# Patient Record
Sex: MALE | Race: WHITE | NOT HISPANIC OR LATINO | Employment: OTHER | URBAN - METROPOLITAN AREA
[De-identification: names, ages, dates, MRNs, and addresses within clinical notes are randomized per-mention and may not be internally consistent; named-entity substitution may affect disease eponyms.]

---

## 2017-01-14 RX ORDER — TAMSULOSIN HYDROCHLORIDE 0.4 MG/1
CAPSULE ORAL
Qty: 90 CAPSULE | Refills: 1 | Status: SHIPPED | OUTPATIENT
Start: 2017-01-14 | End: 2017-06-28 | Stop reason: SDUPTHER

## 2017-01-23 RX ORDER — HYDROCODONE BITARTRATE AND ACETAMINOPHEN 5; 325 MG/1; MG/1
1 TABLET ORAL EVERY 6 HOURS PRN
Qty: 120 TABLET | Refills: 0 | Status: SHIPPED | OUTPATIENT
Start: 2017-01-23 | End: 2017-02-22 | Stop reason: SDUPTHER

## 2017-01-23 NOTE — TELEPHONE ENCOUNTER
----- Message from Deepak Wasserman sent at 1/23/2017  9:32 AM CST -----  Contact: Patient  Patient needs a refill on Vikatin called into   [facility] [address] [place] [address] AT [facility] & [address] [address]  Phone: [phone] Fax: [phone]  Please call patient at [phone] to confirm that prescription has been called in. Thanks!

## 2017-01-23 NOTE — TELEPHONE ENCOUNTER
LV 9/27/16   He has appointment scheduled feb. 15th.   Pt is aware that he needs to come in every 3 months but that the soonest he could get in.

## 2017-02-01 ENCOUNTER — PATIENT OUTREACH (OUTPATIENT)
Dept: ADMINISTRATIVE | Facility: HOSPITAL | Age: 82
End: 2017-02-01

## 2017-02-08 ENCOUNTER — LAB VISIT (OUTPATIENT)
Dept: LAB | Facility: HOSPITAL | Age: 82
End: 2017-02-08
Attending: FAMILY MEDICINE
Payer: MEDICARE

## 2017-02-08 DIAGNOSIS — I10 ESSENTIAL HYPERTENSION: Chronic | ICD-10-CM

## 2017-02-08 DIAGNOSIS — E78.5 DYSLIPIDEMIA: Chronic | ICD-10-CM

## 2017-02-08 DIAGNOSIS — E11.8 TYPE 2 DIABETES MELLITUS WITH COMPLICATION, WITHOUT LONG-TERM CURRENT USE OF INSULIN: ICD-10-CM

## 2017-02-08 LAB
ALBUMIN SERPL BCP-MCNC: 3.9 G/DL
ALP SERPL-CCNC: 60 U/L
ALT SERPL W/O P-5'-P-CCNC: 17 U/L
ANION GAP SERPL CALC-SCNC: 11 MMOL/L
AST SERPL-CCNC: 19 U/L
BILIRUB SERPL-MCNC: 0.8 MG/DL
BUN SERPL-MCNC: 14 MG/DL
CALCIUM SERPL-MCNC: 9.3 MG/DL
CHLORIDE SERPL-SCNC: 105 MMOL/L
CHOLEST/HDLC SERPL: 2.4 {RATIO}
CO2 SERPL-SCNC: 24 MMOL/L
CREAT SERPL-MCNC: 1.1 MG/DL
EST. GFR  (AFRICAN AMERICAN): >60 ML/MIN/1.73 M^2
EST. GFR  (NON AFRICAN AMERICAN): >60 ML/MIN/1.73 M^2
GLUCOSE SERPL-MCNC: 102 MG/DL
HDL/CHOLESTEROL RATIO: 41.9 %
HDLC SERPL-MCNC: 129 MG/DL
HDLC SERPL-MCNC: 54 MG/DL
LDLC SERPL CALC-MCNC: 64.2 MG/DL
NONHDLC SERPL-MCNC: 75 MG/DL
POTASSIUM SERPL-SCNC: 4.2 MMOL/L
PROT SERPL-MCNC: 7 G/DL
SODIUM SERPL-SCNC: 140 MMOL/L
TRIGL SERPL-MCNC: 54 MG/DL
TSH SERPL DL<=0.005 MIU/L-ACNC: 1.88 UIU/ML

## 2017-02-08 PROCEDURE — 80061 LIPID PANEL: CPT

## 2017-02-08 PROCEDURE — 83036 HEMOGLOBIN GLYCOSYLATED A1C: CPT

## 2017-02-08 PROCEDURE — 84443 ASSAY THYROID STIM HORMONE: CPT

## 2017-02-08 PROCEDURE — 36415 COLL VENOUS BLD VENIPUNCTURE: CPT | Mod: PO

## 2017-02-08 PROCEDURE — 80053 COMPREHEN METABOLIC PANEL: CPT

## 2017-02-09 LAB
ESTIMATED AVG GLUCOSE: 123 MG/DL
HBA1C MFR BLD HPLC: 5.9 %

## 2017-02-14 RX ORDER — AMIODARONE HYDROCHLORIDE 200 MG/1
TABLET ORAL
Qty: 90 TABLET | Refills: 3 | Status: SHIPPED | OUTPATIENT
Start: 2017-02-14 | End: 2018-03-26 | Stop reason: SDUPTHER

## 2017-02-15 ENCOUNTER — OFFICE VISIT (OUTPATIENT)
Dept: FAMILY MEDICINE | Facility: CLINIC | Age: 82
End: 2017-02-15
Payer: MEDICARE

## 2017-02-15 VITALS
SYSTOLIC BLOOD PRESSURE: 130 MMHG | DIASTOLIC BLOOD PRESSURE: 70 MMHG | BODY MASS INDEX: 30.54 KG/M2 | HEART RATE: 48 BPM | HEIGHT: 68 IN | WEIGHT: 201.5 LBS | RESPIRATION RATE: 16 BRPM

## 2017-02-15 DIAGNOSIS — M51.9 LUMBAR DISC DISEASE: ICD-10-CM

## 2017-02-15 DIAGNOSIS — E11.8 TYPE 2 DIABETES MELLITUS WITH COMPLICATION, WITHOUT LONG-TERM CURRENT USE OF INSULIN: Primary | ICD-10-CM

## 2017-02-15 DIAGNOSIS — H04.203 WATERY EYES: ICD-10-CM

## 2017-02-15 DIAGNOSIS — E78.5 DYSLIPIDEMIA: ICD-10-CM

## 2017-02-15 DIAGNOSIS — E11.43 TYPE II DIABETES MELLITUS WITH PERIPHERAL AUTONOMIC NEUROPATHY: ICD-10-CM

## 2017-02-15 DIAGNOSIS — I10 ESSENTIAL HYPERTENSION: ICD-10-CM

## 2017-02-15 DIAGNOSIS — I25.10 ATHEROSCLEROSIS OF NATIVE CORONARY ARTERY OF NATIVE HEART WITHOUT ANGINA PECTORIS: ICD-10-CM

## 2017-02-15 PROCEDURE — 3075F SYST BP GE 130 - 139MM HG: CPT | Mod: S$GLB,,, | Performed by: FAMILY MEDICINE

## 2017-02-15 PROCEDURE — 1157F ADVNC CARE PLAN IN RCRD: CPT | Mod: S$GLB,,, | Performed by: FAMILY MEDICINE

## 2017-02-15 PROCEDURE — 99499 UNLISTED E&M SERVICE: CPT | Mod: S$GLB,,, | Performed by: FAMILY MEDICINE

## 2017-02-15 PROCEDURE — 99214 OFFICE O/P EST MOD 30 MIN: CPT | Mod: S$GLB,,, | Performed by: FAMILY MEDICINE

## 2017-02-15 PROCEDURE — 1126F AMNT PAIN NOTED NONE PRSNT: CPT | Mod: S$GLB,,, | Performed by: FAMILY MEDICINE

## 2017-02-15 PROCEDURE — 1159F MED LIST DOCD IN RCRD: CPT | Mod: S$GLB,,, | Performed by: FAMILY MEDICINE

## 2017-02-15 PROCEDURE — 1160F RVW MEDS BY RX/DR IN RCRD: CPT | Mod: S$GLB,,, | Performed by: FAMILY MEDICINE

## 2017-02-15 PROCEDURE — 3078F DIAST BP <80 MM HG: CPT | Mod: S$GLB,,, | Performed by: FAMILY MEDICINE

## 2017-02-15 PROCEDURE — 99999 PR PBB SHADOW E&M-EST. PATIENT-LVL III: CPT | Mod: PBBFAC,,, | Performed by: FAMILY MEDICINE

## 2017-02-15 RX ORDER — CICLOPIROX OLAMINE 7.7 MG/G
CREAM TOPICAL
Refills: 3 | COMMUNITY
Start: 2017-02-09 | End: 2017-05-24 | Stop reason: ALTCHOICE

## 2017-02-15 RX ORDER — QUETIAPINE FUMARATE 50 MG/1
TABLET, FILM COATED ORAL
COMMUNITY
Start: 2017-02-14 | End: 2017-03-29

## 2017-02-15 RX ORDER — TRIAMCINOLONE ACETONIDE 1 MG/G
CREAM TOPICAL
Refills: 1 | COMMUNITY
Start: 2017-02-09 | End: 2017-05-24 | Stop reason: ALTCHOICE

## 2017-02-15 NOTE — MR AVS SNAPSHOT
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         Essential hypertension         Dyslipidemia         Lumbar disc disease         Atherosclerosis of native coronary artery of native heart without angina pectoris         Watery eyes                To Do List           Future Appointments        Provider Department Dept Phone    2017 10:15 AM [facility] US1 [facility] [phone]    3/6/2017 2:30 PM DEAN Muir OD [facility] [phone]    3/29/2017 10:20 AM Don Amaro MD [facility] Cardiology [phone]    5/15/2017 10:40 AM Brodie Trotter MD [facility] [phone]      Goals (5 Years of Data)     None      [facility] On [facility]     [facility] Nurse Care [facility] -  Assistance  Registered nurses in the [facility] provide clinical advisement, health education, appointment booking, and other advisory services.  Call for this free service at [phone].             Medications           Message regarding Medications     Verify the changes and/or additions to your medication regime listed below are the same as discussed with your clinician today.  If any of these changes or additions are incorrect, please notify your healthcare provider.             Verify that the below list of medications is an accurate representation of the medications you are currently taking.  If none reported, the list may be blank. If incorrect,  "please contact your healthcare provider. Carry this list with you in case of emergency.           Current Medications     amiodarone (PACERONE) 200 MG Tab TAKE ONE TABLET BY MOUTH TWICE DAILY FOR 2 WEEKS, THEN ONE TABLET BY MOUTH DAILY THEREAFTER    atorvastatin (LIPITOR) 10 MG tablet Take 1 tablet (10 mg total) by mouth once daily.    cetirizine (ZYRTEC) 10 MG tablet Take 10 mg by mouth 2 (two) times daily.     ciclopirox (LOPROX) 0.77 % Crea APPLY TO FEET BID FOR 4 WEEKS    hydrocodone-acetaminophen 5-325mg (NORCO) 5-325 mg per tablet Take 1 tablet by mouth every 6 (six) hours as needed.    metformin (GLUCOPHAGE-XR) 500 MG 24 hr tablet Take 2 tablets (1,000 mg total) by mouth daily with dinner or evening meal.    nitroGLYCERIN (NITROQUICK) 0.4 MG SL tablet Place 0.4 mg under the tongue every 5 (five) minutes as needed.     omeprazole (PRILOSEC) 40 MG capsule Take 40 mg by mouth once daily.    ONE TOUCH ULTRASOFT LANCETS lancets TEST TWICE DAILY    oxybutynin (DITROPAN-XL) 10 MG 24 hr tablet Take 1 tablet (10 mg total) by mouth once daily.    quetiapine (SEROQUEL) 100 MG Tab TAKE 1 TABLET BY MOUTH EVERY NIGHT AT BEDTIME    quetiapine (SEROQUEL) 50 MG tablet     tamsulosin (FLOMAX) 0.4 mg Cp24 TAKE 1 CAPSULE BY MOUTH EVERY NIGHT AT BEDTIME    triamcinolone acetonide 0.1% (KENALOG) 0.1 % cream APPLY TO AFFECTED AREA BID PRN FOR ITCHING    azelastine (ASTELIN) 137 mcg (0.1 %) nasal spray 1 spray (137 mcg total) by Nasal route 2 (two) times daily.    fluticasone (FLONASE) 50 mcg/actuation nasal spray 1 spray by Each Nare route 2 (two) times daily.           Clinical Reference Information           Your Vitals Were     BP Pulse Resp Height Weight BMI    144/76 48 16 5' 8" (1.727 m) 91.4 kg (201 lb 8 oz) 30.64 kg/m2      Blood Pressure          Most Recent Value    BP  (!)  144/76      Allergies as of 2/15/2017     No Known Drug Allergies      Immunizations Administered on Date of Encounter - 2/15/2017     None    "   Orders Placed During Today's Visit      Normal Orders This Visit    Ambulatory referral to Optometry     Future Labs/Procedures Expected by Expires    US Carotid Bilateral  2/15/2017 2/15/2018      Language Assistance Services     ATTENTION: Language assistance services are available, free of charge. Please call [phone].      ATENCIÓN: Si habla rl, tiene a torres disposición servicios gratuitos de asistencia lingüística. Llame al [phone].     CHÚ Ý: N?u b?n nói Ti?ng Vi?t, có các d?ch v? h? tr? ngôn ng? mi?n phí dành cho b?n. G?i s? [phone].         [facility] complies with applicable Federal civil rights laws and does not discriminate on the basis of race, color, national origin, age, disability, or sex.

## 2017-02-15 NOTE — PROGRESS NOTES
Subjective:     THIS DOCUMENT WAS MADE IN PART WITH [employer] DICTATION SOFTWARE.  OCCASIONALLY THIS SOFTWARE WILL MISINTERPRET WORDS OR PHRASES.     Patient ID: Lázaro Wang is a 82 y.o. male.    Chief Complaint: Diabetes (follow up with labs;  foot exam and eye exam )    HPI   Type 2 diabetes, chronic condition.  Stable and satisfactory.  No hypoglycemia.  No recent eye exam, this will be scheduled.  He reports no acute vision changes but has noticed watery eyes recently.  He denies painful neuropathy but has had mild decreased sensation on exam in the past    Hypertension, chronic stable and well-controlled.  He does report occasionally it sounds like he hears his pulse in his head but has no headaches and has not had elevated blood pressure when checked.    Dyslipidemia, also stable.    Lumbar degenerative disc disease, chronic pain syndrome, chronic narcotic usage to control pain this has been stable and he has been compliant with follow-up.  Has been exercising 3-4 x week, he does feel better but has not had a reduction in his pain.    Recent cold symptoms with some sinus congestion post nasal drainage.  Overall doing better but has some popping and crackling in his ears when chewing.  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    BPV (benign positional vertigo) 02/17/2016    Nuclear sclerosis 02/17/2016    Posterior vitreous detachment of both eyes 02/17/2016    Blepharitis of both eyes  "02/17/2016    Hyperopia with astigmatism and presbyopia 02/17/2016     Resolved Ambulatory Problems     Diagnosis Date Noted    Degenerative arthritis of knee 03/15/2012    Bradycardia 05/23/2013    Hypoxemia 07/16/2014     Past Medical History   Diagnosis Date    Anxiety     Arthritis     Cataract     Coronary artery disease     Diabetes mellitus     Elevated cholesterol     GERD (gastroesophageal reflux disease)     Heart disease     Hypertension     Myocardial infarction     Prostate disorder     Trouble in sleeping            Review of Systems   HENT: Positive for congestion and postnasal drip.    Respiratory: Negative for shortness of breath and wheezing.    Cardiovascular: Negative for chest pain.   Neurological: Negative for tremors and weakness.       Objective:       Vitals:    02/15/17 1140   BP: (!) 144/76   Pulse: (!) 48   Resp: 16   Weight: 91.4 kg (201 lb 8 oz)   Height: 5' 8" (1.727 m)       Physical Exam   Constitutional: He is oriented to person, place, and time. He appears well-developed and well-nourished. No distress.   HENT:   Head: Normocephalic and atraumatic.   Right Ear: External ear normal.   Left Ear: External ear normal.   Mouth/Throat: Oropharynx is clear and moist. No oropharyngeal exudate.   Mild nasal congestion and erythema.  Both tympanic membranes are normal in both canals were clear.   Eyes: Conjunctivae are normal. No scleral icterus.   Cardiovascular: Normal rate, regular rhythm and normal heart sounds.    Pulses:       Dorsalis pedis pulses are 1+ on the right side, and 1+ on the left side.        Posterior tibial pulses are 1+ on the right side, and 1+ on the left side.   Pulmonary/Chest: Effort normal and breath sounds normal. No respiratory distress. He has no wheezes.   Feet:   Right Foot:   Protective Sensation: 10 sites tested. 9 sites sensed.   Skin Integrity: Negative for ulcer, skin breakdown or dry skin.   Left Foot:   Protective Sensation: 10 sites " tested. 9 sites sensed.   Skin Integrity: Negative for ulcer, skin breakdown or dry skin.   Neurological: He is alert and oriented to person, place, and time. Coordination normal.   Skin: He is not diaphoretic.   Psychiatric: He has a normal mood and affect. His behavior is normal.       Assessment:       1. Type 2 diabetes mellitus with complication, without long-term current use of insulin    2. 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 Essential hypertension    4. Dyslipidemia    5. Lumbar disc disease    6. Atherosclerosis of native coronary artery of native heart without angina pectoris     7. Watery eyes        Plan:       Lázaro was seen today for diabetes.    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  Follow back in 3 months

## 2017-02-16 ENCOUNTER — HOSPITAL ENCOUNTER (OUTPATIENT)
Dept: RADIOLOGY | Facility: HOSPITAL | Age: 82
Discharge: HOME | End: 2017-02-16
Attending: FAMILY MEDICINE
Payer: MEDICARE

## 2017-02-16 DIAGNOSIS — I25.10 ATHEROSCLEROSIS OF NATIVE CORONARY ARTERY OF NATIVE HEART WITHOUT ANGINA PECTORIS: ICD-10-CM

## 2017-02-16 PROCEDURE — 93880 EXTRACRANIAL BILAT STUDY: CPT | Mod: TC,PO

## 2017-02-16 PROCEDURE — 93880 EXTRACRANIAL BILAT STUDY: CPT | Mod: 26,,, | Performed by: RADIOLOGY

## 2017-02-22 RX ORDER — HYDROCODONE BITARTRATE AND ACETAMINOPHEN 5; 325 MG/1; MG/1
1 TABLET ORAL EVERY 6 HOURS PRN
Qty: 120 TABLET | Refills: 0 | Status: SHIPPED | OUTPATIENT
Start: 2017-02-22 | End: 2017-03-22 | Stop reason: SDUPTHER

## 2017-02-22 NOTE — TELEPHONE ENCOUNTER
----- Message from Nargis Schmitt sent at 2/22/2017  9:27 AM CST -----  Contact: self  Patient is requesting a refill on hydrocodone    Please send to    [facility] [id number] [place] [address] [place] [address] AT SEC [facility] & [address] [address]  Phone: [phone] Fax: [phone]

## 2017-03-06 ENCOUNTER — OFFICE VISIT (OUTPATIENT)
Dept: OPTOMETRY | Facility: CLINIC | Age: 82
End: 2017-03-06
Payer: MEDICARE

## 2017-03-06 DIAGNOSIS — Z13.5 GLAUCOMA SCREENING: ICD-10-CM

## 2017-03-06 DIAGNOSIS — H01.00B BLEPHARITIS OF UPPER AND LOWER EYELIDS OF BOTH EYES, UNSPECIFIED TYPE: ICD-10-CM

## 2017-03-06 DIAGNOSIS — E11.9 DIABETES MELLITUS TYPE 2 WITHOUT RETINOPATHY: Primary | ICD-10-CM

## 2017-03-06 DIAGNOSIS — H43.813 POSTERIOR VITREOUS DETACHMENT, BILATERAL: ICD-10-CM

## 2017-03-06 DIAGNOSIS — H01.00A BLEPHARITIS OF UPPER AND LOWER EYELIDS OF BOTH EYES, UNSPECIFIED TYPE: ICD-10-CM

## 2017-03-06 DIAGNOSIS — H25.13 NUCLEAR SCLEROSIS, BILATERAL: ICD-10-CM

## 2017-03-06 PROCEDURE — 92014 COMPRE OPH EXAM EST PT 1/>: CPT | Mod: S$GLB,,, | Performed by: OPTOMETRIST

## 2017-03-06 PROCEDURE — 99999 PR PBB SHADOW E&M-EST. PATIENT-LVL II: CPT | Mod: PBBFAC,,, | Performed by: OPTOMETRIST

## 2017-03-06 NOTE — MR AVS SNAPSHOT
[facility]  [address]  [facility] [address]  Phone: [phone]  Fax: [phone]                  Lázaro CHENG Felipe   3/6/2017 2:30 PM   Office Visit    Description:  Male : 10/8/1934   Provider:  DEAN Muir OD   Department:  [facility]           Reason for Visit     Annual Exam     Diabetic Eye Exam     Dry Eye     Spots and/or Floaters     Blurred Vision           Diagnoses this Visit        Comments    Diabetes mellitus type 2 without retinopathy    -  Primary     Nuclear sclerosis, bilateral         Posterior vitreous detachment, bilateral         Glaucoma screening                To Do List           Future Appointments        Provider Department Dept Phone    3/29/2017 10:20 AM Don Amaro MD [facility] Cardiology [phone]    5/15/2017 10:40 AM Brodie Trotter MD [facility] Family Medicine [phone]      Goals (5 Years of Data)     None      Follow-Up and Disposition     Return in about 1 year (around 3/6/2018) for Annual Diabetic Eye Exam.      [facility]     [facility] Nurse Care Line - 24/7 Assistance  Registered nurses in the [facility] provide clinical advisement, health education, appointment booking, and other advisory services.  Call for this free service at [phone].             Medications           Message regarding Medications     Verify the changes and/or additions to your medication regime listed below are the same as discussed with your clinician today.  If any of these changes or additions are incorrect, please notify your healthcare provider.             Verify that the below list of medications is an accurate representation of the medications you are currently taking.  If none reported, the list may be blank. If incorrect, please contact your healthcare provider. Carry this list with you in case of emergency.           Current Medications     amiodarone (PACERONE) 200 MG Tab TAKE ONE  TABLET BY MOUTH TWICE DAILY FOR 2 WEEKS, THEN ONE TABLET BY MOUTH DAILY THEREAFTER    atorvastatin (LIPITOR) 10 MG tablet Take 1 tablet (10 mg total) by mouth once daily.    azelastine (ASTELIN) 137 mcg (0.1 %) nasal spray 1 spray (137 mcg total) by Nasal route 2 (two) times daily.    cetirizine (ZYRTEC) 10 MG tablet Take 10 mg by mouth 2 (two) times daily.     ciclopirox (LOPROX) 0.77 % Crea APPLY TO FEET BID FOR 4 WEEKS    fluticasone (FLONASE) 50 mcg/actuation nasal spray 1 spray by Each Nare route 2 (two) times daily.    hydrocodone-acetaminophen 5-325mg (NORCO) 5-325 mg per tablet Take 1 tablet by mouth every 6 (six) hours as needed.    metformin (GLUCOPHAGE-XR) 500 MG 24 hr tablet Take 2 tablets (1,000 mg total) by mouth daily with dinner or evening meal.    nitroGLYCERIN (NITROQUICK) 0.4 MG SL tablet Place 0.4 mg under the tongue every 5 (five) minutes as needed.     omeprazole (PRILOSEC) 40 MG capsule Take 40 mg by mouth once daily.    ONE TOUCH ULTRASOFT LANCETS lancets TEST TWICE DAILY    oxybutynin (DITROPAN-XL) 10 MG 24 hr tablet Take 1 tablet (10 mg total) by mouth once daily.    quetiapine (SEROQUEL) 100 MG Tab TAKE 1 TABLET BY MOUTH EVERY NIGHT AT BEDTIME    quetiapine (SEROQUEL) 50 MG tablet     tamsulosin (FLOMAX) 0.4 mg Cp24 TAKE 1 CAPSULE BY MOUTH EVERY NIGHT AT BEDTIME    triamcinolone acetonide 0.1% (KENALOG) 0.1 % cream APPLY TO AFFECTED AREA BID PRN FOR ITCHING           Clinical Reference Information           Allergies as of 3/6/2017     No Known Drug Allergies      Immunizations Administered on Date of Encounter - 3/6/2017     None      Instructions    DIABETES AND THE EYE / DIABETIC RETINOPATHY    Diabetic retinopathy is a condition occurring in persons with diabetes, which causes progressive damage to the retina, the light sensitive lining at the back of the eye. It is a serious sight-threatening complication of diabetes.    Diabetic retinopathy is the result of damage to the tiny blood  vessels that nourish the retina. They leak blood and other fluids that cause swelling of retinal tissue and clouding of vision. The condition usually affects both eyes. The longer a person has diabetes, the more likely they will develop diabetic retinopathy. If left untreated, diabetic retinopathy can cause blindness.  There are two basic types of diabetic retinopathy:    Background or nonproliferative diabetic retinopathy (NPDR)  Nonproliferative diabetic retinopathy (NPDR) is the earliest stage of diabetic retinopathy. With this condition, damaged blood vessels in the retina begin to leak extra fluid and small amounts of blood into the eye. Sometimes, deposits of cholesterol or other fats from the blood may leak into the retina. Many people with diabetes have mild NPDR, which usually does not affect their vision. However, if their vision is affected, it is the result of macular edema and macular ischemia.    If vision is affected due to macular changes, a consult with a Retina Specialist may be advised.  This is an ophthalmologist that treats retina conditions, including diabetic retinopathy.     Proliferative diabetic retinopathy (PDR)  Proliferative diabetic retinopathy (PDR) mainly occurs when many of the blood vessels in the retina close, preventing enough blood flow. In an attempt to supply blood to the area where the original vessels closed, the retina responds by growing new blood vessels. This is called neovascularization. However, these new blood vessels are abnormal and do not supply the retina with proper blood flow. The new vessels are also often accompanied by scar tissue that may cause the retina to wrinkle or detach. PDR may cause more severe vision loss than NPDR because it can affect both central and peripheral vision.     A patient diagnosed with proliferative diabetic eye disease will be referred to a retinal specialist for consultation.    Often there are no visual symptoms in the early stages  of diabetic retinopathy. That is why our eye care professionals recommend that everyone with diabetes have a comprehensive dilated eye examination once a year. Early detection and treatment can limit the potential for significant vision loss from diabetic retinopathy.        FLASHES / FLOATERS / POSTERIOR VITREOUS DETACHMENT    Call the clinic if you have any further changes in symptoms.  Including:  Increased numbers of floaters or flashing lights, dimness or darkness that moves through or stays constant in your vision, or any pain in the eye (s).            Early Cataracts--not visually significant for surgery consultation.    What Are Cataracts?  A clear lens in the eye focuses light. This lets the eye see images sharply. With age, the lens slowly becomes cloudy. The cloudy lens is a cataract. A cataract scatters light and makes it hard for the eye to focus. Cataracts often form in both eyes. But one lens may cloud faster than the other.      The Aging of Your Lens    Your lens may cloud so slowly that you don`t notice any vision changes at first. But as the cataract gets worse, the eye has a harder time focusing. In early stages, glasses may help you see better. As the lens gets cloudier, your doctor may recommend surgery to restore your vision.         Language Assistance Services     ATTENTION: Language assistance services are available, free of charge. Please call [phone].      ATENCIÓN: Si dontrell shine, tiene a torres disposición servicios gratuitos de asistencia lingüística. Llame al [phone].     VITO Ý: N?u b?n nói Ti?ng Vi?t, có các d?ch v? h? tr? ngôn ng? mi?n phí dành cho b?n. G?i s? [phone].         [facility] complies with applicable Federal civil rights laws and does not discriminate on the basis of race, color, national origin, age, disability, or sex.

## 2017-03-06 NOTE — PROGRESS NOTES
HPI     Annual Exam    Additional comments: DLE 3-15 (shelley)     ocular health exam            Diabetic Eye Exam    Additional comments: BSL controlled           Dry Eye    Additional comments: OU tearing, +Murine gtts prn           Spots and/or Floaters    Additional comments: OU -- no light flashes           Blurred Vision    Additional comments: at distance only --- near [facility] good           Comments   Hemoglobin A1C       Date                     Value               Ref Range             Status                02/08/2017               5.9                 4.5 - 6.2 %           Final              Comment:    According to ADA guidelines, hemoglobin A1C <7.0% represents  optimal   control in non-pregnant diabetic patients.  Different  metrics may apply   to specific populations.   Standards of Medical Care in Diabetes -   2016.  For the purpose of screening for the presence of diabetes:  <5.7%       Consistent with the absence of diabetes  5.7-6.4%  Consistent with   increasing risk for diabetes   (prediabetes)  >or=6.5%  Consistent with   diabetes  Currently no consensus exists for use of hemoglobin A1C  for   diagnosis of diabetes for children.         05/24/2016               5.8                 4.5 - 6.2 %           Final                 10/16/2015               5.8                 4.5 - 6.2 %           Final            ----------         Last edited by Zoila Ortega on 3/6/2017  3:08 PM. (History)            Assessment /Plan     For exam results, see Encounter Report.    Diabetes mellitus type 2 without retinopathy    Nuclear sclerosis, bilateral    Posterior vitreous detachment, bilateral    Glaucoma screening      1. No ret/ no csme, gave info, control glucose, annual DFE  2. Vis sig, not ready for consult  Cautions driving, avoid night   Gave info  3. RD precautions given  4. Not suspect    Gave copy of specs rx, , consider changes at least for distance vision    Discussed and educated patient on current  findings /plan.  RTC 1 year, prn if any changes / issues

## 2017-03-06 NOTE — LETTER
March 6, 2017      Brodie Trotter MD  [address] [facility] [address] [place] [address]           [facility]  [address] [facility] [address] [place] [address]  Phone: [phone]  Fax: [phone]          Patient: Lázaro Wang   MR Number: [id number]   YOB: 1934   Date of Visit: 3/6/2017       Dear Dr. Brodie Trotter:    Thank you for referring Lázaro Wang to me for evaluation. Attached you will find relevant portions of my assessment and plan of care.    If you have questions, please do not hesitate to call me. I look forward to following Lázaro Wang along with you.    Sincerely,    DEAN Muir, OD    Enclosure  CC:  No Recipients    If you would like to receive this communication electronically, please contact [email or web address] or [phone] to request more information on [employer] access.    For providers and/or their staff who would like to refer a patient to [facility], please contact us through our one-stop-shop provider referral line, [facility] , at [phone].    If you feel you have received this communication in error or would no longer like to receive these types of communications, please e-mail [email or web address]

## 2017-03-22 RX ORDER — HYDROCODONE BITARTRATE AND ACETAMINOPHEN 5; 325 MG/1; MG/1
1 TABLET ORAL EVERY 6 HOURS PRN
Qty: 120 TABLET | Refills: 0 | Status: SHIPPED | OUTPATIENT
Start: 2017-03-22 | End: 2017-04-21 | Stop reason: SDUPTHER

## 2017-03-22 NOTE — TELEPHONE ENCOUNTER
----- Message from Sam Monteiro sent at 3/22/2017  2:55 PM CDT -----  Contact: Patient  Patient states that he is ready for a refill for the hydrocodone-acetaminophen 5-325mg (NORCO) 5-325 mg per tablets.  Any questions, you can call [phone].  Thank you      [facility] [address] [place] [address] AT SEC of [facility] & [address] [address]  Phone: [phone] Fax: [phone]

## 2017-03-29 ENCOUNTER — OFFICE VISIT (OUTPATIENT)
Dept: CARDIOLOGY | Facility: CLINIC | Age: 82
End: 2017-03-29
Payer: MEDICARE

## 2017-03-29 VITALS
DIASTOLIC BLOOD PRESSURE: 70 MMHG | HEART RATE: 53 BPM | SYSTOLIC BLOOD PRESSURE: 145 MMHG | HEIGHT: 68 IN | BODY MASS INDEX: 30.61 KG/M2 | WEIGHT: 201.94 LBS

## 2017-03-29 DIAGNOSIS — I10 ESSENTIAL HYPERTENSION: Chronic | ICD-10-CM

## 2017-03-29 DIAGNOSIS — I25.10 CORONARY ARTERY DISEASE INVOLVING NATIVE CORONARY ARTERY OF NATIVE HEART WITHOUT ANGINA PECTORIS: Chronic | ICD-10-CM

## 2017-03-29 DIAGNOSIS — E78.5 DYSLIPIDEMIA: Primary | Chronic | ICD-10-CM

## 2017-03-29 PROCEDURE — 99999 PR PBB SHADOW E&M-EST. PATIENT-LVL III: CPT | Mod: PBBFAC,,, | Performed by: INTERNAL MEDICINE

## 2017-03-29 PROCEDURE — 1160F RVW MEDS BY RX/DR IN RCRD: CPT | Mod: S$GLB,,, | Performed by: INTERNAL MEDICINE

## 2017-03-29 PROCEDURE — 1159F MED LIST DOCD IN RCRD: CPT | Mod: S$GLB,,, | Performed by: INTERNAL MEDICINE

## 2017-03-29 PROCEDURE — 1157F ADVNC CARE PLAN IN RCRD: CPT | Mod: S$GLB,,, | Performed by: INTERNAL MEDICINE

## 2017-03-29 PROCEDURE — 99499 UNLISTED E&M SERVICE: CPT | Mod: S$GLB,,, | Performed by: INTERNAL MEDICINE

## 2017-03-29 PROCEDURE — 1126F AMNT PAIN NOTED NONE PRSNT: CPT | Mod: S$GLB,,, | Performed by: INTERNAL MEDICINE

## 2017-03-29 PROCEDURE — 3078F DIAST BP <80 MM HG: CPT | Mod: S$GLB,,, | Performed by: INTERNAL MEDICINE

## 2017-03-29 PROCEDURE — 3077F SYST BP >= 140 MM HG: CPT | Mod: S$GLB,,, | Performed by: INTERNAL MEDICINE

## 2017-03-29 PROCEDURE — 99214 OFFICE O/P EST MOD 30 MIN: CPT | Mod: S$GLB,,, | Performed by: INTERNAL MEDICINE

## 2017-03-29 NOTE — PROGRESS NOTES
Subjective:    Patient ID:  Lázaro Wang is a 82 y.o. male who presents for follow-up of Coronary Artery Disease (4 month f/u ) and Hypertension      Coronary Artery Disease   Presents for follow-up visit. Symptoms include shortness of breath. Pertinent negatives include no chest pain, chest pressure, chest tightness, dizziness, leg swelling, muscle weakness, palpitations or weight gain. The symptoms have been stable. Compliance with diet is good. Compliance with exercise is poor. Compliance with medications is good.   Hypertension   Associated symptoms include shortness of breath. Pertinent negatives include no chest pain or palpitations.       Review of Systems   Constitution: Negative for weight gain.   Cardiovascular: Negative for chest pain, leg swelling and palpitations.   Respiratory: Positive for shortness of breath. Negative for chest tightness.    Musculoskeletal: Negative for muscle weakness.   Neurological: Negative for dizziness.   All other systems reviewed and are negative.       Objective:    Physical Exam   Constitutional: He is oriented to person, place, and time. He appears well-developed and well-nourished.   HENT:   Head: Normocephalic and atraumatic.   Eyes: Conjunctivae and EOM are normal. Pupils are equal, round, and reactive to light. Right eye exhibits no discharge. Left eye exhibits no discharge. No scleral icterus.   Neck: Normal range of motion. Neck supple. No JVD present. No tracheal deviation present. No thyromegaly present.   Cardiovascular: Normal rate.  Exam reveals no gallop and no friction rub.    Murmur heard.   Harsh midsystolic murmur is present with a grade of 2/6  at the upper right sternal border radiating to the neck  Pulses:       Carotid pulses are 2+ on the right side, and 2+ on the left side.       Dorsalis pedis pulses are 2+ on the right side, and 2+ on the left side.        Posterior tibial pulses are 2+ on the right side, and 2+ on the left side.    Pulmonary/Chest: Effort normal and breath sounds normal. No stridor. No respiratory distress. He has no wheezes. He has no rales.   Abdominal: Soft. Bowel sounds are normal. He exhibits no distension. There is no tenderness. There is no rebound and no guarding.   Musculoskeletal: Normal range of motion. He exhibits no edema, tenderness or deformity.   Lymphadenopathy:     He has no cervical adenopathy.   Neurological: He is alert and oriented to person, place, and time.   Skin: Skin is warm and dry. No rash noted. No erythema. No pallor.   Psychiatric: He has a normal mood and affect. His behavior is normal. Judgment and thought content normal.         Assessment:       1. Dyslipidemia    2. Coronary artery disease involving native coronary artery of native heart without angina pectoris    3. Essential hypertension         Plan:       Dyslipidemia    Coronary artery disease involving native coronary artery of native heart without angina pectoris    Essential hypertension    Other orders  -     mirabegron 50 mg Tb24; Take 1 tablet (50 mg total) by mouth once daily.  Dispense: 30 tablet; Refill: 11    Decrease amiodarone to 3 days a week.  Decrease atorvastatin to 3 days a week.

## 2017-04-11 RX ORDER — ATORVASTATIN CALCIUM 10 MG/1
TABLET, FILM COATED ORAL
Qty: 90 TABLET | Refills: 3 | Status: SHIPPED | OUTPATIENT
Start: 2017-04-11 | End: 2017-06-28 | Stop reason: SDUPTHER

## 2017-04-21 NOTE — TELEPHONE ENCOUNTER
----- Message from Lesly Hardy sent at 4/21/2017  1:29 PM CDT -----  hydrocodone-acetaminophen 5-325mg (NORCO) 5-325 mg per     [facility]    Call back

## 2017-04-23 RX ORDER — HYDROCODONE BITARTRATE AND ACETAMINOPHEN 5; 325 MG/1; MG/1
1 TABLET ORAL EVERY 6 HOURS PRN
Qty: 120 TABLET | Refills: 0 | Status: SHIPPED | OUTPATIENT
Start: 2017-04-23 | End: 2017-05-22 | Stop reason: SDUPTHER

## 2017-05-15 ENCOUNTER — OFFICE VISIT (OUTPATIENT)
Dept: FAMILY MEDICINE | Facility: CLINIC | Age: 82
End: 2017-05-15
Payer: MEDICARE

## 2017-05-15 VITALS
BODY MASS INDEX: 31.28 KG/M2 | WEIGHT: 206.38 LBS | DIASTOLIC BLOOD PRESSURE: 68 MMHG | HEART RATE: 51 BPM | RESPIRATION RATE: 16 BRPM | SYSTOLIC BLOOD PRESSURE: 144 MMHG | HEIGHT: 68 IN

## 2017-05-15 DIAGNOSIS — R19.8 CHANGE IN BOWEL FUNCTION: ICD-10-CM

## 2017-05-15 DIAGNOSIS — F11.90 CHRONIC NARCOTIC USE: ICD-10-CM

## 2017-05-15 DIAGNOSIS — Z12.11 COLON CANCER SCREENING: ICD-10-CM

## 2017-05-15 DIAGNOSIS — E11.43 TYPE II DIABETES MELLITUS WITH PERIPHERAL AUTONOMIC NEUROPATHY: ICD-10-CM

## 2017-05-15 DIAGNOSIS — I10 ESSENTIAL HYPERTENSION: ICD-10-CM

## 2017-05-15 DIAGNOSIS — R10.9 ABDOMINAL PAIN, UNSPECIFIED LOCATION: ICD-10-CM

## 2017-05-15 DIAGNOSIS — M51.9 LUMBAR DISC DISEASE: Primary | ICD-10-CM

## 2017-05-15 PROCEDURE — 99499 UNLISTED E&M SERVICE: CPT | Mod: S$GLB,,, | Performed by: FAMILY MEDICINE

## 2017-05-15 PROCEDURE — 3078F DIAST BP <80 MM HG: CPT | Mod: S$GLB,,, | Performed by: FAMILY MEDICINE

## 2017-05-15 PROCEDURE — 1160F RVW MEDS BY RX/DR IN RCRD: CPT | Mod: S$GLB,,, | Performed by: FAMILY MEDICINE

## 2017-05-15 PROCEDURE — 1126F AMNT PAIN NOTED NONE PRSNT: CPT | Mod: S$GLB,,, | Performed by: FAMILY MEDICINE

## 2017-05-15 PROCEDURE — 99999 PR PBB SHADOW E&M-EST. PATIENT-LVL III: CPT | Mod: PBBFAC,,, | Performed by: FAMILY MEDICINE

## 2017-05-15 PROCEDURE — 1159F MED LIST DOCD IN RCRD: CPT | Mod: S$GLB,,, | Performed by: FAMILY MEDICINE

## 2017-05-15 PROCEDURE — 3077F SYST BP >= 140 MM HG: CPT | Mod: S$GLB,,, | Performed by: FAMILY MEDICINE

## 2017-05-15 PROCEDURE — 99214 OFFICE O/P EST MOD 30 MIN: CPT | Mod: S$GLB,,, | Performed by: FAMILY MEDICINE

## 2017-05-15 NOTE — PROGRESS NOTES
Subjective:     THIS DOCUMENT WAS MADE IN PART WITH [employer] SOFTWARE.  OCCASIONALLY THIS SOFTWARE WILL MISINTERPRET WORDS OR PHRASES.     Patient ID: Lázaro Wang is a 82 y.o. male.    Chief Complaint: Diabetes (follow up )    HPI     Here today primarily for chronic pain management  Lumbar deg disc disease, has been stable,  But states he has chronic pain, states he is compliant with hydrocodone in this greatly improves his life without any adverse effects or any 'euphoric effect'    'stomach aches', after eating, feels bloated, generalized fullness and discomfort  Stopped prilosec because read negaitve  But no return of reflux symtoms    T2 diabetes has been stable, no changes, no hypoglycemia or other issues. Health maintenance topics up-to-date    Fatigue, not sure if snoring, does not sleep in the same room as his wife. Fatigue is a chronic problem has worsened recently. He does not complain of chest pain or shortness of breath or exercise intolerance. 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    BPV (benign positional vertigo) 02/17/2016    Nuclear sclerosis 02/17/2016    Posterior vitreous detachment of both eyes 02/17/2016    Blepharitis of both eyes 02/17/2016    Hyperopia with astigmatism and presbyopia 02/17/2016     Resolved Ambulatory Problems     Diagnosis Date Noted    Degenerative arthritis of knee 03/15/2012    Bradycardia 05/23/2013    Hypoxemia 07/16/2014     Past Medical  "History:   Diagnosis Date    Anxiety     Arthritis     Cataract     Coronary artery disease     Diabetes mellitus     Elevated cholesterol     GERD (gastroesophageal reflux disease)     Heart disease     Hypertension     Myocardial infarction     Prostate disorder     Trouble in sleeping          Review of Systems   Constitutional: Negative for chills, fever and unexpected weight change.   Respiratory: Negative for shortness of breath and wheezing.    Cardiovascular: Negative for chest pain.   Musculoskeletal: Positive for arthralgias and back pain.   Psychiatric/Behavioral: Positive for sleep disturbance.       Objective:       Vitals:    05/15/17 1040   BP: (!) 144/68   Pulse: (!) 51   Resp: 16   Weight: 93.6 kg (206 lb 5.6 oz)   Height: 5' 8" (1.727 m)       Physical Exam   Constitutional: He is oriented to person, place, and time. He appears well-developed and well-nourished. No distress.   HENT:   Head: Normocephalic and atraumatic.   Eyes: Conjunctivae are normal. No scleral icterus.   Cardiovascular: Regular rhythm.  Bradycardia present.    Pulmonary/Chest: Effort normal. No respiratory distress.   Neurological: He is alert and oriented to person, place, and time. Coordination normal.   Skin: He is not diaphoretic.   Psychiatric: He has a normal mood and affect. His behavior is normal.       Assessment:       1. Lumbar disc disease    2. Chronic narcotic use    3. Essential hypertension    4. 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 Abdominal pain, unspecified location    6. Change in bowel function    7. Colon cancer screening        Plan:       Lázaro was seen today for diabetes.    Diagnoses and all orders for this visit:    Lumbar disc disease  Chronic narcotic use   Refill his hydrocodone as needed. He reports that there may be some logistical issues as he will be spending a few months out of town. He will attempt to just simply call once a month so I can send it " electronically but let me know there any other issues.    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 He is on a narcotic but does take the Miralax daily. Last colonoscopy was about nine years ago he was advised to repeat 6 to 7 years after that. If symptoms do not improve or change he should consult with gastroenterology    Colon cancer screening  -     Case request GI: COLONOSCOPY

## 2017-05-15 NOTE — MR AVS SNAPSHOT
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     Registered nurses in the [facility] provide: appointment scheduling, clinical advisement, health education, and other advisory services.  Call: [phone] (toll free)               Medications           Message regarding Medications     Verify the changes and/or additions to your medication regime listed below are the same as discussed with your clinician today.  If any of these changes or additions are incorrect, please notify your healthcare provider.        STOP taking these medications     omeprazole (PRILOSEC) 40 MG capsule Take 40 mg by mouth once daily.    oxybutynin (DITROPAN-XL) 10 MG 24 hr tablet Take 1 tablet (10 mg total) by mouth once daily.           Verify that the below list of medications is an accurate representation of the medications you are currently taking.  If none reported, the list may be blank. If incorrect,  "please contact your healthcare provider. Carry this list with you in case of emergency.           Current Medications     amiodarone (PACERONE) 200 MG Tab TAKE ONE TABLET BY MOUTH TWICE DAILY FOR 2 WEEKS, THEN ONE TABLET BY MOUTH DAILY THEREAFTER    atorvastatin (LIPITOR) 10 MG tablet TAKE 1 TABLET BY MOUTH EVERY DAY    cetirizine (ZYRTEC) 10 MG tablet Take 10 mg by mouth 2 (two) times daily.     hydrocodone-acetaminophen 5-325mg (NORCO) 5-325 mg per tablet Take 1 tablet by mouth every 6 (six) hours as needed.    metformin (GLUCOPHAGE-XR) 500 MG 24 hr tablet Take 2 tablets (1,000 mg total) by mouth daily with dinner or evening meal.    mirabegron 50 mg Tb24 Take 1 tablet (50 mg total) by mouth once daily.    nitroGLYCERIN (NITROQUICK) 0.4 MG SL tablet Place 0.4 mg under the tongue every 5 (five) minutes as needed.     ONE TOUCH ULTRASOFT LANCETS lancets TEST TWICE DAILY    quetiapine (SEROQUEL) 100 MG Tab TAKE 1 TABLET BY MOUTH EVERY NIGHT AT BEDTIME    tamsulosin (FLOMAX) 0.4 mg Cp24 TAKE 1 CAPSULE BY MOUTH EVERY NIGHT AT BEDTIME    azelastine (ASTELIN) 137 mcg (0.1 %) nasal spray 1 spray (137 mcg total) by Nasal route 2 (two) times daily.    ciclopirox (LOPROX) 0.77 % Crea APPLY TO FEET BID FOR 4 WEEKS    fluticasone (FLONASE) 50 mcg/actuation nasal spray 1 spray by Each Nare route 2 (two) times daily.    triamcinolone acetonide 0.1% (KENALOG) 0.1 % cream APPLY TO AFFECTED AREA BID PRN FOR ITCHING           Clinical Reference Information           Your Vitals Were     BP Pulse Resp Height Weight BMI    144/68 51 16 5' 8" (1.727 m) 93.6 kg (206 lb 5.6 oz) 31.38 kg/m2      Blood Pressure          Most Recent Value    BP  (!)  144/68      Allergies as of 5/15/2017     No Known Drug Allergies      Immunizations Administered on Date of Encounter - 5/15/2017     None      Orders Placed During Today's Visit      Normal Orders This Visit    Case request GI: COLONOSCOPY     Future Labs/Procedures Expected by Expires    US " Abdomen Complete  5/15/2017 5/16/2018      Language Assistance Services     ATTENTION: Language assistance services are available, free of charge. Please call [phone].      ATENCIÓN: Si hablucio shine, tiene a torres disposición servicios gratuitos de asistencia lingüística. Llame al [phone].     CHÚ Ý: N?u b?n nói Ti?ng Vi?t, có các d?ch v? h? tr? ngôn ng? mi?n phí dành cho b?n. G?i s? [phone].         [facility] complies with applicable Federal civil rights laws and does not discriminate on the basis of race, color, national origin, age, disability, or sex.

## 2017-05-22 RX ORDER — HYDROCODONE BITARTRATE AND ACETAMINOPHEN 5; 325 MG/1; MG/1
1 TABLET ORAL EVERY 6 HOURS PRN
Qty: 120 TABLET | Refills: 0 | Status: SHIPPED | OUTPATIENT
Start: 2017-05-22 | End: 2017-06-01 | Stop reason: SDUPTHER

## 2017-05-22 NOTE — TELEPHONE ENCOUNTER
----- Message from Lesly Hardy sent at 5/22/2017  1:00 PM CDT -----  Contact: ihsan   hydrocodone-acetaminophen 5-325mg (NORCO) 5-325 mg per tablet  .  [facility] [id number] [place] [address] AT [facility] & [address] [address]  Phone: [phone] Fax: [phone]     Call back

## 2017-05-25 ENCOUNTER — ANESTHESIA EVENT (OUTPATIENT)
Dept: ENDOSCOPY | Facility: HOSPITAL | Age: 82
End: 2017-05-25
Payer: MEDICARE

## 2017-05-25 ENCOUNTER — HOSPITAL ENCOUNTER (OUTPATIENT)
Facility: HOSPITAL | Age: 82
Discharge: HOME | End: 2017-05-25
Attending: INTERNAL MEDICINE | Admitting: INTERNAL MEDICINE
Payer: MEDICARE

## 2017-05-25 ENCOUNTER — SURGERY (OUTPATIENT)
Age: 82
End: 2017-05-25

## 2017-05-25 ENCOUNTER — ANESTHESIA (OUTPATIENT)
Dept: ENDOSCOPY | Facility: HOSPITAL | Age: 82
End: 2017-05-25
Payer: MEDICARE

## 2017-05-25 VITALS — RESPIRATION RATE: 23 BRPM

## 2017-05-25 DIAGNOSIS — R19.4 CHANGE IN BOWEL HABITS: ICD-10-CM

## 2017-05-25 LAB — GLUCOSE SERPL-MCNC: 108 MG/DL (ref 70–110)

## 2017-05-25 PROCEDURE — G0105 COLORECTAL SCRN; HI RISK IND: HCPCS | Mod: PO | Performed by: INTERNAL MEDICINE

## 2017-05-25 PROCEDURE — 82962 GLUCOSE BLOOD TEST: CPT | Mod: PO | Performed by: INTERNAL MEDICINE

## 2017-05-25 PROCEDURE — 37000009 HC ANESTHESIA EA ADD 15 MINS: Mod: PO | Performed by: INTERNAL MEDICINE

## 2017-05-25 PROCEDURE — D9220A PRA ANESTHESIA: Mod: CRNA,,, | Performed by: NURSE ANESTHETIST, CERTIFIED REGISTERED

## 2017-05-25 PROCEDURE — G0121 COLON CA SCRN NOT HI RSK IND: HCPCS | Mod: ,,, | Performed by: INTERNAL MEDICINE

## 2017-05-25 PROCEDURE — 37000008 HC ANESTHESIA 1ST 15 MINUTES: Mod: PO | Performed by: INTERNAL MEDICINE

## 2017-05-25 PROCEDURE — G0121 COLON CA SCRN NOT HI RSK IND: HCPCS | Mod: PO | Performed by: INTERNAL MEDICINE

## 2017-05-25 PROCEDURE — 25000003 PHARM REV CODE 250: Mod: PO | Performed by: NURSE ANESTHETIST, CERTIFIED REGISTERED

## 2017-05-25 PROCEDURE — 25000003 PHARM REV CODE 250: Mod: PO | Performed by: INTERNAL MEDICINE

## 2017-05-25 PROCEDURE — 63600175 PHARM REV CODE 636 W HCPCS: Mod: PO | Performed by: NURSE ANESTHETIST, CERTIFIED REGISTERED

## 2017-05-25 PROCEDURE — D9220A PRA ANESTHESIA: Mod: ANES,,, | Performed by: ANESTHESIOLOGY

## 2017-05-25 RX ORDER — SODIUM CHLORIDE 0.9 % (FLUSH) 0.9 %
3 SYRINGE (ML) INJECTION
Status: DISCONTINUED | OUTPATIENT
Start: 2017-05-25 | End: 2017-05-25 | Stop reason: HOSPADM

## 2017-05-25 RX ORDER — LIDOCAINE HCL/PF 100 MG/5ML
SYRINGE (ML) INTRAVENOUS
Status: DISCONTINUED | OUTPATIENT
Start: 2017-05-25 | End: 2017-05-25

## 2017-05-25 RX ORDER — PROPOFOL 10 MG/ML
VIAL (ML) INTRAVENOUS
Status: DISCONTINUED | OUTPATIENT
Start: 2017-05-25 | End: 2017-05-25

## 2017-05-25 RX ORDER — SODIUM CHLORIDE, SODIUM LACTATE, POTASSIUM CHLORIDE, CALCIUM CHLORIDE 600; 310; 30; 20 MG/100ML; MG/100ML; MG/100ML; MG/100ML
INJECTION, SOLUTION INTRAVENOUS CONTINUOUS
Status: DISCONTINUED | OUTPATIENT
Start: 2017-05-26 | End: 2017-05-25 | Stop reason: HOSPADM

## 2017-05-25 RX ADMIN — PROPOFOL 30 MG: 10 INJECTION, EMULSION INTRAVENOUS at 10:05

## 2017-05-25 RX ADMIN — PROPOFOL 50 MG: 10 INJECTION, EMULSION INTRAVENOUS at 10:05

## 2017-05-25 RX ADMIN — PROPOFOL 30 MG: 10 INJECTION, EMULSION INTRAVENOUS at 11:05

## 2017-05-25 RX ADMIN — SODIUM CHLORIDE, SODIUM LACTATE, POTASSIUM CHLORIDE, AND CALCIUM CHLORIDE: .6; .31; .03; .02 INJECTION, SOLUTION INTRAVENOUS at 09:05

## 2017-05-25 RX ADMIN — LIDOCAINE HYDROCHLORIDE 50 MG: 20 INJECTION, SOLUTION INTRAVENOUS at 10:05

## 2017-05-25 NOTE — DISCHARGE INSTRUCTIONS
Procedural Sedation (Adult)  You have been given medicine by vein to make you sleep during your surgery. This may have included both a pain medicine and sleeping medicine. Most of the effects have worn off. But you may still have some drowsiness for the next 6 to 8 hours.  Home care  Follow these guidelines when you get home:  · For the next 8 hours, you should be watched by a responsible adult. This person should make sure your condition is not getting worse.  · Don't take any medicine by mouth for pain or for sleep during the next 4 hours. These might react with the medicines you were given in the hospital. This could cause a much stronger response than usual.  · Don't drink any alcohol for the next 24 hours.  · Don't drive, operate dangerous machinery, or make important business or personal decisions during the next 24 hours.  Follow-up care  Follow up with your healthcare provider if you are not alert and back to your usual level of activity within 12 hours.  When to seek medical advice  Call your healthcare provider right away if any of these occur:  · Drowsiness gets worse  · Weakness or dizziness gets worse  · Repeated vomiting  · You cannot be awakened   Date Last Reviewed: 10/18/2016  © 4467-2145 [employer]. [address] [place] [address], [place] [address]. All rights reserved. This information is not intended as a substitute for professional medical care. Always follow your healthcare professional's instructions.

## 2017-05-25 NOTE — DISCHARGE SUMMARY
Discharge Note  Short Stay      SUMMARY     Admit Date: 5/25/2017    Attending Physician: Nito Larsen MD     Discharge Physician: Nito Larsen MD    Discharge Date: 5/25/2017 11:11 AM    Final Diagnosis: Colon cancer screening [Z12.11]  Change in bowel function [R19.4]  Abdominal pain, unspecified location [R10.9]    Disposition: HOME OR SELF CARE    Patient Instructions:   Current Discharge Medication List      CONTINUE these medications which have NOT CHANGED    Details   amiodarone (PACERONE) 200 MG Tab TAKE ONE TABLET BY MOUTH TWICE DAILY FOR 2 WEEKS, THEN ONE TABLET BY MOUTH DAILY THEREAFTER  Qty: 90 tablet, Refills: 3      atorvastatin (LIPITOR) 10 MG tablet TAKE 1 TABLET BY MOUTH EVERY DAY  Qty: 90 tablet, Refills: 3      cetirizine (ZYRTEC) 10 MG tablet Take 10 mg by mouth 2 (two) times daily.       hydrocodone-acetaminophen 5-325mg (NORCO) 5-325 mg per tablet Take 1 tablet by mouth every 6 (six) hours as needed.  Qty: 120 tablet, Refills: 0      metformin (GLUCOPHAGE-XR) 500 MG 24 hr tablet Take 2 tablets (1,000 mg total) by mouth daily with dinner or evening meal.  Qty: 180 tablet, Refills: 6      mirabegron 50 mg Tb24 Take 1 tablet (50 mg total) by mouth once daily.  Qty: 30 tablet, Refills: 11      ONE TOUCH ULTRASOFT LANCETS lancets TEST TWICE DAILY  Qty: 180 each, Refills: PRN    Comments: **Patient requests 90 day supply**      quetiapine (SEROQUEL) 100 MG Tab TAKE 1 TABLET BY MOUTH EVERY NIGHT AT BEDTIME  Qty: 90 tablet, Refills: 1      tamsulosin (FLOMAX) 0.4 mg Cp24 TAKE 1 CAPSULE BY MOUTH EVERY NIGHT AT BEDTIME  Qty: 90 capsule, Refills: 1      nitroGLYCERIN (NITROQUICK) 0.4 MG SL tablet Place 0.4 mg under the tongue every 5 (five) minutes as needed.              Discharge Procedure Orders (must include Diet, Follow-up, Activity)    Follow Up:  Follow up with PCP as previously scheduled  Resume routine diet.  Activity as tolerated.    No driving day of procedure.

## 2017-05-25 NOTE — H&P
History & Physical - Short Stay  Gastroenterology      SUBJECTIVE:     Procedure: Colonoscopy    Chief Complaint/Indication for Procedure: Screening    PTA Medications   Medication Sig    amiodarone (PACERONE) 200 MG Tab TAKE ONE TABLET BY MOUTH TWICE DAILY FOR 2 WEEKS, THEN ONE TABLET BY MOUTH DAILY THEREAFTER    atorvastatin (LIPITOR) 10 MG tablet TAKE 1 TABLET BY MOUTH EVERY DAY    cetirizine (ZYRTEC) 10 MG tablet Take 10 mg by mouth 2 (two) times daily.     hydrocodone-acetaminophen 5-325mg (NORCO) 5-325 mg per tablet Take 1 tablet by mouth every 6 (six) hours as needed.    metformin (GLUCOPHAGE-XR) 500 MG 24 hr tablet Take 2 tablets (1,000 mg total) by mouth daily with dinner or evening meal.    mirabegron 50 mg Tb24 Take 1 tablet (50 mg total) by mouth once daily.    ONE TOUCH ULTRASOFT LANCETS lancets TEST TWICE DAILY    quetiapine (SEROQUEL) 100 MG Tab TAKE 1 TABLET BY MOUTH EVERY NIGHT AT BEDTIME    tamsulosin (FLOMAX) 0.4 mg Cp24 TAKE 1 CAPSULE BY MOUTH EVERY NIGHT AT BEDTIME    nitroGLYCERIN (NITROQUICK) 0.4 MG SL tablet Place 0.4 mg under the tongue every 5 (five) minutes as needed.        Review of patient's allergies indicates:   Allergen Reactions    No known drug allergies         Past Medical History:   Diagnosis Date    Anxiety     Arthritis     Cataract     OU    Coronary artery disease     stents x3    Diabetes mellitus     LBSL 70 today---stable    Elevated cholesterol     GERD (gastroesophageal reflux disease)     Heart disease     Hypertension     Myocardial infarction     Prostate disorder     Trouble in sleeping      Past Surgical History:   Procedure Laterality Date    CARDIAC SURGERY      3 stents    CIRCUMCISION      COLONOSCOPY  11/14/2008  Ray    Diverticulosis.  Small Internal hemorrhoids.    COLONOSCOPY W/ POLYPECTOMY  12/2002    Adenomatous polyp    COLONOSCOPY W/ POLYPECTOMY  10/12/2005  Ray    One 2-3 mm polyp in the rectum. ADENOMATOUS POLYP.   Diverticulosis.  Internal hemorrhoids.      ESOPHAGOGASTRODUODENOSCOPY  10/12/2005  Ray    Reflux esophagitis.  Small Hiatal Hernia.  Esophageal spasm?  Antral erythema.  CLOtest negative.    TOTAL KNEE ARTHROPLASTY      bilateral     Family History   Problem Relation Age of Onset    Glaucoma Mother     Cancer Father      esophageal, did not die of     Social History   Substance Use Topics    Smoking status: Never Smoker    Smokeless tobacco: Never Used    Alcohol use No         OBJECTIVE:     Vital Signs (Most Recent)  Temp: 97.3 °F (36.3 °C) (05/25/17 0948)  Pulse: (!) 53 (05/25/17 0948)  Resp: 16 (05/25/17 0948)  BP: (!) 152/77 (05/25/17 0948)  SpO2: 97 % (05/25/17 0948)    Physical Exam                                                        GENERAL:  Comfortable, in no acute distress.                                 HEENT EXAM:  Nonicteric.  No adenopathy.  Oropharynx is clear.               NECK:  Supple.                                                               LUNGS:  Clear.                                                               CARDIAC:  Regular rate and rhythm.  S1, S2.  No murmur.                      ABDOMEN:  Soft, positive bowel sounds, nontender.  No hepatosplenomegaly or masses.  No rebound or guarding.                                             EXTREMITIES:  No edema.     MENTAL STATUS:  Normal, alert and oriented.      ASSESSMENT/PLAN:     Assessment: Colorectal cancer screening    Plan: Colonoscopy    Anesthesia Plan: General    ASA Grade: ASA 2 - Patient with mild systemic disease with no functional limitations    MALLAMPATI SCORE:  I (soft palate, uvula, fauces, and tonsillar pillars visible)

## 2017-05-25 NOTE — ANESTHESIA PREPROCEDURE EVALUATION
05/25/2017  Lázaro Wang is a 82 y.o., male.    Anesthesia Evaluation      I have reviewed the Medications.     Review of Systems  Anesthesia Hx:  No problems with previous Anesthesia   Social:  Non-Smoker, No Alcohol Use    Cardiovascular:   Hypertension CAD  CABG/stent (stents 2008)  hyperlipidemia    Pulmonary:  Pulmonary Normal    Renal/:  Renal/ Normal     Hepatic/GI:  Hepatic/GI Normal    Musculoskeletal:   Daily narcotics Spine Disorders: lumbar Chronic Pain and Degenerative disease    Neurological:   Peripheral Neuropathy    Endocrine:   Diabetes    Psych:   anxiety          Physical Exam  General:  Obesity    Airway/Jaw/Neck:  Airway Findings: Mouth Opening: Normal General Airway Assessment: Adult  Mallampati: III  Jaw/Neck Findings:  Neck ROM: Extension Decreased, Mild       Chest/Lungs:  Chest/Lungs Findings: Clear to auscultation, Normal Respiratory Rate     Heart/Vascular:  Heart Findings: Rate: Normal  Rhythm: Regular Rhythm  Sounds: Normal  Heart murmur: negative Vascular Findings: Normal (No carotid bruits.)       Mental Status:  Mental Status Findings:  Cooperative, Alert and Oriented         Anesthesia Plan  Type of Anesthesia, risks & benefits discussed:  Anesthesia Type:  general  Patient's Preference:   Intra-op Monitoring Plan:   Intra-op Monitoring Plan Comments:   Post Op Pain Control Plan:   Post Op Pain Control Plan Comments:   Induction:   IV  Beta Blocker:  Patient is not currently on a Beta-Blocker (No further documentation required).       Informed Consent: Patient understands risks and agrees with Anesthesia plan.  Questions answered. Anesthesia consent signed with patient.  ASA Score: 3     Day of Surgery Review of History & Physical:        Anesthesia Plan Notes: Propofol general.        Ready For Surgery From Anesthesia Perspective.

## 2017-05-25 NOTE — ANESTHESIA POSTPROCEDURE EVALUATION
"Anesthesia Post Evaluation    Patient: Lázaro Wang    Procedure(s) Performed: Procedure(s) (LRB):  COLONOSCOPY (N/A)    Final Anesthesia Type: general  Patient location during evaluation: PACU  Patient participation: Yes- Able to Participate  Level of consciousness: awake and alert  Post-procedure vital signs: reviewed and stable  Pain management: adequate  Airway patency: patent  PONV status at discharge: No PONV  Anesthetic complications: no      Cardiovascular status: hemodynamically stable and blood pressure returned to baseline  Respiratory status: unassisted, spontaneous ventilation and room air  Hydration status: euvolemic  Follow-up not needed.        Visit Vitals  BP (!) 179/90 (BP Location: Left arm, Patient Position: Sitting, BP Method: Automatic)   Pulse (!) 55   Temp 36.5 °C (97.7 °F) (Skin)   Resp 16   Ht 5' 8" (1.727 m)   Wt 93.4 kg (206 lb)   SpO2 100%   BMI 31.32 kg/m²       Pain/Juana Score: Pain Assessment Performed: Yes (5/25/2017 11:45 AM)  Presence of Pain: denies (5/25/2017 11:45 AM)  Juana Score: 10 (5/25/2017 11:45 AM)      "

## 2017-05-25 NOTE — TRANSFER OF CARE
"Anesthesia Transfer of Care Note    Patient: Lázaro Wang    Procedure(s) Performed: Procedure(s) (LRB):  COLONOSCOPY (N/A)    Patient location: PACU    Anesthesia Type: general    Transport from OR: Transported from OR on room air with adequate spontaneous ventilation    Post pain: adequate analgesia    Post assessment: no apparent anesthetic complications and tolerated procedure well    Post vital signs: stable    Level of consciousness: awake and sedated    Nausea/Vomiting: no nausea/vomiting    Complications: none          Last vitals:   Visit Vitals  BP (!) 152/77 (BP Location: Right arm, Patient Position: Lying, BP Method: Automatic)   Pulse (!) 53   Temp 36.3 °C (97.3 °F) (Temporal)   Resp 16   Ht 5' 8" (1.727 m)   Wt 93.4 kg (206 lb)   SpO2 97%   BMI 31.32 kg/m²     "

## 2017-05-26 ENCOUNTER — HOSPITAL ENCOUNTER (OUTPATIENT)
Dept: RADIOLOGY | Facility: HOSPITAL | Age: 82
Discharge: HOME | End: 2017-05-26
Attending: FAMILY MEDICINE
Payer: MEDICARE

## 2017-05-26 VITALS
WEIGHT: 206 LBS | SYSTOLIC BLOOD PRESSURE: 179 MMHG | RESPIRATION RATE: 16 BRPM | HEART RATE: 55 BPM | DIASTOLIC BLOOD PRESSURE: 90 MMHG | BODY MASS INDEX: 31.22 KG/M2 | OXYGEN SATURATION: 100 % | HEIGHT: 68 IN | TEMPERATURE: 98 F

## 2017-05-26 DIAGNOSIS — R10.9 ABDOMINAL PAIN, UNSPECIFIED LOCATION: ICD-10-CM

## 2017-05-26 PROCEDURE — 76700 US EXAM ABDOM COMPLETE: CPT | Mod: 26,,, | Performed by: RADIOLOGY

## 2017-05-26 PROCEDURE — 76700 US EXAM ABDOM COMPLETE: CPT | Mod: TC,PO

## 2017-06-01 RX ORDER — HYDROCODONE BITARTRATE AND ACETAMINOPHEN 5; 325 MG/1; MG/1
1 TABLET ORAL EVERY 6 HOURS PRN
Qty: 120 TABLET | Refills: 0 | Status: SHIPPED | OUTPATIENT
Start: 2017-07-31 | End: 2017-09-22 | Stop reason: SDUPTHER

## 2017-06-01 RX ORDER — HYDROCODONE BITARTRATE AND ACETAMINOPHEN 5; 325 MG/1; MG/1
1 TABLET ORAL EVERY 6 HOURS PRN
Qty: 120 TABLET | Refills: 0 | Status: SHIPPED | OUTPATIENT
Start: 2017-07-01 | End: 2017-06-01 | Stop reason: SDUPTHER

## 2017-06-01 RX ORDER — HYDROCODONE BITARTRATE AND ACETAMINOPHEN 5; 325 MG/1; MG/1
1 TABLET ORAL EVERY 6 HOURS PRN
Qty: 120 TABLET | Refills: 0 | Status: SHIPPED | OUTPATIENT
Start: 2017-06-01 | End: 2017-06-01 | Stop reason: SDUPTHER

## 2017-06-01 NOTE — TELEPHONE ENCOUNTER
----- Message from Adrianna Will sent at 6/1/2017 10:14 AM CDT -----  Contact: self  Patient called regarding a medication refill for Norco. Will be leaving for 4 months. Last year was given 3 hardcopy prescriptions for while away. Stating spoke to Dr. Trotter recently and he was ok with it. Please contact [phone] (home)

## 2017-06-12 ENCOUNTER — TELEPHONE (OUTPATIENT)
Dept: FAMILY MEDICINE | Facility: CLINIC | Age: 82
End: 2017-06-12

## 2017-06-12 NOTE — TELEPHONE ENCOUNTER
Pharmacy in [place] calling to verify that patient had active prescription for Norco that he was trying to fill. Prescription verified.

## 2017-06-19 ENCOUNTER — TELEPHONE (OUTPATIENT)
Dept: FAMILY MEDICINE | Facility: CLINIC | Age: 82
End: 2017-06-19

## 2017-06-19 RX ORDER — LANCETS
EACH MISCELLANEOUS
Qty: 180 EACH | Status: SHIPPED | OUTPATIENT
Start: 2017-06-19 | End: 2017-12-29 | Stop reason: SDUPTHER

## 2017-06-19 NOTE — TELEPHONE ENCOUNTER
----- Message from Jeni Khanna sent at 6/19/2017 12:29 PM CDT -----  Patient is requesting a refill on his test strips and lancets.  Patient uses a One Touch Ultra meter.       Pharmacy is [facility] in [place]     Please call patient back at [phone] on his cell. They are in [place] for the summer.     Thank you.

## 2017-06-20 ENCOUNTER — TELEPHONE (OUTPATIENT)
Dept: FAMILY MEDICINE | Facility: CLINIC | Age: 82
End: 2017-06-20

## 2017-06-20 NOTE — TELEPHONE ENCOUNTER
June 4th he had a abd ultrasound done last of may.  i read him the results, he verbalizes understanding.  he doenst know what patient portal is or what a My Chart is.   Since he has been in [place], he continues with his abdominal discomfort,  He said it is usually after he eats. It ranges from a 4-7 on pain scale he takes IB Guard and it seems to help.   He will be in [place] until September. He understands if pain get worrisome or worse he needs to go to urgent care clinic in [facility].

## 2017-06-20 NOTE — TELEPHONE ENCOUNTER
----- Message from Yvon CHENG Jaun sent at 6/20/2017  2:53 PM CDT -----  Contact: same  Patient called in and stated he is in [place] for the summer and Dr. Trotter was to send over a refill for his Rx for his diabetic strips.  (meter is: One Touch Ultra).    Patient stated that a Rx was sent properly and to the correct pharmacy but [employer] and the Pharmacy needs additional information.    Patient call back number is [phone]

## 2017-06-20 NOTE — TELEPHONE ENCOUNTER
i spoke with pt he will check with [employer] to see what type of diabetic machine they cover with his insurance.

## 2017-06-23 ENCOUNTER — TELEPHONE (OUTPATIENT)
Dept: FAMILY MEDICINE | Facility: CLINIC | Age: 82
End: 2017-06-23

## 2017-06-23 NOTE — TELEPHONE ENCOUNTER
----- Message from Tin Friedman sent at 6/23/2017  2:15 PM CDT -----  Contact: Patient  Patient stated that he changed pharmacist,change to [facility] mail order. Requesting a Rx for blood reading machine. Please call back at [phone] to discuss. Thanks,

## 2017-06-23 NOTE — TELEPHONE ENCOUNTER
Spoke with pt he stated we will be receiving info from [employer] about diabetic supplies. Please advise.

## 2017-06-28 RX ORDER — TAMSULOSIN HYDROCHLORIDE 0.4 MG/1
1 CAPSULE ORAL NIGHTLY
Qty: 90 CAPSULE | Refills: 1 | Status: SHIPPED | OUTPATIENT
Start: 2017-06-28 | End: 2019-09-06

## 2017-06-28 RX ORDER — ATORVASTATIN CALCIUM 10 MG/1
10 TABLET, FILM COATED ORAL DAILY
Qty: 90 TABLET | Refills: 3 | Status: SHIPPED | OUTPATIENT
Start: 2017-06-28 | End: 2019-08-23

## 2017-06-28 RX ORDER — QUETIAPINE FUMARATE 100 MG/1
100 TABLET, FILM COATED ORAL NIGHTLY
Qty: 90 TABLET | Refills: 1 | Status: SHIPPED | OUTPATIENT
Start: 2017-06-28 | End: 2017-12-28 | Stop reason: SDUPTHER

## 2017-06-28 RX ORDER — LANCETS 33 GAUGE
1 EACH MISCELLANEOUS 2 TIMES DAILY
Qty: 200 EACH | Refills: 4 | Status: SHIPPED | OUTPATIENT
Start: 2017-06-28 | End: 2020-07-07 | Stop reason: SDUPTHER

## 2017-06-28 RX ORDER — METFORMIN HYDROCHLORIDE 500 MG/1
1000 TABLET, EXTENDED RELEASE ORAL
Qty: 180 TABLET | Refills: 3 | Status: SHIPPED | OUTPATIENT
Start: 2017-06-28 | End: 2017-08-22 | Stop reason: SDUPTHER

## 2017-06-28 NOTE — TELEPHONE ENCOUNTER
I refill the medication sent to me as well as the atorvastatin.     As you stated, the amiodarone MUST come from cardiology, no exceptions on this.     The Flomax and the Mirabegron  Should come from urology. I went ahead and filled the Flomax but the other one should come from his urologist.

## 2017-06-28 NOTE — TELEPHONE ENCOUNTER
I called [facility] to let them know that Dr Bauer fills pt amiodarone, atorvastatin, and mirabergron, they will send message to dr cavanaugh for these meds.

## 2017-06-28 NOTE — TELEPHONE ENCOUNTER
----- Message from Tressa Landaverde sent at 6/28/2017  3:24 PM CDT -----  Contact: Thais with [facility] at [phone]   Thais with [facility] at [phone] , calling to get new Rx for this patient to start with the mail order company. Asking for 90 day supplies on all of the following Rx   amiodarone (PACERONE) 200 MG Tab    atorvastatin (LIPITOR) 10 MG tablet    metformin (GLUCOPHAGE-XR) 500 MG 24 hr tablet    mirabegron 50 mg Tb24    quetiapine (SEROQUEL) 100 MG Tab (possible 50mg-not sure)    tamsulosin (FLOMAX) 0.4 mg Cp24     [employer] trumetrix air meter  [employer] trueplus 33G lancets    Please fax to [phone]

## 2017-07-24 ENCOUNTER — TELEPHONE (OUTPATIENT)
Dept: FAMILY MEDICINE | Facility: CLINIC | Age: 82
End: 2017-07-24

## 2017-07-24 NOTE — TELEPHONE ENCOUNTER
----- Message from Brianne Chua sent at 7/24/2017 10:27 AM CDT -----  [facility] pharmacy at [phone] called regarding medication: Norco for above patient. They are requesting Rx prior authorization. Thanks!

## 2017-07-28 RX ORDER — QUETIAPINE FUMARATE 50 MG/1
TABLET, FILM COATED ORAL
Qty: 135 TABLET | Refills: 0 | Status: SHIPPED | OUTPATIENT
Start: 2017-07-28 | End: 2017-09-27

## 2017-08-22 RX ORDER — METFORMIN HYDROCHLORIDE 500 MG/1
TABLET, EXTENDED RELEASE ORAL
Qty: 180 TABLET | Refills: 0 | Status: SHIPPED | OUTPATIENT
Start: 2017-08-22 | End: 2017-10-31

## 2017-08-25 DIAGNOSIS — E11.9 TYPE 2 DIABETES MELLITUS WITHOUT COMPLICATION: ICD-10-CM

## 2017-09-13 ENCOUNTER — PATIENT OUTREACH (OUTPATIENT)
Dept: ADMINISTRATIVE | Facility: HOSPITAL | Age: 82
End: 2017-09-13

## 2017-09-13 NOTE — LETTER
September 13, 2017    Lázaro Wang  [address]  [place] [address]             [facility]  [address]  [facility] [address]  Phone: [phone] Dear Mr. Wang:    [facility] is committed to your overall health.  To help you get the most out of each of your visits, we will review your information to make sure you are up to date on all of your recommended tests and/or procedures.      Dr. Brodie Trotter has found that you may be due for some labs for your diabetes and a flu immunization.     Medicare does not cover all immunizations to be given in the clinic.  Check your benefits to ensure that you do not need to receive your immunizations at the pharmacy.    If you have had any of the above done at another facility, please bring the records or information with you so that your record at [facility] will be complete.  If you would like to schedule any of these, please contact me.    If you are currently taking medication, please bring it with you to your appointment for review.    Also, if you have any type of Advanced Directives, please bring them with you to your office visit so we may scan them into your chart.    If you have any questions or concerns, please don't hesitate to call.    Thank you for letting us care for you,  Britt Schmitt LPN Clinical Care Coordinator  [facility] [place]  [phone]

## 2017-09-22 RX ORDER — HYDROCODONE BITARTRATE AND ACETAMINOPHEN 5; 325 MG/1; MG/1
1 TABLET ORAL EVERY 6 HOURS PRN
Qty: 120 TABLET | Refills: 0 | Status: SHIPPED | OUTPATIENT
Start: 2017-09-22 | End: 2017-10-23 | Stop reason: SDUPTHER

## 2017-09-22 NOTE — TELEPHONE ENCOUNTER
LOV:5/15/17 and NOV 9/27/2017.  [place] refill [facility].  Requesting 1 week of medication for use until appt next week.        ----- Message from Sam Monteiro sent at 9/22/2017  9:42 AM CDT -----  Contact: Patient  Patient states that he needs a refill for his Vicodin and to please send it to his local pharmacy.  Any questions, you can call the patient back at [phone] or his cell [phone].   Thank you      [facility] [address] [place] [address] AT SEC [facility] & [address] [address]  Phone: [phone] Fax: [phone]

## 2017-09-27 ENCOUNTER — LAB VISIT (OUTPATIENT)
Dept: LAB | Facility: HOSPITAL | Age: 82
End: 2017-09-27
Attending: FAMILY MEDICINE
Payer: MEDICARE

## 2017-09-27 ENCOUNTER — OFFICE VISIT (OUTPATIENT)
Dept: FAMILY MEDICINE | Facility: CLINIC | Age: 82
End: 2017-09-27
Payer: MEDICARE

## 2017-09-27 VITALS
WEIGHT: 196.44 LBS | HEIGHT: 68 IN | SYSTOLIC BLOOD PRESSURE: 132 MMHG | OXYGEN SATURATION: 97 % | HEART RATE: 59 BPM | BODY MASS INDEX: 29.77 KG/M2 | TEMPERATURE: 98 F | DIASTOLIC BLOOD PRESSURE: 62 MMHG

## 2017-09-27 DIAGNOSIS — E11.9 TYPE 2 DIABETES MELLITUS WITHOUT COMPLICATION: ICD-10-CM

## 2017-09-27 DIAGNOSIS — E11.8 TYPE 2 DIABETES MELLITUS WITH COMPLICATION, WITHOUT LONG-TERM CURRENT USE OF INSULIN: ICD-10-CM

## 2017-09-27 DIAGNOSIS — R10.9 ABDOMINAL PAIN, UNSPECIFIED LOCATION: Primary | ICD-10-CM

## 2017-09-27 DIAGNOSIS — I10 ESSENTIAL HYPERTENSION: ICD-10-CM

## 2017-09-27 DIAGNOSIS — R10.9 ABDOMINAL PAIN, UNSPECIFIED LOCATION: ICD-10-CM

## 2017-09-27 DIAGNOSIS — E78.5 DYSLIPIDEMIA: ICD-10-CM

## 2017-09-27 LAB
ALBUMIN SERPL BCP-MCNC: 3.6 G/DL
ALP SERPL-CCNC: 71 U/L
ALT SERPL W/O P-5'-P-CCNC: 12 U/L
ANION GAP SERPL CALC-SCNC: 7 MMOL/L
AST SERPL-CCNC: 16 U/L
BILIRUB SERPL-MCNC: 0.6 MG/DL
BUN SERPL-MCNC: 14 MG/DL
CALCIUM SERPL-MCNC: 9 MG/DL
CHLORIDE SERPL-SCNC: 103 MMOL/L
CO2 SERPL-SCNC: 29 MMOL/L
CREAT SERPL-MCNC: 1.2 MG/DL
EST. GFR  (AFRICAN AMERICAN): >60 ML/MIN/1.73 M^2
EST. GFR  (NON AFRICAN AMERICAN): 56 ML/MIN/1.73 M^2
ESTIMATED AVG GLUCOSE: 114 MG/DL
GLUCOSE SERPL-MCNC: 139 MG/DL
HBA1C MFR BLD HPLC: 5.6 %
POTASSIUM SERPL-SCNC: 4.2 MMOL/L
PROT SERPL-MCNC: 6.7 G/DL
SODIUM SERPL-SCNC: 139 MMOL/L

## 2017-09-27 PROCEDURE — 1159F MED LIST DOCD IN RCRD: CPT | Mod: S$GLB,,, | Performed by: FAMILY MEDICINE

## 2017-09-27 PROCEDURE — 36415 COLL VENOUS BLD VENIPUNCTURE: CPT | Mod: PO

## 2017-09-27 PROCEDURE — 80053 COMPREHEN METABOLIC PANEL: CPT

## 2017-09-27 PROCEDURE — 83036 HEMOGLOBIN GLYCOSYLATED A1C: CPT

## 2017-09-27 PROCEDURE — 90662 IIV NO PRSV INCREASED AG IM: CPT | Mod: S$GLB,,, | Performed by: FAMILY MEDICINE

## 2017-09-27 PROCEDURE — 99214 OFFICE O/P EST MOD 30 MIN: CPT | Mod: S$GLB,,, | Performed by: FAMILY MEDICINE

## 2017-09-27 PROCEDURE — 1126F AMNT PAIN NOTED NONE PRSNT: CPT | Mod: S$GLB,,, | Performed by: FAMILY MEDICINE

## 2017-09-27 PROCEDURE — 99499 UNLISTED E&M SERVICE: CPT | Mod: S$GLB,,, | Performed by: FAMILY MEDICINE

## 2017-09-27 PROCEDURE — 3078F DIAST BP <80 MM HG: CPT | Mod: S$GLB,,, | Performed by: FAMILY MEDICINE

## 2017-09-27 PROCEDURE — 99999 PR PBB SHADOW E&M-EST. PATIENT-LVL III: CPT | Mod: PBBFAC,,, | Performed by: FAMILY MEDICINE

## 2017-09-27 PROCEDURE — 3075F SYST BP GE 130 - 139MM HG: CPT | Mod: S$GLB,,, | Performed by: FAMILY MEDICINE

## 2017-09-27 PROCEDURE — 3008F BODY MASS INDEX DOCD: CPT | Mod: S$GLB,,, | Performed by: FAMILY MEDICINE

## 2017-09-27 PROCEDURE — G0008 ADMIN INFLUENZA VIRUS VAC: HCPCS | Mod: S$GLB,,, | Performed by: FAMILY MEDICINE

## 2017-09-27 RX ORDER — BLOOD-GLUCOSE METER
EACH MISCELLANEOUS
COMMUNITY
Start: 2017-06-29 | End: 2021-02-19 | Stop reason: SDUPTHER

## 2017-09-27 NOTE — PROGRESS NOTES
Subjective:     THIS DOCUMENT WAS MADE IN PART WITH [employer] DICTATION SOFTWARE.  OCCASIONALLY THIS SOFTWARE WILL MISINTERPRET WORDS OR PHRASES.     Patient ID: Lázaro Wang is a 82 y.o. male.    Chief Complaint: Follow-up (pt c/o indigestion for last 6 months )    HPI   Type 2 diabetes, this chronic condition has been stable and satisfactory.  Hypertension is stable and well-controlled.  Lipids have been stable on current medication.  Gastrointestinal symptoms continue come a unchanged from last report describes fullness bloating and generalized discomfort after eating.  No specific foods or triggers.  He does have some constipation but feels that is well managed with the MiraLAX.  No melena or hematochezia.  He did have a colonoscopy earlier this year without any significant worrisome findings.  An ultrasound did not reveal any explanation for his symptoms.  Chronic back pain and muscular skeletal pain.  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    BPV (benign positional vertigo) 02/17/2016    Nuclear sclerosis 02/17/2016    Posterior vitreous detachment of both eyes 02/17/2016    Blepharitis of both eyes 02/17/2016    Hyperopia with astigmatism and presbyopia 02/17/2016    Change in bowel habits 05/25/2017     Resolved Ambulatory Problems     Diagnosis Date Noted    Degenerative arthritis of knee 03/15/2012    Bradycardia 05/23/2013    Hypoxemia  07/16/2014     Past Medical History:   Diagnosis Date    Anxiety     Arthritis     Cataract     Coronary artery disease     Diabetes mellitus     Elevated cholesterol     GERD (gastroesophageal reflux disease)     Heart disease     Hypertension     Myocardial infarction     Prostate disorder     Trouble in sleeping          Review of Systems   Constitutional: Negative for chills, fever and unexpected weight change.   Respiratory: Negative for shortness of breath and wheezing.    Cardiovascular: Negative for chest pain.   Gastrointestinal:        As above     Musculoskeletal: Positive for arthralgias and back pain.   Psychiatric/Behavioral: Positive for sleep disturbance.       Objective:      Physical Exam   Constitutional: He is oriented to person, place, and time. He appears well-developed and well-nourished. No distress.   HENT:   Head: Normocephalic and atraumatic.   Eyes: Conjunctivae are normal. No scleral icterus.   Cardiovascular: Regular rhythm.  Bradycardia present.    Pulmonary/Chest: Effort normal and breath sounds normal. No respiratory distress. He has no wheezes.   Abdominal: Soft. Bowel sounds are normal. He exhibits no distension. There is no tenderness.   Neurological: He is alert and oriented to person, place, and time. Coordination normal.   Skin: He is not diaphoretic.   Psychiatric: He has a normal mood and affect. His behavior is normal.       Assessment:       1. Abdominal pain, unspecified location    2. Type 2 diabetes mellitus with complication, without long-term current use of insulin    3. Essential hypertension    4. Dyslipidemia        Plan:             Lázaro was seen today for follow-up.    Diagnoses and all orders for this visit:    Abdominal pain, unspecified location  -     Ambulatory referral to Gastroenterology  -     Comprehensive metabolic panel; Future  Hold metformin for 1-2 weeks.  If symptoms improve let me know, otherwise consult with gastroenterology.    Type  2 diabetes mellitus with complication, without long-term current use of insulin  -     Comprehensive metabolic panel; Future    Essential hypertension  Stable    Dyslipidemia  Stable    Other orders  -     Cancel: mirabegron 50 mg Tb24; Take 1 tablet (50 mg total) by mouth once daily.  -     Influenza - High Dose (65+) (PF) (IM)

## 2017-10-23 RX ORDER — HYDROCODONE BITARTRATE AND ACETAMINOPHEN 5; 325 MG/1; MG/1
1 TABLET ORAL EVERY 6 HOURS PRN
Qty: 120 TABLET | Refills: 0 | Status: SHIPPED | OUTPATIENT
Start: 2017-10-23 | End: 2017-11-22 | Stop reason: SDUPTHER

## 2017-10-23 NOTE — TELEPHONE ENCOUNTER
----- Message from Yvon CHENG Jaun sent at 10/23/2017  9:31 AM CDT -----  Contact: same  Patient called in and is requesting a refill on his Rx for his Riidr 5/325      [id number] [facility] [place] [address] AT SEC [facility] [address]  [address] [place] [address]  Phone: [phone] Fax: [phone]    Patient call back number is [phone]

## 2017-10-30 ENCOUNTER — TELEPHONE (OUTPATIENT)
Dept: FAMILY MEDICINE | Facility: CLINIC | Age: 82
End: 2017-10-30

## 2017-10-30 NOTE — TELEPHONE ENCOUNTER
Attemped to contact pt to confirm HRA appt on both home and cell numbers. No answer. LVM to call office if appt needs to be resched.

## 2017-10-31 ENCOUNTER — TELEPHONE (OUTPATIENT)
Dept: FAMILY MEDICINE | Facility: CLINIC | Age: 82
End: 2017-10-31

## 2017-10-31 ENCOUNTER — OFFICE VISIT (OUTPATIENT)
Dept: FAMILY MEDICINE | Facility: CLINIC | Age: 82
End: 2017-10-31
Payer: MEDICARE

## 2017-10-31 VITALS
BODY MASS INDEX: 30.87 KG/M2 | WEIGHT: 203.69 LBS | SYSTOLIC BLOOD PRESSURE: 139 MMHG | HEART RATE: 58 BPM | DIASTOLIC BLOOD PRESSURE: 70 MMHG | HEIGHT: 68 IN

## 2017-10-31 DIAGNOSIS — H52.03 HYPEROPIA OF BOTH EYES WITH ASTIGMATISM AND PRESBYOPIA: ICD-10-CM

## 2017-10-31 DIAGNOSIS — I25.10 CORONARY ARTERY DISEASE INVOLVING NATIVE CORONARY ARTERY OF NATIVE HEART WITHOUT ANGINA PECTORIS: Chronic | ICD-10-CM

## 2017-10-31 DIAGNOSIS — K21.9 GASTROESOPHAGEAL REFLUX DISEASE, ESOPHAGITIS PRESENCE NOT SPECIFIED: ICD-10-CM

## 2017-10-31 DIAGNOSIS — I77.819 ECTASIS AORTA: ICD-10-CM

## 2017-10-31 DIAGNOSIS — H43.813 POSTERIOR VITREOUS DETACHMENT OF BOTH EYES: ICD-10-CM

## 2017-10-31 DIAGNOSIS — N32.81 OVERACTIVE BLADDER: ICD-10-CM

## 2017-10-31 DIAGNOSIS — H01.00B BLEPHARITIS OF UPPER AND LOWER EYELIDS OF BOTH EYES, UNSPECIFIED TYPE: ICD-10-CM

## 2017-10-31 DIAGNOSIS — N18.30 TYPE 2 DIABETES MELLITUS WITH STAGE 3 CHRONIC KIDNEY DISEASE, WITHOUT LONG-TERM CURRENT USE OF INSULIN: ICD-10-CM

## 2017-10-31 DIAGNOSIS — R45.4 IRRITABILITY: ICD-10-CM

## 2017-10-31 DIAGNOSIS — M51.9 LUMBAR DISC DISEASE: ICD-10-CM

## 2017-10-31 DIAGNOSIS — H01.00A BLEPHARITIS OF UPPER AND LOWER EYELIDS OF BOTH EYES, UNSPECIFIED TYPE: ICD-10-CM

## 2017-10-31 DIAGNOSIS — Z00.00 ENCOUNTER FOR PREVENTIVE HEALTH EXAMINATION: Primary | ICD-10-CM

## 2017-10-31 DIAGNOSIS — Z98.61 HISTORY OF PTCA: Chronic | ICD-10-CM

## 2017-10-31 DIAGNOSIS — F41.9 ANXIETY: ICD-10-CM

## 2017-10-31 DIAGNOSIS — H52.4 HYPEROPIA OF BOTH EYES WITH ASTIGMATISM AND PRESBYOPIA: ICD-10-CM

## 2017-10-31 DIAGNOSIS — E11.43 TYPE II DIABETES MELLITUS WITH PERIPHERAL AUTONOMIC NEUROPATHY: ICD-10-CM

## 2017-10-31 DIAGNOSIS — E11.8 TYPE 2 DIABETES MELLITUS WITH COMPLICATION, WITHOUT LONG-TERM CURRENT USE OF INSULIN: ICD-10-CM

## 2017-10-31 DIAGNOSIS — I77.9 CAROTID DISEASE, BILATERAL: ICD-10-CM

## 2017-10-31 DIAGNOSIS — E11.22 TYPE 2 DIABETES MELLITUS WITH STAGE 3 CHRONIC KIDNEY DISEASE, WITHOUT LONG-TERM CURRENT USE OF INSULIN: ICD-10-CM

## 2017-10-31 DIAGNOSIS — E11.8 TYPE 2 DIABETES MELLITUS WITH COMPLICATION, WITHOUT LONG-TERM CURRENT USE OF INSULIN: Primary | ICD-10-CM

## 2017-10-31 DIAGNOSIS — H25.13 NUCLEAR SCLEROSIS OF BOTH EYES: ICD-10-CM

## 2017-10-31 DIAGNOSIS — I10 ESSENTIAL HYPERTENSION: Chronic | ICD-10-CM

## 2017-10-31 DIAGNOSIS — H52.203 HYPEROPIA OF BOTH EYES WITH ASTIGMATISM AND PRESBYOPIA: ICD-10-CM

## 2017-10-31 DIAGNOSIS — E78.5 DYSLIPIDEMIA: Chronic | ICD-10-CM

## 2017-10-31 PROBLEM — R19.4 CHANGE IN BOWEL HABITS: Status: RESOLVED | Noted: 2017-05-25 | Resolved: 2017-10-31

## 2017-10-31 PROCEDURE — G0439 PPPS, SUBSEQ VISIT: HCPCS | Mod: S$GLB,,, | Performed by: NURSE PRACTITIONER

## 2017-10-31 PROCEDURE — 99499 UNLISTED E&M SERVICE: CPT | Mod: S$GLB,,, | Performed by: NURSE PRACTITIONER

## 2017-10-31 PROCEDURE — 99999 PR PBB SHADOW E&M-EST. PATIENT-LVL IV: CPT | Mod: PBBFAC,,, | Performed by: NURSE PRACTITIONER

## 2017-10-31 RX ORDER — ESCITALOPRAM OXALATE 10 MG/1
10 TABLET ORAL DAILY
Qty: 30 TABLET | Refills: 11 | Status: SHIPPED | OUTPATIENT
Start: 2017-10-31 | End: 2018-08-09

## 2017-10-31 NOTE — PROGRESS NOTES
"Lázaro Wang presented for a  Medicare AWV and comprehensive Health Risk Assessment today. The following components were reviewed and updated:    · Medical history  · Family History  · Social history  · Allergies and Current Medications  · Health Risk Assessment  · Health Maintenance  · Care Team     Review of Systems   Constitutional: Negative for chills, fever, malaise/fatigue and weight loss.   Respiratory: Negative for cough and shortness of breath.    Cardiovascular: Negative for chest pain and leg swelling.   Gastrointestinal: Negative for abdominal pain, blood in stool, constipation, diarrhea, nausea and vomiting.   Skin: Negative for rash.   Neurological: Negative for dizziness and headaches.   Psychiatric/Behavioral: Negative for depression. The patient is nervous/anxious.      ** See Completed Assessments for Annual Wellness Visit within the encounter summary.**     The following assessments were completed:  · Living Situation  · CAGE  · Depression Screening  · Timed Get Up and Go  · Whisper Test  · Cognitive Function Screening      · Nutrition Screening  · ADL Screening  · PAQ Screening    Vitals:    10/31/17 1310   BP: 139/70   BP Location: Left arm   Patient Position: Sitting   BP Method: Medium (Automatic)   Pulse: (!) 58   Weight: 92.4 kg (203 lb 11.3 oz)   Height: 5' 8" (1.727 m)     Body mass index is 30.97 kg/m².  Physical Exam   Constitutional: He is oriented to person, place, and time. He appears well-nourished.   Cardiovascular: Normal rate, regular rhythm, normal heart sounds and intact distal pulses.    Pulmonary/Chest: Effort normal and breath sounds normal. He has no wheezes. He has no rales.   Neurological: He is alert and oriented to person, place, and time.   Vitals reviewed.        Diagnoses and health risks identified today and associated recommendations/orders:    1. Encounter for preventive health examination  Reviewed and discussed health maintenance.     2. Type 2 diabetes mellitus " with stage 3 chronic kidney disease, without long-term current use of insulin  Stable- metformin recently D/c'd due to abdominal c/o. Follow up routinely with PCP.   Labs reviewed 9/2017  Labs reviewed 9/2017  Avoid NSAIDs    3. Ectasis aorta  Stable- continue current treatment and follow up routinely with PCP  Ultrasound 5/2017    4. Carotid disease, bilateral  Stable- continue current treatment and follow up routinely with PCP  Ultrasound 2/2017    5. Dyslipidemia  Stable- continue current treatment and follow up routinely with PCP and cardiology (Dr. Aldrich)    6. Coronary artery disease involving native coronary artery of native heart without angina pectoris  Stable- continue current treatment and follow up routinely with PCP and cardiology (Dr. Aldrich)    7. History of PTCA  Stable- continue current treatment and follow up routinely with PCP and cardiology (Dr. Aldrich)    8. Essential hypertension  Stable- continue current treatment and follow up routinely with PCP and cardiology (Dr. Aldrich)    9. Type 2 diabetes mellitus with complication, without long-term current use of insulin  Stable- metformin recently D/c'd due to abdominal c/o. Follow up routinely with PCP.   Labs reviewed 9/2017    10. 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  Stable- metformin recently D/c'd due to abdominal c/o. Follow up routinely with PCP.   Labs reviewed 9/2017    11. Gastroesophageal reflux disease, esophagitis presence not specified  Stable- no new complaints. Continue current treatment and follow up routinely with PCP    12. Overactive bladder  Stable- no new complaints. Continue current treatment and follow up routinely with PCP    13. Lumbar disc disease  Stable- no new complaints. Continue current treatment and follow up routinely with PCP    14. Hyperopia of both eyes with astigmatism and presbyopia  Continue optometry follow up  (Dr. rosa)    15. Blepharitis of upper and lower  eyelids of both eyes, unspecified type  Continue optometry follow up  (Dr. rosa)    16. Posterior vitreous detachment of both eyes  Continue optometry follow up  (Dr. rosa)    17. Nuclear sclerosis of both eyes  Continue optometry follow up  (Dr. rosa)    18. Irritability  - escitalopram oxalate (LEXAPRO) 10 MG tablet; Take 1 tablet (10 mg total) by mouth once daily.  Dispense: 30 tablet; Refill: 11    19. Anxiety  - escitalopram oxalate (LEXAPRO) 10 MG tablet; Take 1 tablet (10 mg total) by mouth once daily.  Dispense: 30 tablet; Refill: 11      Provided Lázaro with a 5-10 year written screening schedule and personal prevention plan. Recommendations were developed using the USPSTF age appropriate recommendations. Education, counseling, and referrals were provided as needed. After Visit Summary printed and given to patient which includes a list of additional screenings\tests needed.    No Follow-up on file.    Latoya Palomo NP

## 2017-10-31 NOTE — PATIENT INSTRUCTIONS
Counseling and Referral of Other Preventative  (Italic type indicates deductible and co-insurance are waived)    Patient Name: Lázaro Wang  Today's Date: 10/31/2017      SERVICE LIMITATIONS RECOMMENDATION    Vaccines    · Pneumococcal (once after 65)    · Influenza (annually)    · Hepatitis B (if medium/high risk)    · Prevnar 13      Hepatitis B medium/high risk factors:       - End-stage renal disease       - Hemophiliacs who received Factor VII or         IX concentrates       - Clients of institutions for the mentally             retarded       - Persons who live in the same house as          a HepB carrier       - Homosexual men       - Illicit injectable drug abusers     Pneumococcal: Done, no repeat necessary     Influenza: Done, repeat in one year     Hepatitis B: N/A     Prevnar 13: Done, no repeat necessary    Prostate cancer screening (annually to age 75)     Prostate specific antigen (PSA) Shared decision making with Provider. Sometimes a co-pay may be required if the patient decides to have this test. The USPSTF no longer recommends prostate cancer screening routinely in medicine: not to follow    Colorectal cancer screening (to age 75)    · Fecal occult blood test (annual)  · Flexible sigmoidoscopy (5y)  · Screening colonoscopy (10y)  · Barium enema   Last done 5/2017, recommend to repeat as needed  .    Diabetes self-management training (no USPSTF recommendations)  Requires referral by treating physician for patient with diabetes or renal disease. 10 hours of initial DSMT sessions of no less than 30 minutes each in a continuous 12-month period. 2 hours of follow-up DSMT in subsequent years.  N/A    Glaucoma screening (no USPSTF recommendation)  Diabetes mellitus, family history   , age 50 or over    American, age 65 or over  Recommend follow up with eye care professional regularly   Dr. Muir 3/2017      Medical nutrition therapy for diabetes or renal disease (no  recommended schedule)  Requires referral by treating physician for patient with diabetes or renal disease or kidney transplant within the past 3 years.  Can be provided in same year as diabetes self-management training (DSMT), and CMS recommends medical nutrition therapy take place after DSMT. Up to 3 hours for initial year and 2 hours in subsequent years.  N/A    Cardiovascular screening blood tests (every 5 years)  · Fasting lipid panel  Order as a panel if possible  Done this year, repeat every year   2/2017    Diabetes screening tests (at least every 3 years, Medicare covers annually or at 6-month intervals for prediabetic patients)  · Fasting blood sugar (FBS) or glucose tolerance test (GTT)  Patient must be diagnosed with one of the following:       - Hypertension       - Dyslipidemia       - Obesity (BMI 30kg/m2)       - Previous elevated impaired FBS or GTT       ... or any two of the following:       - Overweight (BMI 25 but <30)       - Family history of diabetes       - Age 65 or older       - History of gestational diabetes or birth of baby weighing more than 9 pounds  Done this year, repeat every 6 months    9/2017    Abdominal aortic aneurysm screening (once)  · Sonogram   Limited to patients who meet one of the following criteria:       - Men who are 65-75 years old and have smoked more than 100 cigarette in their lifetime       - Anyone with a family history of abdominal aortic aneurysm       - Anyone recommended for screening by the USPSTF  N/A    HIV screening (annually for increased risk patients)  · HIV-1 and HIV-2 by EIA, or MINGO, rapid antibody test or oral mucosa transudate  Patients must be at increased risk for HIV infection per USPSTF guidelines or pregnant. Tests covered annually for patient at increased risk or as requested by the patient. Pregnant patients may receive up to 3 tests during pregnancy.  Risks discussed, screening is not recommended    Smoking cessation counseling (up to 8  sessions per year)  Patients must be asymptomatic of tobacco-related conditions to receive as a preventative service.  Non-smoker    Subsequent annual wellness visit  At least 12 months since last AWV  Return in one year     The following information is provided to all patients.  This information is to help you find resources for any of the problems found today that may be affecting your health:                Living healthy guide: www.[facility].[place].[facility]      Understanding Diabetes: www.diabetes.org      Eating healthy: www.cdc.gov/healthyweight      CDC home safety checklist: www.cdc.gov/steadi/patient.html      Agency on Aging: www.goea.[place].[facility]      Alcoholics anonymous (AA): www.aa.org      Physical Activity: www.shadi.nih.gov/dp0ishb      Tobacco use: www.quitwithusla.org

## 2017-10-31 NOTE — TELEPHONE ENCOUNTER
Seen patient for HRA today....    He is doing better regarding abdominal pains off the metformin, but feels his blood sugars are high now.   Question:   1. Do you want him on something to replace meformin?  2. Do you want to recheck A1c? If so, when?    Also, pt c/o irritability, short fuse and anxiety. I started him on lexapro 10mg. He has a follow up with you Dec 28th.    Would you like to see him sooner or does that appointment work?    Thanks   raiza

## 2017-11-02 NOTE — TELEPHONE ENCOUNTER
Please call patient and let him know  recommends staying off Metformin for now and repeating A1c before next appt. Please schedule labs a few day prior to his next appt with PCP. Thank you

## 2017-11-02 NOTE — TELEPHONE ENCOUNTER
Mr. Wang states Dr. Trotter had already stopped his metformin for a while now. Pt scheduled labs for 12/22/17. Latoya Palomo NP notified.

## 2017-11-22 RX ORDER — HYDROCODONE BITARTRATE AND ACETAMINOPHEN 5; 325 MG/1; MG/1
1 TABLET ORAL EVERY 6 HOURS PRN
Qty: 120 TABLET | Refills: 0 | Status: SHIPPED | OUTPATIENT
Start: 2017-11-22 | End: 2017-12-22 | Stop reason: SDUPTHER

## 2017-11-22 NOTE — TELEPHONE ENCOUNTER
----- Message from Calista Flores sent at 11/22/2017  9:18 AM CST -----  Refill on Rx Vicodin.  Please send into [facility].  Any questions call [phone].

## 2017-12-13 DIAGNOSIS — E11.00 TYPE 2 DIABETES MELLITUS WITH HYPEROSMOLARITY WITHOUT COMA, UNSPECIFIED LONG TERM INSULIN USE STATUS: Primary | ICD-10-CM

## 2017-12-22 ENCOUNTER — LAB VISIT (OUTPATIENT)
Dept: LAB | Facility: HOSPITAL | Age: 82
End: 2017-12-22
Attending: NURSE PRACTITIONER
Payer: MEDICARE

## 2017-12-22 DIAGNOSIS — E11.43 TYPE II DIABETES MELLITUS WITH PERIPHERAL AUTONOMIC NEUROPATHY: ICD-10-CM

## 2017-12-22 DIAGNOSIS — E11.8 TYPE 2 DIABETES MELLITUS WITH COMPLICATION, WITHOUT LONG-TERM CURRENT USE OF INSULIN: ICD-10-CM

## 2017-12-22 LAB
ALBUMIN SERPL BCP-MCNC: 3.7 G/DL
ALP SERPL-CCNC: 70 U/L
ALT SERPL W/O P-5'-P-CCNC: 14 U/L
ANION GAP SERPL CALC-SCNC: 6 MMOL/L
AST SERPL-CCNC: 17 U/L
BILIRUB SERPL-MCNC: 0.6 MG/DL
BUN SERPL-MCNC: 20 MG/DL
CALCIUM SERPL-MCNC: 9.3 MG/DL
CHLORIDE SERPL-SCNC: 104 MMOL/L
CO2 SERPL-SCNC: 28 MMOL/L
CREAT SERPL-MCNC: 1.2 MG/DL
EST. GFR  (AFRICAN AMERICAN): >60 ML/MIN/1.73 M^2
EST. GFR  (NON AFRICAN AMERICAN): 55.6 ML/MIN/1.73 M^2
ESTIMATED AVG GLUCOSE: 126 MG/DL
GLUCOSE SERPL-MCNC: 133 MG/DL
HBA1C MFR BLD HPLC: 6 %
POTASSIUM SERPL-SCNC: 4.1 MMOL/L
PROT SERPL-MCNC: 7 G/DL
SODIUM SERPL-SCNC: 138 MMOL/L

## 2017-12-22 PROCEDURE — 80053 COMPREHEN METABOLIC PANEL: CPT

## 2017-12-22 PROCEDURE — 83036 HEMOGLOBIN GLYCOSYLATED A1C: CPT

## 2017-12-22 PROCEDURE — 36415 COLL VENOUS BLD VENIPUNCTURE: CPT | Mod: PN

## 2017-12-22 RX ORDER — HYDROCODONE BITARTRATE AND ACETAMINOPHEN 5; 325 MG/1; MG/1
1 TABLET ORAL EVERY 6 HOURS PRN
Qty: 120 TABLET | Refills: 0 | Status: SHIPPED | OUTPATIENT
Start: 2017-12-22 | End: 2018-01-22 | Stop reason: SDUPTHER

## 2017-12-22 NOTE — TELEPHONE ENCOUNTER
----- Message from Olena Casiano sent at 12/22/2017 12:47 PM CST -----  Contact: pt  Pt is calling returning the nurse call to reschedule appointment..[phone] (home)

## 2017-12-22 NOTE — TELEPHONE ENCOUNTER
----- Message from Olena Casiano sent at 12/22/2017 12:49 PM CST -----  Contact: pt  Pt calling states needs a refill on hydrocodone-acetaminophen 5-325mg (NORCO) 5-325 mg per tablet.Pt is completely out..[phone] (home)         .  [facility] [address] [place] [address] AT SEC of [facility] & [address] [address]  Phone: [phone] Fax: [phone]

## 2017-12-28 NOTE — TELEPHONE ENCOUNTER
----- Message from Mckenzie Martinez sent at 12/28/2017 11:48 AM CST -----  Contact: self [phone]  He is requesting a 90 day refills of quetiapine 100 mg be sent to:   [facility] [address] [place] [address] AT SEC of [facility] & [address] [address]  Phone: [phone] Fax: [phone]    Thank you!

## 2017-12-29 RX ORDER — QUETIAPINE FUMARATE 100 MG/1
100 TABLET, FILM COATED ORAL NIGHTLY
Qty: 90 TABLET | Refills: 0 | Status: SHIPPED | OUTPATIENT
Start: 2017-12-29 | End: 2018-03-19 | Stop reason: SDUPTHER

## 2017-12-29 NOTE — PROGRESS NOTES
Refill Authorization Note     is requesting a refill authorization.    Brief assessment and rationale for refill: APPROVE: prr  Amount/Quantity of medication ordered: 90d        Refills Authorized: Yes  If authorized number of refills: 0        Medication-related problems identified: Therapeutic duplication  Medication Therapy Plan: Appears to be using for insomnia; labs utd; approve 3 more mo   Name and strength of medication: QUEtiapine (SEROQUEL) 100 MG Tab  How patient will take medication: t1t po QHS   Medication reconciliation completed: No  Comments: Dc'd duplicate meds    Lab Results   Component Value Date    HGBA1C 6.0 (H) 12/22/2017      Lab Results   Component Value Date    CHOL 129 02/08/2017    CHOL 108 (L) 05/24/2016    CHOL 131 10/16/2015     Lab Results   Component Value Date    HDL 54 02/08/2017    HDL 49 05/24/2016    HDL 49 10/16/2015     Lab Results   Component Value Date    LDLCALC 64.2 02/08/2017    LDLCALC 47.4 (L) 05/24/2016    LDLCALC 66.4 10/16/2015     Lab Results   Component Value Date    TRIG 54 02/08/2017    TRIG 58 05/24/2016    TRIG 78 10/16/2015     Lab Results   Component Value Date    CHOLHDL 41.9 02/08/2017    CHOLHDL 45.4 05/24/2016    CHOLHDL 37.4 10/16/2015

## 2018-01-09 ENCOUNTER — OFFICE VISIT (OUTPATIENT)
Dept: FAMILY MEDICINE | Facility: CLINIC | Age: 83
End: 2018-01-09
Payer: MEDICARE

## 2018-01-09 VITALS
SYSTOLIC BLOOD PRESSURE: 116 MMHG | BODY MASS INDEX: 32.34 KG/M2 | DIASTOLIC BLOOD PRESSURE: 62 MMHG | WEIGHT: 213.38 LBS | TEMPERATURE: 98 F | HEART RATE: 60 BPM | HEIGHT: 68 IN

## 2018-01-09 DIAGNOSIS — E11.43 TYPE II DIABETES MELLITUS WITH PERIPHERAL AUTONOMIC NEUROPATHY: ICD-10-CM

## 2018-01-09 DIAGNOSIS — M51.9 LUMBAR DISC DISEASE: ICD-10-CM

## 2018-01-09 DIAGNOSIS — F11.90 CHRONIC NARCOTIC USE: ICD-10-CM

## 2018-01-09 DIAGNOSIS — E11.8 TYPE 2 DIABETES MELLITUS WITH COMPLICATION, WITHOUT LONG-TERM CURRENT USE OF INSULIN: Primary | ICD-10-CM

## 2018-01-09 DIAGNOSIS — I77.819 ECTASIS AORTA: ICD-10-CM

## 2018-01-09 PROCEDURE — 99214 OFFICE O/P EST MOD 30 MIN: CPT | Mod: S$GLB,,, | Performed by: FAMILY MEDICINE

## 2018-01-09 PROCEDURE — 99999 PR PBB SHADOW E&M-EST. PATIENT-LVL III: CPT | Mod: PBBFAC,,, | Performed by: FAMILY MEDICINE

## 2018-01-09 PROCEDURE — 99499 UNLISTED E&M SERVICE: CPT | Mod: S$GLB,,, | Performed by: FAMILY MEDICINE

## 2018-01-09 NOTE — PROGRESS NOTES
Subjective:     THIS DOCUMENT WAS MADE IN PART WITH [employer] DICTATION SOFTWARE.  OCCASIONALLY THIS SOFTWARE WILL MISINTERPRET WORDS OR PHRASES.     Patient ID: Lázaro Wang is a 83 y.o. male.    Chief Complaint: Chills (pt states he is just getting over a cold, states he is still sleeping a lot, congested, fatigued)    HPI     Feelings of irritability, stress  Placed on lexapro, feeling much better    T2DM  Increase in a1c to 6.0, we did stop metformin, abd sx resolved  But room to improve diet    Chronic back pain  Chronic pain , chronic narcotic use for pain control  States hydrocodone still working very well    URI, getting over, still congestion, no fever no shortness of breath    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    BPV (benign positional vertigo) 02/17/2016    Nuclear sclerosis 02/17/2016    Posterior vitreous detachment of both eyes 02/17/2016    Blepharitis of both eyes 02/17/2016    Hyperopia with astigmatism and presbyopia 02/17/2016    Ectasis aorta 10/31/2017    Carotid disease, bilateral 10/31/2017    Irritability 10/31/2017    Anxiety 10/31/2017     Resolved Ambulatory Problems     Diagnosis Date Noted    Degenerative arthritis of knee 03/15/2012    Bradycardia 05/23/2013    Hypoxemia 07/16/2014    Change in bowel habits 05/25/2017     Past Medical History:   Diagnosis Date    Anxiety     Arthritis     Cataract     Coronary artery disease     Diabetes mellitus      "Elevated cholesterol     GERD (gastroesophageal reflux disease)     Heart disease     Hypertension     Myocardial infarction     Prostate disorder     Trouble in sleeping            Review of Systems   Constitutional: Positive for fatigue (ild fatigue from Lexapro but improv).   Respiratory: Negative for shortness of breath.    Cardiovascular: Negative for chest pain.   Musculoskeletal: Positive for arthralgias and back pain.   Psychiatric/Behavioral: Negative for dysphoric mood, sleep disturbance and suicidal ideas.       Objective:       Vitals:    01/09/18 1037   BP: 116/62   BP Location: Left arm   Patient Position: Sitting   BP Method: Medium (Manual)   Pulse: 60   Temp: 98.1 °F (36.7 °C)   TempSrc: Oral   Weight: 96.8 kg (213 lb 6.5 oz)   Height: 5' 8" (1.727 m)       Physical Exam   Constitutional: He is oriented to person, place, and time. He appears well-developed and well-nourished. No distress.   HENT:   Head: Normocephalic and atraumatic.   Eyes: Conjunctivae are normal. No scleral icterus.   Cardiovascular: Normal rate and regular rhythm.    Pulmonary/Chest: Effort normal and breath sounds normal. No respiratory distress. He has no wheezes.   Abdominal: Soft. Bowel sounds are normal. He exhibits no distension. There is no tenderness.   Neurological: He is alert and oriented to person, place, and time. Coordination normal.   Skin: He is not diaphoretic.   Psychiatric: He has a normal mood and affect. His behavior is normal.       Results for orders placed or performed in visit on 12/22/17   Microalbumin/creatinine urine ratio   Result Value Ref Range    Microalbum.,U,Random 4.0 ug/mL    Creatinine, Random Ur 79.0 23.0 - 375.0 mg/dL    Microalb Creat Ratio 5.1 0.0 - 30.0 ug/mg       Assessment:       1. Type 2 diabetes mellitus with complication, without long-term current use of insulin    2. 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 Lumbar disc disease    4. Chronic " narcotic use    5. Ectasis aorta        Plan:     Lázaro was seen today for chills.    Diagnoses and all orders for this visit:    Type 2 diabetes mellitus with complication, without long-term current use of insulin  Mild increase in A1c after discontinuation of metformin.  But gastrointestinal symptoms have resolved.  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  He has been consistent and reliable.  This medication does appear to improve quality of life significantly without obvious side effects at this time.  Continue cautiously with 3 month follow-ups    Diagnosis reviewed for HCC reasons but not affecting today's coding:  Ectasis aorta, last ultrasound was in May 2017.

## 2018-01-22 RX ORDER — HYDROCODONE BITARTRATE AND ACETAMINOPHEN 5; 325 MG/1; MG/1
1 TABLET ORAL EVERY 6 HOURS PRN
Qty: 120 TABLET | Refills: 0 | Status: SHIPPED | OUTPATIENT
Start: 2018-01-22 | End: 2018-02-22 | Stop reason: SDUPTHER

## 2018-01-22 NOTE — TELEPHONE ENCOUNTER
----- Message from Valentine Guardado sent at 1/22/2018  8:51 AM CST -----  Contact: Patient   Patient is requesting a refill of his hydrocodone-acetaminophen 5-325mg (NORCO) 5-325 mg per tablet.  Thank you!    [facility] [id number] [place] [address] AT SEC of [facility] & [address] [address]  Phone: [phone] Fax: [phone]

## 2018-01-29 ENCOUNTER — OFFICE VISIT (OUTPATIENT)
Dept: FAMILY MEDICINE | Facility: CLINIC | Age: 83
End: 2018-01-29
Payer: MEDICARE

## 2018-01-29 VITALS
TEMPERATURE: 98 F | SYSTOLIC BLOOD PRESSURE: 122 MMHG | BODY MASS INDEX: 32.31 KG/M2 | HEIGHT: 68 IN | DIASTOLIC BLOOD PRESSURE: 82 MMHG | HEART RATE: 56 BPM | WEIGHT: 213.19 LBS

## 2018-01-29 DIAGNOSIS — R53.82 CHRONIC FATIGUE: ICD-10-CM

## 2018-01-29 DIAGNOSIS — H69.93 ETD (EUSTACHIAN TUBE DYSFUNCTION), BILATERAL: ICD-10-CM

## 2018-01-29 DIAGNOSIS — H91.90 DECREASED HEARING, UNSPECIFIED LATERALITY: ICD-10-CM

## 2018-01-29 DIAGNOSIS — H93.8X3 EAR FULLNESS, BILATERAL: Primary | ICD-10-CM

## 2018-01-29 PROCEDURE — 99999 PR PBB SHADOW E&M-EST. PATIENT-LVL III: CPT | Mod: PBBFAC,,, | Performed by: INTERNAL MEDICINE

## 2018-01-29 PROCEDURE — 99213 OFFICE O/P EST LOW 20 MIN: CPT | Mod: S$GLB,,, | Performed by: INTERNAL MEDICINE

## 2018-01-29 NOTE — PROGRESS NOTES
Assessment and Plan:    1. Ear fullness, bilateral  2. Decreased hearing, unspecified laterality  3. ETD (Eustachian tube dysfunction), bilateral  Ear symptoms likely related to nasal congestion and associated ETD. Patient had similar symptoms in 2016 and saw ENT for this, at the time symptoms improved with use of nasal sprays. Discussed that he should restart the flonase and seen ENT for repeat hearing testing if not continuing to improve. Had high frequency hearing loss on last audiometry, but he notes that this is currently worse.     4. Chronic fatigue  Discussed that the worsening fatigue may be related to the recent cold, but it is not clear as of now why he is having chronic fatigue. He has very mild anemia, but not enough to explain feeling fatigued. TSH has been normal. Discussed that this can be normal aging. If this is not improving as he gets further out from the recent cold, could consider sleep study as next step in workup.     ______________________________________________________________________  Subjective:    Chief Complaint:  Ear fullness and fatigue    HPI:  Lázaro is a 83 y.o. year old man here for evaluation of ear fullness and fatigue. Medical history is notable for DM and MI but no known lung disease.    Notes that symptoms started with a bad head cold about a month ago. Head cold symptoms have essentially disappeared, but still bothered by a sensation of bilateral ear fullness and decreased hearing that is worse on the right than the left. Denies ear pain or discharge. Notes that he uses Q-tips frequently and does not think that he has an earwax obstruction.     Notes that he has also had persistent fatigue since this cold, and has had some amount of fatigue for a while before that. Denies any actual shortness of breath, but notes that sometimes he feels like he has to take an extra deep breath. Denies a history of snoring or witnessed apneas, but he and his wife sleep in different rooms due  to her snoring, so he is not sure. Thinks that he may have had a home sleep study at some point, but is not sure.     Medications:  Current Outpatient Prescriptions on File Prior to Visit   Medication Sig Dispense Refill    amiodarone (PACERONE) 200 MG Tab TAKE ONE TABLET BY MOUTH TWICE DAILY FOR 2 WEEKS, THEN ONE TABLET BY MOUTH DAILY THEREAFTER (Patient taking differently: take one tablet every other day) 90 tablet 3    atorvastatin (LIPITOR) 10 MG tablet Take 1 tablet (10 mg total) by mouth once daily. 90 tablet 3    blood sugar diagnostic Strp One touch ultra test strips . Test two times a day DX E11.43 200 each 3    cetirizine (ZYRTEC) 10 MG tablet Take 10 mg by mouth 2 (two) times daily.       diabetic supplies, Splunk. Kit One true metrix testing device to be used two times a day DX E11.49 1 kit 1    escitalopram oxalate (LEXAPRO) 10 MG tablet Take 1 tablet (10 mg total) by mouth once daily. (Patient taking differently: Take 5 mg by mouth once daily. ) 30 tablet 11    hydrocodone-acetaminophen 5-325mg (NORCO) 5-325 mg per tablet Take 1 tablet by mouth every 6 (six) hours as needed. 120 tablet 0    lancets (ONETOUCH DELICA LANCETS) 33 gauge Misc 1 lancet by Misc.(Non-Drug; Combo Route) route 2 (two) times daily. Trueplus 33guage lancet use two times a day. DX E11.40 200 each 4    QUEtiapine (SEROQUEL) 100 MG Tab Take 1 tablet (100 mg total) by mouth nightly. 90 tablet 0    tamsulosin (FLOMAX) 0.4 mg Cp24 Take 1 capsule (0.4 mg total) by mouth nightly. 90 capsule 1    TRUE METRIX AIR GLUCOSE METER kit       mirabegron 50 mg Tb24 Take 1 tablet (50 mg total) by mouth once daily. 30 tablet 11    nitroGLYCERIN (NITROQUICK) 0.4 MG SL tablet Place 0.4 mg under the tongue every 5 (five) minutes as needed.       [DISCONTINUED] blood sugar diagnostic Strp Use true metrix Test strips  two times a day DX E11.49 200 each 4     No current facility-administered medications on file prior to visit.   "      Review of Systems:  Review of Systems   Constitutional: Positive for fatigue. Negative for chills and fever.   HENT: Positive for congestion. Negative for ear discharge, ear pain, postnasal drip and sinus pressure.    Respiratory: Negative for cough, shortness of breath and wheezing.        Past Medical History:  Past Medical History:   Diagnosis Date    Anxiety     Arthritis     Cataract     OU    Coronary artery disease     stents x3    Diabetes mellitus     LBSL 70 today---stable    Elevated cholesterol     GERD (gastroesophageal reflux disease)     Heart disease     Hypertension     Myocardial infarction     Prostate disorder     Trouble in sleeping        Objective:    Vitals:  Vitals:    01/29/18 1418   BP: 122/82   Pulse: (!) 56   Temp: 97.6 °F (36.4 °C)   Weight: 96.7 kg (213 lb 3 oz)   Height: 5' 8" (1.727 m)   PainSc: 0-No pain       Physical Exam   Constitutional: He is oriented to person, place, and time. He appears well-developed and well-nourished. No distress.   HENT:   Right Ear: Tympanic membrane and ear canal normal. Tympanic membrane is not perforated, not retracted and not bulging. No middle ear effusion.   Left Ear: Tympanic membrane and ear canal normal. Tympanic membrane is not perforated, not retracted and not bulging.  No middle ear effusion.   Mouth/Throat: Oropharynx is clear and moist.   no cerumen impaction bilaterally   Eyes: Conjunctivae are normal. No scleral icterus.   Cardiovascular: Normal rate and regular rhythm.    No murmur heard.  Pulmonary/Chest: Effort normal and breath sounds normal. No respiratory distress. He has no wheezes.   Musculoskeletal: He exhibits no edema.   Neurological: He is alert and oriented to person, place, and time.   Skin: Skin is warm and dry.   Psychiatric: He has a normal mood and affect. His behavior is normal.   Vitals reviewed.      Data:  Previous labs reviewed and pertinent for very mild anemia intermittently, normal TSH, stable " CKD.      Ella Quick MD  Internal Medicine

## 2018-01-29 NOTE — PATIENT INSTRUCTIONS
Nasal steroid sprays are available over the counter and can be used longer term, but they can take about a week to get the full effect. Flonase (fluticasone), Rhinocort (budesonide), or Nasacort (triamcinolone) are the most common examples. You should start using 2 sprays in each nostril for 1 week, then using 1 spray in each nostril each night. To correctly use the spray, lean forward and aim the spray toward the ear on that side. The main side effect of these sprays is drying out of the nose.

## 2018-02-05 ENCOUNTER — CLINICAL SUPPORT (OUTPATIENT)
Dept: AUDIOLOGY | Facility: CLINIC | Age: 83
End: 2018-02-05
Payer: MEDICARE

## 2018-02-05 ENCOUNTER — OFFICE VISIT (OUTPATIENT)
Dept: OTOLARYNGOLOGY | Facility: CLINIC | Age: 83
End: 2018-02-05
Payer: MEDICARE

## 2018-02-05 VITALS
DIASTOLIC BLOOD PRESSURE: 73 MMHG | HEIGHT: 68 IN | BODY MASS INDEX: 32.77 KG/M2 | SYSTOLIC BLOOD PRESSURE: 137 MMHG | WEIGHT: 216.25 LBS

## 2018-02-05 DIAGNOSIS — H69.91 EUSTACHIAN TUBE DYSFUNCTION, RIGHT: ICD-10-CM

## 2018-02-05 DIAGNOSIS — H69.91 ETD (EUSTACHIAN TUBE DYSFUNCTION), RIGHT: Primary | ICD-10-CM

## 2018-02-05 DIAGNOSIS — H69.90 DYSFUNCTION OF EUSTACHIAN TUBE, UNSPECIFIED LATERALITY: Primary | ICD-10-CM

## 2018-02-05 DIAGNOSIS — H65.191 ACUTE NON-SUPPURATIVE OTITIS MEDIA, RIGHT: Primary | ICD-10-CM

## 2018-02-05 PROCEDURE — 3008F BODY MASS INDEX DOCD: CPT | Mod: S$GLB,,, | Performed by: NURSE PRACTITIONER

## 2018-02-05 PROCEDURE — 1159F MED LIST DOCD IN RCRD: CPT | Mod: S$GLB,,, | Performed by: NURSE PRACTITIONER

## 2018-02-05 PROCEDURE — 92567 TYMPANOMETRY: CPT | Mod: S$GLB,,, | Performed by: AUDIOLOGIST

## 2018-02-05 PROCEDURE — 1126F AMNT PAIN NOTED NONE PRSNT: CPT | Mod: S$GLB,,, | Performed by: NURSE PRACTITIONER

## 2018-02-05 PROCEDURE — 99999 PR PBB SHADOW E&M-EST. PATIENT-LVL IV: CPT | Mod: PBBFAC,,, | Performed by: NURSE PRACTITIONER

## 2018-02-05 PROCEDURE — 99214 OFFICE O/P EST MOD 30 MIN: CPT | Mod: S$GLB,,, | Performed by: NURSE PRACTITIONER

## 2018-02-05 RX ORDER — AZELASTINE 1 MG/ML
1 SPRAY, METERED NASAL 2 TIMES DAILY
Qty: 30 ML | Refills: 12 | Status: SHIPPED | OUTPATIENT
Start: 2018-02-05 | End: 2018-08-07 | Stop reason: SDUPTHER

## 2018-02-05 NOTE — PROGRESS NOTES
"Subjective:       Patient ID: Lázaro Wang is a 83 y.o. male.    Chief Complaint: No chief complaint on file.    Ear Fullness    Associated symptoms include hearing loss.      Patient was seen approx 14 months ago with asymmetrical SNHL and asymmetrical speech discrimination.  He returns today for ear fullness.  He had a head cold over [place] and reports fluid in right ear since then. He has been using Nasonex OTC X 1 week.       Review of Systems   Constitutional: Negative.    HENT: Positive for hearing loss.    Eyes: Negative.    Respiratory: Negative.    Cardiovascular: Negative.    Gastrointestinal: Negative.    Musculoskeletal: Negative.    Skin: Negative.    Neurological: Negative.    Hematological: Negative.    Psychiatric/Behavioral: Negative.        Objective:      Physical Exam   Constitutional: He is oriented to person, place, and time. Vital signs are normal. He appears well-developed and well-nourished. He is cooperative. He does not appear ill. No distress.   HENT:   Head: Normocephalic and atraumatic.   Right Ear: Hearing, external ear and ear canal normal. Tympanic membrane is not erythematous. Tympanic membrane mobility is abnormal (type "B" tympanogram). A middle ear effusion (non-suppurative) is present.   Left Ear: Hearing, tympanic membrane, external ear and ear canal normal. Tympanic membrane is not erythematous. Tympanic membrane mobility is normal (type "A" tympanogram).  No middle ear effusion.   Nose: Nose normal. No mucosal edema or rhinorrhea. Right sinus exhibits no maxillary sinus tenderness and no frontal sinus tenderness. Left sinus exhibits no maxillary sinus tenderness and no frontal sinus tenderness.   Mouth/Throat: Uvula is midline, oropharynx is clear and moist and mucous membranes are normal. Mucous membranes are not pale, not dry and not cyanotic. No oral lesions. No oropharyngeal exudate, posterior oropharyngeal edema or posterior oropharyngeal erythema.   Eyes: " Conjunctivae, EOM and lids are normal. Pupils are equal, round, and reactive to light. Right eye exhibits no discharge. Left eye exhibits no discharge. No scleral icterus.   Neck: Trachea normal and normal range of motion. Neck supple. No tracheal deviation present. No thyroid mass and no thyromegaly present.   Cardiovascular: Normal rate.    Pulmonary/Chest: Effort normal. No stridor. No respiratory distress. He has no wheezes.   Musculoskeletal: Normal range of motion.   Lymphadenopathy:        Head (right side): No submental, no submandibular, no tonsillar, no preauricular and no posterior auricular adenopathy present.        Head (left side): No submental, no submandibular, no tonsillar, no preauricular and no posterior auricular adenopathy present.     He has no cervical adenopathy.        Right cervical: No superficial cervical and no posterior cervical adenopathy present.       Left cervical: No superficial cervical and no posterior cervical adenopathy present.   Neurological: He is alert and oriented to person, place, and time. He has normal strength. Coordination and gait normal.   Skin: Skin is warm, dry and intact. No lesion and no rash noted. He is not diaphoretic. No cyanosis. No pallor.   Psychiatric: He has a normal mood and affect. His speech is normal and behavior is normal. Judgment and thought content normal. Cognition and memory are normal.   Nursing note and vitals reviewed.        Assessment:     Right non-suppurative OME       Plan:        Nasonex & Astelin BID, nasal valsalva several times per day.  Discussed ETD, serous/non-suppurative OME vs suppurative OM at length.   Patient to call back for further antibiotics if throbbing otalgia.   Briefly discussed myringotomy vs tympanostomy tubes.   Return to clinic in 4-6 weeks if not significantly improved.

## 2018-02-05 NOTE — LETTER
February 5, 2018      Ella Quick MD  [address]  [place] [address]           [facility]  [address] [facility] [address] [place] [address]  Phone: [phone]  Fax: [phone]          Patient: Lázaro Wang   MR Number: [id number]   YOB: 1934   Date of Visit: 2/5/2018       Dear Dr. Ella Quick:    Thank you for referring Lázaro Wang to me for evaluation. Attached you will find relevant portions of my assessment and plan of care.    If you have questions, please do not hesitate to call me. I look forward to following Lázaro Wang along with you.    Sincerely,    Sharona Chaidez, NP    Enclosure  CC:  No Recipients    If you would like to receive this communication electronically, please contact [email or web address] or [phone] to request more information on [facility] access.    For providers and/or their staff who would like to refer a patient to [facility], please contact us through our one-stop-shop provider referral line, [facility] , at [phone].    If you feel you have received this communication in error or would no longer like to receive these types of communications, please e-mail [email or web address]

## 2018-02-05 NOTE — PATIENT INSTRUCTIONS
You have fluid behind your right ear drum.  Antibiotics are not effective at resolving fluid, which can sometimes remain for 4-12 weeks.      There are only two ways to get rid of fluid stuck behind the ear drum:    (1) The conservative approach -- nasal sprays twice daily (Nasonex & Astelin), nasal valsalva/popping, and time (sometimes several weeks).     (2) The invasive approach -- your ear drum is pierced with a sharp tool and the fluid is suctioned out by our ear surgeon. This provides instant relief; however there is the risk that the ear drum may not heal properly or that the fluid may return requiring repeating the procedure or putting a drainage tube in your ear drum.

## 2018-02-14 ENCOUNTER — OFFICE VISIT (OUTPATIENT)
Dept: FAMILY MEDICINE | Facility: CLINIC | Age: 83
End: 2018-02-14
Payer: MEDICARE

## 2018-02-14 VITALS
HEIGHT: 68 IN | TEMPERATURE: 98 F | HEART RATE: 68 BPM | SYSTOLIC BLOOD PRESSURE: 136 MMHG | DIASTOLIC BLOOD PRESSURE: 74 MMHG | WEIGHT: 214.94 LBS | BODY MASS INDEX: 32.58 KG/M2

## 2018-02-14 DIAGNOSIS — E11.8 TYPE 2 DIABETES MELLITUS WITH COMPLICATION, WITHOUT LONG-TERM CURRENT USE OF INSULIN: ICD-10-CM

## 2018-02-14 DIAGNOSIS — I10 ESSENTIAL HYPERTENSION: Chronic | ICD-10-CM

## 2018-02-14 DIAGNOSIS — L85.8 KERATOACANTHOMA OF CHEEK: Primary | ICD-10-CM

## 2018-02-14 DIAGNOSIS — I25.10 CORONARY ARTERY DISEASE INVOLVING NATIVE CORONARY ARTERY OF NATIVE HEART WITHOUT ANGINA PECTORIS: Chronic | ICD-10-CM

## 2018-02-14 PROCEDURE — 88305 TISSUE EXAM BY PATHOLOGIST: CPT | Mod: 26,,, | Performed by: PATHOLOGY

## 2018-02-14 PROCEDURE — 1125F AMNT PAIN NOTED PAIN PRSNT: CPT | Mod: S$GLB,,, | Performed by: FAMILY MEDICINE

## 2018-02-14 PROCEDURE — 1159F MED LIST DOCD IN RCRD: CPT | Mod: S$GLB,,, | Performed by: FAMILY MEDICINE

## 2018-02-14 PROCEDURE — 11641 EXC F/E/E/N/L MAL+MRG 0.6-1: CPT | Mod: S$GLB,,, | Performed by: FAMILY MEDICINE

## 2018-02-14 PROCEDURE — 99213 OFFICE O/P EST LOW 20 MIN: CPT | Mod: 25,S$GLB,, | Performed by: FAMILY MEDICINE

## 2018-02-14 PROCEDURE — 99999 PR PBB SHADOW E&M-EST. PATIENT-LVL IV: CPT | Mod: PBBFAC,,, | Performed by: FAMILY MEDICINE

## 2018-02-14 PROCEDURE — 3008F BODY MASS INDEX DOCD: CPT | Mod: S$GLB,,, | Performed by: FAMILY MEDICINE

## 2018-02-14 PROCEDURE — 88305 TISSUE EXAM BY PATHOLOGIST: CPT | Performed by: PATHOLOGY

## 2018-02-14 PROCEDURE — 99499 UNLISTED E&M SERVICE: CPT | Mod: S$GLB,,, | Performed by: FAMILY MEDICINE

## 2018-02-14 NOTE — PROGRESS NOTES
"Subjective:       Patient ID: Lázaro Wang is a 83 y.o. male.    Chief Complaint: Cyst (on right side of face)    Tender growth on right cheek 3 weeks. URI in past few weeks. No fever. CAD with stent and no sx. DM very well controlled. HgA1c 6%      Cyst   Pertinent negatives include no chest pain or fever.     Review of Systems   Constitutional: Negative for fever.   Cardiovascular: Negative for chest pain.       Objective:     Blood pressure 136/74, pulse 68, temperature 97.9 °F (36.6 °C), height 5' 8" (1.727 m), weight 97.5 kg (214 lb 15.2 oz).      Physical Exam   Constitutional: He appears well-developed and well-nourished. No distress.   Neurological: He is alert.   Skin:   8 mm dome shaped papule. Pink. Right preauricular area. Tender. Central keratin small       Assessment:       1. Keratoacanthoma of cheek    2. Type 2 diabetes mellitus with complication, without long-term current use of insulin    3. Coronary artery disease involving native coronary artery of native heart without angina pectoris    4. Essential hypertension        Plan:       Discussed options. Agrees to excision  Patient identified by name and date of birth. I did not leave the treatment room after identification. Oral consent obtained.  Lesion as described  Anesthesia with syringe containing 2.8 ml lidocaine 1% with epinephrine and 0.3 ml sodium bicarbonate.   Betadyne prep. Sterile gloves  Shave excision and specimen to path  Curretage and dessication x 3   Wound care discussed. Will send letter with pathology report. Recheck that area for recurrence in 2 months.     "

## 2018-02-16 RX ORDER — TAMSULOSIN HYDROCHLORIDE 0.4 MG/1
CAPSULE ORAL
Qty: 90 CAPSULE | Refills: 1 | Status: SHIPPED | OUTPATIENT
Start: 2018-02-16 | End: 2018-08-09 | Stop reason: SDUPTHER

## 2018-02-16 NOTE — PROGRESS NOTES
Refill Authorization Note     is requesting a refill authorization.    Brief assessment and rationale for refill: Defer; pt was instructed to have refills with urology  Amount/Quantity of medication ordered: 90d        Refills Authorized: Yes  If authorized number of refills: 0        Medication-related problems identified: Medication education needs  Medication Therapy Plan: Que for 30d; looks like pt received mirabegron from cardiology.  Name and strength of medication: TAMSULOSIN 0.4MG CAPSULES  How patient will take medication: t1t po qhs   Medication reconciliation completed: No  Comments:   BP Readings from Last 3 Encounters:   02/14/18 136/74   02/05/18 137/73   01/29/18 122/82

## 2018-02-22 RX ORDER — HYDROCODONE BITARTRATE AND ACETAMINOPHEN 5; 325 MG/1; MG/1
1 TABLET ORAL EVERY 6 HOURS PRN
Qty: 120 TABLET | Refills: 0 | Status: SHIPPED | OUTPATIENT
Start: 2018-02-22 | End: 2018-03-22 | Stop reason: SDUPTHER

## 2018-02-22 NOTE — TELEPHONE ENCOUNTER
----- Message from Elkin Campbell sent at 2/22/2018 10:11 AM CST -----  Contact: Lázaro  Calling to requesting a refill on [facility] [address] [place] [address] AT SEC of [facility] & [address] [address]  Phone: [phone] Fax: [phone]    Thanks!

## 2018-03-19 RX ORDER — QUETIAPINE FUMARATE 100 MG/1
100 TABLET, FILM COATED ORAL NIGHTLY
Qty: 90 TABLET | Refills: 1 | Status: SHIPPED | OUTPATIENT
Start: 2018-03-19 | End: 2018-09-07 | Stop reason: SDUPTHER

## 2018-03-22 RX ORDER — HYDROCODONE BITARTRATE AND ACETAMINOPHEN 5; 325 MG/1; MG/1
1 TABLET ORAL EVERY 6 HOURS PRN
Qty: 120 TABLET | Refills: 0 | Status: SHIPPED | OUTPATIENT
Start: 2018-03-22 | End: 2018-04-23 | Stop reason: SDUPTHER

## 2018-03-22 NOTE — TELEPHONE ENCOUNTER
----- Message from Angie Moon sent at 3/22/2018 11:24 AM CDT -----  Contact: Patient  Patient is requesting a refill on hydrocodone-acetaminophen 5-325mg (NORCO) 5-325 mg per tablet.  Call Back#[phone]  Thanks     [facility] [address] [place] [address] AT SEC of [facility] & [address] [address]  Phone: [phone] Fax: [phone]

## 2018-03-26 ENCOUNTER — TELEPHONE (OUTPATIENT)
Dept: CARDIOLOGY | Facility: CLINIC | Age: 83
End: 2018-03-26

## 2018-03-27 ENCOUNTER — OFFICE VISIT (OUTPATIENT)
Dept: CARDIOLOGY | Facility: CLINIC | Age: 83
End: 2018-03-27
Payer: MEDICARE

## 2018-03-27 VITALS
BODY MASS INDEX: 32.38 KG/M2 | WEIGHT: 213.63 LBS | HEIGHT: 68 IN | SYSTOLIC BLOOD PRESSURE: 146 MMHG | DIASTOLIC BLOOD PRESSURE: 82 MMHG | HEART RATE: 82 BPM

## 2018-03-27 DIAGNOSIS — I10 ESSENTIAL HYPERTENSION: Primary | Chronic | ICD-10-CM

## 2018-03-27 DIAGNOSIS — Z98.61 HISTORY OF PTCA: Chronic | ICD-10-CM

## 2018-03-27 DIAGNOSIS — G47.33 OSA (OBSTRUCTIVE SLEEP APNEA): ICD-10-CM

## 2018-03-27 DIAGNOSIS — R53.82 CHRONIC FATIGUE: ICD-10-CM

## 2018-03-27 DIAGNOSIS — I25.10 CORONARY ARTERY DISEASE INVOLVING NATIVE CORONARY ARTERY OF NATIVE HEART WITHOUT ANGINA PECTORIS: Chronic | ICD-10-CM

## 2018-03-27 DIAGNOSIS — E78.5 DYSLIPIDEMIA: Chronic | ICD-10-CM

## 2018-03-27 DIAGNOSIS — M54.50 BILATERAL LOW BACK PAIN WITHOUT SCIATICA, UNSPECIFIED CHRONICITY: ICD-10-CM

## 2018-03-27 PROCEDURE — 3079F DIAST BP 80-89 MM HG: CPT | Mod: CPTII,S$GLB,, | Performed by: INTERNAL MEDICINE

## 2018-03-27 PROCEDURE — 99499 UNLISTED E&M SERVICE: CPT | Mod: S$GLB,,, | Performed by: INTERNAL MEDICINE

## 2018-03-27 PROCEDURE — 99214 OFFICE O/P EST MOD 30 MIN: CPT | Mod: S$GLB,,, | Performed by: INTERNAL MEDICINE

## 2018-03-27 PROCEDURE — 99999 PR PBB SHADOW E&M-EST. PATIENT-LVL III: CPT | Mod: PBBFAC,,, | Performed by: INTERNAL MEDICINE

## 2018-03-27 PROCEDURE — 3077F SYST BP >= 140 MM HG: CPT | Mod: CPTII,S$GLB,, | Performed by: INTERNAL MEDICINE

## 2018-03-27 RX ORDER — AMIODARONE HYDROCHLORIDE 200 MG/1
TABLET ORAL
Qty: 90 TABLET | Refills: 3 | Status: SHIPPED | OUTPATIENT
Start: 2018-03-27 | End: 2020-06-01 | Stop reason: SDUPTHER

## 2018-03-27 NOTE — TELEPHONE ENCOUNTER
Please contact patient to schedule an appointment as per Dr. Amaro. Referral is in patient appointment desk. Thank you in advance.

## 2018-03-27 NOTE — PROGRESS NOTES
Subjective:    Patient ID:  Lázaro Wang is a 83 y.o. male who presents for follow-up of Hypertension (follow up); Hyperlipidemia; Carotid Artery Disease; and Coronary Artery Disease      Fatigue and sleepiness, back problems (chronic)        Review of Systems   Musculoskeletal: Positive for back pain.   All other systems reviewed and are negative.       Objective:    Physical Exam   Constitutional: He is oriented to person, place, and time. He appears well-developed and well-nourished.   HENT:   Head: Normocephalic and atraumatic.   Eyes: Conjunctivae and EOM are normal. Pupils are equal, round, and reactive to light. Right eye exhibits no discharge. Left eye exhibits no discharge. No scleral icterus.   Neck: Normal range of motion. Neck supple. No JVD present. No tracheal deviation present. No thyromegaly present.   Cardiovascular: Normal rate.  Exam reveals no gallop and no friction rub.    Murmur heard.   Harsh midsystolic murmur is present with a grade of 2/6  at the upper right sternal border radiating to the neck  Pulses:       Carotid pulses are 2+ on the right side, and 2+ on the left side.       Dorsalis pedis pulses are 2+ on the right side, and 2+ on the left side.        Posterior tibial pulses are 2+ on the right side, and 2+ on the left side.   Pulmonary/Chest: Effort normal and breath sounds normal. No stridor. No respiratory distress. He has no wheezes. He has no rales.   Abdominal: Soft. Bowel sounds are normal. He exhibits no distension. There is no tenderness. There is no rebound and no guarding.   Musculoskeletal: Normal range of motion. He exhibits no edema, tenderness or deformity.   Lymphadenopathy:     He has no cervical adenopathy.   Neurological: He is alert and oriented to person, place, and time.   Skin: Skin is warm and dry. No rash noted. No erythema. No pallor.   Psychiatric: He has a normal mood and affect. His behavior is normal. Judgment and thought content normal.          Assessment:       1. Essential hypertension    2. History of PTCA    3. Dyslipidemia    4. Coronary artery disease involving native coronary artery of native heart without angina pectoris    5. Chronic fatigue         Plan:       Essential hypertension    History of PTCA    Dyslipidemia    Coronary artery disease involving native coronary artery of native heart without angina pectoris    Chronic fatigue  -     Ambulatory consult to Sleep Disorders    TAMELA (obstructive sleep apnea)  -     Ambulatory consult to Sleep Disorders    Bilateral low back pain without sciatica, unspecified chronicity  -     [facility] referral to [facility]    Rtc in 6 months refer to pain management.

## 2018-04-23 RX ORDER — HYDROCODONE BITARTRATE AND ACETAMINOPHEN 5; 325 MG/1; MG/1
1 TABLET ORAL EVERY 6 HOURS PRN
Qty: 120 TABLET | Refills: 0 | Status: SHIPPED | OUTPATIENT
Start: 2018-04-23 | End: 2018-05-21 | Stop reason: SDUPTHER

## 2018-04-23 NOTE — TELEPHONE ENCOUNTER
Refill request for hydrocodone     LOV: 1/9/18 (saw Aakash 2/14/18.    Last filled: 3/22/18    [facility] [address]

## 2018-04-23 NOTE — TELEPHONE ENCOUNTER
----- Message from Hortencia Arellano sent at 4/23/2018 10:21 AM CDT -----  Contact: Patient  Lázaro, patient [phone] calling for a refill on hydrocodone-acetaminophen 5-325mg (NORCO) 5-325 mg per tablet, patient states he gets 120 tablets and is completely out. Please advise. Thanks.    [facility] [address] [place] [id number]  Phone: [phone] Fax: [phone]

## 2018-04-25 NOTE — PROGRESS NOTES
Tympanometry completed per order from Sharona Chaidez.    Results revealed:     Type A tymp AS  Type B tymp AD    Pt referred back to Sharona Chaidez to discuss treatment options for right middle ear pathology.

## 2018-05-08 ENCOUNTER — OFFICE VISIT (OUTPATIENT)
Dept: PAIN MEDICINE | Facility: CLINIC | Age: 83
End: 2018-05-08
Payer: MEDICARE

## 2018-05-08 VITALS
BODY MASS INDEX: 32.34 KG/M2 | WEIGHT: 213.38 LBS | RESPIRATION RATE: 20 BRPM | SYSTOLIC BLOOD PRESSURE: 135 MMHG | HEIGHT: 68 IN | DIASTOLIC BLOOD PRESSURE: 69 MMHG | TEMPERATURE: 97 F | OXYGEN SATURATION: 96 % | HEART RATE: 58 BPM

## 2018-05-08 DIAGNOSIS — F11.90 CHRONIC, CONTINUOUS USE OF OPIOIDS: ICD-10-CM

## 2018-05-08 DIAGNOSIS — M47.816 LUMBAR SPONDYLOSIS: Primary | ICD-10-CM

## 2018-05-08 PROCEDURE — 3078F DIAST BP <80 MM HG: CPT | Mod: CPTII,S$GLB,, | Performed by: ANESTHESIOLOGY

## 2018-05-08 PROCEDURE — 3075F SYST BP GE 130 - 139MM HG: CPT | Mod: CPTII,S$GLB,, | Performed by: ANESTHESIOLOGY

## 2018-05-08 PROCEDURE — 99203 OFFICE O/P NEW LOW 30 MIN: CPT | Mod: S$GLB,,, | Performed by: ANESTHESIOLOGY

## 2018-05-08 PROCEDURE — 99999 PR PBB SHADOW E&M-EST. PATIENT-LVL IV: CPT | Mod: PBBFAC,,, | Performed by: ANESTHESIOLOGY

## 2018-05-08 NOTE — PROGRESS NOTES
This note was completed with dictation software and grammatical errors may exist.    CC: Back pain    HPI: The patient is an 83-year-old man with a history of diabetes, CAD, chronic back and knee pain who presents in referral from Dr. Amaro for chronic back pain.  The patient states that he is not sure why he was referred here today.  He has a history of chronic back pain but he reports having 90% relief with hydrocodone 5/325 which he takes 4 times daily.  He has taken this for many years for both his back and his knees, several years ago had undergone bilateral knee replacements and is now doing well with that, denies any knee pain.  He states that the pain is cross his bilateral low back and describes it as aching, deep worse with standing and walking, doing any lifting.  He does get relief with rest, sitting down, laying down, heat and medications.  He states that he has not done any physical therapy.  He had done some injections greater than 5 years ago and states that he had only temporary relief with these.  He has not had any lumbar imaging recently.  He had previously been on stronger hydrocodone, 10 mg at one time, drop down to 7.5 and finally has been on 5 mg for several years now.    ROS: He reports back pain, difficulty sleeping..  Balance of review of systems is negative.    Past Medical History:   Diagnosis Date    Anxiety     Arthritis     Cataract     OU    Coronary artery disease     stents x3    Diabetes mellitus     LBSL 70 today---stable    Elevated cholesterol     GERD (gastroesophageal reflux disease)     Heart disease     Hypertension     Myocardial infarction     Prostate disorder     Trouble in sleeping        Past Surgical History:   Procedure Laterality Date    CARDIAC SURGERY      3 stents    CIRCUMCISION      COLONOSCOPY  11/14/2008  Ray    Diverticulosis.  Small Internal hemorrhoids.    COLONOSCOPY N/A 5/25/2017    Procedure: COLONOSCOPY;  Surgeon: Nito WILKINSON  "MD Chava;  Location: [facility];  Service: Endoscopy;  Laterality: N/A;    COLONOSCOPY W/ POLYPECTOMY  12/2002    Adenomatous polyp    COLONOSCOPY W/ POLYPECTOMY  10/12/2005  Ray    One 2-3 mm polyp in the rectum. ADENOMATOUS POLYP.  Diverticulosis.  Internal hemorrhoids.      ESOPHAGOGASTRODUODENOSCOPY  10/12/2005  Ray    Reflux esophagitis.  Small Hiatal Hernia.  Esophageal spasm?  Antral erythema.  CLOtest negative.    TOTAL KNEE ARTHROPLASTY      bilateral       Social History     Social History    Marital status:      Spouse name: N/A    Number of children: N/A    Years of education: N/A     Occupational History    retired       Social History Main Topics    Smoking status: Never Smoker    Smokeless tobacco: Never Used    Alcohol use No    Drug use: No    Sexual activity: Not Asked     Other Topics Concern    None     Social History Narrative    None         Medications/Allergies: See med card    Vitals:    05/08/18 0952   BP: 135/69   Pulse: (!) 58   Resp: 20   Temp: 97.4 °F (36.3 °C)   TempSrc: Oral   SpO2: 96%   Weight: 96.8 kg (213 lb 6.5 oz)   Height: 5' 8" (1.727 m)   PainSc:   6   PainLoc: Back         Physical exam:  Gen: A and O x3, pleasant, well-groomed  Skin: No rashes or obvious lesions  HEENT: PERRLA, no obvious deformities on ears or in canals. Trachea midline.  CVS: Regular rate and rhythm, normal palpable pulses.  Resp:No increased work of breathing, symmetrical chest rise.  Abdomen: Soft, NT/ND.  Musculoskeletal: Walks with a forward leaning gait, slow shuffling.  Moving all extremities equally.      Imaging:  None of lumbar spine    Assessment:  The patient is an 83-year-old man with a history of diabetes, CAD, chronic back and knee pain who presents in referral from Dr. Amaro for chronic back pain.    1. Lumbar spondylosis     2. Chronic, continuous use of opioids         Plan:  1.  The patient has been using hydrocodone for many years for his " back, states that he does not have any side effects and according to records he has not shown any signs of abuse or addiction, no early refills, has had no requests to increase dosing.  I do not see any red flags for his continued therapeutic use of hydrocodone although I did explain that we may have some things to offer to help decrease his usage of medication.  He is not interested and feels that he is doing fine with the medication as is.  I will have him follow-up as needed.     Thank you for referring this interesting patient, and I look forward to continuing to collaborate in his care.

## 2018-05-08 NOTE — LETTER
May 8, 2018      Don Amaro MD  [address] [facility] [address] [place] [address]           [facility]  [address] [facility] [address] [place] [address]  Phone: [phone]  Fax: [phone]          Patient: Lázaro Wang   MR Number: [id number]   YOB: 1934   Date of Visit: 5/8/2018       Dear Dr. Don Amaro:    Thank you for referring Lázaro Wang to me for evaluation. Attached you will find relevant portions of my assessment and plan of care.    If you have questions, please do not hesitate to call me. I look forward to following Lázaro Wang along with you.    Sincerely,    Tad Gordon MD    Enclosure  CC:  No Recipients    If you would like to receive this communication electronically, please contact [email or web address] or [phone] to request more information on [facility] access.    For providers and/or their staff who would like to refer a patient to [facility], please contact us through our one-stop-shop provider referral line, [facility], at [phone].    If you feel you have received this communication in error or would no longer like to receive these types of communications, please e-mail [email or web address]

## 2018-05-21 ENCOUNTER — TELEPHONE (OUTPATIENT)
Dept: FAMILY MEDICINE | Facility: CLINIC | Age: 83
End: 2018-05-21

## 2018-05-21 RX ORDER — HYDROCODONE BITARTRATE AND ACETAMINOPHEN 5; 325 MG/1; MG/1
1 TABLET ORAL EVERY 6 HOURS PRN
Qty: 120 TABLET | Refills: 0 | Status: SHIPPED | OUTPATIENT
Start: 2018-05-21 | End: 2018-06-21 | Stop reason: SDUPTHER

## 2018-05-21 NOTE — TELEPHONE ENCOUNTER
----- Message from Keiry Calderon sent at 5/21/2018 10:18 AM CDT -----  Contact: [phone]  Patient is requesting a call back from the nurse stated he have pain in right arm and numbness in fingers.    Please call the patient upon request at phone number [phone].

## 2018-05-21 NOTE — TELEPHONE ENCOUNTER
----- Message from Keiry Calderon sent at 5/21/2018 10:17 AM CDT -----  Contact: [phone]   Patient requesting a refill on Vicodin.     Patient will be using   [facility] [address] [place] [address] AT [facility] & [address] [address]  Phone: [phone] Fax: [phone]    Please call patient at [phone].     Thanks!

## 2018-05-22 ENCOUNTER — OFFICE VISIT (OUTPATIENT)
Dept: FAMILY MEDICINE | Facility: CLINIC | Age: 83
End: 2018-05-22
Payer: MEDICARE

## 2018-05-22 VITALS
HEIGHT: 68 IN | WEIGHT: 214.31 LBS | HEART RATE: 55 BPM | TEMPERATURE: 99 F | OXYGEN SATURATION: 95 % | SYSTOLIC BLOOD PRESSURE: 110 MMHG | BODY MASS INDEX: 32.48 KG/M2 | DIASTOLIC BLOOD PRESSURE: 74 MMHG

## 2018-05-22 DIAGNOSIS — R20.2 NUMBNESS AND TINGLING IN RIGHT HAND: Primary | ICD-10-CM

## 2018-05-22 DIAGNOSIS — R63.5 WEIGHT GAIN: ICD-10-CM

## 2018-05-22 DIAGNOSIS — E11.8 TYPE 2 DIABETES MELLITUS WITH COMPLICATION, WITHOUT LONG-TERM CURRENT USE OF INSULIN: ICD-10-CM

## 2018-05-22 DIAGNOSIS — R20.0 NUMBNESS AND TINGLING IN RIGHT HAND: Primary | ICD-10-CM

## 2018-05-22 PROCEDURE — 3074F SYST BP LT 130 MM HG: CPT | Mod: CPTII,S$GLB,, | Performed by: FAMILY MEDICINE

## 2018-05-22 PROCEDURE — 99499 UNLISTED E&M SERVICE: CPT | Mod: S$GLB,,, | Performed by: FAMILY MEDICINE

## 2018-05-22 PROCEDURE — 99999 PR PBB SHADOW E&M-EST. PATIENT-LVL IV: CPT | Mod: PBBFAC,,, | Performed by: FAMILY MEDICINE

## 2018-05-22 PROCEDURE — 3078F DIAST BP <80 MM HG: CPT | Mod: CPTII,S$GLB,, | Performed by: FAMILY MEDICINE

## 2018-05-22 PROCEDURE — 99214 OFFICE O/P EST MOD 30 MIN: CPT | Mod: S$GLB,,, | Performed by: FAMILY MEDICINE

## 2018-05-22 NOTE — PROGRESS NOTES
Subjective:     THIS DOCUMENT WAS MADE IN PART WITH [employer] DICTATION SOFTWARE.  OCCASIONALLY THIS SOFTWARE WILL MISINTERPRET WORDS OR PHRASES.     Patient ID: Lázaro Wang is a 83 y.o. male.    Chief Complaint: Arm Pain (pt states he is having numbness in fingers and forearm/wrist pain for about 3 months- now pain and numbness are constant )    HPI     Right hand numb ( No pain)    -  Location:  Thumb to 4th finger, most prominent 2nd and 3rd  -  Quality:  Numb  -  Severity: moderate  -  Duration:  Few months  -  Timing: now constant  -  Context:   Denies history of carpal tunnel syndrome or cervical disc disease but does have lumbar disc disease  -  Modifying factors:  none  -  Associated signs and symptoms: no exertional symptoms, no chest pain breath.     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    BPV (benign positional vertigo) 02/17/2016    Nuclear sclerosis 02/17/2016    Posterior vitreous detachment of both eyes 02/17/2016    Blepharitis of both eyes 02/17/2016    Hyperopia with astigmatism and presbyopia 02/17/2016    Ectasis aorta 10/31/2017    Carotid disease, bilateral 10/31/2017    Irritability 10/31/2017    Anxiety 10/31/2017    Chronic fatigue 03/27/2018     Resolved Ambulatory Problems     Diagnosis Date Noted    Degenerative arthritis of knee 03/15/2012     Bradycardia 05/23/2013    Hypoxemia 07/16/2014    Change in bowel habits 05/25/2017     Past Medical History:   Diagnosis Date    Anxiety     Arthritis     Cataract     Coronary artery disease     Diabetes mellitus     Elevated cholesterol     GERD (gastroesophageal reflux disease)     Heart disease     Hypertension     Myocardial infarction     Prostate disorder     Trouble in sleeping      '    Review of Systems   Constitutional: Negative for fatigue, fever and unexpected weight change.   HENT: Negative for congestion, sinus pressure, trouble swallowing and voice change.    Eyes: Negative for pain and visual disturbance.   Respiratory: Negative for cough, chest tightness and shortness of breath.    Cardiovascular: Negative for chest pain, palpitations and leg swelling.   Gastrointestinal: Negative for abdominal pain, blood in stool, constipation, diarrhea, nausea and vomiting.   Genitourinary: Negative for dysuria, frequency and hematuria.   Musculoskeletal: Positive for arthralgias and back pain. Negative for myalgias and neck pain.   Skin: Negative for rash and wound.   Neurological: Positive for numbness. Negative for dizziness, syncope and light-headedness.   Psychiatric/Behavioral: Positive for sleep disturbance ( controlled with current medications).       Objective:      Physical Exam   Constitutional: He is oriented to person, place, and time. He appears well-developed and well-nourished. No distress.   HENT:   Head: Normocephalic and atraumatic.   Eyes: Conjunctivae are normal. No scleral icterus.   Cardiovascular: Normal rate and regular rhythm.    Pulmonary/Chest: Effort normal and breath sounds normal. No respiratory distress. He has no wheezes.   Abdominal: Soft. Bowel sounds are normal. He exhibits no distension. There is no tenderness.   Neurological: He is alert and oriented to person, place, and time. He has normal strength. He displays no atrophy and no tremor. He exhibits normal  muscle tone. Coordination normal.   Reflex Scores:       Tricep reflexes are 2+ on the right side and 2+ on the left side.       Bicep reflexes are 1+ on the right side and 2+ on the left side.       Brachioradialis reflexes are 1+ on the right side and 2+ on the left side.  Skin: He is not diaphoretic.   Psychiatric: He has a normal mood and affect. His behavior is normal.       Assessment:       1. Numbness and tingling in right hand    2. Type 2 diabetes mellitus with complication, without long-term current use of insulin    3. Weight gain        Plan:       Lázaro was seen today for arm pain.    Diagnoses and all orders for this visit:    Numbness and tingling in right hand  -     Ambulatory referral to Physical Medicine Rehab   suspected carpal tunnel syndrome though symptoms came about pretty rapidly with what he describes as continuous symptoms. He did have abnormally symmetrical reflexes though I believe had some involuntary guarding which may have affected the results but I do recommend further evaluation possibly EMG testing depending on PMR's recommendation. I recommended short wrist splint at nighttime but he declined    Type 2 diabetes mellitus with complication, without long-term current use of insulin   stable    Weight gain    Possibly multifactorial. I did express some concern with Seroquel. He has chronic insomnia and has been sleeping very well with this and I suspect has also underlying mood disorder that this medicine has helped but now that he's getting older must reinforce side effects that we haven't discussed recently. I have recommended trying to reduce the half tablet today it tolerated but did not resume 100 mg.

## 2018-05-31 ENCOUNTER — INITIAL CONSULT (OUTPATIENT)
Dept: PHYSICAL MEDICINE AND REHAB | Facility: CLINIC | Age: 83
End: 2018-05-31
Payer: MEDICARE

## 2018-05-31 VITALS
HEART RATE: 88 BPM | HEIGHT: 68 IN | DIASTOLIC BLOOD PRESSURE: 88 MMHG | SYSTOLIC BLOOD PRESSURE: 127 MMHG | BODY MASS INDEX: 32.43 KG/M2 | WEIGHT: 214 LBS

## 2018-05-31 DIAGNOSIS — G56.01 RIGHT CARPAL TUNNEL SYNDROME: ICD-10-CM

## 2018-05-31 DIAGNOSIS — M79.641 RIGHT HAND PAIN: Primary | ICD-10-CM

## 2018-05-31 PROCEDURE — 76942 ECHO GUIDE FOR BIOPSY: CPT | Mod: 26,S$GLB,, | Performed by: PHYSICAL MEDICINE & REHABILITATION

## 2018-05-31 PROCEDURE — 3079F DIAST BP 80-89 MM HG: CPT | Mod: CPTII,S$GLB,, | Performed by: PHYSICAL MEDICINE & REHABILITATION

## 2018-05-31 PROCEDURE — 99999 PR PBB SHADOW E&M-EST. PATIENT-LVL III: CPT | Mod: PBBFAC,,, | Performed by: PHYSICAL MEDICINE & REHABILITATION

## 2018-05-31 PROCEDURE — 3074F SYST BP LT 130 MM HG: CPT | Mod: CPTII,S$GLB,, | Performed by: PHYSICAL MEDICINE & REHABILITATION

## 2018-05-31 PROCEDURE — 20526 THER INJECTION CARP TUNNEL: CPT | Mod: RT,S$GLB,, | Performed by: PHYSICAL MEDICINE & REHABILITATION

## 2018-05-31 PROCEDURE — 99203 OFFICE O/P NEW LOW 30 MIN: CPT | Mod: 25,S$GLB,, | Performed by: PHYSICAL MEDICINE & REHABILITATION

## 2018-05-31 RX ORDER — BETAMETHASONE SODIUM PHOSPHATE AND BETAMETHASONE ACETATE 3; 3 MG/ML; MG/ML
6 INJECTION, SUSPENSION INTRA-ARTICULAR; INTRALESIONAL; INTRAMUSCULAR; SOFT TISSUE
Status: DISCONTINUED | OUTPATIENT
Start: 2018-05-31 | End: 2018-05-31 | Stop reason: HOSPADM

## 2018-05-31 RX ADMIN — BETAMETHASONE SODIUM PHOSPHATE AND BETAMETHASONE ACETATE 6 MG: 3; 3 INJECTION, SUSPENSION INTRA-ARTICULAR; INTRALESIONAL; INTRAMUSCULAR; SOFT TISSUE at 05:05

## 2018-05-31 NOTE — PROCEDURES
Carpal Tunnel  Date/Time: 5/31/2018 5:26 PM  Performed by: GENE TAMAYO  Authorized by: GENE TAMAYO     Consent Done?:  Yes (Verbal)  Indications:  Pain  Site marked: The procedure site was marked    Timeout: Prior to procedure the correct patient, procedure, and site was verified      Location:  Wrist  Site:  R intercarpal  Prep: Patient was prepped and draped in usual sterile fashion    Ultrasonic Guidance for needle placement: Yes  Images are saved and documented.  Needle size:  27 G  Approach: Needle in plane, radial to ulnar.  Medications:  6 mg betamethasone acetate-betamethasone sodium phosphate 6 mg/mL  Patient tolerance:  Patient tolerated the procedure well with no immediate complications     Additional Comments: Ultrasound guidance was used for correct needle placement, the images were saved will be uploaded to EMR.

## 2018-05-31 NOTE — LETTER
May 31, 2018      Brodie Trotter MD  [address] [facility] [address] [place] [address]           [facility]  [address] [facility] [address] [place] [address]  Phone: [phone]  Fax: [phone]          Patient: Lázaro Wang   MR Number: [id number]   YOB: 1934   Date of Visit: 5/31/2018       Dear Dr. Brodie Trotter:    Thank you for referring Lázaro Wang to me for evaluation. Attached you will find relevant portions of my assessment and plan of care.    If you have questions, please do not hesitate to call me. I look forward to following Lázaro Wang along with you.    Sincerely,    Jean-Paul Kaur MD    Enclosure  CC:  No Recipients    If you would like to receive this communication electronically, please contact [email or web address] or [phone] to request more information on [facility] access.    For providers and/or their staff who would like to refer a patient to [facility], please contact us through our one-stop-shop provider referral line, [facility], at [phone].    If you feel you have received this communication in error or would no longer like to receive these types of communications, please e-mail [email or web address]

## 2018-05-31 NOTE — PROGRESS NOTES
[facility]    Consulting Provider: Brodie Trotter, *    CHIEF COMPLAINT:   Chief Complaint   Patient presents with    Numbness     right hand     HISTORY OF PRESENT ILLNESS: Lázaro Wang is a 83 y.o. male who presents to me for the first time for evaluation and treatment of right hand pain and numbness.  The symptoms started about 3 months ago insidiously.  There was no injury or traumatic event.  He denies any neck pain or radicular type pain.  He locates the symptoms the digits 2, 3, and 4.  He denies any weakness of the arm.  The symptoms are fairly constant.  He denies any symptoms on his left side.  He takes Vicodin as needed that he has prescribed for his back.  He does report that he has been dropping things.    Review of Systems   Constitutional: Negative for fever.   HENT: Negative for drooling.    Eyes: Negative for discharge.   Respiratory: Negative for choking.    Cardiovascular: Negative for chest pain.   Genitourinary: Negative for flank pain.   Skin: Negative for wound.   Allergic/Immunologic: Negative for immunocompromised state.   Neurological: Negative for tremors and syncope.   Psychiatric/Behavioral: Negative for behavioral problems.     Past Medical History:   Past Medical History:   Diagnosis Date    Anxiety     Arthritis     Cataract     OU    Coronary artery disease     stents x3    Diabetes mellitus     LBSL 70 today---stable    Elevated cholesterol     GERD (gastroesophageal reflux disease)     Heart disease     Hypertension     Myocardial infarction     Prostate disorder     Trouble in sleeping        Past Surgical History:   Past Surgical History:   Procedure Laterality Date    CARDIAC SURGERY      3 stents    CIRCUMCISION      COLONOSCOPY  11/14/2008  Ray    Diverticulosis.  Small Internal hemorrhoids.    COLONOSCOPY N/A 5/25/2017    Procedure: COLONOSCOPY;  Surgeon: Nito Larsen MD;  Location: [facility];  Service: Endoscopy;   Laterality: N/A;    COLONOSCOPY W/ POLYPECTOMY  12/2002    Adenomatous polyp    COLONOSCOPY W/ POLYPECTOMY  10/12/2005  Ray    One 2-3 mm polyp in the rectum. ADENOMATOUS POLYP.  Diverticulosis.  Internal hemorrhoids.      ESOPHAGOGASTRODUODENOSCOPY  10/12/2005  Ray    Reflux esophagitis.  Small Hiatal Hernia.  Esophageal spasm?  Antral erythema.  CLOtest negative.    TOTAL KNEE ARTHROPLASTY      bilateral       Family History:   Family History   Problem Relation Age of Onset    Glaucoma Mother     Cancer Father         esophageal, did not die of       Medications:   Current Outpatient Prescriptions on File Prior to Visit   Medication Sig Dispense Refill    amiodarone (PACERONE) 200 MG Tab TAKE ONE TABLET BY MOUTH TWICE DAILY FOR 2 WEEKS, THEN ONE TABLET BY MOUTH DAILY THEREAFTER 90 tablet 3    atorvastatin (LIPITOR) 10 MG tablet Take 1 tablet (10 mg total) by mouth once daily. 90 tablet 3    azelastine (ASTELIN) 137 mcg (0.1 %) nasal spray 1 spray (137 mcg total) by Nasal route 2 (two) times daily. 30 mL 12    blood sugar diagnostic Strp One touch ultra test strips . Test two times a day DX E11.43 200 each 3    cetirizine (ZYRTEC) 10 MG tablet Take 10 mg by mouth 2 (two) times daily.       diabetic supplies, [employer]. Kit One true metrix testing device to be used two times a day DX E11.49 1 kit 1    escitalopram oxalate (LEXAPRO) 10 MG tablet Take 1 tablet (10 mg total) by mouth once daily. (Patient taking differently: Take 5 mg by mouth once daily. ) 30 tablet 11    HYDROcodone-acetaminophen (NORCO) 5-325 mg per tablet Take 1 tablet by mouth every 6 (six) hours as needed. 120 tablet 0    lancets (ONETOUCH DELICA LANCETS) 33 gauge Misc 1 lancet by Misc.(Non-Drug; Combo Route) route 2 (two) times daily. Trueplus 33guage lancet use two times a day. DX E11.40 200 each 4    mirabegron 50 mg Tb24 Take 1 tablet (50 mg total) by mouth once daily. 30 tablet 11    multivitamin capsule Take 1 capsule by  "mouth once daily.      nitroGLYCERIN (NITROQUICK) 0.4 MG SL tablet Place 0.4 mg under the tongue every 5 (five) minutes as needed.       QUEtiapine (SEROQUEL) 100 MG Tab Take 1 tablet (100 mg total) by mouth nightly. 90 tablet 1    tamsulosin (FLOMAX) 0.4 mg Cp24 Take 1 capsule (0.4 mg total) by mouth nightly. 90 capsule 1    tamsulosin (FLOMAX) 0.4 mg Cp24 TAKE 1 CAPSULE BY MOUTH EVERY NIGHT AT BEDTIME 90 capsule 1    TRUE METRIX AIR GLUCOSE METER kit       VIT C/VIT E AC/LUT/COPPER/ZINC (PRESERVISION LUTEIN ORAL) Take by mouth.       No current facility-administered medications on file prior to visit.        Allergies:   Review of patient's allergies indicates:   Allergen Reactions    No known drug allergies        Social History:   Social History     Social History    Marital status:      Spouse name: N/A    Number of children: N/A    Years of education: N/A     Occupational History    retired       Social History Main Topics    Smoking status: Never Smoker    Smokeless tobacco: Never Used    Alcohol use No    Drug use: No    Sexual activity: Not Asked     Other Topics Concern    None     Social History Narrative    None     PHYSICAL EXAMINATION:   General    Vitals:    05/31/18 1412   BP: 127/88   Pulse: 88   Weight: 97.1 kg (214 lb)   Height: 5' 8" (1.727 m)     Constitutional: Oriented to person, place, and time. No apparent distress. Appears well-developed and well-nourished. Pleasant.  HENT:   Head: Normocephalic and atraumatic.   Eyes: Right eye exhibits no discharge. Left eye exhibits no discharge. No scleral icterus.   Pulmonary/Chest: Effort normal. No respiratory distress.   Abdominal: There is no guarding.   Neurological: Alert and oriented to person, place, and time.   Psychiatric: Behavior is normal.   Right Hand Exam     Tenderness   The patient is experiencing no tenderness.         Range of Motion     Wrist   Extension: 40   Flexion: 70   Pronation: normal "   Supination: normal     Muscle Strength   Wrist Extension: 5/5   Wrist Flexion: 5/5   : 5/5     Tests   Phalens Sign: negative  Tinels Sign (Medial Nerve): negative  Finkelstein: negative    Other   Erythema: absent  Scars: absent  Sensation: decreased (Sensation light touch is reduced over the median distribution on the right in comparison to the left)  Pulse: present      Left Hand Exam     Tenderness   The patient is experiencing no tenderness.         Range of Motion     Wrist   Extension: normal   Flexion: normal   Pronation: normal   Supination: normal     Other   Erythema: absent  Scars: absent  Sensation: normal  Pulse: present        INSPECTION: There is no swelling, ecchymoses, erythema or gross deformity of the right hand.  No gross thenar atrophy.  Neurologic: Reflexes are 2+ and symmetric at the bilateral upper extremities.  Manual muscle testing reveals 5/5 strength about the bilateral upper extremities.  Somewhat limited cervical range of motion with some neck discomfort, but negative Spurling's test.    Imaging  Diagnostic Ultrasound Exam: Limited exam of the right median nerve was performed today.  The cross-sectional area measured 0.12 cm² at the level of the transverse carpal ligament.    Data Reviewed: ultrasound    Supportive Actions: Independent visualization of images or test specimens    ASSESSMENT:   1. Right hand pain    2. Right carpal tunnel syndrome      PLAN:     1. Time was spent reviewing the above diagnosis in depth with Lázaro today, including acute management and rehabilitation.     2.  His symptoms and physical exam are most consistent with a diagnosis of right carpal tunnel syndrome.  Diagnostic ultrasound exam today of the median nerve showed borderline enlargement of the median nerve.  We discussed EMG/nerve conduction study, versus treatment conservatively in the form of night splinting and cortisone steroid injection.  After discussion, he wished to proceed with  "conservative treatment, see separate procedure note for ultrasound-guided injection of cortisone steroid to the right carpal tunnel.    3.  He was issued today a right carpal tunnel night splint.     4. RTC in 6 weeks if not improved.  At that point we may consider EMG/nerve conduction study.    This is a consult from Dr. Brodie Trotter. Please see the "Communications" section of Epic to see how the consulting physician received the report of today's findings and recommendations. If it's an [facility] physician, it will be forwarded to his/her "in basket".    The above note was completed, in part, with the aid of Dragon dictation software/hardware. Translation errors may be present.    "

## 2018-06-14 ENCOUNTER — TELEPHONE (OUTPATIENT)
Dept: PHYSICAL MEDICINE AND REHAB | Facility: CLINIC | Age: 83
End: 2018-06-14

## 2018-06-14 NOTE — TELEPHONE ENCOUNTER
----- Message from Jean-Paul Kaur MD sent at 6/14/2018  9:31 AM CDT -----  Please give him a call to see how he has been doing since the shot. If not better, I would give it another 3-4 weeks, then, if still not better, he needs to schedule an EMG.    ----- Message -----  From: Jean-Paul Kaur MD  Sent: 6/5/2018   7:58 PM  To: Jean-Paul Kaur MD

## 2018-06-21 RX ORDER — HYDROCODONE BITARTRATE AND ACETAMINOPHEN 5; 325 MG/1; MG/1
1 TABLET ORAL EVERY 6 HOURS PRN
Qty: 120 TABLET | Refills: 0 | Status: SHIPPED | OUTPATIENT
Start: 2018-06-21 | End: 2018-07-23 | Stop reason: SDUPTHER

## 2018-06-21 NOTE — TELEPHONE ENCOUNTER
----- Message from Alexis Salinas sent at 6/21/2018  2:27 PM CDT -----  Contact: JEANNE FLETCHER [[id number]]      Can the clinic reply in [facility]: no      Please refill the medication(s) listed below. Please call the patient when the prescription(s) is ready for  at this phone number   [phone]      Medication #1 HYDROcodone-acetaminophen (NORCO) 5-325 mg per tablet      Preferred Pharmacy: [facility] [address] [place] [address] AT [facility] & [facility]  22

## 2018-06-22 ENCOUNTER — PES CALL (OUTPATIENT)
Dept: ADMINISTRATIVE | Facility: CLINIC | Age: 83
End: 2018-06-22

## 2018-07-11 ENCOUNTER — TELEPHONE (OUTPATIENT)
Dept: FAMILY MEDICINE | Facility: CLINIC | Age: 83
End: 2018-07-11

## 2018-07-11 NOTE — TELEPHONE ENCOUNTER
Attempted to call pt,Left message on voicemail to call office.  On office visit with Dr Kaur he stated if brace and injection did not work then they would discuss EMG/nerve conduction study

## 2018-07-11 NOTE — TELEPHONE ENCOUNTER
----- Message from Mariza Talavera sent at 7/11/2018 10:42 AM CDT -----  Please call pt at [phone]  Asking for referral / may need surgery on his wrist ... Saw Dr Kaur / injection and brace has not helped

## 2018-07-12 NOTE — TELEPHONE ENCOUNTER
Pt states he does not want to see Dr. Kaur again.  He states he is till having to same pain in his wrist and wants to be referred to someone else.  Please advise

## 2018-07-12 NOTE — TELEPHONE ENCOUNTER
----- Message from Brianne Chua sent at 7/12/2018  2:18 PM CDT -----  Type:  Patient Returning Call    Who Called:  Patient  Who Left Message for Patient:  Libby  Does the patient know what this is regarding?:  referral  Best Call Back Number:  [phone]  Additional Information:

## 2018-07-12 NOTE — TELEPHONE ENCOUNTER
----- Message from Tan Carvajal sent at 7/12/2018  9:51 AM CDT -----  Type:  Patient Returning Call    Who Called:  Patient  Who Left Message for Patient:  DMITRI OHARA  Does the patient know what this is regarding?:  Referral for condition  Best Call Back Number:  [phone]

## 2018-07-13 NOTE — TELEPHONE ENCOUNTER
----- Message from Keiry Calderon sent at 7/13/2018  2:16 PM CDT -----  Contact: [phone]  Patient is requesting a call back from the nurse stated he been playing phone tag, she will know what the call is about.   Please call the patient upon request at phone number [phone].

## 2018-07-13 NOTE — TELEPHONE ENCOUNTER
His options are orthopedics or neurosurgery. Though it's possible that they may send him back to [facility] for the EMG.

## 2018-07-13 NOTE — TELEPHONE ENCOUNTER
Patient states that he will not go back to [facility]. Advised he has 2 options ortho or neurosurgery. Patient states thanks and hung up.

## 2018-07-23 RX ORDER — HYDROCODONE BITARTRATE AND ACETAMINOPHEN 5; 325 MG/1; MG/1
1 TABLET ORAL EVERY 6 HOURS PRN
Qty: 120 TABLET | Refills: 0 | Status: SHIPPED | OUTPATIENT
Start: 2018-07-23 | End: 2018-08-21 | Stop reason: SDUPTHER

## 2018-07-23 NOTE — TELEPHONE ENCOUNTER
----- Message from Aby Venegas sent at 7/23/2018  1:02 PM CDT -----  Type:  Pharmacy Calling to Clarify an RX    Name of Caller:  Pt [facility]    [facility] [address] [address] AT [facility] & [address] [address]  Phone: [phone] Fax: [phone]    Prescription Name: hydrocodone   What do they need to clarify?: forrest Best Call Back Number:[phone] Information:  na

## 2018-07-24 ENCOUNTER — OFFICE VISIT (OUTPATIENT)
Dept: FAMILY MEDICINE | Facility: CLINIC | Age: 83
End: 2018-07-24
Payer: MEDICARE

## 2018-07-24 ENCOUNTER — LAB VISIT (OUTPATIENT)
Dept: LAB | Facility: HOSPITAL | Age: 83
End: 2018-07-24
Attending: FAMILY MEDICINE
Payer: MEDICARE

## 2018-07-24 VITALS
HEART RATE: 48 BPM | DIASTOLIC BLOOD PRESSURE: 68 MMHG | SYSTOLIC BLOOD PRESSURE: 132 MMHG | WEIGHT: 216.25 LBS | OXYGEN SATURATION: 95 % | BODY MASS INDEX: 32.77 KG/M2 | HEIGHT: 68 IN

## 2018-07-24 DIAGNOSIS — E11.43 TYPE II DIABETES MELLITUS WITH PERIPHERAL AUTONOMIC NEUROPATHY: ICD-10-CM

## 2018-07-24 DIAGNOSIS — H91.90 DECREASED HEARING, UNSPECIFIED LATERALITY: ICD-10-CM

## 2018-07-24 DIAGNOSIS — H52.203 HYPEROPIA OF BOTH EYES WITH ASTIGMATISM AND PRESBYOPIA: ICD-10-CM

## 2018-07-24 DIAGNOSIS — E78.5 DYSLIPIDEMIA: Chronic | ICD-10-CM

## 2018-07-24 DIAGNOSIS — H25.13 NUCLEAR SCLEROSIS OF BOTH EYES: ICD-10-CM

## 2018-07-24 DIAGNOSIS — R53.82 CHRONIC FATIGUE: ICD-10-CM

## 2018-07-24 DIAGNOSIS — H43.813 POSTERIOR VITREOUS DETACHMENT OF BOTH EYES: ICD-10-CM

## 2018-07-24 DIAGNOSIS — H01.00A BLEPHARITIS OF UPPER AND LOWER EYELIDS OF BOTH EYES, UNSPECIFIED TYPE: ICD-10-CM

## 2018-07-24 DIAGNOSIS — Z00.00 ENCOUNTER FOR PREVENTIVE HEALTH EXAMINATION: Primary | ICD-10-CM

## 2018-07-24 DIAGNOSIS — Z00.00 ENCOUNTER FOR PREVENTIVE HEALTH EXAMINATION: ICD-10-CM

## 2018-07-24 DIAGNOSIS — I70.0 ATHEROSCLEROSIS OF AORTA: ICD-10-CM

## 2018-07-24 DIAGNOSIS — Z98.61 HISTORY OF PTCA: Chronic | ICD-10-CM

## 2018-07-24 DIAGNOSIS — F41.9 ANXIETY: ICD-10-CM

## 2018-07-24 DIAGNOSIS — I77.819 ECTASIS AORTA: ICD-10-CM

## 2018-07-24 DIAGNOSIS — E11.8 TYPE 2 DIABETES MELLITUS WITH COMPLICATION, WITHOUT LONG-TERM CURRENT USE OF INSULIN: ICD-10-CM

## 2018-07-24 DIAGNOSIS — H52.4 HYPEROPIA OF BOTH EYES WITH ASTIGMATISM AND PRESBYOPIA: ICD-10-CM

## 2018-07-24 DIAGNOSIS — N18.30 CHRONIC KIDNEY DISEASE, STAGE III (MODERATE): ICD-10-CM

## 2018-07-24 DIAGNOSIS — H93.8X9 SENSATION OF FULLNESS IN EAR, UNSPECIFIED LATERALITY: ICD-10-CM

## 2018-07-24 DIAGNOSIS — H01.00B BLEPHARITIS OF UPPER AND LOWER EYELIDS OF BOTH EYES, UNSPECIFIED TYPE: ICD-10-CM

## 2018-07-24 DIAGNOSIS — I10 ESSENTIAL HYPERTENSION: Chronic | ICD-10-CM

## 2018-07-24 DIAGNOSIS — I25.10 CORONARY ARTERY DISEASE INVOLVING NATIVE CORONARY ARTERY OF NATIVE HEART WITHOUT ANGINA PECTORIS: Chronic | ICD-10-CM

## 2018-07-24 DIAGNOSIS — H52.03 HYPEROPIA OF BOTH EYES WITH ASTIGMATISM AND PRESBYOPIA: ICD-10-CM

## 2018-07-24 DIAGNOSIS — I77.9 CAROTID DISEASE, BILATERAL: ICD-10-CM

## 2018-07-24 DIAGNOSIS — R45.4 IRRITABILITY: ICD-10-CM

## 2018-07-24 LAB
ALBUMIN SERPL BCP-MCNC: 4 G/DL
ALP SERPL-CCNC: 84 U/L
ALT SERPL W/O P-5'-P-CCNC: 18 U/L
ANION GAP SERPL CALC-SCNC: 7 MMOL/L
AST SERPL-CCNC: 23 U/L
BILIRUB SERPL-MCNC: 0.7 MG/DL
BUN SERPL-MCNC: 17 MG/DL
CALCIUM SERPL-MCNC: 9.6 MG/DL
CHLORIDE SERPL-SCNC: 105 MMOL/L
CHOLEST SERPL-MCNC: 142 MG/DL
CHOLEST/HDLC SERPL: 2.7 {RATIO}
CO2 SERPL-SCNC: 26 MMOL/L
CREAT SERPL-MCNC: 1.3 MG/DL
EST. GFR  (AFRICAN AMERICAN): 58.3 ML/MIN/1.73 M^2
EST. GFR  (NON AFRICAN AMERICAN): 50.5 ML/MIN/1.73 M^2
ESTIMATED AVG GLUCOSE: 128 MG/DL
GLUCOSE SERPL-MCNC: 108 MG/DL
HBA1C MFR BLD HPLC: 6.1 %
HDLC SERPL-MCNC: 53 MG/DL
HDLC SERPL: 37.3 %
LDLC SERPL CALC-MCNC: 67.6 MG/DL
NONHDLC SERPL-MCNC: 89 MG/DL
POTASSIUM SERPL-SCNC: 4.5 MMOL/L
PROT SERPL-MCNC: 7.2 G/DL
SODIUM SERPL-SCNC: 138 MMOL/L
TRIGL SERPL-MCNC: 107 MG/DL

## 2018-07-24 PROCEDURE — 3075F SYST BP GE 130 - 139MM HG: CPT | Mod: CPTII,S$GLB,, | Performed by: NURSE PRACTITIONER

## 2018-07-24 PROCEDURE — 36415 COLL VENOUS BLD VENIPUNCTURE: CPT | Mod: PO

## 2018-07-24 PROCEDURE — 3078F DIAST BP <80 MM HG: CPT | Mod: CPTII,S$GLB,, | Performed by: NURSE PRACTITIONER

## 2018-07-24 PROCEDURE — G0439 PPPS, SUBSEQ VISIT: HCPCS | Mod: S$GLB,,, | Performed by: NURSE PRACTITIONER

## 2018-07-24 PROCEDURE — 80053 COMPREHEN METABOLIC PANEL: CPT

## 2018-07-24 PROCEDURE — 80061 LIPID PANEL: CPT

## 2018-07-24 PROCEDURE — 99999 PR PBB SHADOW E&M-EST. PATIENT-LVL V: CPT | Mod: PBBFAC,,, | Performed by: NURSE PRACTITIONER

## 2018-07-24 PROCEDURE — 83036 HEMOGLOBIN GLYCOSYLATED A1C: CPT

## 2018-07-24 RX ORDER — ASPIRIN 325 MG
325 TABLET ORAL DAILY
COMMUNITY
End: 2023-10-24

## 2018-07-24 RX ORDER — ATORVASTATIN CALCIUM 10 MG/1
TABLET, FILM COATED ORAL
Qty: 90 TABLET | Refills: 0 | Status: SHIPPED | OUTPATIENT
Start: 2018-07-24 | End: 2018-08-09 | Stop reason: SDUPTHER

## 2018-07-24 NOTE — PROGRESS NOTES
"Lázaro Wang presented for a  Medicare AWV and comprehensive Health Risk Assessment today. The following components were reviewed and updated:    · Medical history  · Family History  · Social history  · Allergies and Current Medications  · Health Risk Assessment  · Health Maintenance  · Care Team     Review of Systems   Constitutional: Positive for malaise/fatigue (chronic). Negative for chills, fever and weight loss.   HENT: Positive for congestion and hearing loss.    Respiratory: Negative for cough, shortness of breath and wheezing.    Cardiovascular: Negative for chest pain.   Gastrointestinal: Negative for abdominal pain, blood in stool, constipation, diarrhea, nausea and vomiting.   Skin: Negative for rash.   Neurological: Positive for weakness. Negative for dizziness and headaches.     ** See Completed Assessments for Annual Wellness Visit within the encounter summary.**       The following assessments were completed:  · Living Situation  · CAGE  · Depression Screening  · Timed Get Up and Go  · Whisper Test  · Cognitive Function Screening      · Nutrition Screening  · ADL Screening  · PAQ Screening    Vitals:    07/24/18 1110   BP: 132/68   BP Location: Left arm   Patient Position: Sitting   BP Method: Medium (Manual)   Pulse: (!) 48   SpO2: 95%   Weight: 98.1 kg (216 lb 4.3 oz)   Height: 5' 8" (1.727 m)     Body mass index is 32.88 kg/m².  Physical Exam   Constitutional: He is oriented to person, place, and time. He appears well-nourished.   Cardiovascular: Normal rate, regular rhythm, normal heart sounds and intact distal pulses.    Pulmonary/Chest: Effort normal and breath sounds normal.   Neurological: He is alert and oriented to person, place, and time.   Skin: Skin is warm and dry.   Vitals reviewed.        Diagnoses and health risks identified today and associated recommendations/orders:    1. Encounter for preventive health examination  Reviewed and discussed health maintenance.  Follow up in 1 " year  - Comprehensive metabolic panel; Future  - Lipid panel; Future  - Hemoglobin A1c; Future  - Ambulatory referral to ENT    2. Anxiety  Stable- continue current treatment and follow up routinely with PCP     3. Irritability  Stable- continue current treatment and follow up routinely with PCP     4. Carotid disease, bilateral  Stable- continue current treatment and follow up routinely with PCP and cardiology (Dr. Dominguez)    5. Ectasis aorta  Stable- continue current treatment and follow up routinely with PCP and cardiology (Dr. Dominguez)    6. Dyslipidemia  Stable- continue current treatment and follow up routinely with PCP and cardiology (Dr. Dominguez)  - Comprehensive metabolic panel; Future  - Lipid panel; Future  - Hemoglobin A1c; Future    7. Coronary artery disease involving native coronary artery of native heart without angina pectoris  Stable- continue current treatment and follow up routinely with PCP and cardiology (Dr. Dominguez)  - Comprehensive metabolic panel; Future  - Lipid panel; Future  - Hemoglobin A1c; Future    8. History of PTCA  Stable- continue current treatment and follow up routinely with PCP and cardiology (Dr. Dominguez)  - Comprehensive metabolic panel; Future  - Lipid panel; Future  - Hemoglobin A1c; Future    9. Essential hypertension  Stable- continue current treatment and follow up routinely with PCP and cardiology (Dr. Dominguez)  - Comprehensive metabolic panel; Future  - Lipid panel; Future  - Hemoglobin A1c; Future    10. Type 2 diabetes mellitus with complication, without long-term current use of insulin  Stable- continue current treatment and follow up routinely with PCP   - Comprehensive metabolic panel; Future  - Lipid panel; Future  - Hemoglobin A1c; Future    11. 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 Chronic  fatigue  Stable- continue current treatment and follow up routinely with PCP    13. Hyperopia of both eyes with astigmatism and presbyopia  Eye appt scheduled with Dr. Muir    14. Blepharitis of upper and lower eyelids of both eyes, unspecified type  Eye appt scheduled with Dr. Muir    15. Posterior vitreous detachment of both eyes  Eye appt scheduled with Dr. Muir    16. Nuclear sclerosis of both eyes  Eye appt scheduled with Dr. Muir    17. Sensation of fullness in ear, unspecified laterality  - Ambulatory referral to ENT    18. Decreased hearing, unspecified laterality  - Ambulatory referral to ENT    19. Chronic kidney disease, stage III (moderate)  Stable- continue current treatment and follow up routinely with PCP   Labs reviewed. Avoid NSAIDs and increase fluid intake    20. Atherosclerosis of aorta  Stable- continue current treatment and follow up routinely with PCP     Provided Lázaro with a 5-10 year written screening schedule and personal prevention plan. Recommendations were developed using the USPSTF age appropriate recommendations. Education, counseling, and referrals were provided as needed. After Visit Summary printed and given to patient which includes a list of additional screenings\tests needed.    Latoya Palomo NP

## 2018-07-24 NOTE — PATIENT INSTRUCTIONS
Counseling and Referral of Other Preventative  (Italic type indicates deductible and co-insurance are waived)    Patient Name: Lázaro Wang  Today's Date: 7/24/2018    Health Maintenance       Date Due Completion Date    Sign Pain Contract 10/08/1952 ---    Complete Opioid Risk Tool 10/08/1952 ---    Lipid Panel 02/08/2018 2/8/2017    Foot Exam 02/15/2018 2/15/2017    Eye Exam 03/06/2018 3/6/2017    Hemoglobin A1c 06/22/2018 12/22/2017    Influenza Vaccine 08/01/2018 9/27/2017    Urine Microalbumin 12/22/2018 12/22/2017    TETANUS VACCINE 05/05/2019 5/5/2009        No orders of the defined types were placed in this encounter.    The following information is provided to all patients.  This information is to help you find resources for any of the problems found today that may be affecting your health:                Living healthy guide: www.[facility].[place].gov      Understanding Diabetes: www.diabetes.org      Eating healthy: www.cdc.gov/healthyweight      [facility] home safety checklist: www.cdc.gov/steadi/patient.html      Agency on Aging: www.goea.[place].[facility]      Alcoholics anonymous (AA): www.aa.org      Physical Activity: www.shadi.nih.gov/ua7reaa      Tobacco use: www.quitwithusla.org

## 2018-08-07 ENCOUNTER — CLINICAL SUPPORT (OUTPATIENT)
Dept: AUDIOLOGY | Facility: CLINIC | Age: 83
End: 2018-08-07
Payer: MEDICARE

## 2018-08-07 ENCOUNTER — OFFICE VISIT (OUTPATIENT)
Dept: OTOLARYNGOLOGY | Facility: CLINIC | Age: 83
End: 2018-08-07
Payer: MEDICARE

## 2018-08-07 VITALS
SYSTOLIC BLOOD PRESSURE: 148 MMHG | HEART RATE: 56 BPM | DIASTOLIC BLOOD PRESSURE: 83 MMHG | BODY MASS INDEX: 32.77 KG/M2 | HEIGHT: 68 IN | WEIGHT: 216.25 LBS

## 2018-08-07 DIAGNOSIS — H73.891 RETRACTION OF TYMPANIC MEMBRANE OF RIGHT EAR: Primary | ICD-10-CM

## 2018-08-07 DIAGNOSIS — H90.A31 MIXED CONDUCTIVE AND SENSORINEURAL HEARING LOSS OF RIGHT EAR WITH RESTRICTED HEARING OF LEFT EAR: Primary | ICD-10-CM

## 2018-08-07 DIAGNOSIS — H69.91 ETD (EUSTACHIAN TUBE DYSFUNCTION), RIGHT: ICD-10-CM

## 2018-08-07 DIAGNOSIS — H90.A22 SENSORINEURAL HEARING LOSS (SNHL) OF LEFT EAR WITH RESTRICTED HEARING OF RIGHT EAR: ICD-10-CM

## 2018-08-07 DIAGNOSIS — H90.3 BILATERAL SENSORINEURAL HEARING LOSS: ICD-10-CM

## 2018-08-07 DIAGNOSIS — H93.8X1 EAR PRESSURE, RIGHT: ICD-10-CM

## 2018-08-07 PROCEDURE — 99999 PR PBB SHADOW E&M-EST. PATIENT-LVL IV: CPT | Mod: PBBFAC,,, | Performed by: NURSE PRACTITIONER

## 2018-08-07 PROCEDURE — 3077F SYST BP >= 140 MM HG: CPT | Mod: CPTII,S$GLB,, | Performed by: NURSE PRACTITIONER

## 2018-08-07 PROCEDURE — 99999 PR PBB SHADOW E&M-EST. PATIENT-LVL I: CPT | Mod: PBBFAC,,,

## 2018-08-07 PROCEDURE — 92557 COMPREHENSIVE HEARING TEST: CPT | Mod: S$GLB,,, | Performed by: AUDIOLOGIST-HEARING AID FITTER

## 2018-08-07 PROCEDURE — 92567 TYMPANOMETRY: CPT | Mod: S$GLB,,, | Performed by: AUDIOLOGIST-HEARING AID FITTER

## 2018-08-07 PROCEDURE — 3079F DIAST BP 80-89 MM HG: CPT | Mod: CPTII,S$GLB,, | Performed by: NURSE PRACTITIONER

## 2018-08-07 PROCEDURE — 99213 OFFICE O/P EST LOW 20 MIN: CPT | Mod: S$GLB,,, | Performed by: NURSE PRACTITIONER

## 2018-08-07 RX ORDER — AZELASTINE 1 MG/ML
1 SPRAY, METERED NASAL 2 TIMES DAILY
Qty: 30 ML | Refills: 12 | Status: SHIPPED | OUTPATIENT
Start: 2018-08-07 | End: 2019-03-01

## 2018-08-07 RX ORDER — FLUTICASONE PROPIONATE 50 MCG
1 SPRAY, SUSPENSION (ML) NASAL 2 TIMES DAILY
Qty: 16 G | Refills: 12 | Status: SHIPPED | OUTPATIENT
Start: 2018-08-07 | End: 2019-06-06 | Stop reason: SDUPTHER

## 2018-08-07 NOTE — PROGRESS NOTES
Subjective:       Patient ID: Lázaro Wang is a 83 y.o. male.    Chief Complaint: Other (sensation of fullness in bilat ears)    HPI   Patient reports chronic right-sided aural fullness/pressure sensation, not getting better. He reports worsening of his hearing over past six months, TV volume at max. No tinnitus. He flew in June to [place] and back. He was treated for right HENRY six months ago, but states he discontinued Flonase because it was not helping, and tried nasal valsalva occasionally but relief is only temporary so he doesn't see the point.     Review of Systems   Constitutional: Negative.    HENT: Negative.         Right ear pressure/fullness X 1 year   Eyes: Negative.    Respiratory: Negative.    Cardiovascular: Negative.    Gastrointestinal: Negative.    Musculoskeletal: Negative.    Skin: Negative.    Neurological: Negative.    Hematological: Negative.    Psychiatric/Behavioral: Negative.        Objective:      Physical Exam   Constitutional: He is oriented to person, place, and time. Vital signs are normal. He appears well-developed and well-nourished. He is cooperative. He does not appear ill. No distress.   HENT:   Head: Normocephalic and atraumatic.   Right Ear: Hearing, tympanic membrane, external ear and ear canal normal. Tympanic membrane is not erythematous. No middle ear effusion.   Left Ear: Hearing, tympanic membrane, external ear and ear canal normal. Tympanic membrane is not erythematous.  No middle ear effusion.   Nose: Nose normal.   Mouth/Throat: Uvula is midline, oropharynx is clear and moist and mucous membranes are normal.     SEPARATE PROCEDURE IN OFFICE:   Procedure: Removal of impacted cerumen, bilateral   Pre Procedure Diagnosis: Cerumen Impaction   Post Procedure Diagnosis: Cerumen Impaction   Verbal informed consent in regards to risk of trauma to ear canal, ear drum or hearing, discomfort during procedure and/or inability to remove cerumen impaction in one session or  "unforeseen events or complications.   No anesthesia.     Procedure in detail:   Ear canal visualized bilateral with appropriate size ear speculum utilizing Operating Head Binocular Otomicroscope   Utilizing the following: Ring curet, delicate alligator forceps, and/or suction cannula. The impacted cerumen of the ear canals was removed atraumatically. The TM and EAC were then inspected and found to be clear of wax. See description of TMs/EACs in PE above.   Complications: No   Condition: Improved/Good     Eyes: EOM and lids are normal. Right eye exhibits no discharge. Left eye exhibits no discharge. No scleral icterus.   Neck: Trachea normal and normal range of motion. Neck supple. No tracheal deviation present.   Cardiovascular: Normal rate.    Pulmonary/Chest: Effort normal. No stridor. No respiratory distress. He has no wheezes.   Musculoskeletal: Normal range of motion.   Neurological: He is alert and oriented to person, place, and time. He has normal strength. Coordination and gait normal.   Skin: Skin is warm, dry and intact. He is not diaphoretic. No cyanosis. No pallor.   Psychiatric: He has a normal mood and affect. His speech is normal and behavior is normal. Judgment and thought content normal. Cognition and memory are normal.   Nursing note and vitals reviewed.      Assessment:       1. 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 ETD (Eustachian tube dysfunction), right   3.  Symmetric sloping SNHL       Plan:     Discussed importance of adherence to "aggressive" conservative approach before consideration of an invasive procedure such as tympanostomy tube.   Patient will resume nasal sprays and attempt valsalva with more regularity.   Patient will return in 4 weeks for repeat tymps and possible NP scope examination of ET orifice right side, prior to discussion regarding possible PET AD.       "

## 2018-08-07 NOTE — PROGRESS NOTES
Lázaro Wang was seen 08/07/2018 for an audiological evaluation. Patient complains of ear fullness AD. Pt reports some noise exposure with lawn equipment and firearms. He denies tinnitus and family Hx of hearing loss. He has a Hx of type 2 diabetes. He says he has significant difficulty hearing the TV when he and his wife watch movies at night. His wife wears hearing aids purchased elsewhere but he would like to keep all of his medical treatments at [facility].     Results reveal a normal-to-profound mixed hearing loss for the right ear, and  normal-to-severe sensorineural hearing loss for the left ear.  Some changes were noted with air conduction in the right ear when compared to previous hearing testing on 12/20/16.  Speech Reception Thresholds were  25 dBHL for the right ear and 15 dBHL for the left ear.    Word recognition scores were good for the right ear and good for the left ear.   Tympanograms were Type C for the right ear and Type A for the left ear.    Audiogram results were reviewed in detail with patient and all questions were answered. Results will be reviewed by ENT at the completion of this note. Recommend further medical eval for the abnormal tymp and air/bone gap AD, repeat hearing testing following medical treatment, binaural amplification pending medical clearance, annual hearing tests to monitor hearing and hearing protection in loud noise. He will call the clinic if he wishes to set up a hearing aid consult.

## 2018-08-07 NOTE — LETTER
August 7, 2018      Latoya Palomo NP  [address] [facility] [address] [place] [address]           [facility]  [address] [facility] [address] [place] [address]  Phone: [phone]  Fax: [phone]          Patient: Lázaro Wang   MR Number: [id number]   YOB: 1934   Date of Visit: 8/7/2018       Dear Latoya Palomo:    Thank you for referring Lázaro Wang to me for evaluation. Attached you will find relevant portions of my assessment and plan of care.    If you have questions, please do not hesitate to call me. I look forward to following Lázaro Wang along with you.    Sincerely,    Sharona Chaidez NP    Enclosure  CC:  No Recipients    If you would like to receive this communication electronically, please contact [email or web address] or [phone] to request more information on [facility] Link access.    For providers and/or their staff who would like to refer a patient to [facility], please contact us through our one-stop-shop provider referral line, Anette Abraham, at [phone].    If you feel you have received this communication in error or would no longer like to receive these types of communications, please e-mail [email or web address]

## 2018-08-07 NOTE — PATIENT INSTRUCTIONS
"DIFFERENT TYPES OF NASAL SPRAYS:  · Flonase / fluticasone (steroid spray) is best for stuffy, pressure, fullness.  · Astelin / azelastine (antihistamine spray) is best for itchy, drippy, sneezy.  · Atrovent / ipratropium is best for chronic watery nasal drip ("leaky faucet" nose), which may worsen with eating.    Nasal spray instructions:  Blow nose first gently to clean. Keep chin level with the floor (do not tilt head forward or back). Insert nasal spray taking caution to direct it AWAY from the middle wall inside the nose (septum) to avoid irritating nasal septum which could cause nosebleed.  Do not tilt spray up but rather flat and out along the roof of your mouth to spray. Angle the tip of the spray out slightly toward the direction of the ears; then spray. Do not take quick vigorous sniff but rather slow gentle inhalation while waiting for medication to absorb into nasal passages. Then administer second spray in same way.     EUSTACHIAN TUBE INSTRUCTIONS:  Nasal valsalva:  Pinch nose and with closed mouth try to "pop" air into ears through the back of the nose. Attempt this several times a day. The more popping you have, the more likely the fluid will resolve.     Ponaris Nasal Emollient is used for the relief of: nasal congestion due to colds, nasal irritation, allergy exacerbations, nasal crusting. Specifically prepared iodized organic oils of pine, eucalyptus, peppermint, cajeput, and cottonseed. To order Ponaris: ask your pharmacist to order it for you or we carry it in our pharmacy downstairs on the first floor.     "

## 2018-08-08 ENCOUNTER — PATIENT OUTREACH (OUTPATIENT)
Dept: ADMINISTRATIVE | Facility: HOSPITAL | Age: 83
End: 2018-08-08

## 2018-08-08 NOTE — LETTER
August 8, 2018    Lázaro CHENG Felipe  [address] [place] [address]             [facility]  [address]  [facility] [address]  Phone: [phone] Dear Mr. Wang:    [facility] is committed to your overall health.  To help you get the most out of each of your visits, we will review your information to make sure you are up to date on all of your recommended tests and/or procedures.      Dr. Trotter has found that your chart shows you may be due for annual diabetic foot and eye exam, flu vaccine.     If you have had any of the above done at another facility, please bring the records or information with you so that your record at [facility] will be complete.  If you would like to schedule any of these, please contact me.    If you are currently taking medication, please bring it with you to your appointment for review.    Also, if you have any type of Advanced Directives, please bring them with you to your office visit so we may scan them into your chart.        If you have any questions or concerns, please don't hesitate to call.    Sincerely,        Juanjo Hemphill LPN Clinical Care Coordinator  [facility]  [address] [facility] [address]  [place] [address]  [phone] (p)   [phone] (f)

## 2018-08-09 ENCOUNTER — OFFICE VISIT (OUTPATIENT)
Dept: CARDIOLOGY | Facility: CLINIC | Age: 83
End: 2018-08-09
Payer: MEDICARE

## 2018-08-09 VITALS
BODY MASS INDEX: 32.84 KG/M2 | WEIGHT: 216.69 LBS | HEART RATE: 56 BPM | HEIGHT: 68 IN | DIASTOLIC BLOOD PRESSURE: 68 MMHG | SYSTOLIC BLOOD PRESSURE: 132 MMHG

## 2018-08-09 DIAGNOSIS — Z98.61 HISTORY OF PTCA: Chronic | ICD-10-CM

## 2018-08-09 DIAGNOSIS — I25.10 CORONARY ARTERY DISEASE INVOLVING NATIVE CORONARY ARTERY OF NATIVE HEART WITHOUT ANGINA PECTORIS: Primary | Chronic | ICD-10-CM

## 2018-08-09 DIAGNOSIS — N18.30 CHRONIC KIDNEY DISEASE, STAGE III (MODERATE): ICD-10-CM

## 2018-08-09 DIAGNOSIS — I10 ESSENTIAL HYPERTENSION: Chronic | ICD-10-CM

## 2018-08-09 DIAGNOSIS — E78.5 DYSLIPIDEMIA: Chronic | ICD-10-CM

## 2018-08-09 PROCEDURE — 3075F SYST BP GE 130 - 139MM HG: CPT | Mod: CPTII,S$GLB,, | Performed by: INTERNAL MEDICINE

## 2018-08-09 PROCEDURE — 3078F DIAST BP <80 MM HG: CPT | Mod: CPTII,S$GLB,, | Performed by: INTERNAL MEDICINE

## 2018-08-09 PROCEDURE — 99999 PR PBB SHADOW E&M-EST. PATIENT-LVL III: CPT | Mod: PBBFAC,,, | Performed by: INTERNAL MEDICINE

## 2018-08-09 PROCEDURE — 99499 UNLISTED E&M SERVICE: CPT | Mod: S$GLB,,, | Performed by: INTERNAL MEDICINE

## 2018-08-09 PROCEDURE — 99214 OFFICE O/P EST MOD 30 MIN: CPT | Mod: S$GLB,,, | Performed by: INTERNAL MEDICINE

## 2018-08-16 ENCOUNTER — TELEPHONE (OUTPATIENT)
Dept: CARDIOLOGY | Facility: CLINIC | Age: 83
End: 2018-08-16

## 2018-08-16 NOTE — TELEPHONE ENCOUNTER
----- Message from Dung Polo sent at 8/16/2018  9:45 AM CDT -----  Type: Needs Medical Advice    Who Called:  Wife- Tonia   Symptoms (please be specific):  NA  How long has patient had these symptoms:  [facility] name and phone #:  [facility]  Best Call Back Number: [phone]  Additional Information: Patient's wife called to cancel nuclear stress test

## 2018-08-21 RX ORDER — HYDROCODONE BITARTRATE AND ACETAMINOPHEN 5; 325 MG/1; MG/1
1 TABLET ORAL EVERY 6 HOURS PRN
Qty: 120 TABLET | Refills: 0 | Status: SHIPPED | OUTPATIENT
Start: 2018-08-21 | End: 2018-09-21 | Stop reason: SDUPTHER

## 2018-08-22 ENCOUNTER — OFFICE VISIT (OUTPATIENT)
Dept: FAMILY MEDICINE | Facility: CLINIC | Age: 83
End: 2018-08-22
Payer: MEDICARE

## 2018-08-22 VITALS
BODY MASS INDEX: 31.94 KG/M2 | WEIGHT: 210.75 LBS | HEART RATE: 58 BPM | HEIGHT: 68 IN | DIASTOLIC BLOOD PRESSURE: 74 MMHG | TEMPERATURE: 98 F | OXYGEN SATURATION: 95 % | SYSTOLIC BLOOD PRESSURE: 136 MMHG

## 2018-08-22 DIAGNOSIS — I10 ESSENTIAL HYPERTENSION: ICD-10-CM

## 2018-08-22 DIAGNOSIS — E78.5 DYSLIPIDEMIA: ICD-10-CM

## 2018-08-22 DIAGNOSIS — S46.209A INJURY OF TENDON OF BICEPS: ICD-10-CM

## 2018-08-22 DIAGNOSIS — E11.8 TYPE 2 DIABETES MELLITUS WITH COMPLICATION, WITHOUT LONG-TERM CURRENT USE OF INSULIN: Primary | ICD-10-CM

## 2018-08-22 PROCEDURE — 3078F DIAST BP <80 MM HG: CPT | Mod: CPTII,S$GLB,, | Performed by: FAMILY MEDICINE

## 2018-08-22 PROCEDURE — 99214 OFFICE O/P EST MOD 30 MIN: CPT | Mod: S$GLB,,, | Performed by: FAMILY MEDICINE

## 2018-08-22 PROCEDURE — 3075F SYST BP GE 130 - 139MM HG: CPT | Mod: CPTII,S$GLB,, | Performed by: FAMILY MEDICINE

## 2018-08-22 PROCEDURE — 99999 PR PBB SHADOW E&M-EST. PATIENT-LVL III: CPT | Mod: PBBFAC,,, | Performed by: FAMILY MEDICINE

## 2018-08-22 NOTE — PROGRESS NOTES
THIS DOCUMENT WAS MADE IN PART WITH VOICE RECOGNITION SOFTWARE.  OCCASIONALLY THIS SOFTWARE WILL MISINTERPRET WORDS OR PHRASES.      Lázaro Wang  10/8/1934    Subjective     Chief Complaint   Patient presents with    Follow-up     3 month follow up       HPI     Lázaro was seen today for follow-up.    Diagnoses and all orders for this visit:    Type 2 diabetes mellitus with complication, without long-term current use of insulin   reasonably stable and well-controlled  Essential hypertension   satisfactory state  Dyslipidemia   satisfactory and stable as well       'trouble with right arm'  Being seen for CTS  Pain also in upper arm and biceps, apparent tear    Went to ER with severe pain, given Dilaudid inj, then medrol pack  Then was told to increase his norco up to 10 pills / day  Not given new Rx, told to take mine  Seeing ortho tomorrow (Dr. 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    BPV (benign positional vertigo) 02/17/2016    Nuclear sclerosis 02/17/2016    Posterior vitreous detachment of both eyes 02/17/2016    Blepharitis of both eyes 02/17/2016    Hyperopia with astigmatism and presbyopia 02/17/2016    Ectasis aorta 10/31/2017    Carotid disease, bilateral 10/31/2017    Irritability 10/31/2017    Anxiety 10/31/2017    Chronic fatigue 03/27/2018    Chronic kidney disease, stage  III (moderate) 07/24/2018    Atherosclerosis of aorta 07/24/2018     Resolved Ambulatory Problems     Diagnosis Date Noted    Degenerative arthritis of knee 03/15/2012    Bradycardia 05/23/2013    Hypoxemia 07/16/2014    Change in bowel habits 05/25/2017     Past Medical History:   Diagnosis Date    Anxiety     Arthritis     Cataract     Coronary artery disease     Diabetes mellitus     Elevated cholesterol     GERD (gastroesophageal reflux disease)     Heart disease     Hypertension     Myocardial infarction     Prostate disorder     Trouble in sleeping          Review of Systems   Constitutional: Positive for activity change. Negative for fatigue, fever and unexpected weight change.   HENT: Negative for congestion, sinus pressure, trouble swallowing and voice change.    Respiratory: Negative for cough, chest tightness and shortness of breath.    Cardiovascular: Negative for chest pain, palpitations and leg swelling.   Gastrointestinal: Negative for abdominal pain, blood in stool, constipation, diarrhea, nausea and vomiting.   Genitourinary: Negative for dysuria, frequency and hematuria.   Musculoskeletal: Positive for arthralgias and back pain. Negative for myalgias and neck pain.   Skin: Negative for rash and wound.   Neurological: Positive for numbness. Negative for dizziness, syncope and light-headedness.   Psychiatric/Behavioral: Positive for sleep disturbance ( controlled with current medications).       Objective     Physical Exam   Constitutional: He is oriented to person, place, and time. He appears well-developed and well-nourished. No distress.   HENT:   Head: Normocephalic and atraumatic.   Eyes: Conjunctivae are normal. No scleral icterus.   Cardiovascular: Normal rate and regular rhythm.   Pulmonary/Chest: Effort normal and breath sounds normal. No respiratory distress. He has no wheezes.   Abdominal: Soft. Bowel sounds are normal. He exhibits no distension. There is no tenderness.  "  Musculoskeletal:        Arms:  Neurological: He is alert and oriented to person, place, and time. Coordination normal.   Skin: He is not diaphoretic.   Psychiatric: He has a normal mood and affect. His behavior is normal.     Vitals:    08/22/18 1040   BP: 136/74   BP Location: Right arm   Patient Position: Sitting   BP Method: Medium (Manual)   Pulse: (!) 58   Temp: 97.6 °F (36.4 °C)   TempSrc: Oral   SpO2: 95%   Weight: 95.6 kg (210 lb 12.2 oz)   Height: 5' 8" (1.727 m)       MOST RECENT LABS IN OUR ELECTRONIC MEDICAL RECORD:     Results for orders placed or performed in visit on 07/24/18   Comprehensive metabolic panel   Result Value Ref Range    Sodium 138 136 - 145 mmol/L    Potassium 4.5 3.5 - 5.1 mmol/L    Chloride 105 95 - 110 mmol/L    CO2 26 23 - 29 mmol/L    Glucose 108 70 - 110 mg/dL    BUN, Bld 17 8 - 23 mg/dL    Creatinine 1.3 0.5 - 1.4 mg/dL    Calcium 9.6 8.7 - 10.5 mg/dL    Total Protein 7.2 6.0 - 8.4 g/dL    Albumin 4.0 3.5 - 5.2 g/dL    Total Bilirubin 0.7 0.1 - 1.0 mg/dL    Alkaline Phosphatase 84 55 - 135 U/L    AST 23 10 - 40 U/L    ALT 18 10 - 44 U/L    Anion Gap 7 (L) 8 - 16 mmol/L    eGFR if African American 58.3 (A) >60 mL/min/1.73 m^2    eGFR if non African American 50.5 (A) >60 mL/min/1.73 m^2   Lipid panel   Result Value Ref Range    Cholesterol 142 120 - 199 mg/dL    Triglycerides 107 30 - 150 mg/dL    HDL 53 40 - 75 mg/dL    LDL Cholesterol 67.6 63.0 - 159.0 mg/dL    HDL/Chol Ratio 37.3 20.0 - 50.0 %    Total Cholesterol/HDL Ratio 2.7 2.0 - 5.0    Non-HDL Cholesterol 89 mg/dL   Hemoglobin A1c   Result Value Ref Range    Hemoglobin A1C 6.1 (H) 4.0 - 5.6 %    Estimated Avg Glucose 128 68 - 131 mg/dL         Lázaro was seen today for follow-up.    Diagnoses and all orders for this visit:    Type 2 diabetes mellitus with complication, without long-term current use of insulin   satisfactory continue current medications  Essential hypertension   stable satisfactory  Dyslipidemia   " stable  Injury of tendon of biceps   patient did have to increase pain medication because of this injury but may have enough in reserve. So keep me informed. He meets with his orthopedic surgeon tomorrow.     From a diabetes and hypertension standpoint he can proceed with surgery as planned., If surgery is indicated. However if so he will need clearance from his cardiologist regarding cardiac status.

## 2018-08-23 ENCOUNTER — TELEPHONE (OUTPATIENT)
Dept: CARDIOLOGY | Facility: CLINIC | Age: 83
End: 2018-08-23

## 2018-08-23 RX ORDER — TAMSULOSIN HYDROCHLORIDE 0.4 MG/1
CAPSULE ORAL
Qty: 90 CAPSULE | Refills: 1 | Status: SHIPPED | OUTPATIENT
Start: 2018-08-23 | End: 2018-10-23 | Stop reason: SDUPTHER

## 2018-08-23 NOTE — TELEPHONE ENCOUNTER
Request for Cardiac Clearance    Lázaro Wang  is having right carpal tunnel release and needs clearance.     Please advise on any medication holds. Pt. Taking ASA    Please indicate if patient is cleared from a Cardiac standpoint.

## 2018-08-29 NOTE — TELEPHONE ENCOUNTER
Pt seen recently and we ordered a SPECT stress and Echo which were cancelled.  He needs an updated ECG and Echo at a minimum; would prefer he also have the Stress.

## 2018-09-05 ENCOUNTER — TELEPHONE (OUTPATIENT)
Dept: CARDIOLOGY | Facility: CLINIC | Age: 83
End: 2018-09-05

## 2018-09-05 ENCOUNTER — OFFICE VISIT (OUTPATIENT)
Dept: OPTOMETRY | Facility: CLINIC | Age: 83
End: 2018-09-05
Payer: MEDICARE

## 2018-09-05 DIAGNOSIS — H52.203 HYPEROPIA WITH ASTIGMATISM AND PRESBYOPIA, BILATERAL: ICD-10-CM

## 2018-09-05 DIAGNOSIS — Z13.5 GLAUCOMA SCREENING: ICD-10-CM

## 2018-09-05 DIAGNOSIS — H25.13 NUCLEAR SCLEROSIS, BILATERAL: ICD-10-CM

## 2018-09-05 DIAGNOSIS — E11.9 DIABETES MELLITUS TYPE 2 WITHOUT RETINOPATHY: Primary | ICD-10-CM

## 2018-09-05 DIAGNOSIS — H52.03 HYPEROPIA WITH ASTIGMATISM AND PRESBYOPIA, BILATERAL: ICD-10-CM

## 2018-09-05 DIAGNOSIS — H52.4 HYPEROPIA WITH ASTIGMATISM AND PRESBYOPIA, BILATERAL: ICD-10-CM

## 2018-09-05 DIAGNOSIS — H43.813 POSTERIOR VITREOUS DETACHMENT, BILATERAL: ICD-10-CM

## 2018-09-05 DIAGNOSIS — H18.003 CORNEA VERTICILLATA OF BOTH EYES: ICD-10-CM

## 2018-09-05 PROCEDURE — 99212 OFFICE O/P EST SF 10 MIN: CPT | Mod: PBBFAC,PO | Performed by: OPTOMETRIST

## 2018-09-05 PROCEDURE — 92014 COMPRE OPH EXAM EST PT 1/>: CPT | Mod: S$PBB,,, | Performed by: OPTOMETRIST

## 2018-09-05 PROCEDURE — 99999 PR PBB SHADOW E&M-EST. PATIENT-LVL II: CPT | Mod: PBBFAC,,, | Performed by: OPTOMETRIST

## 2018-09-05 PROCEDURE — 92015 DETERMINE REFRACTIVE STATE: CPT | Mod: ,,, | Performed by: OPTOMETRIST

## 2018-09-05 NOTE — TELEPHONE ENCOUNTER
----- Message from Marbin Almaguer sent at 9/5/2018  2:00 PM CDT -----  Type: Needs Medical Advice    Who Called:  Patient    Best Call Back Number: [phone]  Additional Information: Patient calling.  States that he wants to schedule a procedure with another doctor but is waiting on Dr. Dominguez to fill out paperwork on clearance on the cardiac health of the patient.  Please call to advise

## 2018-09-05 NOTE — PROGRESS NOTES
HPI     Annual Exam      Additional comments: DLE 3-17 (shelley)    ocular health exam  // pt   states no visual complaints              Diabetic Eye Exam      Additional comments: BSL fluctuates              Dry Eye      Additional comments: +Systane gtts OU prn              Comments     Hemoglobin A1C       Date                     Value               Ref Range             Status                07/24/2018               6.1 (H)             4.0 - 5.6 %           Final              Comment:    ADA Screening Guidelines:  5.7-6.4%  Consistent with   prediabetes  >or=6.5%  Consistent with diabetes  High levels of fetal   hemoglobin interfere with the HbA1C  assay. Heterozygous hemoglobin   variants (HbS, HgC, etc)do  not significantly interfere with this assay.     However, presence of multiple variants may affect accuracy.         12/22/2017               6.0 (H)             4.0 - 5.6 %           Final              Comment:    According to ADA guidelines, hemoglobin A1c <7.0% represents  optimal   control in non-pregnant diabetic patients. Different  metrics may apply to   specific patient populations.   Standards of Medical Care in   Diabetes-2016.  For the purpose of screening for the presence of   diabetes:  <5.7%     Consistent with the absence of diabetes  5.7-6.4%    Consistent with increasing risk for diabetes   (prediabetes)  >or=6.5%    Consistent with diabetes  Currently, no consensus exists for use of   hemoglobin A1c  for diagnosis of diabetes for children.  This Hemoglobin   A1c assay has significant interference with fetal   hemoglobin   (HbF).   The results are invalid for patients with abnormal amounts of   HbF,     including those with known Hereditary Persistence   of Fetal Hemoglobin.   Heterozygous hemoglobin variants (HbAS, HbAC,   HbAD, HbAE, HbA2) do not   significantly interfere with this assay;   however, presence of multiple   variants in a sample may impact the %   interference.          09/27/2017               5.6                 4.0 - 5.6 %           Final              Comment:    According to ADA guidelines, hemoglobin A1c <7.0% represents  optimal   control in non-pregnant diabetic patients. Different  metrics may apply to   specific patient populations.   Standards of Medical Care in   Diabetes-2016.  For the purpose of screening for the presence of   diabetes:  <5.7%     Consistent with the absence of diabetes  5.7-6.4%    Consistent with increasing risk for diabetes   (prediabetes)  >or=6.5%    Consistent with diabetes  Currently, no consensus exists for use of   hemoglobin A1c  for diagnosis of diabetes for children.  This Hemoglobin   A1c assay has significant interference with fetal   hemoglobin   (HbF).   The results are invalid for patients with abnormal amounts of   HbF,     including those with known Hereditary Persistence   of Fetal Hemoglobin.   Heterozygous hemoglobin variants (HbAS, HbAC,   HbAD, HbAE, HbA2) do not   significantly interfere with this assay;   however, presence of multiple   variants in a sample may impact the %   interference.    ----------              Last edited by Zoila Ortega on 9/5/2018  2:49 PM. (History)        ROS     Positive for: Eyes    Negative for: Constitutional, Gastrointestinal, Neurological, Skin,   Genitourinary, Musculoskeletal, HENT, Endocrine, Cardiovascular,   Respiratory, Psychiatric, Allergic/Imm, Heme/Lymph    Last edited by DEAN Muir, OD on 9/5/2018  3:07 PM. (History)        Assessment /Plan     For exam results, see Encounter Report.    Diabetes mellitus type 2 without retinopathy    Nuclear sclerosis, bilateral    Posterior vitreous detachment, bilateral    Glaucoma screening    Cornea verticillata of both eyes    Hyperopia with astigmatism and presbyopia, bilateral      1. No ret/ no csme, gave info, control glucose, annual DFE  2. Vis sig NS, but pt still happy w/ current vision---defer consult for CE at this time  3. RD  precautions given  4. Not suspect  5. Very mild OD>OS 2nd amiodarone--monitor  6. Updated specs rx, gave copy, fill prn    Discussed and educated patient on current findings /plan.  RTC 1 year, prn if any changes / issues

## 2018-09-05 NOTE — TELEPHONE ENCOUNTER
Spoke to pt. Wife, informed her pt. Need an appointment with Dr. Dominguez before he can be cleared and also pt. to have a stress test and an echo done before Dr. Carr can review his chart to see if he can be cleared. Schedule stress test and echo for 09- at 1:15 p.m. Also gave instructions to fast 4 hr. Before test and no caffeine 12 hr. Before appointment. Pt. Wife verbalized understanding.

## 2018-09-07 RX ORDER — QUETIAPINE FUMARATE 100 MG/1
TABLET, FILM COATED ORAL
Qty: 90 TABLET | Refills: 0 | Status: SHIPPED | OUTPATIENT
Start: 2018-09-07 | End: 2018-12-05 | Stop reason: SDUPTHER

## 2018-09-10 ENCOUNTER — TELEPHONE (OUTPATIENT)
Dept: CARDIOLOGY | Facility: CLINIC | Age: 83
End: 2018-09-10

## 2018-09-10 NOTE — TELEPHONE ENCOUNTER
----- Message from Brooke Dominguez sent at 9/10/2018 10:16 AM CDT -----  Contact: Kendy with Dr Edge's office  Kendy with Dr Edge's office calling for medical clearance for patient's surgery. Kendy states they have made several request for clearance. First request on 08/21/18 and updated on 09/05/18. Please fax to [phone]. Please call Kendy at [phone]. Thanks!

## 2018-09-11 ENCOUNTER — HOSPITAL ENCOUNTER (OUTPATIENT)
Dept: RADIOLOGY | Facility: HOSPITAL | Age: 83
Discharge: HOME | End: 2018-09-11
Attending: INTERNAL MEDICINE
Payer: MEDICARE

## 2018-09-11 ENCOUNTER — CLINICAL SUPPORT (OUTPATIENT)
Dept: CARDIOLOGY | Facility: CLINIC | Age: 83
End: 2018-09-11
Attending: INTERNAL MEDICINE
Payer: MEDICARE

## 2018-09-11 VITALS — HEIGHT: 68 IN | WEIGHT: 210 LBS | BODY MASS INDEX: 31.83 KG/M2

## 2018-09-11 VITALS — HEIGHT: 68 IN | BODY MASS INDEX: 31.83 KG/M2 | WEIGHT: 210 LBS | HEART RATE: 54 BPM

## 2018-09-11 DIAGNOSIS — N18.30 CHRONIC KIDNEY DISEASE, STAGE III (MODERATE): ICD-10-CM

## 2018-09-11 DIAGNOSIS — Z98.61 HISTORY OF PTCA: ICD-10-CM

## 2018-09-11 DIAGNOSIS — I10 ESSENTIAL HYPERTENSION: ICD-10-CM

## 2018-09-11 DIAGNOSIS — E78.5 DYSLIPIDEMIA: ICD-10-CM

## 2018-09-11 DIAGNOSIS — I25.10 CORONARY ARTERY DISEASE INVOLVING NATIVE CORONARY ARTERY OF NATIVE HEART WITHOUT ANGINA PECTORIS: ICD-10-CM

## 2018-09-11 PROCEDURE — 99999 PR PBB SHADOW E&M-EST. PATIENT-LVL I: CPT | Mod: PBBFAC,,,

## 2018-09-11 PROCEDURE — 93306 TTE W/DOPPLER COMPLETE: CPT | Mod: PBBFAC,PO | Performed by: INTERNAL MEDICINE

## 2018-09-11 PROCEDURE — 99211 OFF/OP EST MAY X REQ PHY/QHP: CPT | Mod: PBBFAC,25,PO

## 2018-09-11 PROCEDURE — 99211 OFF/OP EST MAY X REQ PHY/QHP: CPT | Mod: PBBFAC,25,27,PO

## 2018-09-11 PROCEDURE — 78452 HT MUSCLE IMAGE SPECT MULT: CPT | Mod: PO

## 2018-09-11 PROCEDURE — 78452 HT MUSCLE IMAGE SPECT MULT: CPT | Mod: 26,,, | Performed by: INTERNAL MEDICINE

## 2018-09-11 PROCEDURE — 93015 CV STRESS TEST SUPVJ I&R: CPT | Mod: ,,, | Performed by: INTERNAL MEDICINE

## 2018-09-12 LAB
ASCENDING AORTA: 3.33 CM
AV MEAN GRADIENT: 6.77 MMHG
AV PEAK GRADIENT: 11.34 MMHG
AV VALVE AREA: 1.97 CM2
BSA FOR ECHO PROCEDURE: 2.14 M2
CV ECHO LV RWT: 0.61 CM
CV STRESS BASE HR: 53
CVPEAKDIABP: 79
CVPEAKSYSBP: 153
CVRESTDIABP: 85
CVRESTSYSBP: 150
DOP CALC AO PEAK VEL: 1.68 M/S
DOP CALC AO VTI: 45.01 CM
DOP CALC LVOT AREA: 3.9 CM2
DOP CALC LVOT DIAMETER: 2.23 CM
DOP CALC LVOT STROKE VOLUME: 88.85 CM3
DOP CALCLVOT PEAK VEL VTI: 22.76 CM
E WAVE DECELERATION TIME: 350.04 MSEC
E/A RATIO: 0.65
E/E' RATIO: 13.2
ECHO LV POSTERIOR WALL: 1.28 CM (ref 0.6–1.1)
FRACTIONAL SHORTENING: 32 % (ref 28–44)
INTERVENTRICULAR SEPTUM: 1.24 CM (ref 0.6–1.1)
IVRT: 0.15 MSEC
LA MAJOR: 5.42 CM
LA MINOR: 5.19 CM
LA WIDTH: 3.39 CM
LEFT ATRIUM SIZE: 3.6 CM
LEFT ATRIUM VOLUME INDEX: 25.7 ML/M2
LEFT ATRIUM VOLUME: 55.01 CM3
LEFT INTERNAL DIMENSION IN SYSTOLE: 2.77 CM (ref 2.1–4)
LEFT VENTRICLE MASS INDEX: 86.3 G/M2
LEFT VENTRICULAR INTERNAL DIMENSION IN DIASTOLE: 4.1 CM (ref 3.5–6)
LEFT VENTRICULAR MASS: 184.64 G
LV LATERAL E/E' RATIO: 11
LV SEPTAL E/E' RATIO: 16.5
MV PEAK A VEL: 1.02 M/S
MV PEAK E VEL: 0.66 M/S
MV STENOSIS PRESSURE HALF TIME: 101.51 MS
MV VALVE AREA P 1/2 METHOD: 2.17 CM2
NUC REST DIASTOLIC VOLUME INDEX: 83
NUC REST EJECTION FRACTION: 67
NUC REST SYSTOLIC VOLUME INDEX: 27
PISA TR MAX VEL: 2.29 M/S
PULM VEIN S/D RATIO: 1.47
PV PEAK D VEL: 0.32 M/S
PV PEAK S VEL: 0.47 M/S
RA MAJOR: 5.28 CM
RA PRESSURE: 3 MMHG
RA WIDTH: 3.22 CM
RIGHT VENTRICULAR END-DIASTOLIC DIMENSION: 4.3 CM
SINUS: 3.47 CM
STJ: 3.3 CM
TDI LATERAL: 0.06
TDI SEPTAL: 0.04
TDI: 0.05
TR MAX PG: 20.98 MMHG
TRICUSPID ANNULAR PLANE SYSTOLIC EXCURSION: 0.02 CM
TV REST PULMONARY ARTERY PRESSURE: 23.98 MMHG

## 2018-09-14 ENCOUNTER — TELEPHONE (OUTPATIENT)
Dept: CARDIOLOGY | Facility: CLINIC | Age: 83
End: 2018-09-14

## 2018-09-14 NOTE — TELEPHONE ENCOUNTER
----- Message from Tin Friedman sent at 9/14/2018 10:10 AM CDT -----  Contact: fidel  Type: Needs Medical Advice    Who Called:  Fidel patient's wife  Symptoms (please be specific):    How long has patient had these symptoms:    Pharmacy name and phone #:    Best Call Back Number: [phone]  Additional Information: patient is trying to get test results from tests on 09/11. Trying to get carpal tunnel surgery can't get it done without results. Please call back to discuss

## 2018-09-14 NOTE — TELEPHONE ENCOUNTER
Patient of Dr. Carr with recent stress test/echo done. Needs cardiac clearance for carpal tunnel release Dr. Broussard fax to [phone].  Also needs clearance to hold aspirin    Refer to Dr. Carr 's note from recent visit.

## 2018-09-17 ENCOUNTER — TELEPHONE (OUTPATIENT)
Dept: CARDIOLOGY | Facility: CLINIC | Age: 83
End: 2018-09-17

## 2018-09-17 NOTE — TELEPHONE ENCOUNTER
----- Message from Marianna Booth sent at 9/17/2018  3:10 PM CDT -----  Contact: DR. Sood office   Dr. Sood office need to speak to nurse regarding getting a clearance     Please call to advice [phone] ext [phone]    Fax number [phone]

## 2018-09-23 RX ORDER — HYDROCODONE BITARTRATE AND ACETAMINOPHEN 5; 325 MG/1; MG/1
1 TABLET ORAL EVERY 6 HOURS PRN
Qty: 120 TABLET | Refills: 0 | Status: SHIPPED | OUTPATIENT
Start: 2018-09-23 | End: 2018-10-23 | Stop reason: SDUPTHER

## 2018-09-24 PROBLEM — G56.01 CARPAL TUNNEL SYNDROME ON RIGHT: Status: ACTIVE | Noted: 2018-09-24

## 2018-09-28 ENCOUNTER — TELEPHONE (OUTPATIENT)
Dept: FAMILY MEDICINE | Facility: CLINIC | Age: 83
End: 2018-09-28

## 2018-09-28 RX ORDER — HYDROCODONE BITARTRATE AND ACETAMINOPHEN 5; 325 MG/1; MG/1
1 TABLET ORAL EVERY 6 HOURS PRN
Qty: 120 TABLET | Refills: 0 | Status: CANCELLED | OUTPATIENT
Start: 2018-09-28

## 2018-09-28 NOTE — TELEPHONE ENCOUNTER
Patient called requesting refill on medication to preferred pharmacy. Last Office Visit (date: 8/22/18)  Next Office Visit (date: 11/26/18) . Please advise.

## 2018-09-28 NOTE — TELEPHONE ENCOUNTER
I filled 120 tablets on September 23, receipt was confirmed by the pharmacy. Why is he asking for this again? See below. It looks like he also received a prescription for a limited number of tablets on that same day by his surgeon. My original prescription should be valid,    HYDROcodone-acetaminophen (NORCO) 5-325 mg per tablet 120 tablet 0 9/23/2018  No   Sig - Route: Take 1 tablet by mouth every 6 (six) hours as needed. - Oral   Sent to pharmacy as: HYDROcodone-acetaminophen (NORCO) 5-325 mg per tablet   Class: Normal   Earliest Fill Date: 9/23/2018   Order: [id number]   Date/Time Signed: 9/23/2018 14:56       E-Prescribing Status: Receipt confirmed by pharmacy (9/23/2018  2:56 PM CDT)   Pharmacy     [facility] [address] [place] [address] AT [facility] & [facility]

## 2018-09-28 NOTE — TELEPHONE ENCOUNTER
----- Message from Anita Espinoza sent at 9/28/2018  9:06 AM CDT -----  Contact: Patient  Type:  RX Refill Request    Who Called:  patient  Refill or New Rx:  refill  RX Name and Strength:  HYDROcodone-acetaminophen (NORCO) 5-325 mg per tablet  How is the patient currently taking it? (ex. 1XDay):  4x day  Is this a 30 day or 90 day RX:  30 day  Preferred Pharmacy with phone number:    [facility] [address] [place] [address] & [address] [address]  Phone: [phone] Fax: [phone]    Local or Mail Order:  local  Ordering Provider:  Dr. Trotter  Best Call Back Number:  [phone] (home)

## 2018-10-23 ENCOUNTER — OFFICE VISIT (OUTPATIENT)
Dept: CARDIOLOGY | Facility: CLINIC | Age: 83
End: 2018-10-23
Payer: MEDICARE

## 2018-10-23 VITALS — WEIGHT: 213.88 LBS | BODY MASS INDEX: 32.41 KG/M2 | HEIGHT: 68 IN

## 2018-10-23 DIAGNOSIS — I25.10 CORONARY ARTERY DISEASE INVOLVING NATIVE CORONARY ARTERY OF NATIVE HEART WITHOUT ANGINA PECTORIS: Chronic | ICD-10-CM

## 2018-10-23 DIAGNOSIS — E78.5 DYSLIPIDEMIA: Chronic | ICD-10-CM

## 2018-10-23 DIAGNOSIS — I10 ESSENTIAL HYPERTENSION: Chronic | ICD-10-CM

## 2018-10-23 DIAGNOSIS — F51.8 OTHER SLEEP DISORDERS NOT DUE TO A SUBSTANCE OR KNOWN PHYSIOLOGICAL CONDITION: ICD-10-CM

## 2018-10-23 DIAGNOSIS — Z98.61 HISTORY OF PTCA: Primary | Chronic | ICD-10-CM

## 2018-10-23 DIAGNOSIS — I77.9 BILATERAL CAROTID ARTERY DISEASE, UNSPECIFIED TYPE: ICD-10-CM

## 2018-10-23 PROCEDURE — 99214 OFFICE O/P EST MOD 30 MIN: CPT | Mod: S$PBB,,, | Performed by: INTERNAL MEDICINE

## 2018-10-23 PROCEDURE — 99999 PR PBB SHADOW E&M-EST. PATIENT-LVL II: CPT | Mod: PBBFAC,,, | Performed by: INTERNAL MEDICINE

## 2018-10-23 PROCEDURE — 99212 OFFICE O/P EST SF 10 MIN: CPT | Mod: PBBFAC,PO | Performed by: INTERNAL MEDICINE

## 2018-10-23 PROCEDURE — 1101F PT FALLS ASSESS-DOCD LE1/YR: CPT | Mod: CPTII,,, | Performed by: INTERNAL MEDICINE

## 2018-10-23 RX ORDER — HYDROCODONE BITARTRATE AND ACETAMINOPHEN 5; 325 MG/1; MG/1
1 TABLET ORAL EVERY 6 HOURS PRN
Qty: 120 TABLET | Refills: 0 | Status: SHIPPED | OUTPATIENT
Start: 2018-10-23 | End: 2018-11-26 | Stop reason: SDUPTHER

## 2018-10-23 RX ORDER — ATORVASTATIN CALCIUM 10 MG/1
TABLET, FILM COATED ORAL
Qty: 90 TABLET | Refills: 0 | Status: SHIPPED | OUTPATIENT
Start: 2018-10-23 | End: 2018-11-26

## 2018-10-23 NOTE — TELEPHONE ENCOUNTER
----- Message from Nimo Teto sent at 10/23/2018  2:27 PM CDT -----  Contact: Patient  Type:  RX Refill Request    Who Called:  Patient  Refill or New Rx:  Refill  RX Name and Strength:  HYDROcodone-acetaminophen (NORCO) 5-325 mg per tablet  How is the patient currently taking it? (ex. 1XDay):  Take 1 tablet by mouth every 6 (six) hours as needed. - Oral  Is this a 30 day or 90 day RX:  120 tablets  Preferred Pharmacy with phone number:    [facility] [address] [place] [address] AT [facility] & [address] [address]  Phone: [phone] Fax: [phone]  Local or Mail Order:  Local   Ordering Provider:  Dr Brodie Trotter  Best Call Back Number:  [phone]  Additional Information:  Calling to request a refill. Please advise.

## 2018-10-23 NOTE — PROGRESS NOTES
Subjective:    Patient ID:  Lázaro Wang is a 84 y.o. male who presents for follow-up of Coronary Artery Disease (re-establish care with Dr. Dominguez.  Echo/stress 09/2018)      HPI  Here for follow up of CAD-PCI (2008 )/bradycardia. C/o sever fatigue, slow speech.       Review of Systems   Constitution: Negative for malaise/fatigue.   Eyes: Negative for blurred vision.   Cardiovascular: Negative for chest pain, claudication, cyanosis, dyspnea on exertion, irregular heartbeat, leg swelling, near-syncope, orthopnea, palpitations, paroxysmal nocturnal dyspnea and syncope.   Respiratory: Negative for cough and shortness of breath.    Hematologic/Lymphatic: Does not bruise/bleed easily.   Musculoskeletal: Negative for back pain, falls, joint pain, muscle cramps, muscle weakness and myalgias.   Gastrointestinal: Negative for abdominal pain, change in bowel habit, nausea and vomiting.   Genitourinary: Negative for urgency.   Neurological: Negative for dizziness, focal weakness and light-headedness.        Objective:    Physical Exam   Constitutional: He is oriented to person, place, and time. He appears well-developed and well-nourished.   Neck: Normal range of motion. Neck supple. No JVD present.   Cardiovascular: Normal rate, regular rhythm, normal heart sounds and intact distal pulses.   Pulses:       Carotid pulses are 2+ on the right side, and 2+ on the left side.       Radial pulses are 2+ on the right side, and 2+ on the left side.   Pulmonary/Chest: Effort normal and breath sounds normal.   Musculoskeletal: Normal range of motion. He exhibits no edema.   Neurological: He is alert and oriented to person, place, and time.   Skin: Skin is warm and dry.   Psychiatric: He has a normal mood and affect. His speech is normal. Cognition and memory are normal.   Nursing note and vitals reviewed.              ..    Chemistry        Component Value Date/Time     09/21/2018 1342    K 4.3 09/21/2018 1342     09/21/2018  1342    CO2 27 09/21/2018 1342    BUN 18 09/21/2018 1342    CREATININE 1.13 09/21/2018 1342     (H) 09/21/2018 1342        Component Value Date/Time    CALCIUM 9.2 09/21/2018 1342    ALKPHOS 84 07/24/2018 1217    AST 23 07/24/2018 1217    ALT 18 07/24/2018 1217    BILITOT 0.7 07/24/2018 1217    ESTGFRAFRICA >60 09/21/2018 1342    EGFRNONAA 60 09/21/2018 1342            ..  Lab Results   Component Value Date    CHOL 142 07/24/2018    CHOL 129 02/08/2017    CHOL 108 (L) 05/24/2016     Lab Results   Component Value Date    HDL 53 07/24/2018    HDL 54 02/08/2017    HDL 49 05/24/2016     Lab Results   Component Value Date    LDLCALC 67.6 07/24/2018    LDLCALC 64.2 02/08/2017    LDLCALC 47.4 (L) 05/24/2016     Lab Results   Component Value Date    TRIG 107 07/24/2018    TRIG 54 02/08/2017    TRIG 58 05/24/2016     Lab Results   Component Value Date    CHOLHDL 37.3 07/24/2018    CHOLHDL 41.9 02/08/2017    CHOLHDL 45.4 05/24/2016     ..  Lab Results   Component Value Date    WBC 6.02 10/26/2016    HGB 13.3 (L) 09/21/2018    HCT 41.1 10/26/2016    MCV 96 10/26/2016     10/26/2016       Test(s) Reviewed  I have reviewed the following in detail:  [] Stress test   [] Angiography   [] Echocardiogram   [x] Labs   [x] Other:       Assessment:         ICD-10-CM ICD-9-CM   1. History of PTCA Z98.61 V45.82   2. Coronary artery disease involving native coronary artery of native heart without angina pectoris I25.10 414.01   3. Essential hypertension I10 401.9   4. Bilateral carotid artery disease, unspecified type I77.9 447.9   5. Dyslipidemia E78.5 272.4     Problem List Items Addressed This Visit     CAD (coronary artery disease) (Chronic)    Overview     07/06/2008 LM, 30% stenosis; mid LAD, 90% stenosis; mid LCX, 90%             stenosis; RCA, 60% stenosis; PDA, 90% stenosis;                              Previous PCI:                                                                S/P stent to PDA, 07/31/2008                                                  S/P stent to LCX, 07/06/2008                                                 S/P stent to LAD, 07/06/2008           Carotid disease, bilateral    Dyslipidemia (Chronic)    Essential hypertension (Chronic)    History of PTCA - Primary (Chronic)    Overview     7/08 LAD vision 3x20, CX-vision  3x15                                                                                 Plan:           Return to clinic 3 months   Low level/low impact aerobic exercise 5x's/wk. Heart healthy diet and risk factor modification.    See labs and med orders.  Took pt for a 2 min brisk walk HR 58 to peak 68  Holter, ETT eval for chronotropic insuf  Annual eye exam, TSH, PFT with DLCO, CMP due to amiodarone therapy.

## 2018-11-05 ENCOUNTER — TELEPHONE (OUTPATIENT)
Dept: CARDIOLOGY | Facility: CLINIC | Age: 83
End: 2018-11-05

## 2018-11-05 NOTE — TELEPHONE ENCOUNTER
Sorted schedule for pt. Pt verbally agreed.    ----- Message from Jose Luis Hoffman sent at 11/5/2018  3:57 PM CST -----  Type: Needs Medical Advice    Who Called: patient   Best Call Back Number: [phone]  Additional Information: patient needs to reschedule his ekg appointment on 11/12/18 at 1:00.please call to advise and reschedule.

## 2018-11-12 ENCOUNTER — LAB VISIT (OUTPATIENT)
Dept: LAB | Facility: HOSPITAL | Age: 83
End: 2018-11-12
Attending: INTERNAL MEDICINE
Payer: MEDICARE

## 2018-11-12 ENCOUNTER — CLINICAL SUPPORT (OUTPATIENT)
Dept: CARDIOLOGY | Facility: CLINIC | Age: 83
End: 2018-11-12
Attending: INTERNAL MEDICINE
Payer: MEDICARE

## 2018-11-12 VITALS — BODY MASS INDEX: 32.43 KG/M2 | WEIGHT: 214 LBS | HEIGHT: 68 IN

## 2018-11-12 DIAGNOSIS — I25.10 CORONARY ARTERY DISEASE INVOLVING NATIVE CORONARY ARTERY OF NATIVE HEART WITHOUT ANGINA PECTORIS: ICD-10-CM

## 2018-11-12 DIAGNOSIS — I25.10 CORONARY ARTERY DISEASE INVOLVING NATIVE CORONARY ARTERY OF NATIVE HEART WITHOUT ANGINA PECTORIS: Chronic | ICD-10-CM

## 2018-11-12 DIAGNOSIS — E78.5 DYSLIPIDEMIA: ICD-10-CM

## 2018-11-12 DIAGNOSIS — I10 ESSENTIAL HYPERTENSION: ICD-10-CM

## 2018-11-12 DIAGNOSIS — Z98.61 HISTORY OF PTCA: ICD-10-CM

## 2018-11-12 DIAGNOSIS — I77.9 BILATERAL CAROTID ARTERY DISEASE, UNSPECIFIED TYPE: ICD-10-CM

## 2018-11-12 DIAGNOSIS — E78.5 DYSLIPIDEMIA: Chronic | ICD-10-CM

## 2018-11-12 LAB
T4 FREE SERPL-MCNC: 1.09 NG/DL
TSH SERPL DL<=0.005 MIU/L-ACNC: 2.48 UIU/ML

## 2018-11-12 PROCEDURE — 99999 PR PBB SHADOW E&M-EST. PATIENT-LVL I: CPT | Mod: PBBFAC,HCWC,,

## 2018-11-12 PROCEDURE — 36415 COLL VENOUS BLD VENIPUNCTURE: CPT | Mod: HCWC,PO

## 2018-11-12 PROCEDURE — 84439 ASSAY OF FREE THYROXINE: CPT | Mod: HCWC

## 2018-11-12 PROCEDURE — 93015 CV STRESS TEST SUPVJ I&R: CPT | Mod: HCWC,S$GLB,, | Performed by: INTERNAL MEDICINE

## 2018-11-12 PROCEDURE — 84443 ASSAY THYROID STIM HORMONE: CPT | Mod: HCWC

## 2018-11-16 LAB
CV STRESS BASE HR: 69
DIASTOLIC BLOOD PRESSURE: 62
OHS CV CPX 1 MINUTE RECOVERY HEART RATE: 79 BPM
OHS CV CPX 85 PERCENT MAX PREDICTED HEART RATE MALE: 116
OHS CV CPX ESTIMATED METS: 6
OHS CV CPX MAX PREDICTED HEART RATE: 136
OHS CV CPX PATIENT IS FEMALE: 0
OHS CV CPX PATIENT IS MALE: 1
OHS CV CPX PEAK DIASTOLIC BLOOD PRESSURE: 62 MMHG
OHS CV CPX PEAK HEAR RATE: 100
OHS CV CPX PEAK RATE PRESSURE PRODUCT: NORMAL
OHS CV CPX PEAK SYSTOLIC BLOOD PRESSURE: 132
OHS CV CPX PERCENT MAX PREDICTED HEART RATE ACHIEVED: 74
OHS CV CPX RATE PRESSURE PRODUCT PRESENTING: 9108
STRESS ECHO POST EXERCISE DUR MIN: 3 MIN
STRESS ECHO POST EXERCISE DUR SEC: 24
SYSTOLIC BLOOD PRESSURE: 132

## 2018-11-20 ENCOUNTER — CLINICAL SUPPORT (OUTPATIENT)
Dept: CARDIOLOGY | Facility: CLINIC | Age: 83
End: 2018-11-20
Attending: INTERNAL MEDICINE
Payer: MEDICARE

## 2018-11-20 PROCEDURE — 93224 XTRNL ECG REC UP TO 48 HRS: CPT | Mod: HCWC,S$GLB,, | Performed by: INTERNAL MEDICINE

## 2018-11-26 ENCOUNTER — OFFICE VISIT (OUTPATIENT)
Dept: FAMILY MEDICINE | Facility: CLINIC | Age: 83
End: 2018-11-26
Payer: MEDICARE

## 2018-11-26 VITALS
SYSTOLIC BLOOD PRESSURE: 136 MMHG | RESPIRATION RATE: 16 BRPM | OXYGEN SATURATION: 96 % | HEART RATE: 67 BPM | DIASTOLIC BLOOD PRESSURE: 72 MMHG | BODY MASS INDEX: 32.76 KG/M2 | HEIGHT: 68 IN | TEMPERATURE: 98 F | WEIGHT: 216.19 LBS

## 2018-11-26 DIAGNOSIS — E11.8 TYPE 2 DIABETES MELLITUS WITH COMPLICATION, WITHOUT LONG-TERM CURRENT USE OF INSULIN: Primary | ICD-10-CM

## 2018-11-26 DIAGNOSIS — F41.9 ANXIETY: ICD-10-CM

## 2018-11-26 DIAGNOSIS — E78.5 DYSLIPIDEMIA: ICD-10-CM

## 2018-11-26 DIAGNOSIS — M51.9 LUMBAR DISC DISEASE: ICD-10-CM

## 2018-11-26 DIAGNOSIS — R53.83 FATIGUE, UNSPECIFIED TYPE: ICD-10-CM

## 2018-11-26 DIAGNOSIS — I10 ESSENTIAL HYPERTENSION: ICD-10-CM

## 2018-11-26 DIAGNOSIS — F11.90 CHRONIC NARCOTIC USE: ICD-10-CM

## 2018-11-26 DIAGNOSIS — J31.0 NON-ALLERGIC RHINITIS: ICD-10-CM

## 2018-11-26 DIAGNOSIS — J30.9 ALLERGIC RHINITIS, UNSPECIFIED SEASONALITY, UNSPECIFIED TRIGGER: ICD-10-CM

## 2018-11-26 DIAGNOSIS — F51.04 CHRONIC INSOMNIA: ICD-10-CM

## 2018-11-26 LAB
ALBUMIN/CREAT UR: 6.2 UG/MG
CREAT UR-MCNC: 81 MG/DL
MICROALBUMIN UR DL<=1MG/L-MCNC: 5 UG/ML

## 2018-11-26 PROCEDURE — 1101F PT FALLS ASSESS-DOCD LE1/YR: CPT | Mod: CPTII,HCWC,S$GLB, | Performed by: FAMILY MEDICINE

## 2018-11-26 PROCEDURE — 3078F DIAST BP <80 MM HG: CPT | Mod: CPTII,HCWC,S$GLB, | Performed by: FAMILY MEDICINE

## 2018-11-26 PROCEDURE — 99215 OFFICE O/P EST HI 40 MIN: CPT | Mod: HCWC,S$GLB,, | Performed by: FAMILY MEDICINE

## 2018-11-26 PROCEDURE — 99999 PR PBB SHADOW E&M-EST. PATIENT-LVL III: CPT | Mod: PBBFAC,HCWC,, | Performed by: FAMILY MEDICINE

## 2018-11-26 PROCEDURE — 82043 UR ALBUMIN QUANTITATIVE: CPT | Mod: HCWC

## 2018-11-26 PROCEDURE — 3075F SYST BP GE 130 - 139MM HG: CPT | Mod: CPTII,HCWC,S$GLB, | Performed by: FAMILY MEDICINE

## 2018-11-26 RX ORDER — MELOXICAM 7.5 MG/1
TABLET ORAL
Refills: 0 | COMMUNITY
Start: 2018-11-01 | End: 2019-03-01

## 2018-11-26 RX ORDER — IPRATROPIUM BROMIDE 42 UG/1
2 SPRAY, METERED NASAL 4 TIMES DAILY
Qty: 30 ML | Refills: 3 | Status: SHIPPED | OUTPATIENT
Start: 2018-11-26 | End: 2019-03-01

## 2018-11-26 RX ORDER — HYDROCODONE BITARTRATE AND ACETAMINOPHEN 5; 325 MG/1; MG/1
1 TABLET ORAL EVERY 6 HOURS PRN
Qty: 120 TABLET | Refills: 0 | Status: SHIPPED | OUTPATIENT
Start: 2018-11-26 | End: 2018-12-26 | Stop reason: SDUPTHER

## 2018-11-26 NOTE — PROGRESS NOTES
THIS DOCUMENT WAS MADE IN PART WITH VOICE RECOGNITION SOFTWARE.  OCCASIONALLY THIS SOFTWARE WILL MISINTERPRET WORDS OR PHRASES.      Lázaro Wang  10/8/1934    Lázaro was seen today for follow-up and diabetes.    Diagnoses and all orders for this visit:    Type 2 diabetes mellitus with complication, without long-term current use of insulin  -     MICROALBUMIN / CREATININE RATIO URINE  -     Comprehensive metabolic panel; Future  -     TSH; Future  -     Lipid panel; Future  -     Hemoglobin A1c; Future  This chronic condition is currently stable.    Essential hypertension  This chronic condition is currently stable.    Dyslipidemia  This chronic condition is currently stable.    Fatigue, unspecified type  Mild fatigue is chronic but there has been significant worsening suggesting possibly new problem. He has consulted with cardiology and recently had a Holter monitor to rule out bradycardia and possible need for a pacemaker. I advised him to discontinue Zyrtec which can cause fatigue especially if taken daily. See below for additional recommendations. I asked him to consider reduction of his Seroquel which takes for chronic insomnia and anxiety but he declined fearful of significant exacerbations. He did have TSH checked, this was normal. But does need additional labs    Chronic insomnia  Anxiety  As above, If Holter monitor is relatively normal and symptoms don't improve then I think we need to re-visit the possibility of reducing Seroquel. He's reluctant to consider doing so and I understand this but if symptoms on improving we may have no choice    Lumbar disc disease  Chronic narcotic use  This chronic condition is currently stable., discussed and refilled hydrocodone, follow in 3 months    Allergic rhinitis, unspecified seasonality, unspecified trigger  Switch to allegro because of sedation and the fact that Zyrtec is not working as well. Continue Flonase. It may have a component of nonallergic rhinitis as  well so consider ipratropium PRN for rhinorrhea    Non-allergic rhinitis  As above    Other orders  -     HYDROcodone-acetaminophen (NORCO) 5-325 mg per tablet; Take 1 tablet by mouth every 6 (six) hours as needed.  -     ipratropium (ATROVENT) 42 mcg (0.06 %) nasal spray; 2 sprays by Nasal route 4 (four) times daily.    d/c zyrtec  allergra 180 mg   Continue flonase,   Add atrovent prn  ? Reduce serquel, but he is very hesitant     Subjective     Chief Complaint   Patient presents with    Follow-up    Diabetes       Back Pain   This is a chronic problem. The current episode started more than 1 year ago. The problem occurs constantly. The problem has been waxing and waning since onset. The pain is present in the lumbar spine. The quality of the pain is described as aching. The pain does not radiate. The pain is moderate. The pain is worse during the day. The symptoms are aggravated by bending. Associated symptoms include numbness. Pertinent negatives include no abdominal pain, chest pain, dysuria or fever. Treatments tried: remains on hydrocodone, stable. The treatment provided moderate relief.     \  Increased fatigue, completed holter, ? Pacemaker    Increased runny nose and watery eyes  Has been on zyrtec, long time    Other conditions discussed above    HPI elements addressed above in the assessment and plan including problems, diagnosis, stability/instability,  improving/worsening, and chronicity will not be duplicated in this section. Any important additional HPI topics will be discussed here if needed.    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    Dysphonia 02/17/2016    Vocal cord nodules 02/17/2016    BPV (benign positional vertigo) 02/17/2016    Nuclear sclerosis 02/17/2016    Posterior vitreous detachment of both eyes 02/17/2016    Blepharitis of both eyes 02/17/2016    Hyperopia with astigmatism and presbyopia 02/17/2016    Ectasis aorta 10/31/2017    Carotid disease, bilateral 10/31/2017    Irritability 10/31/2017    Anxiety 10/31/2017    Chronic fatigue 03/27/2018    Chronic kidney disease, stage III (moderate) 07/24/2018    Atherosclerosis of aorta 07/24/2018    Carpal tunnel syndrome on right 09/24/2018     Resolved Ambulatory Problems     Diagnosis Date Noted    Degenerative arthritis of knee 03/15/2012    Bradycardia 05/23/2013    Hypoxemia 07/16/2014    Change in bowel habits 05/25/2017     Past Medical History:   Diagnosis Date    Anticoagulant long-term use     Anxiety     Arthritis     Carpal tunnel syndrome on right 09/2018    Cataract     Chronic back pain     Coronary artery disease     Diabetes mellitus     Elevated cholesterol     General anesthetics causing adverse effect in therapeutic use     GERD (gastroesophageal reflux disease)     Heart disease     Hypertension     Myocardial infarction     Prostate disorder     Trouble in sleeping      Current Outpatient Medications on File Prior to Visit   Medication Sig Dispense Refill    amiodarone (PACERONE) 200 MG Tab TAKE ONE TABLET BY MOUTH TWICE DAILY FOR 2 WEEKS, THEN ONE TABLET BY MOUTH DAILY THEREAFTER (Patient taking differently: TAKE ONE TABLET BY MOUTH THREE TIMES A WEEK) 90 tablet 3    aspirin 325 MG tablet Take 325 mg by mouth once daily.      atorvastatin (LIPITOR) 10 MG tablet Take 1 tablet (10 mg total) by mouth once daily. (Patient taking differently: Take 10 mg by mouth every evening. ) 90 tablet 3    azelastine (ASTELIN) 137 mcg (0.1 %) nasal  spray 1 spray (137 mcg total) by Nasal route 2 (two) times daily. 30 mL 12    blood sugar diagnostic Strp One touch ultra test strips . Test two times a day DX E11.43 200 each 3    cetirizine (ZYRTEC) 10 MG tablet Take 10 mg by mouth 2 (two) times daily.       diabetic supplies, [employer]. Kit One true metrix testing device to be used two times a day DX E11.49 1 kit 1    fluticasone (FLONASE) 50 mcg/actuation nasal spray 1 spray (50 mcg total) by Each Nare route 2 (two) times daily. 16 g 12    lancets (ONETOUCH DELICA LANCETS) 33 gauge Misc 1 lancet by Misc.(Non-Drug; Combo Route) route 2 (two) times daily. Trueplus 33guage lancet use two times a day. DX E11.40 200 each 4    meloxicam (MOBIC) 7.5 MG tablet TK 1 T PO D WITH MEAL  0    multivitamin capsule Take 1 capsule by mouth once daily.      QUEtiapine (SEROQUEL) 100 MG Tab TAKE 1 TABLET(100 MG) BY MOUTH EVERY NIGHT 90 tablet 0    tamsulosin (FLOMAX) 0.4 mg Cp24 Take 1 capsule (0.4 mg total) by mouth nightly. 90 capsule 1    TRUE METRIX AIR GLUCOSE METER kit       VIT C/VIT E AC/LUT/COPPER/ZINC (PRESERVISION LUTEIN ORAL) Take by mouth once daily.       [DISCONTINUED] HYDROcodone-acetaminophen (NORCO) 5-325 mg per tablet Take 1 tablet by mouth every 6 (six) hours as needed. 120 tablet 0    nitroGLYCERIN (NITROQUICK) 0.4 MG SL tablet Place 0.4 mg under the tongue every 5 (five) minutes as needed.       [DISCONTINUED] atorvastatin (LIPITOR) 10 MG tablet TAKE 1 TABLET BY MOUTH EVERY DAY 90 tablet 0     No current facility-administered medications on file prior to visit.      Review of patient's allergies indicates:   Allergen Reactions    No known drug allergies      Social History     Tobacco Use    Smoking status: Never Smoker    Smokeless tobacco: Never Used   Substance Use Topics    Alcohol use: No    Drug use: No     Family History   Problem Relation Age of Onset    Glaucoma Mother     Cataracts Mother     Cancer Father         esophageal,  did not die of    COPD Father         emphysema (2 ppd)         Review of Systems   Constitutional: Positive for activity change and fatigue. Negative for fever and unexpected weight change.   HENT: Positive for rhinorrhea. Negative for congestion, sinus pressure, sinus pain, trouble swallowing and voice change.    Eyes: Positive for discharge (watery) and itching. Negative for photophobia and redness.   Respiratory: Negative for cough, chest tightness and shortness of breath.    Cardiovascular: Negative for chest pain, palpitations and leg swelling.   Gastrointestinal: Negative for abdominal pain, blood in stool, constipation, diarrhea, nausea and vomiting.   Genitourinary: Negative for dysuria, frequency and hematuria.   Musculoskeletal: Positive for arthralgias and back pain. Negative for myalgias and neck pain.   Skin: Negative for rash and wound.   Neurological: Positive for numbness. Negative for dizziness, syncope and light-headedness.   Psychiatric/Behavioral: Positive for sleep disturbance ( controlled with current medications).       Objective     Physical Exam   Constitutional: He is oriented to person, place, and time. He appears well-developed and well-nourished. No distress.   HENT:   Head: Normocephalic and atraumatic.   Right Ear: External ear normal.   Left Ear: External ear normal.   Nose: Mucosal edema present.   Mouth/Throat: Oropharynx is clear and moist.   Eyes: Conjunctivae and EOM are normal. Right eye exhibits no discharge. Left eye exhibits no discharge. No scleral icterus.   Neck: Neck supple. No thyromegaly present.   Cardiovascular: Regular rhythm and normal heart sounds. Bradycardia present. Exam reveals no gallop and no friction rub.   No murmur heard.  Pulmonary/Chest: Effort normal and breath sounds normal. No stridor. No respiratory distress. He has no wheezes.   Abdominal: Soft. Bowel sounds are normal. He exhibits no distension. There is no tenderness.   Neurological: He is alert  "and oriented to person, place, and time. Coordination normal.   Skin: He is not diaphoretic.   Psychiatric: He has a normal mood and affect. His behavior is normal.     Vitals:    11/26/18 1418   BP: 136/72   BP Location: Left arm   Patient Position: Sitting   BP Method: Large (Manual)   Pulse: 67   Resp: 16   Temp: 97.7 °F (36.5 °C)   TempSrc: Oral   SpO2: 96%   Weight: 98.1 kg (216 lb 2.6 oz)   Height: 5' 8" (1.727 m)       MOST RECENT LABS IN OUR ELECTRONIC MEDICAL RECORD:     Results for orders placed or performed in visit on 11/12/18   Stress test   Result Value Ref Range    SYSTOLIC BLOOD PRESSURE 132     diastolic blood pressure 62     HR at rest 69     RPP 9,108     Peak      Peak Systolic      Peak Diatolic BP 62 mmHg    Peak RPP 13,200     Estimated METs 6     Max Predicted      85% Max Predicted      % Max HR Achieved 74     1 Minute Recovery HR 79 bpm    [facility] CV CPX PATIENT IS MALE 1     [facility] CV CPX PATIENT IS FEMALE 0     Exercise duration (sec) 24     Exercise duration (min) 3 min         "

## 2018-12-05 ENCOUNTER — TELEPHONE (OUTPATIENT)
Dept: CARDIOLOGY | Facility: CLINIC | Age: 83
End: 2018-12-05

## 2018-12-05 DIAGNOSIS — I45.5 SINUS PAUSE: ICD-10-CM

## 2018-12-05 DIAGNOSIS — Z95.0 CARDIAC PACEMAKER IN SITU: Primary | ICD-10-CM

## 2018-12-05 DIAGNOSIS — I45.5 SINUS PAUSE: Primary | ICD-10-CM

## 2018-12-05 LAB
OHS CV EVENT MONITOR DAY: 2
OHS CV HOLTER LENGTH DECIMAL HOURS: 96
OHS CV HOLTER LENGTH HOURS: 48
OHS CV HOLTER LENGTH MINUTES: 0

## 2018-12-05 RX ORDER — QUETIAPINE FUMARATE 100 MG/1
TABLET, FILM COATED ORAL
Qty: 90 TABLET | Refills: 1 | Status: SHIPPED | OUTPATIENT
Start: 2018-12-05 | End: 2019-03-01 | Stop reason: SDUPTHER

## 2018-12-05 NOTE — TELEPHONE ENCOUNTER
----- Message from Courtney Quintanilla RN sent at 12/5/2018  2:37 PM CST -----  I have not been able to get in touch with Mr Wang for his procedure. He is having a pacemaker insertion on 12/6/2018.   Do you have any additional contact information I can try?     Thank you    Courtney Quintanilla RN  [phone]

## 2018-12-05 NOTE — PROGRESS NOTES
Holter with frequent Wenckebach and a 7.8 sec pause. Refer for PPm implant indications arrest, chronotropic insuf

## 2018-12-05 NOTE — TELEPHONE ENCOUNTER
As per Dr. Dominguez, orders given to set up for pacer implant with Dr Trejo. [facility] ASAP. Had 7.8 sec pause on holter. Orders placed.     Attempted to contact patient, no answer, left a voicemail to return a call will attempt to call again.

## 2018-12-05 NOTE — TELEPHONE ENCOUNTER
Attempted to contact patient, no answer, left a voicemail to return a call.     Called cath lab rescheduled pt. Procedure to 12- at 8 a.m. Will call pt. Again tomorrow.

## 2018-12-06 ENCOUNTER — TELEPHONE (OUTPATIENT)
Dept: CARDIOLOGY | Facility: CLINIC | Age: 83
End: 2018-12-06

## 2018-12-06 NOTE — TELEPHONE ENCOUNTER
----- Message from Maribn Almaguer sent at 12/6/2018  2:33 PM CST -----  Type:  Patient Returning Call    Who Called:  Patient  Who Left Message for Patient:  NA  Does the patient know what this is regarding?: Test results  Best Call Back Number:  [phone]  Additional Information:

## 2018-12-06 NOTE — TELEPHONE ENCOUNTER
Pacemaker     Arrive for procedure at: [facility] on Monday December 10th 2018. Procedure is for 10 a.m.    You will receive a phone call from [facility] Pre-Op Department with final arrival time and further instructions prior to your scheduled procedure.    FASTING: You MAY NOT have anything to eat or drink AFTER MIDNIGHT the day before your procedure.     MEDICATIONS: You may take your regular morning medications with water. If there are any medications that you should not take, you will be instructed to hold them for that morning.    ? CARDIOLOGY PRE-PROCEDURE MEDICATION ORDERS:  ** Please hold any medications that are checked below:    HOLD   # OF DAYS TO HOLD  ? Coumadin  Consult with Coumadin Clinic   ? Xarelto    DAY BEFORE & DAY OF  ? Pradaxa   DAY BEFORE & DAY OF   ? Eliquis    DAY BEFORE & DAY OF   ? Short acting insulin   Morning of procedure    CONTINUE the Following Medications     ? Aspirin  ? Plavix  ? Effient  ? Metformin    WHAT TO EXPECT:    How long will the procedure take?  The procedure will take an average of 1 - 2 hours to perform.  After the procedure, you will need to lay flat for around 4 - 6 hours to minimize bleeding from the puncture site. If the wrist is accessed you will need to keep your arm still as instructed by the nurse.    When can I go home?  You may be able to be discharged home that same afternoon if there were no complications.  If you have one of the following: balloon; stent; pacemaker or defibrillator procedures, you may spend one night for observation.  Your doctor will determine your discharge based upon your progress.  The results of your procedure will be discussed with you before you are discharged.  Any further testing or procedures will be scheduled for you either before you leave or you will be instructed to call for a future appointment.      TRANSPORTATION:  PLEASE ARRANGE TO HAVE SOMEONE DRIVE YOU HOME FOLLOWING YOUR PROCEDURE, YOU WILL NOT BE  ALLOWED TO DRIVE.    Pt. Verbalized understanding.

## 2018-12-07 ENCOUNTER — TELEPHONE (OUTPATIENT)
Dept: CARDIOLOGY | Facility: CLINIC | Age: 83
End: 2018-12-07

## 2018-12-07 NOTE — TELEPHONE ENCOUNTER
Spoke to pt. Requested to speak to Dr. Dominguez he wanted to know why he has to get the pacemaker. Informed him Dr. Dominguez currently on vacation and Dr. Dominguez stated set up for pacer implant with Dr Trejo. [facility] ASAP. Had 7.8 sec pause on holter. Orders placed. Also informed pt. He may speak to Dr. Trejo once he is in the room at the hospital. Pt. Verbalized understanding.

## 2018-12-07 NOTE — TELEPHONE ENCOUNTER
----- Message from Olena Casiano sent at 12/7/2018 12:08 PM CST -----  Contact: pt  Pt states that he has a problem,that he Is scheduled for Monday 12/10 to have a pacemaker implanted and he would like to speak with the Dr/office about this cause it ws never discussed,just need a little hand holding. Would like a call back today,please...[phone] (home)

## 2018-12-10 PROBLEM — I45.5 SINUS PAUSE: Status: ACTIVE | Noted: 2018-12-10

## 2018-12-18 ENCOUNTER — CLINICAL SUPPORT (OUTPATIENT)
Dept: CARDIOLOGY | Facility: CLINIC | Age: 83
End: 2018-12-18
Attending: INTERNAL MEDICINE
Payer: MEDICARE

## 2018-12-18 DIAGNOSIS — I45.5 SINUS PAUSE: ICD-10-CM

## 2018-12-18 DIAGNOSIS — Z95.0 CARDIAC PACEMAKER IN SITU: ICD-10-CM

## 2018-12-18 PROCEDURE — 93280 PM DEVICE PROGR EVAL DUAL: CPT | Mod: S$GLB,,, | Performed by: INTERNAL MEDICINE

## 2018-12-21 LAB
AV DELAY - LONGEST: 330 MSEC
AV DELAY - SHORTEST: 210 MSEC
IMPEDANCE RA LEAD (NATIVE): 812 OHMS
IMPEDANCE RA LEAD: 680 OHMS
OHS CV DC PP MS1: 0.5 MS
OHS CV DC PP MS2: 0.5 MS
OHS CV DC PP V1: 3.5 V
OHS CV DC PP V2: 3.5 V
P/R-WAVE RA LEAD (NATIVE): 11.9 MV
P/R-WAVE RA LEAD: 1.9 MV
PV DELAY - LONGEST: 330 MSEC
PV DELAY - SHORTEST: 210 MSEC
THRESHOLD MS RA LEAD (NATIVE): 0.5 MS
THRESHOLD MS RA LEAD: 0.5 MS
THRESHOLD V RA LEAD (NATIVE): 0.7 V
THRESHOLD V RA LEAD: 1 V

## 2018-12-26 RX ORDER — HYDROCODONE BITARTRATE AND ACETAMINOPHEN 5; 325 MG/1; MG/1
1 TABLET ORAL EVERY 6 HOURS PRN
Qty: 120 TABLET | Refills: 0 | Status: SHIPPED | OUTPATIENT
Start: 2018-12-26 | End: 2019-01-24 | Stop reason: SDUPTHER

## 2018-12-26 NOTE — TELEPHONE ENCOUNTER
----- Message from Nay Yousif sent at 12/26/2018  3:42 PM CST -----  Contact: pt  Pt is requesting a refill on his medication(HYDROcodone-acetaminophen (NORCO) 5-325 mg per tablet)  Please call to advise  Call back   Thanks    [facility] [address] [place] [address] AT [facility] & [address] [address]  Phone: [phone] Fax: [phone]

## 2019-01-22 ENCOUNTER — CLINICAL SUPPORT (OUTPATIENT)
Dept: CARDIOLOGY | Facility: CLINIC | Age: 84
End: 2019-01-22
Attending: INTERNAL MEDICINE
Payer: MEDICARE

## 2019-01-22 ENCOUNTER — OFFICE VISIT (OUTPATIENT)
Dept: CARDIOLOGY | Facility: CLINIC | Age: 84
End: 2019-01-22
Payer: MEDICARE

## 2019-01-22 VITALS
BODY MASS INDEX: 32.61 KG/M2 | WEIGHT: 215.19 LBS | HEIGHT: 68 IN | SYSTOLIC BLOOD PRESSURE: 142 MMHG | HEART RATE: 63 BPM | DIASTOLIC BLOOD PRESSURE: 81 MMHG

## 2019-01-22 DIAGNOSIS — I45.5 SINUS PAUSE: ICD-10-CM

## 2019-01-22 DIAGNOSIS — I25.10 CORONARY ARTERY DISEASE INVOLVING NATIVE CORONARY ARTERY OF NATIVE HEART WITHOUT ANGINA PECTORIS: Chronic | ICD-10-CM

## 2019-01-22 DIAGNOSIS — E78.5 DYSLIPIDEMIA: Chronic | ICD-10-CM

## 2019-01-22 DIAGNOSIS — I10 ESSENTIAL HYPERTENSION: Chronic | ICD-10-CM

## 2019-01-22 DIAGNOSIS — Z95.0 CARDIAC PACEMAKER IN SITU: ICD-10-CM

## 2019-01-22 DIAGNOSIS — Z98.61 HISTORY OF PTCA: Primary | Chronic | ICD-10-CM

## 2019-01-22 DIAGNOSIS — Z95.0 PACEMAKER: ICD-10-CM

## 2019-01-22 DIAGNOSIS — I65.23 BILATERAL CAROTID ARTERY STENOSIS: ICD-10-CM

## 2019-01-22 PROCEDURE — 1101F PT FALLS ASSESS-DOCD LE1/YR: CPT | Mod: CPTII,S$GLB,, | Performed by: INTERNAL MEDICINE

## 2019-01-22 PROCEDURE — 93280 PM DEVICE PROGR EVAL DUAL: CPT | Mod: S$GLB,,, | Performed by: INTERNAL MEDICINE

## 2019-01-22 PROCEDURE — 99213 OFFICE O/P EST LOW 20 MIN: CPT | Mod: S$GLB,,, | Performed by: INTERNAL MEDICINE

## 2019-01-22 PROCEDURE — 3079F DIAST BP 80-89 MM HG: CPT | Mod: CPTII,S$GLB,, | Performed by: INTERNAL MEDICINE

## 2019-01-22 PROCEDURE — 3077F SYST BP >= 140 MM HG: CPT | Mod: CPTII,S$GLB,, | Performed by: INTERNAL MEDICINE

## 2019-01-22 PROCEDURE — 99999 PR PBB SHADOW E&M-EST. PATIENT-LVL III: CPT | Mod: PBBFAC,,, | Performed by: INTERNAL MEDICINE

## 2019-01-22 PROCEDURE — 99499 RISK ADDL DX/OHS AUDIT: ICD-10-PCS | Mod: HCNC,S$GLB,, | Performed by: INTERNAL MEDICINE

## 2019-01-22 PROCEDURE — 99499 UNLISTED E&M SERVICE: CPT | Mod: HCNC,S$GLB,, | Performed by: INTERNAL MEDICINE

## 2019-01-22 PROCEDURE — 1101F PR PT FALLS ASSESS DOC 0-1 FALLS W/OUT INJ PAST YR: ICD-10-PCS | Mod: CPTII,S$GLB,, | Performed by: INTERNAL MEDICINE

## 2019-01-22 PROCEDURE — 99999 PR PBB SHADOW E&M-EST. PATIENT-LVL III: ICD-10-PCS | Mod: PBBFAC,,, | Performed by: INTERNAL MEDICINE

## 2019-01-22 PROCEDURE — 99213 PR OFFICE/OUTPT VISIT, EST, LEVL III, 20-29 MIN: ICD-10-PCS | Mod: S$GLB,,, | Performed by: INTERNAL MEDICINE

## 2019-01-22 PROCEDURE — 93280 CARDIAC DEVICE CHECK - IN CLINIC & HOSPITAL: ICD-10-PCS | Mod: S$GLB,,, | Performed by: INTERNAL MEDICINE

## 2019-01-22 PROCEDURE — 3077F PR MOST RECENT SYSTOLIC BLOOD PRESSURE >= 140 MM HG: ICD-10-PCS | Mod: CPTII,S$GLB,, | Performed by: INTERNAL MEDICINE

## 2019-01-22 PROCEDURE — 3079F PR MOST RECENT DIASTOLIC BLOOD PRESSURE 80-89 MM HG: ICD-10-PCS | Mod: CPTII,S$GLB,, | Performed by: INTERNAL MEDICINE

## 2019-01-22 RX ORDER — METOPROLOL SUCCINATE 25 MG/1
25 TABLET, EXTENDED RELEASE ORAL DAILY
Qty: 90 TABLET | Refills: 6 | Status: SHIPPED | OUTPATIENT
Start: 2019-01-22 | End: 2019-09-08 | Stop reason: SDUPTHER

## 2019-01-22 NOTE — PROGRESS NOTES
Subjective:    Patient ID:  Lázaro Wang is a 84 y.o. male who presents for follow-up of No chief complaint on file.      HPI  Here for follow up of CAD-PCI (2008)/PPM (2018). Doing well no cp. Patient denies palpitations, syncope, presyncope, lightheadedness or dizziness.        Review of Systems   Constitution: Negative for malaise/fatigue.   Eyes: Negative for blurred vision.   Cardiovascular: Negative for chest pain, claudication, cyanosis, dyspnea on exertion, irregular heartbeat, leg swelling, near-syncope, orthopnea, palpitations, paroxysmal nocturnal dyspnea and syncope.   Respiratory: Negative for cough and shortness of breath.    Hematologic/Lymphatic: Does not bruise/bleed easily.   Musculoskeletal: Negative for back pain, falls, joint pain, muscle cramps, muscle weakness and myalgias.   Gastrointestinal: Negative for abdominal pain, change in bowel habit, nausea and vomiting.   Genitourinary: Negative for urgency.   Neurological: Negative for dizziness, focal weakness and light-headedness.        Objective:    Physical Exam   Constitutional: He is oriented to person, place, and time. He appears well-developed and well-nourished.   Neck: Normal range of motion. Neck supple. No JVD present.   Cardiovascular: Normal rate, regular rhythm, normal heart sounds and intact distal pulses.   Pulses:       Carotid pulses are 2+ on the right side, and 2+ on the left side.       Radial pulses are 2+ on the right side, and 2+ on the left side.     The pacemaker site is doing well and without drainage.     Pulmonary/Chest: Effort normal and breath sounds normal.   Musculoskeletal: Normal range of motion. He exhibits no edema.   Neurological: He is alert and oriented to person, place, and time.   Skin: Skin is warm and dry.   Psychiatric: He has a normal mood and affect. His speech is normal. Cognition and memory are normal.   Nursing note and vitals reviewed.              ..    Chemistry        Component Value  Date/Time     12/10/2018 0845    K 3.9 12/10/2018 0845     12/10/2018 0845    CO2 24 12/10/2018 0845    BUN 19 12/10/2018 0845    CREATININE 1.11 12/10/2018 0845     (H) 12/10/2018 0845        Component Value Date/Time    CALCIUM 9.7 12/10/2018 0845    ALKPHOS 84 07/24/2018 1217    AST 23 07/24/2018 1217    ALT 18 07/24/2018 1217    BILITOT 0.7 07/24/2018 1217    ESTGFRAFRICA >60 12/10/2018 0845    EGFRNONAA >60 12/10/2018 0845            ..  Lab Results   Component Value Date    CHOL 142 07/24/2018    CHOL 129 02/08/2017    CHOL 108 (L) 05/24/2016     Lab Results   Component Value Date    HDL 53 07/24/2018    HDL 54 02/08/2017    HDL 49 05/24/2016     Lab Results   Component Value Date    LDLCALC 67.6 07/24/2018    LDLCALC 64.2 02/08/2017    LDLCALC 47.4 (L) 05/24/2016     Lab Results   Component Value Date    TRIG 107 07/24/2018    TRIG 54 02/08/2017    TRIG 58 05/24/2016     Lab Results   Component Value Date    CHOLHDL 37.3 07/24/2018    CHOLHDL 41.9 02/08/2017    CHOLHDL 45.4 05/24/2016     ..  Lab Results   Component Value Date    WBC 5.56 12/10/2018    HGB 15.5 12/10/2018    HCT 46.5 12/10/2018    MCV 94 12/10/2018     12/10/2018       Test(s) Reviewed  I have reviewed the following in detail:  [] Stress test   [] Angiography   [x] Echocardiogram   [x] Labs   [] Other:       Assessment:         ICD-10-CM ICD-9-CM   1. History of PTCA Z98.61 V45.82   2. Coronary artery disease involving native coronary artery of native heart without angina pectoris I25.10 414.01   3. Bilateral carotid artery stenosis I65.23 433.10     433.30   4. Essential hypertension I10 401.9   5. Dyslipidemia E78.5 272.4   6. Pacemaker Z95.0 V45.01     Problem List Items Addressed This Visit     Pacemaker    Overview     [employer] MRI DR IS-1 L311, SN: [id number], DOI: 12/10/2018  RA Lead: [employer] Inge[employer] MRI IS-1 BiPositive Fix 52 cm 7741, SN: [id number], DOI: 12/10/2018  RV Lead: [employer] MRI IS-1 BiPositive Fix  59  7742, : [id number], DOI: 12/10/2018         History of PTCA - Primary (Chronic)    Overview     7/08 LAD vision 3x20, CX-vision  3x15                                                                          Essential hypertension (Chronic)    Dyslipidemia (Chronic)    Carotid disease, bilateral    CAD (coronary artery disease) (Chronic)    Overview     07/06/2008 LM, 30% stenosis; mid LAD, 90% stenosis; mid LCX, 90%             stenosis; RCA, 60% stenosis; PDA, 90% stenosis;                              Previous PCI:                                                                S/P stent to PDA, 07/31/2008                                                 S/P stent to LCX, 07/06/2008                                                 S/P stent to LAD, 07/06/2008                  Plan:           Return to clinic 9 year   Low level/low impact aerobic exercise 5x's/wk. Heart healthy diet and risk factor modification.    See labs and med orders.  Add BB at next visit pt still feels fatigue  Annual eye exam, TSH, PFT with DLCO, CMP due to amiodarone therapy.

## 2019-01-24 DIAGNOSIS — M51.9 LUMBAR DISC DISEASE: Primary | ICD-10-CM

## 2019-01-24 LAB
AV DELAY - LONGEST: 330 MSEC
AV DELAY - SHORTEST: 210 MSEC
IMPEDANCE RA LEAD (NATIVE): 926 OHMS
IMPEDANCE RA LEAD: 656 OHMS
OHS CV DC PP MS1: 0.5 MS
OHS CV DC PP MS2: 0.5 MS
OHS CV DC PP V1: 2 V
OHS CV DC PP V2: 2 V
P/R-WAVE RA LEAD (NATIVE): 13 MV
P/R-WAVE RA LEAD: 2.7 MV
PV DELAY - LONGEST: 330 MSEC
PV DELAY - SHORTEST: 210 MSEC
THRESHOLD MS RA LEAD (NATIVE): 0.5 MS
THRESHOLD MS RA LEAD: 0.5 MS
THRESHOLD V RA LEAD (NATIVE): 0.4 V
THRESHOLD V RA LEAD: 0.9 V

## 2019-01-24 RX ORDER — HYDROCODONE BITARTRATE AND ACETAMINOPHEN 5; 325 MG/1; MG/1
1 TABLET ORAL EVERY 6 HOURS PRN
Qty: 120 TABLET | Refills: 0 | Status: SHIPPED | OUTPATIENT
Start: 2019-01-24 | End: 2019-02-25 | Stop reason: SDUPTHER

## 2019-01-24 NOTE — TELEPHONE ENCOUNTER
Pt Of Brodie Trotter MD    Last seen on: 11/26/2018    Next appt: 3/1/2018    Last refill: 12/26/2018    Allergies:   Review of patient's allergies indicates:   Allergen Reactions    No known drug allergies        Pharmacy:   [facility] [address] [place] [address] AT [facility] & [address] [address]  Phone: [phone] Fax: [phone]        Please review! Thank you!

## 2019-01-24 NOTE — TELEPHONE ENCOUNTER
----- Message from Gi Shultz sent at 1/24/2019  1:09 PM CST -----  Contact: Lázaro  Type:  RX Refill Request    Who Called:  patient  Refill or New Rx:  refill  RX Name and Strength:  HYDROcodone-acetaminophen (NORCO) 5-325 mg per tablet  How is the patient currently taking it? (ex. 1XDay):  Take 1 tablet by mouth every 6 (six) hours as needed. - Oral  Is this a 30 day or 90 day RX:  30  Preferred Pharmacy with phone number:    [facility] [address] [place] [address] AT [facility] & [address] [address]  Phone: [phone] Fax: [phone]    Local or Mail Order:  local  Ordering Provider:  Dr. Trotter  Best Call Back Number:  [phone]  Additional Information:  Patient would like to be notified when completed--Please advise--thank you

## 2019-02-25 DIAGNOSIS — M51.9 LUMBAR DISC DISEASE: ICD-10-CM

## 2019-02-25 RX ORDER — HYDROCODONE BITARTRATE AND ACETAMINOPHEN 5; 325 MG/1; MG/1
1 TABLET ORAL EVERY 6 HOURS PRN
Qty: 120 TABLET | Refills: 0 | Status: SHIPPED | OUTPATIENT
Start: 2019-02-25 | End: 2019-03-25 | Stop reason: SDUPTHER

## 2019-02-25 NOTE — TELEPHONE ENCOUNTER
----- Message from Huong Ilan sent at 2/25/2019  9:12 AM CST -----  Contact: pt  1. What is the name of the medication you are requesting? Hydrocodone  2. What is the dose? 5-325  3. How do you take the medication? Orally, topically, etc? mouth  4. How often do you take this medication? One every 6 hours as needed  5. Do you need a 30 day or 90 day supply? 30  6. How many refills are you requesting? 1  7. What is your preferred pharmacy and location of the pharmacy? [facility] [address]  8. Who can we contact with further questions? Pt at [phone] (home)

## 2019-03-01 ENCOUNTER — OFFICE VISIT (OUTPATIENT)
Dept: FAMILY MEDICINE | Facility: CLINIC | Age: 84
End: 2019-03-01
Payer: MEDICARE

## 2019-03-01 VITALS
WEIGHT: 212.75 LBS | SYSTOLIC BLOOD PRESSURE: 110 MMHG | TEMPERATURE: 98 F | HEIGHT: 68 IN | BODY MASS INDEX: 32.24 KG/M2 | HEART RATE: 60 BPM | RESPIRATION RATE: 16 BRPM | DIASTOLIC BLOOD PRESSURE: 80 MMHG

## 2019-03-01 DIAGNOSIS — I10 ESSENTIAL HYPERTENSION: ICD-10-CM

## 2019-03-01 DIAGNOSIS — F11.90 CHRONIC NARCOTIC USE: ICD-10-CM

## 2019-03-01 DIAGNOSIS — E11.8 TYPE 2 DIABETES MELLITUS WITH COMPLICATION, WITHOUT LONG-TERM CURRENT USE OF INSULIN: Primary | ICD-10-CM

## 2019-03-01 DIAGNOSIS — F41.9 ANXIETY: ICD-10-CM

## 2019-03-01 DIAGNOSIS — M51.9 LUMBAR DISC DISEASE: ICD-10-CM

## 2019-03-01 DIAGNOSIS — F51.04 CHRONIC INSOMNIA: ICD-10-CM

## 2019-03-01 DIAGNOSIS — E78.5 DYSLIPIDEMIA: ICD-10-CM

## 2019-03-01 PROCEDURE — 99499 UNLISTED E&M SERVICE: CPT | Mod: HCNC,S$GLB,, | Performed by: FAMILY MEDICINE

## 2019-03-01 PROCEDURE — 3079F PR MOST RECENT DIASTOLIC BLOOD PRESSURE 80-89 MM HG: ICD-10-PCS | Mod: HCNC,CPTII,S$GLB, | Performed by: FAMILY MEDICINE

## 2019-03-01 PROCEDURE — 99214 OFFICE O/P EST MOD 30 MIN: CPT | Mod: HCNC,S$GLB,, | Performed by: FAMILY MEDICINE

## 2019-03-01 PROCEDURE — 1101F PR PT FALLS ASSESS DOC 0-1 FALLS W/OUT INJ PAST YR: ICD-10-PCS | Mod: HCNC,CPTII,S$GLB, | Performed by: FAMILY MEDICINE

## 2019-03-01 PROCEDURE — 99214 PR OFFICE/OUTPT VISIT, EST, LEVL IV, 30-39 MIN: ICD-10-PCS | Mod: HCNC,S$GLB,, | Performed by: FAMILY MEDICINE

## 2019-03-01 PROCEDURE — 99999 PR PBB SHADOW E&M-EST. PATIENT-LVL III: CPT | Mod: PBBFAC,HCNC,, | Performed by: FAMILY MEDICINE

## 2019-03-01 PROCEDURE — 3079F DIAST BP 80-89 MM HG: CPT | Mod: HCNC,CPTII,S$GLB, | Performed by: FAMILY MEDICINE

## 2019-03-01 PROCEDURE — 99499 RISK ADDL DX/OHS AUDIT: ICD-10-PCS | Mod: HCNC,S$GLB,, | Performed by: FAMILY MEDICINE

## 2019-03-01 PROCEDURE — 3074F SYST BP LT 130 MM HG: CPT | Mod: HCNC,CPTII,S$GLB, | Performed by: FAMILY MEDICINE

## 2019-03-01 PROCEDURE — 99999 PR PBB SHADOW E&M-EST. PATIENT-LVL III: ICD-10-PCS | Mod: PBBFAC,HCNC,, | Performed by: FAMILY MEDICINE

## 2019-03-01 PROCEDURE — 3074F PR MOST RECENT SYSTOLIC BLOOD PRESSURE < 130 MM HG: ICD-10-PCS | Mod: HCNC,CPTII,S$GLB, | Performed by: FAMILY MEDICINE

## 2019-03-01 PROCEDURE — 1101F PT FALLS ASSESS-DOCD LE1/YR: CPT | Mod: HCNC,CPTII,S$GLB, | Performed by: FAMILY MEDICINE

## 2019-03-01 RX ORDER — QUETIAPINE FUMARATE 100 MG/1
TABLET, FILM COATED ORAL
Qty: 90 TABLET | Refills: 1 | Status: SHIPPED | OUTPATIENT
Start: 2019-03-01 | End: 2019-05-16 | Stop reason: SDUPTHER

## 2019-03-01 NOTE — PROGRESS NOTES
THIS DOCUMENT WAS MADE IN PART WITH VOICE RECOGNITION SOFTWARE.  OCCASIONALLY THIS SOFTWARE WILL MISINTERPRET WORDS OR PHRASES.      Lázaro Wang  10/8/1934    Lázaro was seen today for diabetes.    Diagnoses and all orders for this visit:    Type 2 diabetes mellitus with complication, without long-term current use of insulin   chronic and stable, will arrange labs    Essential hypertension   stable    Dyslipidemia   stable    Chronic insomnia  Anxiety   chronic, he has been taking Seroquel for a number of years which has controlled his insomnia. We have discussed the long-term risks of this medication but this helped him tremendously and likely helped with some other anxiety and personality concerns.    Chronic narcotic use  Lumbar disc disease   Stable, reviewed and discussed will follow every three months    Other orders  -     QUEtiapine (SEROQUEL) 100 MG Tab; TAKE 1 TABLET(100 MG) BY MOUTH EVERY NIGHT        Subjective     Chief Complaint   Patient presents with    Diabetes     follow up        HPI       HPI elements addressed above in the assessment and plan including problems, diagnosis, stability/instability,  improving/worsening, and chronicity will not be duplicated in this section. Any important additional HPI topics will be discussed here if needed.    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    BPV (benign positional vertigo) 02/17/2016    Nuclear sclerosis 02/17/2016    Posterior vitreous  detachment of both eyes 02/17/2016    Blepharitis of both eyes 02/17/2016    Hyperopia with astigmatism and presbyopia 02/17/2016    Ectasis aorta 10/31/2017    Carotid disease, bilateral 10/31/2017    Irritability 10/31/2017    Anxiety 10/31/2017    Chronic fatigue 03/27/2018    Chronic kidney disease, stage III (moderate) 07/24/2018    Atherosclerosis of aorta 07/24/2018    Carpal tunnel syndrome on right 09/24/2018    Sinus pause 12/10/2018    Pacemaker 01/22/2019     Resolved Ambulatory Problems     Diagnosis Date Noted    Degenerative arthritis of knee 03/15/2012    Bradycardia 05/23/2013    Hypoxemia 07/16/2014    Change in bowel habits 05/25/2017     Past Medical History:   Diagnosis Date    Anticoagulant long-term use     Anxiety     Arthritis     Carpal tunnel syndrome on right 09/2018    Cataract     Chronic back pain     Coronary artery disease     Diabetes mellitus     Elevated cholesterol     General anesthetics causing adverse effect in therapeutic use     GERD (gastroesophageal reflux disease)     Heart disease     Hypertension     Myocardial infarction     Prostate disorder     Trouble in sleeping          Review of Systems   Constitutional: Negative for activity change, fatigue, fever and unexpected weight change.   HENT: Negative for congestion, sinus pressure, trouble swallowing and voice change.    Respiratory: Negative for cough, chest tightness and shortness of breath.    Cardiovascular: Negative for chest pain, palpitations and leg swelling.   Gastrointestinal: Negative for abdominal pain, blood in stool, constipation, diarrhea, nausea and vomiting.   Genitourinary: Negative for dysuria, frequency and hematuria.   Musculoskeletal: Positive for arthralgias and back pain. Negative for myalgias and neck pain.   Skin: Negative for rash and wound.   Neurological: Positive for numbness. Negative for dizziness, syncope and light-headedness.       Objective     Physical  "Exam   Constitutional: He is oriented to person, place, and time. He appears well-developed and well-nourished. No distress.   HENT:   Head: Normocephalic and atraumatic.   Eyes: Conjunctivae are normal. No scleral icterus.   Pulmonary/Chest: Effort normal. No respiratory distress.   Neurological: He is alert and oriented to person, place, and time. Coordination normal.   Skin: He is not diaphoretic.   Psychiatric: He has a normal mood and affect. His behavior is normal.     Vitals:    03/01/19 1419   BP: 110/80   BP Location: Left arm   Patient Position: Sitting   BP Method: X-Large (Manual)   Pulse: 60   Resp: 16   Temp: 98 °F (36.7 °C)   TempSrc: Oral   Weight: 96.5 kg (212 lb 11.9 oz)   Height: 5' 8" (1.727 m)       MOST RECENT LABS IN OUR ELECTRONIC MEDICAL RECORD:     Results for orders placed or performed in visit on 01/22/19   Cardiac device check - [facility] ([facility] only)   Result Value Ref Range    AV Delay - Longest 330 msec    AV Delay - Shortest 210 msec    PV Delay - Longest 330 msec    PV Delay - Shortest 210 msec    P/R-wave RA Lead 2.7 mV    P/R-wave RA Lead (native) 13.0 mV    Impedance RA Lead 656 Ohms    Impedance RA Lead (native) 926 Ohms    Threshold V RA Lead 0.9 V    Theshold ms RA Lead 0.5 ms    Threshold V RA Lead (native) 0.4 V    Threshold ms RA Lead (native) 0.5 ms    Threshold V RA Lead 2.0 V    Threshold V RA Lead (native) 2.0 V    Theshold ms RA Lead 0.5 ms    Threshold ms RA Lead (native) 0.5 ms         "

## 2019-03-12 ENCOUNTER — LAB VISIT (OUTPATIENT)
Dept: LAB | Facility: HOSPITAL | Age: 84
End: 2019-03-12
Attending: FAMILY MEDICINE
Payer: MEDICARE

## 2019-03-12 DIAGNOSIS — E11.8 TYPE 2 DIABETES MELLITUS WITH COMPLICATION, WITHOUT LONG-TERM CURRENT USE OF INSULIN: ICD-10-CM

## 2019-03-12 LAB
ALBUMIN SERPL BCP-MCNC: 4 G/DL
ALP SERPL-CCNC: 74 U/L
ALT SERPL W/O P-5'-P-CCNC: 22 U/L
ANION GAP SERPL CALC-SCNC: 8 MMOL/L
AST SERPL-CCNC: 22 U/L
BILIRUB SERPL-MCNC: 0.7 MG/DL
BUN SERPL-MCNC: 22 MG/DL
CALCIUM SERPL-MCNC: 9.8 MG/DL
CHLORIDE SERPL-SCNC: 105 MMOL/L
CHOLEST SERPL-MCNC: 135 MG/DL
CHOLEST/HDLC SERPL: 2.7 {RATIO}
CO2 SERPL-SCNC: 28 MMOL/L
CREAT SERPL-MCNC: 1.2 MG/DL
EST. GFR  (AFRICAN AMERICAN): >60 ML/MIN/1.73 M^2
EST. GFR  (NON AFRICAN AMERICAN): 55.2 ML/MIN/1.73 M^2
ESTIMATED AVG GLUCOSE: 137 MG/DL
GLUCOSE SERPL-MCNC: 105 MG/DL
HBA1C MFR BLD HPLC: 6.4 %
HDLC SERPL-MCNC: 50 MG/DL
HDLC SERPL: 37 %
LDLC SERPL CALC-MCNC: 70.4 MG/DL
NONHDLC SERPL-MCNC: 85 MG/DL
POTASSIUM SERPL-SCNC: 4.7 MMOL/L
PROT SERPL-MCNC: 6.9 G/DL
SODIUM SERPL-SCNC: 141 MMOL/L
TRIGL SERPL-MCNC: 73 MG/DL

## 2019-03-12 PROCEDURE — 80053 COMPREHEN METABOLIC PANEL: CPT | Mod: HCNC

## 2019-03-12 PROCEDURE — 80061 LIPID PANEL: CPT | Mod: HCNC

## 2019-03-12 PROCEDURE — 36415 COLL VENOUS BLD VENIPUNCTURE: CPT | Mod: HCNC,PN

## 2019-03-12 PROCEDURE — 83036 HEMOGLOBIN GLYCOSYLATED A1C: CPT | Mod: HCNC

## 2019-03-14 ENCOUNTER — PES CALL (OUTPATIENT)
Dept: ADMINISTRATIVE | Facility: CLINIC | Age: 84
End: 2019-03-14

## 2019-03-25 DIAGNOSIS — M51.9 LUMBAR DISC DISEASE: ICD-10-CM

## 2019-03-25 RX ORDER — HYDROCODONE BITARTRATE AND ACETAMINOPHEN 5; 325 MG/1; MG/1
1 TABLET ORAL EVERY 6 HOURS PRN
Qty: 120 TABLET | Refills: 0 | Status: SHIPPED | OUTPATIENT
Start: 2019-03-25 | End: 2019-04-25 | Stop reason: SDUPTHER

## 2019-03-25 NOTE — TELEPHONE ENCOUNTER
Called to speak with pt. Pt's wife answered. Pt wife took a message the pt's medications were sent to the preferred pharmacy.

## 2019-03-25 NOTE — TELEPHONE ENCOUNTER
Pt Of Brodie Trotter MD    Last seen on: 3/1/2019  Next appt: 6/6/2019  Last refill: 2/25/2019    Allergies:   Review of patient's allergies indicates:   Allergen Reactions    No known drug allergies      Pharmacy:   [facility] [address] [place] [address] AT [facility] & [address] [address]  Phone: [phone] Fax: [phone]    Please review! Thank you!

## 2019-04-25 DIAGNOSIS — M51.9 LUMBAR DISC DISEASE: ICD-10-CM

## 2019-04-25 RX ORDER — HYDROCODONE BITARTRATE AND ACETAMINOPHEN 5; 325 MG/1; MG/1
1 TABLET ORAL EVERY 6 HOURS PRN
Qty: 120 TABLET | Refills: 0 | Status: SHIPPED | OUTPATIENT
Start: 2019-04-25 | End: 2019-05-28 | Stop reason: SDUPTHER

## 2019-04-25 NOTE — TELEPHONE ENCOUNTER
----- Message from Cristel Andersen sent at 4/25/2019  4:25 PM CDT -----  Contact: self   Type:  RX Refill Request    Who Called:  Self   Refill or New Rx: refill   RX Name and Strength: HYDROcodone-acetaminophen (NORCO) 5-325 mg per tablet  Preferred Pharmacy with phone number:   [facility] [id number] [place] [address] AT SEC OF [facility] & [address] [address]  Phone: [phone] Fax: [phone]  Best Call Back Number: [phone] (home)

## 2019-05-04 RX ORDER — TAMSULOSIN HYDROCHLORIDE 0.4 MG/1
CAPSULE ORAL
Qty: 90 CAPSULE | Refills: 0 | Status: SHIPPED | OUTPATIENT
Start: 2019-05-04 | End: 2019-05-09 | Stop reason: SDUPTHER

## 2019-05-09 ENCOUNTER — OFFICE VISIT (OUTPATIENT)
Dept: FAMILY MEDICINE | Facility: CLINIC | Age: 84
End: 2019-05-09
Payer: MEDICARE

## 2019-05-09 VITALS
BODY MASS INDEX: 32.16 KG/M2 | WEIGHT: 212.19 LBS | HEIGHT: 68 IN | DIASTOLIC BLOOD PRESSURE: 64 MMHG | TEMPERATURE: 98 F | SYSTOLIC BLOOD PRESSURE: 112 MMHG

## 2019-05-09 DIAGNOSIS — M51.9 LUMBAR DISC DISEASE: ICD-10-CM

## 2019-05-09 DIAGNOSIS — I25.10 CORONARY ARTERY DISEASE INVOLVING NATIVE CORONARY ARTERY OF NATIVE HEART WITHOUT ANGINA PECTORIS: Chronic | ICD-10-CM

## 2019-05-09 DIAGNOSIS — M47.816 ARTHRITIS, LUMBAR SPINE: Primary | ICD-10-CM

## 2019-05-09 DIAGNOSIS — E11.43 TYPE II DIABETES MELLITUS WITH PERIPHERAL AUTONOMIC NEUROPATHY: ICD-10-CM

## 2019-05-09 PROCEDURE — 99214 OFFICE O/P EST MOD 30 MIN: CPT | Mod: HCNC,S$GLB,, | Performed by: FAMILY MEDICINE

## 2019-05-09 PROCEDURE — 3078F PR MOST RECENT DIASTOLIC BLOOD PRESSURE < 80 MM HG: ICD-10-PCS | Mod: HCNC,CPTII,S$GLB, | Performed by: FAMILY MEDICINE

## 2019-05-09 PROCEDURE — 99999 PR PBB SHADOW E&M-EST. PATIENT-LVL IV: CPT | Mod: PBBFAC,HCNC,, | Performed by: FAMILY MEDICINE

## 2019-05-09 PROCEDURE — 3078F DIAST BP <80 MM HG: CPT | Mod: HCNC,CPTII,S$GLB, | Performed by: FAMILY MEDICINE

## 2019-05-09 PROCEDURE — 99499 RISK ADDL DX/OHS AUDIT: ICD-10-PCS | Mod: HCNC,S$GLB,, | Performed by: FAMILY MEDICINE

## 2019-05-09 PROCEDURE — 1101F PR PT FALLS ASSESS DOC 0-1 FALLS W/OUT INJ PAST YR: ICD-10-PCS | Mod: HCNC,CPTII,S$GLB, | Performed by: FAMILY MEDICINE

## 2019-05-09 PROCEDURE — 1101F PT FALLS ASSESS-DOCD LE1/YR: CPT | Mod: HCNC,CPTII,S$GLB, | Performed by: FAMILY MEDICINE

## 2019-05-09 PROCEDURE — 3074F SYST BP LT 130 MM HG: CPT | Mod: HCNC,CPTII,S$GLB, | Performed by: FAMILY MEDICINE

## 2019-05-09 PROCEDURE — 3074F PR MOST RECENT SYSTOLIC BLOOD PRESSURE < 130 MM HG: ICD-10-PCS | Mod: HCNC,CPTII,S$GLB, | Performed by: FAMILY MEDICINE

## 2019-05-09 PROCEDURE — 99499 UNLISTED E&M SERVICE: CPT | Mod: HCNC,S$GLB,, | Performed by: FAMILY MEDICINE

## 2019-05-09 PROCEDURE — 99999 PR PBB SHADOW E&M-EST. PATIENT-LVL IV: ICD-10-PCS | Mod: PBBFAC,HCNC,, | Performed by: FAMILY MEDICINE

## 2019-05-09 PROCEDURE — 99214 PR OFFICE/OUTPT VISIT, EST, LEVL IV, 30-39 MIN: ICD-10-PCS | Mod: HCNC,S$GLB,, | Performed by: FAMILY MEDICINE

## 2019-05-09 RX ORDER — NITROGLYCERIN 0.4 MG/1
0.4 TABLET SUBLINGUAL EVERY 5 MIN PRN
Qty: 25 TABLET | Refills: 5 | Status: SHIPPED | OUTPATIENT
Start: 2019-05-09 | End: 2021-08-25 | Stop reason: SDUPTHER

## 2019-05-09 NOTE — PROGRESS NOTES
"Subjective:       Patient ID: Lázaro Wang is a 84 y.o. male.    Chief Complaint: Back Pain (Chronic LBP "has gotten worse" x couple wk. Poss from gardening)    He has a long history of chronic low back pain. He takes Lortab 4 times a day at least for the past 7 years.  There does not seem to be an escalation in dose.  He describes his back pain is mid and lower lumbar with no radiation.  Dull and aching with occasional sharp exacerbations.  No pain with sitting.  He gets pain when he stands up and also bending over.  His recent flare he relates to doing a lot of [employer] work and bending.  In till January he was going to [place] on a regular basis and doing circuit training and bicycle exercises.  His wife broke her ankle in January and that has disrupted his routine.  He does not have radiation of the pain and there is no numbness or weakness.  He would like a refill of nitroglycerin.  He does not have to use it but wants to keep a fresh bottle.  He has coronary artery disease with a history of stents.  No recent symptoms.  He also has diabetes and is no longer taking metformin.  His hemoglobin A1c has been a little bit above 6%.  His weight has been fairly stable.    Review of Systems   Respiratory: Negative for shortness of breath.    Cardiovascular: Negative for chest pain.   Musculoskeletal: Positive for back pain.   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   Neurological: Negative for weakness and numbness.       Objective:     Blood pressure 112/64, temperature 98.2 °F (36.8 °C), temperature source Oral, height 5' 8" (1.727 m), weight 96.2 kg (212 lb 3.1 oz).      Physical Exam   Constitutional:   He is overweight and in mild distress   Neck: No thyromegaly present.   Cardiovascular: Normal rate and regular rhythm.   No murmur heard.  Pulmonary/Chest: Effort normal and breath sounds normal.   Musculoskeletal:   He rises from a " chair fairly easily but stands slightly flexed.  He can flex forward approximately 70°.  Decreased side bend and accompanying rotation.  Very limited with extension.  He has some tenderness in the mid and lower spinous processes.  Some tightness in the lumbar muscles.   Lymphadenopathy:     He has no cervical adenopathy.   Neurological:   Patellar reflexes are 2+ despite the bilateral knee replacements.  Achilles reflexes absent.  Sensation to light touch intact.  Ankle strength intact.       Assessment:       1. Arthritis, lumbar spine    2. 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 Coronary artery disease involving native coronary artery of native heart without angina pectoris    4. Lumbar disc disease        Plan:       I gave him a handout on back stretches and demonstrated an additional stretching exercise.  He has a prescription for pain medication.  I am hopeful that he will be able to return to [facility] in the near future.    I refilled his nitroglycerin.

## 2019-05-15 ENCOUNTER — TELEPHONE (OUTPATIENT)
Dept: FAMILY MEDICINE | Facility: CLINIC | Age: 84
End: 2019-05-15

## 2019-05-15 RX ORDER — TAMSULOSIN HYDROCHLORIDE 0.4 MG/1
1 CAPSULE ORAL NIGHTLY
Qty: 90 CAPSULE | Refills: 1 | Status: CANCELLED | OUTPATIENT
Start: 2019-05-15

## 2019-05-15 NOTE — TELEPHONE ENCOUNTER
----- Message from Jose Luis Hoffman sent at 5/15/2019  4:27 PM CDT -----  Type:  Patient Call Back    Who Called:[facility]  nicol[facility]  Does the patient know what this is regarding?: refill tamsulosin (FLOMAX) 0.4 mg Cp24   QUEtiapine (SEROQUEL) 100 MG [facility] [address] [place] [address] AT [address] & [address] [address]  Phone: [phone] Fax: [phone]      Best Call Back Number:  [phone]  Additional Information:  Please call pt and leave a detailed message if there is no answer.

## 2019-05-16 ENCOUNTER — TELEPHONE (OUTPATIENT)
Dept: FAMILY MEDICINE | Facility: CLINIC | Age: 84
End: 2019-05-16

## 2019-05-16 RX ORDER — QUETIAPINE FUMARATE 100 MG/1
TABLET, FILM COATED ORAL
Qty: 90 TABLET | Refills: 1 | Status: SHIPPED | OUTPATIENT
Start: 2019-05-16 | End: 2019-05-20 | Stop reason: SDUPTHER

## 2019-05-16 NOTE — TELEPHONE ENCOUNTER
----- Message from Keiry Calderon sent at 5/16/2019  4:28 PM CDT -----  Contact: [phone]  Patient requesting a refill on QUEtiapine (SEROQUEL) 100 MG Tab, stated he's completely out and have been quite some time, for more than 1 week.    Patient will be using   [email or web address] [address] [place] [address] AT SEC OF [facility] & [address] [address]  Phone: [phone] Fax: [phone]    Please call patient at [phone].     Thanks!

## 2019-05-16 NOTE — TELEPHONE ENCOUNTER
You can let him know that I sent in a refill of his medication.  However, it was not necessary because this was sent to [facility] in March for 90 days with 1 refill.  Here is proof if he has any question about this    Outpatient Medication Detail      Disp Refills Start End RONNIE   QUEtiapine (SEROQUEL) 100 MG Tab 90 tablet 1 3/1/2019  No   Sig: TAKE 1 TABLET(100 MG) BY MOUTH EVERY NIGHT   Sent to pharmacy as: QUEtiapine (SEROQUEL) 100 MG Tab   Class: Normal   Order: [id number]   Date/Time Signed: 3/1/2019 14:55       E-Prescribing Status: Receipt confirmed by pharmacy (3/1/2019  2:55 PM CST)

## 2019-05-16 NOTE — TELEPHONE ENCOUNTER
----- Message from Jessica Pinzon sent at 5/16/2019  3:03 PM CDT -----  Contact: wife/Tonia Wang  Type:  RX Refill Request    Who Called: Tonia Wang   Refill or New Rx:refill  RX Name and Strength:serquil (quintapine) 50 mg  How is the patient currently taking it? (ex. 1XDay):1Xday  Is this a 30 day or 90 day RX:90  Preferred Pharmacy with phone number:.  [facility] [address] [place] [address] & [address] [address]  Phone: [phone] Fax: [phone]  Local or Mail Order:local  Ordering Provider:Dr Trotter  Would the patient rather a call back or a response via [facility]? Call back  Best Call Back Number:[phone]  Additional Information: They would like you to send it over as soon as possible. Please let them know when its called in.    Thank you

## 2019-05-16 NOTE — TELEPHONE ENCOUNTER
"Spoke to pt. To verify medication and pharmacy. Pt. [facility] sent all medications to previous address in [place]. Advised pt. I would get a message sent to the doctor allow up to 72 business hours for review. Pt. Stated "this is ridiculous. I don't want to talk to you. I want to talk to the doctor." advised pt I would send a message to his office so they could give him a call. Pt stated "If I don't hear anything in 20 minutes I will be calling back." pt. Hung up the phone.      Pt. Asking for refill medication pended. Please advise.  "

## 2019-05-17 ENCOUNTER — TELEPHONE (OUTPATIENT)
Dept: FAMILY MEDICINE | Facility: CLINIC | Age: 84
End: 2019-05-17

## 2019-05-17 NOTE — TELEPHONE ENCOUNTER
Spoke to pt. Informed medication was sent to the pharmacy. Pt. Verbalized understanding. Phone call ended.

## 2019-05-20 RX ORDER — QUETIAPINE FUMARATE 100 MG/1
TABLET, FILM COATED ORAL
Qty: 66 TABLET | Refills: 0 | Status: SHIPPED | OUTPATIENT
Start: 2019-05-20 | End: 2020-03-31 | Stop reason: SDUPTHER

## 2019-05-20 NOTE — TELEPHONE ENCOUNTER
Spoke to patient and he wanted [facility] deleted from his chart, as he will not be using them any longer.     Pt also needing a Rx for 66 more tablets of Seroquel as he only received 24 tablets with this prescription. Pt states that there was some confusion with [facility] stating they mailed this Rx and insurance would not cover the Rx at [facility]. Pt states he never received the Rx from [facility] and ended up paying out of pocket for the Rx. Pended to you. Please advise. Thanks. LOV: 5/09/2019

## 2019-05-20 NOTE — TELEPHONE ENCOUNTER
----- Message from Mariza Talavera sent at 5/20/2019 10:21 AM CDT -----  Type: Needs Medical Advice    Who Called:  self  Symptoms (please be specific):   No info / request to speak to nurse   How long has patient had these symptoms:     Pharmacy name and phone #:     Best Call Back Number:  [phone]   Additional Information: prescriptions

## 2019-05-28 DIAGNOSIS — M51.9 LUMBAR DISC DISEASE: ICD-10-CM

## 2019-05-28 RX ORDER — HYDROCODONE BITARTRATE AND ACETAMINOPHEN 5; 325 MG/1; MG/1
1 TABLET ORAL EVERY 6 HOURS PRN
Qty: 120 TABLET | Refills: 0 | Status: SHIPPED | OUTPATIENT
Start: 2019-05-28 | End: 2019-06-06 | Stop reason: SDUPTHER

## 2019-05-28 NOTE — TELEPHONE ENCOUNTER
----- Message from Gi Shultz sent at 5/28/2019  9:34 AM CDT -----  Contact: Lázaro  Type:  RX Refill Request    Who Called:  patient  Refill or New Rx:  refill  RX Name and Strength:  HYDROcodone-acetaminophen (NORCO) 5-325 mg per tablet  How is the patient currently taking it? (ex. 1XDay):  As directed  Is this a 30 day or 90 day RX:  30  Preferred Pharmacy with phone number:    [email or web address] [address] [place] [address] & [address] [address]  Phone: [phone] Fax: [phone]    Local or Mail Order:  local  Ordering Provider:  Sergo Jimenez Call Back Number:  [phone]  Additional Information:  Please advise--thank you

## 2019-05-31 ENCOUNTER — CLINICAL SUPPORT (OUTPATIENT)
Dept: CARDIOLOGY | Facility: CLINIC | Age: 84
End: 2019-05-31
Attending: INTERNAL MEDICINE
Payer: MEDICARE

## 2019-05-31 DIAGNOSIS — Z95.0 CARDIAC PACEMAKER IN SITU: ICD-10-CM

## 2019-05-31 DIAGNOSIS — I45.5 SINUS PAUSE: ICD-10-CM

## 2019-06-06 ENCOUNTER — OFFICE VISIT (OUTPATIENT)
Dept: FAMILY MEDICINE | Facility: CLINIC | Age: 84
End: 2019-06-06
Payer: MEDICARE

## 2019-06-06 VITALS
HEIGHT: 68 IN | DIASTOLIC BLOOD PRESSURE: 70 MMHG | WEIGHT: 214.19 LBS | TEMPERATURE: 98 F | HEART RATE: 60 BPM | SYSTOLIC BLOOD PRESSURE: 118 MMHG | BODY MASS INDEX: 32.46 KG/M2

## 2019-06-06 DIAGNOSIS — H73.891 RETRACTION OF TYMPANIC MEMBRANE OF RIGHT EAR: ICD-10-CM

## 2019-06-06 DIAGNOSIS — H69.91 ETD (EUSTACHIAN TUBE DYSFUNCTION), RIGHT: ICD-10-CM

## 2019-06-06 DIAGNOSIS — E11.43 TYPE II DIABETES MELLITUS WITH PERIPHERAL AUTONOMIC NEUROPATHY: Primary | ICD-10-CM

## 2019-06-06 DIAGNOSIS — I77.9 BILATERAL CAROTID ARTERY DISEASE, UNSPECIFIED TYPE: ICD-10-CM

## 2019-06-06 DIAGNOSIS — E78.5 DYSLIPIDEMIA: Chronic | ICD-10-CM

## 2019-06-06 DIAGNOSIS — M51.9 LUMBAR DISC DISEASE: ICD-10-CM

## 2019-06-06 DIAGNOSIS — I48.91 ATRIAL FIBRILLATION, UNSPECIFIED TYPE: ICD-10-CM

## 2019-06-06 DIAGNOSIS — N18.30 CHRONIC KIDNEY DISEASE, STAGE III (MODERATE): ICD-10-CM

## 2019-06-06 DIAGNOSIS — I10 ESSENTIAL HYPERTENSION: Chronic | ICD-10-CM

## 2019-06-06 PROBLEM — R45.4 IRRITABILITY: Status: RESOLVED | Noted: 2017-10-31 | Resolved: 2019-06-06

## 2019-06-06 PROCEDURE — 1101F PR PT FALLS ASSESS DOC 0-1 FALLS W/OUT INJ PAST YR: ICD-10-PCS | Mod: HCNC,CPTII,S$GLB, | Performed by: FAMILY MEDICINE

## 2019-06-06 PROCEDURE — 99214 OFFICE O/P EST MOD 30 MIN: CPT | Mod: HCNC,S$GLB,, | Performed by: FAMILY MEDICINE

## 2019-06-06 PROCEDURE — 3074F PR MOST RECENT SYSTOLIC BLOOD PRESSURE < 130 MM HG: ICD-10-PCS | Mod: HCNC,CPTII,S$GLB, | Performed by: FAMILY MEDICINE

## 2019-06-06 PROCEDURE — 3078F PR MOST RECENT DIASTOLIC BLOOD PRESSURE < 80 MM HG: ICD-10-PCS | Mod: HCNC,CPTII,S$GLB, | Performed by: FAMILY MEDICINE

## 2019-06-06 PROCEDURE — 99999 PR PBB SHADOW E&M-EST. PATIENT-LVL III: ICD-10-PCS | Mod: PBBFAC,HCNC,, | Performed by: FAMILY MEDICINE

## 2019-06-06 PROCEDURE — 99499 RISK ADDL DX/OHS AUDIT: ICD-10-PCS | Mod: HCNC,S$GLB,, | Performed by: FAMILY MEDICINE

## 2019-06-06 PROCEDURE — 3074F SYST BP LT 130 MM HG: CPT | Mod: HCNC,CPTII,S$GLB, | Performed by: FAMILY MEDICINE

## 2019-06-06 PROCEDURE — 99999 PR PBB SHADOW E&M-EST. PATIENT-LVL III: CPT | Mod: PBBFAC,HCNC,, | Performed by: FAMILY MEDICINE

## 2019-06-06 PROCEDURE — 3078F DIAST BP <80 MM HG: CPT | Mod: HCNC,CPTII,S$GLB, | Performed by: FAMILY MEDICINE

## 2019-06-06 PROCEDURE — 99499 UNLISTED E&M SERVICE: CPT | Mod: HCNC,S$GLB,, | Performed by: FAMILY MEDICINE

## 2019-06-06 PROCEDURE — 99214 PR OFFICE/OUTPT VISIT, EST, LEVL IV, 30-39 MIN: ICD-10-PCS | Mod: HCNC,S$GLB,, | Performed by: FAMILY MEDICINE

## 2019-06-06 PROCEDURE — 1101F PT FALLS ASSESS-DOCD LE1/YR: CPT | Mod: HCNC,CPTII,S$GLB, | Performed by: FAMILY MEDICINE

## 2019-06-06 RX ORDER — FLUTICASONE PROPIONATE 50 MCG
1 SPRAY, SUSPENSION (ML) NASAL 2 TIMES DAILY
Qty: 16 G | Refills: 3 | Status: SHIPPED | OUTPATIENT
Start: 2019-06-06 | End: 2020-06-05

## 2019-06-06 RX ORDER — QUETIAPINE FUMARATE 25 MG/1
TABLET, FILM COATED ORAL
Qty: 30 TABLET | Refills: 1 | Status: SHIPPED | OUTPATIENT
Start: 2019-06-06 | End: 2019-09-06

## 2019-06-06 RX ORDER — IPRATROPIUM BROMIDE 42 UG/1
2 SPRAY, METERED NASAL 4 TIMES DAILY
Qty: 30 ML | Refills: 3 | Status: SHIPPED | OUTPATIENT
Start: 2019-06-06 | End: 2019-09-06

## 2019-06-06 RX ORDER — HYDROCODONE BITARTRATE AND ACETAMINOPHEN 5; 325 MG/1; MG/1
1 TABLET ORAL EVERY 6 HOURS PRN
Qty: 120 TABLET | Refills: 0 | Status: SHIPPED | OUTPATIENT
Start: 2019-06-27 | End: 2019-07-26 | Stop reason: SDUPTHER

## 2019-06-06 RX ORDER — QUETIAPINE FUMARATE 25 MG/1
TABLET, FILM COATED ORAL
Qty: 90 TABLET | Refills: 1 | OUTPATIENT
Start: 2019-06-06

## 2019-06-06 NOTE — PROGRESS NOTES
THIS DOCUMENT WAS MADE IN PART WITH VOICE RECOGNITION SOFTWARE.  OCCASIONALLY THIS SOFTWARE WILL MISINTERPRET WORDS OR PHRASES.      Lázaro Wang  10/8/1934    Lázaro was seen today for diabetes.    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  Will check labs before he leaves for the [employer] for a few weeks.    Dyslipidemia  -     Lipid panel; Future  Stable    Essential hypertension  -     Comprehensive metabolic panel; Future  Chronic stable and well controlled    Atrial fibrillation, unspecified type  Stable rate controlled    Bilateral carotid artery disease, unspecified type  -     Lipid panel; Future    Chronic kidney disease, stage III (moderate)  -     Comprehensive metabolic panel; Future    Lumbar disc disease  -     HYDROcodone-acetaminophen (NORCO) 5-325 mg per tablet; Take 1 tablet by mouth every 6 (six) hours as needed.  Patient has been stable over the last several weeks  .    ETD (Eustachian tube dysfunction), right  -     fluticasone propionate (FLONASE) 50 mcg/actuation nasal spray; 1 spray (50 mcg total) by Each Nare route 2 (two) times daily.      Chronic insomnia  Ongoing, he was placed on Seroquel many years ago and has been doing well on this and reluctant to stop it despite age and side effect possibilities.  However he does report some daytime drowsiness especially during the 1st half of the day so I recommended a dosage decrease he is reluctant because he values sleep, but willing to try reduction to 75 mg for a week or 2 and see if this helps.    -     QUEtiapine (SEROQUEL) 25 MG Tab; Take 75 mg nightly (take one 25 mg tab and 1/2 of a 100 mg tablet)    Also mild frontal headache intermittent.  I advised  him if this does not improve with allergy symptoms to re-evaluate and he will keep me informed.  He will restart the Flonase, had Claritin.  The Atrovent nasal spray did not seem to help with rhinorrhea, could consider adding Astelin    Subjective     Chief Complaint   Patient presents with    Diabetes     3 mo DM f/u. poss due for lab       HPI    Runny nose  Eyes itch and drain  zytec stopped helping  Out of flonase    Mild headache frontal/above eyes, ~ 1 year  No obvious triggers, daily, no blurred vision, no n/v      HPI elements addressed above in the assessment and plan including problems, diagnosis, stability/instability,  improving/worsening, and chronicity will not be duplicated in this section. Any important additional HPI topics will be discussed here if needed.    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    BPV (benign positional vertigo) 02/17/2016    Nuclear sclerosis 02/17/2016    Posterior vitreous detachment of both eyes 02/17/2016    Blepharitis of both eyes 02/17/2016    Hyperopia with astigmatism and presbyopia 02/17/2016    Ectasis aorta 10/31/2017    Carotid disease, bilateral 10/31/2017    Irritability 10/31/2017    Anxiety 10/31/2017    Chronic fatigue 03/27/2018    Chronic kidney disease, stage III (moderate) 07/24/2018    Atherosclerosis of aorta 07/24/2018    Carpal tunnel syndrome on right 09/24/2018    Sinus pause 12/10/2018    Pacemaker 01/22/2019    Atrial  "fibrillation 06/06/2019     Resolved Ambulatory Problems     Diagnosis Date Noted    Degenerative arthritis of knee 03/15/2012    Bradycardia 05/23/2013    Hypoxemia 07/16/2014    Change in bowel habits 05/25/2017     Past Medical History:   Diagnosis Date    Anticoagulant long-term use     Anxiety     Arthritis     Carpal tunnel syndrome on right 09/2018    Cataract     Chronic back pain     Coronary artery disease     Diabetes mellitus     Elevated cholesterol     General anesthetics causing adverse effect in therapeutic use     GERD (gastroesophageal reflux disease)     Heart disease     Hypertension     Myocardial infarction     Prostate disorder     Trouble in sleeping          Review of Systems    Objective     Physical Exam  Vitals:    06/06/19 1439   BP: 118/70   BP Location: Left arm   Pulse: 60   Temp: 98 °F (36.7 °C)   TempSrc: Oral   Weight: 97.1 kg (214 lb 2.8 oz)   Height: 5' 8" (1.727 m)       MOST RECENT LABS IN OUR ELECTRONIC MEDICAL RECORD:     Results for orders placed or performed in visit on 03/12/19   Comprehensive metabolic panel   Result Value Ref Range    Sodium 141 136 - 145 mmol/L    Potassium 4.7 3.5 - 5.1 mmol/L    Chloride 105 95 - 110 mmol/L    CO2 28 23 - 29 mmol/L    Glucose 105 70 - 110 mg/dL    BUN, Bld 22 8 - 23 mg/dL    Creatinine 1.2 0.5 - 1.4 mg/dL    Calcium 9.8 8.7 - 10.5 mg/dL    Total Protein 6.9 6.0 - 8.4 g/dL    Albumin 4.0 3.5 - 5.2 g/dL    Total Bilirubin 0.7 0.1 - 1.0 mg/dL    Alkaline Phosphatase 74 55 - 135 U/L    AST 22 10 - 40 U/L    ALT 22 10 - 44 U/L    Anion Gap 8 8 - 16 mmol/L    eGFR if African American >60.0 >60 mL/min/1.73 m^2    eGFR if non  55.2 (A) >60 mL/min/1.73 m^2   Lipid panel   Result Value Ref Range    Cholesterol 135 120 - 199 mg/dL    Triglycerides 73 30 - 150 mg/dL    HDL 50 40 - 75 mg/dL    LDL Cholesterol 70.4 63.0 - 159.0 mg/dL    Hdl/Cholesterol Ratio 37.0 20.0 - 50.0 %    Total Cholesterol/HDL Ratio 2.7 " 2.0 - 5.0    Non-HDL Cholesterol 85 mg/dL   Hemoglobin A1c   Result Value Ref Range    Hemoglobin A1C 6.4 (H) 4.0 - 5.6 %    Estimated Avg Glucose 137 (H) 68 - 131 mg/dL

## 2019-06-07 ENCOUNTER — LAB VISIT (OUTPATIENT)
Dept: LAB | Facility: HOSPITAL | Age: 84
End: 2019-06-07
Attending: FAMILY MEDICINE
Payer: MEDICARE

## 2019-06-07 DIAGNOSIS — E11.43 TYPE II DIABETES MELLITUS WITH PERIPHERAL AUTONOMIC NEUROPATHY: ICD-10-CM

## 2019-06-07 LAB
ALBUMIN/CREAT UR: 8.8 UG/MG (ref 0–30)
CREAT UR-MCNC: 68 MG/DL (ref 23–375)
MICROALBUMIN UR DL<=1MG/L-MCNC: 6 UG/ML

## 2019-06-07 PROCEDURE — 82043 UR ALBUMIN QUANTITATIVE: CPT | Mod: HCNC

## 2019-06-12 ENCOUNTER — CLINICAL SUPPORT (OUTPATIENT)
Dept: CARDIOLOGY | Facility: CLINIC | Age: 84
End: 2019-06-12
Attending: INTERNAL MEDICINE
Payer: MEDICARE

## 2019-06-12 DIAGNOSIS — Z95.0 CARDIAC PACEMAKER IN SITU: ICD-10-CM

## 2019-06-12 DIAGNOSIS — I45.5 SINUS PAUSE: ICD-10-CM

## 2019-07-15 ENCOUNTER — CLINICAL SUPPORT (OUTPATIENT)
Dept: CARDIOLOGY | Facility: CLINIC | Age: 84
End: 2019-07-15
Attending: INTERNAL MEDICINE
Payer: MEDICARE

## 2019-07-15 DIAGNOSIS — I45.5 SINUS PAUSE: ICD-10-CM

## 2019-07-15 DIAGNOSIS — Z95.0 CARDIAC PACEMAKER IN SITU: ICD-10-CM

## 2019-07-26 DIAGNOSIS — M51.9 LUMBAR DISC DISEASE: ICD-10-CM

## 2019-07-26 RX ORDER — HYDROCODONE BITARTRATE AND ACETAMINOPHEN 5; 325 MG/1; MG/1
1 TABLET ORAL EVERY 6 HOURS PRN
Qty: 120 TABLET | Refills: 0 | Status: SHIPPED | OUTPATIENT
Start: 2019-07-26 | End: 2019-08-26 | Stop reason: SDUPTHER

## 2019-07-26 NOTE — TELEPHONE ENCOUNTER
----- Message from Rosibel Bueno sent at 7/26/2019  9:02 AM CDT -----  Contact: Lázaro calles  Type:  RX Refill Request    Who Called:  Lázaro  Refill or New Rx:  refill  RX Name and Strength:  HYDROcodone-acetaminophen (NORCO) 5-325 mg per tablet  How is the patient currently taking it? (ex. 1XDay):  As needed  Is this a 30 day or 90 day RX:  30  Preferred Pharmacy with phone number:    [id number] [address] & [address] [address]  Phone: [phone] Fax: [phone]      Local or Mail Order:  local  Ordering Provider:  Sergo  Best Call Back Number:  [phone]  Additional Information:  Pls call pt if there is an issue w/ rx

## 2019-07-29 ENCOUNTER — CLINICAL SUPPORT (OUTPATIENT)
Dept: CARDIOLOGY | Facility: CLINIC | Age: 84
End: 2019-07-29
Attending: INTERNAL MEDICINE
Payer: MEDICARE

## 2019-07-29 DIAGNOSIS — I45.5 SINUS PAUSE: ICD-10-CM

## 2019-07-29 DIAGNOSIS — Z95.0 CARDIAC PACEMAKER IN SITU: ICD-10-CM

## 2019-07-29 RX ORDER — TAMSULOSIN HYDROCHLORIDE 0.4 MG/1
CAPSULE ORAL
Qty: 90 CAPSULE | Refills: 1 | Status: SHIPPED | OUTPATIENT
Start: 2019-07-29 | End: 2020-01-10

## 2019-08-01 ENCOUNTER — TELEPHONE (OUTPATIENT)
Dept: FAMILY MEDICINE | Facility: CLINIC | Age: 84
End: 2019-08-01

## 2019-08-01 RX ORDER — OMEPRAZOLE 20 MG/1
20 CAPSULE, DELAYED RELEASE ORAL DAILY
Qty: 30 CAPSULE | Refills: 11 | Status: SHIPPED | OUTPATIENT
Start: 2019-08-01 | End: 2020-07-06 | Stop reason: SDUPTHER

## 2019-08-01 NOTE — TELEPHONE ENCOUNTER
Patient has been buying omeprazole OTC and is requesting it as prescription now. States that he is no longer seeing Dr Bell. States that he is taking one 20mg  Tablet daily po.

## 2019-08-01 NOTE — TELEPHONE ENCOUNTER
----- Message from Marianna Booth sent at 8/1/2019 12:50 PM CDT -----  Contact: self  Patient requesting a script to be called in for omeprole 100mg        [id number] - [place] [address] AT [facility] & [address] [address]  Phone: [phone] Fax: [phone]    Patient contact is [phone] (home)

## 2019-08-05 DIAGNOSIS — Z95.0 CARDIAC PACEMAKER IN SITU: Primary | ICD-10-CM

## 2019-08-05 DIAGNOSIS — I45.5 SINUS PAUSE: ICD-10-CM

## 2019-08-19 ENCOUNTER — PATIENT OUTREACH (OUTPATIENT)
Dept: ADMINISTRATIVE | Facility: HOSPITAL | Age: 84
End: 2019-08-19

## 2019-08-23 ENCOUNTER — OFFICE VISIT (OUTPATIENT)
Dept: FAMILY MEDICINE | Facility: CLINIC | Age: 84
End: 2019-08-23
Payer: MEDICARE

## 2019-08-23 VITALS
WEIGHT: 216.69 LBS | OXYGEN SATURATION: 96 % | HEIGHT: 68 IN | BODY MASS INDEX: 32.84 KG/M2 | SYSTOLIC BLOOD PRESSURE: 124 MMHG | HEART RATE: 60 BPM | DIASTOLIC BLOOD PRESSURE: 76 MMHG

## 2019-08-23 DIAGNOSIS — I65.23 BILATERAL CAROTID ARTERY STENOSIS: ICD-10-CM

## 2019-08-23 DIAGNOSIS — I10 ESSENTIAL HYPERTENSION: Chronic | ICD-10-CM

## 2019-08-23 DIAGNOSIS — E78.5 DYSLIPIDEMIA: Chronic | ICD-10-CM

## 2019-08-23 DIAGNOSIS — Z00.00 ENCOUNTER FOR PREVENTIVE HEALTH EXAMINATION: Primary | ICD-10-CM

## 2019-08-23 DIAGNOSIS — E11.43 TYPE II DIABETES MELLITUS WITH PERIPHERAL AUTONOMIC NEUROPATHY: ICD-10-CM

## 2019-08-23 DIAGNOSIS — E11.8 TYPE 2 DIABETES MELLITUS WITH COMPLICATION, WITHOUT LONG-TERM CURRENT USE OF INSULIN: ICD-10-CM

## 2019-08-23 DIAGNOSIS — I70.0 ATHEROSCLEROSIS OF AORTA: ICD-10-CM

## 2019-08-23 DIAGNOSIS — I77.819 ECTASIS AORTA: ICD-10-CM

## 2019-08-23 DIAGNOSIS — I48.91 ATRIAL FIBRILLATION, UNSPECIFIED TYPE: ICD-10-CM

## 2019-08-23 DIAGNOSIS — I25.10 CORONARY ARTERY DISEASE INVOLVING NATIVE CORONARY ARTERY OF NATIVE HEART WITHOUT ANGINA PECTORIS: Chronic | ICD-10-CM

## 2019-08-23 DIAGNOSIS — N18.30 CHRONIC KIDNEY DISEASE, STAGE III (MODERATE): ICD-10-CM

## 2019-08-23 PROCEDURE — 3078F DIAST BP <80 MM HG: CPT | Mod: HCNC,CPTII,S$GLB, | Performed by: NURSE PRACTITIONER

## 2019-08-23 PROCEDURE — 99999 PR PBB SHADOW E&M-EST. PATIENT-LVL V: ICD-10-PCS | Mod: PBBFAC,HCNC,, | Performed by: NURSE PRACTITIONER

## 2019-08-23 PROCEDURE — 3074F PR MOST RECENT SYSTOLIC BLOOD PRESSURE < 130 MM HG: ICD-10-PCS | Mod: HCNC,CPTII,S$GLB, | Performed by: NURSE PRACTITIONER

## 2019-08-23 PROCEDURE — 99499 RISK ADDL DX/OHS AUDIT: ICD-10-PCS | Mod: HCNC,S$GLB,, | Performed by: NURSE PRACTITIONER

## 2019-08-23 PROCEDURE — 99499 UNLISTED E&M SERVICE: CPT | Mod: HCNC,S$GLB,, | Performed by: NURSE PRACTITIONER

## 2019-08-23 PROCEDURE — 3074F SYST BP LT 130 MM HG: CPT | Mod: HCNC,CPTII,S$GLB, | Performed by: NURSE PRACTITIONER

## 2019-08-23 PROCEDURE — 99999 PR PBB SHADOW E&M-EST. PATIENT-LVL V: CPT | Mod: PBBFAC,HCNC,, | Performed by: NURSE PRACTITIONER

## 2019-08-23 PROCEDURE — G0439 PR MEDICARE ANNUAL WELLNESS SUBSEQUENT VISIT: ICD-10-PCS | Mod: HCNC,S$GLB,, | Performed by: NURSE PRACTITIONER

## 2019-08-23 PROCEDURE — 3078F PR MOST RECENT DIASTOLIC BLOOD PRESSURE < 80 MM HG: ICD-10-PCS | Mod: HCNC,CPTII,S$GLB, | Performed by: NURSE PRACTITIONER

## 2019-08-23 PROCEDURE — G0439 PPPS, SUBSEQ VISIT: HCPCS | Mod: HCNC,S$GLB,, | Performed by: NURSE PRACTITIONER

## 2019-08-23 NOTE — PROGRESS NOTES
"Lázaro Wang presented for a  Medicare AWV and comprehensive Health Risk Assessment today. The following components were reviewed and updated:    · Medical history  · Family History  · Social history  · Allergies and Current Medications  · Health Risk Assessment  · Health Maintenance  · Care Team     ** See Completed Assessments for Annual Wellness Visit within the encounter summary.**       The following assessments were completed:  · Living Situation  · CAGE  · Depression Screening  · Timed Get Up and Go  · Whisper Test  · Cognitive Function Screening          · Nutrition Screening  · ADL Screening  · PAQ Screening    Vitals:    08/23/19 1257   BP: 124/76   BP Location: Left arm   Patient Position: Sitting   BP Method: Medium (Manual)   Pulse: 60   SpO2: 96%   Weight: 98.3 kg (216 lb 11.4 oz)   Height: 5' 8" (1.727 m)     Body mass index is 32.95 kg/m².  Physical Exam   Constitutional: He is oriented to person, place, and time. He appears well-developed and well-nourished. No distress.   HENT:   Head: Normocephalic and atraumatic.   Eyes: Conjunctivae are normal. No scleral icterus.   Cardiovascular: Normal heart sounds.   Pulses:       Dorsalis pedis pulses are 1+ on the right side, and 1+ on the left side.        Posterior tibial pulses are 1+ on the right side, and 1+ on the left side.   Pulmonary/Chest: Effort normal and breath sounds normal. No respiratory distress.   Musculoskeletal:        Right foot: There is no deformity.        Left foot: There is no deformity.   Feet:   Right Foot:   Protective Sensation: 6 sites tested. 6 sites sensed.   Skin Integrity: Negative for ulcer, blister, skin breakdown or erythema.   Left Foot:   Protective Sensation: 6 sites tested. 6 sites sensed.   Skin Integrity: Negative for ulcer, blister, skin breakdown or erythema.   Neurological: He is alert and oriented to person, place, and time. Coordination normal.   Skin: Skin is warm. He is not diaphoretic.   Psychiatric: He " has a normal mood and affect. His behavior is normal.         Diagnoses and health risks identified today and associated recommendations/orders:    1. Encounter for preventive health examination  Reviewed health maintenance and provided recommendations    Educated on shingrix, flu and tdap vaccines.      2. Coronary artery disease involving native coronary artery of native heart without angina pectoris  Continue to monitor  Followed by Angelica  Halley PEREZ.      3. Dyslipidemia  Continue to monitor  Followed by Brodie Trotter MD .      4. Essential hypertension  Continue to monitor  Followed by Brodie Trotter MD      5. Atherosclerosis of aorta  Continue to monitor  Followed by Angelica.      6. Ectasis aorta  Continue to monitor  Followed by Angelica.      7. Bilateral carotid artery stenosis  Continue to monitor  Followed by Angelica.   Carotid US 2/16/17     8. Atrial fibrillation, unspecified type  Continue to monitor  Followed by Angelica  Taking amiodarone.      9. Chronic kidney disease, stage III (moderate)  Continue to monitor  Followed by Brodie Trotter MD   BP controlled  Avoid nsaids  Drink adequate amounts of h2o.      10. 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  Continue to monitor  Followed by Brodie Trotter MD   Last a1c 6.2.      11. Type 2 diabetes mellitus with complication, without long-term current use of insulin  Continue to monitor  Followed by Brodie Trotter MD   Controlled with diet.        Provided Lázaro with a 5-10 year written screening schedule and personal prevention plan. Recommendations were developed using the USPSTF age appropriate recommendations. Education, counseling, and referrals were provided as needed. After Visit Summary printed and given to patient which includes a list of additional screenings\tests needed.    Follow up in about 1 year (around 8/23/2020).    Nadia Butterfield NP  I offered to discuss end of life issues, including information on  how to make advance directives that the patient could use to name someone who would make medical decisions on their behalf if they became too ill to make themselves.    ___Patient declined  _X_Patient is interested, I provided paper work and offered to discuss.

## 2019-08-23 NOTE — PATIENT INSTRUCTIONS
Counseling and Referral of Other Preventative  (Italic type indicates deductible and co-insurance are waived)    Patient Name: Lázaro Wang  Today's Date: 8/23/2019    Health Maintenance       Date Due Completion Date    Sign Pain Contract 10/08/1952 ---    Complete Opioid Risk Tool 10/08/1952 ---    Shingles Vaccine (2 of 3) 06/30/2009 5/5/2009    Foot Exam 02/15/2018 2/15/2017    TETANUS VACCINE 05/05/2019 5/5/2009    Eye Exam 09/05/2019 9/5/2018    Influenza Vaccine (1) 09/01/2019 9/28/2018    Hemoglobin A1c 12/07/2019 6/7/2019    Lipid Panel 06/07/2020 6/7/2019    Urine Microalbumin 06/07/2020 6/7/2019    Aspirin/Antiplatelet Therapy 08/23/2020 8/23/2019        No orders of the defined types were placed in this encounter.    The following information is provided to all patients.  This information is to help you find resources for any of the problems found today that may be affecting your health:                Living healthy guide: www.[facility].[place].gov      Understanding Diabetes: www.diabetes.org      Eating healthy: www.cdc.gov/healthyweight      CDC home safety checklist: www.cdc.gov/steadi/patient.html      Agency on Aging: www.goea.[place].gov      Alcoholics anonymous (AA): www.aa.org      Physical Activity: www.shadi.nih.gov/sv2zsom      Tobacco use: www.quitwithusla.org

## 2019-08-26 ENCOUNTER — TELEPHONE (OUTPATIENT)
Dept: FAMILY MEDICINE | Facility: CLINIC | Age: 84
End: 2019-08-26

## 2019-08-26 DIAGNOSIS — M51.9 LUMBAR DISC DISEASE: ICD-10-CM

## 2019-08-26 RX ORDER — HYDROCODONE BITARTRATE AND ACETAMINOPHEN 5; 325 MG/1; MG/1
1 TABLET ORAL EVERY 6 HOURS PRN
Qty: 120 TABLET | Refills: 0 | Status: SHIPPED | OUTPATIENT
Start: 2019-08-26 | End: 2019-08-26 | Stop reason: SDUPTHER

## 2019-08-26 RX ORDER — HYDROCODONE BITARTRATE AND ACETAMINOPHEN 5; 325 MG/1; MG/1
TABLET ORAL
Qty: 120 TABLET | Refills: 0 | Status: SHIPPED | OUTPATIENT
Start: 2019-08-26 | End: 2019-08-27

## 2019-08-26 NOTE — TELEPHONE ENCOUNTER
----- Message from Susancaryn Medeiros sent at 8/26/2019  3:41 PM CDT -----  Contact: Tran Lowery pha  Type: Needs Medical Advice    [facility]:  [facility]  [facility]arm[facility] name and phone #:  [email or web address]  Best Call Back Number:   Additional Information: Requesting a call back from Nurse regarding a Rx

## 2019-08-26 NOTE — TELEPHONE ENCOUNTER
"Spoke w/ Tran. Hydrocodone rx needs to be resent with comment stating "Medeically necessary for longer than 7 days". Please edit and resend.  "

## 2019-08-27 ENCOUNTER — TELEPHONE (OUTPATIENT)
Dept: FAMILY MEDICINE | Facility: CLINIC | Age: 84
End: 2019-08-27

## 2019-08-27 DIAGNOSIS — M51.9 LUMBAR DISC DISEASE: ICD-10-CM

## 2019-08-27 RX ORDER — HYDROCODONE BITARTRATE AND ACETAMINOPHEN 5; 325 MG/1; MG/1
1 TABLET ORAL EVERY 6 HOURS PRN
Qty: 120 TABLET | Refills: 0 | Status: SHIPPED | OUTPATIENT
Start: 2019-08-27 | End: 2019-09-26 | Stop reason: SDUPTHER

## 2019-08-27 NOTE — TELEPHONE ENCOUNTER
----- Message from Aby Venegas sent at 8/27/2019  9:27 AM CDT -----   Type:  Pharmacy Calling to Clarify an RX    Name of Caller:  santy  Pharmacy Name:    [id number] - [place] [address] AT [address] by [facility] & [address] [address]  Phone: [phone] Fax: [phone]    Prescription Name: narco 5/325 mg  What do they need to clarify?:  Needs   Medically  neccessary and  Directions  On  Med   best Call Back Number: [phone]  Additional Information: please call  For  All details

## 2019-08-27 NOTE — TELEPHONE ENCOUNTER
Rx does have that more than 2 day supply is medically necessary, but it is in the directions part of the rx and there are no directions. Please edit and resend

## 2019-08-27 NOTE — TELEPHONE ENCOUNTER
----- Message from Inez Almaguer sent at 8/27/2019  4:10 PM CDT -----  Contact: self  Type:  Needs Medical Advice    Who Called: pt  Symptoms (please be specific):n/a   How long has patient had these symptoms: n/a  Pharmacy name and phone #: n/a  Would the patient rather a call back or a response via [facility]? Call back  Best Call Back Number: [phone]  Additional Information: requesting call back regarding questions as to why provider denied pt refill request for hydrocodone.    Thanks,  Inez Almaguer

## 2019-08-27 NOTE — TELEPHONE ENCOUNTER
----- Message from Chelsea Hardy sent at 8/27/2019  4:55 PM CDT -----  Contact: Chelsea with [facility]  Type:  Pharmacy Calling to Clarify an RX    Name of Caller:  Chelsea  Pharmacy Name:  [facility]  Prescription Name:  HYDROcodone-acetaminophen (NORCO) 5-325 mg per tablet  What do they need to clarify?:  Diagnosis code   Best Call Back Number:  [phone]

## 2019-08-27 NOTE — TELEPHONE ENCOUNTER
Spoke w/ pt. Advised him that this was not denied and that we have just been working with pharmacy to clarify the sig. Advised pt this has been done and that he may want to call the pharmacy to make sure it is ready before he gets there. Pt agreed.

## 2019-09-06 ENCOUNTER — OFFICE VISIT (OUTPATIENT)
Dept: FAMILY MEDICINE | Facility: CLINIC | Age: 84
End: 2019-09-06
Payer: MEDICARE

## 2019-09-06 VITALS
WEIGHT: 216.69 LBS | SYSTOLIC BLOOD PRESSURE: 118 MMHG | HEIGHT: 68 IN | BODY MASS INDEX: 32.84 KG/M2 | DIASTOLIC BLOOD PRESSURE: 60 MMHG | HEART RATE: 64 BPM | RESPIRATION RATE: 16 BRPM | TEMPERATURE: 98 F

## 2019-09-06 DIAGNOSIS — E11.43 TYPE II DIABETES MELLITUS WITH PERIPHERAL AUTONOMIC NEUROPATHY: Primary | ICD-10-CM

## 2019-09-06 DIAGNOSIS — E78.5 DYSLIPIDEMIA: ICD-10-CM

## 2019-09-06 DIAGNOSIS — I10 ESSENTIAL HYPERTENSION: ICD-10-CM

## 2019-09-06 PROCEDURE — 99999 PR PBB SHADOW E&M-EST. PATIENT-LVL IV: CPT | Mod: PBBFAC,HCNC,, | Performed by: FAMILY MEDICINE

## 2019-09-06 PROCEDURE — 99214 OFFICE O/P EST MOD 30 MIN: CPT | Mod: HCNC,S$GLB,, | Performed by: FAMILY MEDICINE

## 2019-09-06 PROCEDURE — 3078F DIAST BP <80 MM HG: CPT | Mod: HCNC,CPTII,S$GLB, | Performed by: FAMILY MEDICINE

## 2019-09-06 PROCEDURE — 3078F PR MOST RECENT DIASTOLIC BLOOD PRESSURE < 80 MM HG: ICD-10-PCS | Mod: HCNC,CPTII,S$GLB, | Performed by: FAMILY MEDICINE

## 2019-09-06 PROCEDURE — 99999 PR PBB SHADOW E&M-EST. PATIENT-LVL IV: ICD-10-PCS | Mod: PBBFAC,HCNC,, | Performed by: FAMILY MEDICINE

## 2019-09-06 PROCEDURE — 1101F PT FALLS ASSESS-DOCD LE1/YR: CPT | Mod: HCNC,CPTII,S$GLB, | Performed by: FAMILY MEDICINE

## 2019-09-06 PROCEDURE — 99214 PR OFFICE/OUTPT VISIT, EST, LEVL IV, 30-39 MIN: ICD-10-PCS | Mod: HCNC,S$GLB,, | Performed by: FAMILY MEDICINE

## 2019-09-06 PROCEDURE — 3074F SYST BP LT 130 MM HG: CPT | Mod: HCNC,CPTII,S$GLB, | Performed by: FAMILY MEDICINE

## 2019-09-06 PROCEDURE — 3074F PR MOST RECENT SYSTOLIC BLOOD PRESSURE < 130 MM HG: ICD-10-PCS | Mod: HCNC,CPTII,S$GLB, | Performed by: FAMILY MEDICINE

## 2019-09-06 PROCEDURE — 1101F PR PT FALLS ASSESS DOC 0-1 FALLS W/OUT INJ PAST YR: ICD-10-PCS | Mod: HCNC,CPTII,S$GLB, | Performed by: FAMILY MEDICINE

## 2019-09-06 NOTE — PROGRESS NOTES
THIS DOCUMENT WAS MADE IN PART WITH VOICE RECOGNITION SOFTWARE.  OCCASIONALLY THIS SOFTWARE WILL MISINTERPRET WORDS OR PHRASES.      Lázaro Wang  10/8/1934    Lázaro was seen today for diabetes.    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    Essential hypertension  -     Comprehensive metabolic panel; Future  This chronic condition, condition has been reviewed and discussed, it is currently stable.    Dyslipidemia  -     Lipid panel; Future  This chronic condition, condition has been reviewed and discussed, it is currently stable.    We also discussed his current management.  He continued to from 3 months stable.  He also remains on serum for chronic insomnia.  He has been on this point some, reports he was not able to proceed with the dose reduction.  He remains happy with his current dosage.    discussed vaccines, he declines    Subjective     Chief Complaint   Patient presents with    Diabetes     follow up        HPI      Doing well on 100 seroquel, no reduction worked    HPI elements addressed above in the assessment and plan including problems, diagnosis, stability/instability,  improving/worsening, and chronicity will not be duplicated in this section. Any important additional HPI topics will be discussed here if needed.    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    BPV (benign positional vertigo) 02/17/2016    Nuclear sclerosis 02/17/2016    Posterior vitreous detachment of both eyes 02/17/2016    Blepharitis of both eyes 02/17/2016    Hyperopia with astigmatism and presbyopia 02/17/2016    Ectasis aorta 10/31/2017    Carotid disease, bilateral 10/31/2017    Irritability 10/31/2017    Anxiety 10/31/2017    Chronic fatigue 03/27/2018    Chronic kidney disease, stage III (moderate) 07/24/2018    Atherosclerosis of aorta 07/24/2018    Carpal tunnel syndrome on right 09/24/2018    Sinus pause 12/10/2018    Pacemaker 01/22/2019    Atrial fibrillation 06/06/2019     Resolved Ambulatory Problems     Diagnosis Date Noted    Degenerative arthritis of knee 03/15/2012    Bradycardia 05/23/2013    Hypoxemia 07/16/2014    Change in bowel habits 05/25/2017     Past Medical History:   Diagnosis Date    Anticoagulant long-term use     Anxiety     Arthritis     Carpal tunnel syndrome on right 09/2018    Cataract     Chronic back pain     Coronary artery disease     Diabetes mellitus     Elevated cholesterol     General anesthetics causing adverse effect in therapeutic use     GERD (gastroesophageal reflux disease)     Heart disease     Hypertension     Myocardial infarction     Prostate disorder     Trouble in sleeping          Review of Systems   Constitutional: Negative for activity change, fatigue, fever and unexpected weight change.   HENT: Negative for congestion, sinus pressure, trouble swallowing and voice change.    Respiratory: Negative for cough, chest tightness and shortness of breath.    Cardiovascular: Negative for chest pain, palpitations and leg swelling.   Gastrointestinal: Negative for abdominal pain, blood in stool, constipation, diarrhea, nausea and vomiting.   Genitourinary: Negative for dysuria, frequency and hematuria.   Musculoskeletal: Positive  "for arthralgias and back pain. Negative for myalgias and neck pain.   Skin: Negative for rash and wound.   Neurological: Positive for numbness. Negative for syncope and light-headedness.   Psychiatric/Behavioral: Positive for sleep disturbance. Negative for dysphoric mood.       Objective     Physical Exam   Constitutional: He is oriented to person, place, and time. He appears well-developed and well-nourished. No distress.   HENT:   Head: Normocephalic and atraumatic.   Eyes: Conjunctivae are normal. No scleral icterus.   Cardiovascular: Normal rate, regular rhythm and normal heart sounds.   Pulmonary/Chest: Effort normal and breath sounds normal. No respiratory distress.   Neurological: He is alert and oriented to person, place, and time. Coordination normal.   Skin: He is not diaphoretic.   Psychiatric: He has a normal mood and affect. His behavior is normal.     Vitals:    09/06/19 1428   BP: 118/60   BP Location: Left arm   Patient Position: Sitting   BP Method: Medium (Manual)   Pulse: 64   Resp: 16   Temp: 98 °F (36.7 °C)   TempSrc: Oral   Weight: 98.3 kg (216 lb 11.4 oz)   Height: 5' 8" (1.727 m)       MOST RECENT LABS IN OUR ELECTRONIC MEDICAL RECORD:     Results for orders placed or performed in visit on 06/07/19   Comprehensive metabolic panel   Result Value Ref Range    Sodium 139 136 - 145 mmol/L    Potassium 4.5 3.5 - 5.1 mmol/L    Chloride 106 95 - 110 mmol/L    CO2 25 23 - 29 mmol/L    Glucose 114 (H) 70 - 110 mg/dL    BUN, Bld 16 8 - 23 mg/dL    Creatinine 1.2 0.5 - 1.4 mg/dL    Calcium 9.4 8.7 - 10.5 mg/dL    Total Protein 6.8 6.0 - 8.4 g/dL    Albumin 3.8 3.5 - 5.2 g/dL    Total Bilirubin 0.6 0.1 - 1.0 mg/dL    Alkaline Phosphatase 65 55 - 135 U/L    AST 17 10 - 40 U/L    ALT 17 10 - 44 U/L    Anion Gap 8 8 - 16 mmol/L    eGFR if African American >60.0 >60 mL/min/1.73 m^2    eGFR if non  55.2 (A) >60 mL/min/1.73 m^2   TSH   Result Value Ref Range    TSH 1.751 0.400 - 4.000 uIU/mL "   Lipid panel   Result Value Ref Range    Cholesterol 132 120 - 199 mg/dL    Triglycerides 77 30 - 150 mg/dL    HDL 53 40 - 75 mg/dL    LDL Cholesterol 63.6 63.0 - 159.0 mg/dL    Hdl/Cholesterol Ratio 40.2 20.0 - 50.0 %    Total Cholesterol/HDL Ratio 2.5 2.0 - 5.0    Non-HDL Cholesterol 79 mg/dL   Hemoglobin A1c   Result Value Ref Range    Hemoglobin A1C 6.2 (H) 4.0 - 5.6 %    Estimated Avg Glucose 131 68 - 131 mg/dL

## 2019-09-09 RX ORDER — METOPROLOL SUCCINATE 25 MG/1
TABLET, EXTENDED RELEASE ORAL
Qty: 90 TABLET | Refills: 4 | Status: SHIPPED | OUTPATIENT
Start: 2019-09-09 | End: 2020-01-28

## 2019-09-17 RX ORDER — ATORVASTATIN CALCIUM 10 MG/1
TABLET, FILM COATED ORAL
Qty: 90 TABLET | Refills: 1 | Status: SHIPPED | OUTPATIENT
Start: 2019-09-17 | End: 2020-03-03

## 2019-09-17 RX ORDER — ATORVASTATIN CALCIUM 10 MG/1
TABLET, FILM COATED ORAL
Qty: 30 TABLET | Refills: 1 | Status: SHIPPED | OUTPATIENT
Start: 2019-09-17 | End: 2019-09-17 | Stop reason: SDUPTHER

## 2019-09-26 DIAGNOSIS — M51.9 LUMBAR DISC DISEASE: ICD-10-CM

## 2019-09-26 RX ORDER — HYDROCODONE BITARTRATE AND ACETAMINOPHEN 5; 325 MG/1; MG/1
1 TABLET ORAL EVERY 6 HOURS PRN
Qty: 120 TABLET | Refills: 0 | Status: SHIPPED | OUTPATIENT
Start: 2019-09-26 | End: 2019-10-28 | Stop reason: SDUPTHER

## 2019-09-26 NOTE — TELEPHONE ENCOUNTER
----- Message from Marianna Booth sent at 9/26/2019 11:17 AM CDT -----  Contact: self  Patient requesting refill on HYDROcodone-acetaminophen (NORCO) 5-325 mg per tablet       [id number] - [place] [address] AT [facility] & [address] [address]  Phone: [phone] Fax: [phone]        Patient contact [phone] (home) patient ask to call him once refill is done please per patient

## 2019-09-29 ENCOUNTER — CLINICAL SUPPORT (OUTPATIENT)
Dept: CARDIOLOGY | Facility: CLINIC | Age: 84
End: 2019-09-29
Payer: MEDICARE

## 2019-09-29 PROCEDURE — 93297 CARDIAC DEVICE CHECK - REMOTE: ICD-10-PCS | Mod: ,,, | Performed by: INTERNAL MEDICINE

## 2019-09-29 PROCEDURE — 93299 CARDIAC DEVICE CHECK - REMOTE: ICD-10-PCS | Mod: ,,, | Performed by: INTERNAL MEDICINE

## 2019-09-29 PROCEDURE — 93299 CARDIAC DEVICE CHECK - REMOTE: CPT | Mod: ,,, | Performed by: INTERNAL MEDICINE

## 2019-09-29 PROCEDURE — 93297 REM INTERROG DEV EVAL ICPMS: CPT | Mod: ,,, | Performed by: INTERNAL MEDICINE

## 2019-10-23 ENCOUNTER — OFFICE VISIT (OUTPATIENT)
Dept: OPTOMETRY | Facility: CLINIC | Age: 84
End: 2019-10-23
Payer: MEDICARE

## 2019-10-23 DIAGNOSIS — E11.9 DIABETES MELLITUS TYPE 2 WITHOUT RETINOPATHY: Primary | ICD-10-CM

## 2019-10-23 DIAGNOSIS — H52.203 HYPEROPIA WITH ASTIGMATISM AND PRESBYOPIA, BILATERAL: ICD-10-CM

## 2019-10-23 DIAGNOSIS — H18.003 CORNEA VERTICILLATA OF BOTH EYES: ICD-10-CM

## 2019-10-23 DIAGNOSIS — H43.813 POSTERIOR VITREOUS DETACHMENT, BILATERAL: ICD-10-CM

## 2019-10-23 DIAGNOSIS — H52.4 HYPEROPIA WITH ASTIGMATISM AND PRESBYOPIA, BILATERAL: ICD-10-CM

## 2019-10-23 DIAGNOSIS — H25.13 NUCLEAR SCLEROSIS, BILATERAL: ICD-10-CM

## 2019-10-23 DIAGNOSIS — Z13.5 GLAUCOMA SCREENING: ICD-10-CM

## 2019-10-23 DIAGNOSIS — H52.03 HYPEROPIA WITH ASTIGMATISM AND PRESBYOPIA, BILATERAL: ICD-10-CM

## 2019-10-23 PROCEDURE — 92015 DETERMINE REFRACTIVE STATE: CPT | Mod: HCNC,S$GLB,, | Performed by: OPTOMETRIST

## 2019-10-23 PROCEDURE — 92014 PR EYE EXAM, EST PATIENT,COMPREHESV: ICD-10-PCS | Mod: HCNC,S$GLB,, | Performed by: OPTOMETRIST

## 2019-10-23 PROCEDURE — 92015 PR REFRACTION: ICD-10-PCS | Mod: HCNC,S$GLB,, | Performed by: OPTOMETRIST

## 2019-10-23 PROCEDURE — 99999 PR PBB SHADOW E&M-EST. PATIENT-LVL III: CPT | Mod: PBBFAC,HCNC,, | Performed by: OPTOMETRIST

## 2019-10-23 PROCEDURE — 99999 PR PBB SHADOW E&M-EST. PATIENT-LVL III: ICD-10-PCS | Mod: PBBFAC,HCNC,, | Performed by: OPTOMETRIST

## 2019-10-23 PROCEDURE — 92014 COMPRE OPH EXAM EST PT 1/>: CPT | Mod: HCNC,S$GLB,, | Performed by: OPTOMETRIST

## 2019-10-23 NOTE — PROGRESS NOTES
HPI     Hemoglobin A1C       Date                     Value               Ref Range             Status                06/07/2019               6.2 (H)             4.0 - 5.6 %           Final              Comment:    ADA Screening Guidelines:  5.7-6.4%  Consistent with   prediabetes  >or=6.5%  Consistent with diabetes  High levels of fetal   hemoglobin interfere with the HbA1C  assay. Heterozygous hemoglobin   variants (HbS, HgC, etc)do  not significantly interfere with this assay.     However, presence of multiple variants may affect accuracy.         03/12/2019               6.4 (H)             4.0 - 5.6 %           Final              Comment:    ADA Screening Guidelines:  5.7-6.4%  Consistent with   prediabetes  >or=6.5%  Consistent with diabetes  High levels of fetal   hemoglobin interfere with the HbA1C  assay. Heterozygous hemoglobin   variants (HbS, HgC, etc)do  not significantly interfere with this assay.     However, presence of multiple variants may affect accuracy.         07/24/2018               6.1 (H)             4.0 - 5.6 %           Final              Comment:    ADA Screening Guidelines:  5.7-6.4%  Consistent with   prediabetes  >or=6.5%  Consistent with diabetes  High levels of fetal   hemoglobin interfere with the HbA1C  assay. Heterozygous hemoglobin   variants (HbS, HgC, etc)do  not significantly interfere with this assay.     However, presence of multiple variants may affect accuracy.    ----------      Notes slightly reduced at night vision when driving (rarely these days per   pt)  No other new issues  Good glucose / BP control  TAKING FLOMAX    Last edited by DEAN Muir, OD on 10/23/2019 11:05 AM. (History)        ROS     Positive for: Eyes    Negative for: Constitutional, Gastrointestinal, Neurological, Skin,   Genitourinary, Musculoskeletal, HENT, Endocrine, Cardiovascular,   Respiratory, Psychiatric, Allergic/Imm, Heme/Lymph    Last edited by DEAN Muir, JADA on 10/23/2019 11:05  AM. (History)        Assessment /Plan     For exam results, see Encounter Report.    Diabetes mellitus type 2 without retinopathy    Nuclear sclerosis, bilateral    Posterior vitreous detachment, bilateral    Glaucoma screening    Cornea verticillata of both eyes    Hyperopia with astigmatism and presbyopia, bilateral      1. No ret/ no csme, gave info, control glucose, annual DFE  2. Vis sig w/ mild ref shift--night vision complaint OU  VA still good to consider CE  Cautions / avoid night driving  3. RD precautions given  4. Not suspect  5. Stable from previous  6. Updated specs rx, gave copy, fill prn  Ok continue with current    Discussed and educated patient on current findings /plan.  RTC 1 year, prn if any changes / issues

## 2019-10-23 NOTE — LETTER
October 23, 2019      Brodie Trotter MD  [address] [facility] [address]  [facility] [address]           [facility]  [address] [place] [address]  Phone: [phone]  Fax: [phone]          Patient: Lázaro Wang   MR Number: [id number]   YOB: 1934   Date of Visit: 10/23/2019       Dear Dr. Brodie Trotter:    Thank you for referring Lázaro Wang to me for evaluation. Attached you will find relevant portions of my assessment and plan of care.    If you have questions, please do not hesitate to call me. I look forward to following Lázaro Wang along with you.    Sincerely,    DEAN Muir, OD    Enclosure  CC:  No Recipients    If you would like to receive this communication electronically, please contact [email or web address] or [phone] to request more information on [employer] access.    For providers and/or their staff who would like to refer a patient to [facility], please contact us through our one-stop-shop provider referral line, [facility], at [phone].    If you feel you have received this communication in error or would no longer like to receive these types of communications, please e-mail [email or web address]

## 2019-10-23 NOTE — PATIENT INSTRUCTIONS
DIABETES AND THE EYE / DIABETIC RETINOPATHY    Diabetic retinopathy is a condition occurring in persons with diabetes, which causes progressive damage to the retina, the light sensitive lining at the back of the eye. It is a serious sight-threatening complication of diabetes.    Diabetic retinopathy is the result of damage to the tiny blood vessels that nourish the retina. They leak blood and other fluids that cause swelling of retinal tissue and clouding of vision. The condition usually affects both eyes. The longer a person has diabetes, the more likely they will develop diabetic retinopathy. If left untreated, diabetic retinopathy can cause blindness.  There are two basic types of diabetic retinopathy:    Background or nonproliferative diabetic retinopathy (NPDR)  Nonproliferative diabetic retinopathy (NPDR) is the earliest stage of diabetic retinopathy. With this condition, damaged blood vessels in the retina begin to leak extra fluid and small amounts of blood into the eye. Sometimes, deposits of cholesterol or other fats from the blood may leak into the retina. Many people with diabetes have mild NPDR, which usually does not affect their vision. However, if their vision is affected, it is the result of macular edema and macular ischemia.    If vision is affected due to macular changes, a consult with a Retina Specialist may be advised.  This is an ophthalmologist that treats retina conditions, including diabetic retinopathy.     Proliferative diabetic retinopathy (PDR)  Proliferative diabetic retinopathy (PDR) mainly occurs when many of the blood vessels in the retina close, preventing enough blood flow. In an attempt to supply blood to the area where the original vessels closed, the retina responds by growing new blood vessels. This is called neovascularization. However, these new blood vessels are abnormal and do not supply the retina with proper blood flow. The new vessels are also often accompanied by scar  tissue that may cause the retina to wrinkle or detach. PDR may cause more severe vision loss than NPDR because it can affect both central and peripheral vision.     A patient diagnosed with proliferative diabetic eye disease will be referred to a retinal specialist for consultation.    Often there are no visual symptoms in the early stages of diabetic retinopathy. That is why our eye care professionals recommend that everyone with diabetes have a comprehensive dilated eye examination once a year. Early detection and treatment can limit the potential for significant vision loss from diabetic retinopathy.        FLASHES / FLOATERS / POSTERIOR VITREOUS DETACHMENT    Call the clinic if you have any further changes in symptoms.  Including:  Increased numbers of floaters or flashing lights, dimness or darkness that moves through or stays constant in your vision, or any pain in the eye (s).            Cataracts visually significant:  Ready to consider surgery consultation.    Cataract Treatment: Removing the Cloudy Lens  A cataract is a clouding of the lens in your eye. Surgery can be done to remove the cataract and replace the lens. This can improve how well you see. The most common methods to remove a cataract are phacoemulsification and extracapsular extraction.   Phacoemulsification    A small incision is made in the cornea or sclera. Then a hole is made in the anterior capsule. An ultrasound probe is inserted into the lens. Sound vibrations from the probe break the cloudy lens into tiny pieces. The pieces are then suctioned out. No stitches are needed after the procedure.  Extracapsular Extraction    This method requires a larger incision in the cornea. The entire lens can then be removed at once through the incision. Stitches are used to close the incision after the procedure.  Implanting an IOL   After the cloudy lens is removed, it can be replaced with a plastic lens called an IOL(intraocular lens). Your doctor can  tell you more about this part of the surgery.      Cataract Treatment: Implanting a New Lens (IOL)          A cataract is clouding of your eyes lens. Cataracts can be treated with surgery to remove the cloudy lens and replace it with a plastic lens called an IOL(intraocular lens). The IOL can be placed either in front of or behind the iris. Your doctor will choose the location based on the condition of the capsule and the type of IOL you will receive. An IOL doesnt change the appearance of your eye. But it can improve how well you see.  Posterior Chamber Lens  In most cases, an IOL is implanted in the posterior chamber. Flexible loops hold the IOL in place. The posterior capsule also provides support for the IOL.  Anterior Chamber Lens  If the capsule behind the lens is weak or damaged, your eye doctor will place a special lens in the anterior chamber. Small loops hold the IOL in place.  Types of IOLs  An IOL doesnt change the appearance of your eye. But it can improve how well you see. Your eye doctor will select a new lens to fit your eye and vision needs. IOLs are made in slightly different shapes. In most cases, the IOL will last a lifetime. Talk to your doctor about which lenses are right for you.  · Special materials.IOLs are made of materials that wont irritate your eye. These include silicone, acrylic, and PMMA (a type of hard plastic).  · Foldable lenses. Many IOLs are foldable. This allows them to be inserted through small incisions in the cornea or sclera (the white part of the eye).  · Multifocal and accommodating lenses. These lenses use advanced technology to improve your range of vision, including up close. For best results, they are put into both eyes.  DRY EYES:  Use Over The Counter artificial tears as needed for dry eye symptoms.  Some common brands include:  Systane, Optive, and Refresh.  These drops can be used as frequently as desired, but may be most helpful use during long periods of  "concentrated work.  For example, reading / working at the computer.  Avoid drops that "get redness out", as these contain medication that may further irritate the eyes.    ALLERGY EYES / SYMPTOMS:    Over the counter medications include--Zaditor and Alaway  Use as directed 1-2 drops daily for symptoms of itching / watering eyes.  These drops will not help for dry eye or exposure symptoms.      "

## 2019-10-28 DIAGNOSIS — M51.9 LUMBAR DISC DISEASE: ICD-10-CM

## 2019-10-28 RX ORDER — HYDROCODONE BITARTRATE AND ACETAMINOPHEN 5; 325 MG/1; MG/1
1 TABLET ORAL EVERY 6 HOURS PRN
Qty: 120 TABLET | Refills: 0 | Status: SHIPPED | OUTPATIENT
Start: 2019-10-28 | End: 2019-11-27 | Stop reason: SDUPTHER

## 2019-10-28 NOTE — TELEPHONE ENCOUNTER
----- Message from Yvon CHENG Jaun sent at 10/28/2019 10:01 AM CDT -----  Contact: same  Patient called in and is requesting a refill on his Rx for HYDROcodone-acetaminophen (NORCO) 5-325 mg per tablet, 120 tablets with 0 refills last filled on 9/26/2019 Sig - Route: Take 1 tablet by mouth every 6 (six) hours as needed for Pain. >7 day supply of opioid is medically necessary for chronic pain. - [facility] #[id number] - [place] [address] AT [facility] & [address] [address]  Phone: [phone] Fax: [phone]    Patient call back number is [phone]

## 2019-11-27 DIAGNOSIS — M51.9 LUMBAR DISC DISEASE: ICD-10-CM

## 2019-11-27 RX ORDER — HYDROCODONE BITARTRATE AND ACETAMINOPHEN 5; 325 MG/1; MG/1
1 TABLET ORAL EVERY 6 HOURS PRN
Qty: 120 TABLET | Refills: 0 | Status: SHIPPED | OUTPATIENT
Start: 2019-11-27 | End: 2019-12-31 | Stop reason: SDUPTHER

## 2019-11-27 NOTE — TELEPHONE ENCOUNTER
----- Message from Yvon SKYLAR Jaun sent at 11/27/2019 12:46 PM CST -----  Contact: same  Patient called in and is requesting a refill on his Rx for HYDROcodone-acetaminophen (NORCO) 5-325 mg per tablet, 120 tablets with 0 refills last filled on 10/28/2019    Sig - Route: Take 1 tablet by mouth every 6 (six) hours as needed for Pain. >7 day supply of opioid is medically necessary for chronic pain. - [facility] #[id number] - [place] [address] AT [facility] & [address] [address]  Phone: [phone] Fax: [phone]    Patient call back number is [phone]

## 2019-12-31 DIAGNOSIS — M51.9 LUMBAR DISC DISEASE: ICD-10-CM

## 2019-12-31 RX ORDER — HYDROCODONE BITARTRATE AND ACETAMINOPHEN 5; 325 MG/1; MG/1
1 TABLET ORAL EVERY 6 HOURS PRN
Qty: 120 TABLET | Refills: 0 | Status: SHIPPED | OUTPATIENT
Start: 2019-12-31 | End: 2020-01-28 | Stop reason: SDUPTHER

## 2019-12-31 NOTE — TELEPHONE ENCOUNTER
----- Message from Maddie TAMELA Briones sent at 12/31/2019  3:00 PM CST -----  Contact: Jimmie  Type:  RX Refill Request    Who Called:  Patient  Refill or New Rx:  Refill  RX Name and Strength:  HYDROcodone-acetaminophen (NORCO) 5-325 mg per tablet  How is the patient currently taking it? (ex. 1XDay): Route: Take 1 tablet by mouth every 6 (six) hours as needed for Pain. >7 day supply of opioid is medically necessary for chronic pain.  Is this a 30 day or 90 day RX:  120 dispense  Preferred Pharmacy with phone number:    [facility] #[id number] [place] [address] AT [facility] & [address] [address]  Phone: [phone] Fax: [phone]  Local or Mail Order:  Local  Ordering Provider:  Dr. Brodie Jimenez Call Back Number:    Additional Information:  Requesting a call back when Rx is sent over

## 2020-01-06 ENCOUNTER — TELEPHONE (OUTPATIENT)
Dept: CARDIOLOGY | Facility: CLINIC | Age: 85
End: 2020-01-06

## 2020-01-06 NOTE — TELEPHONE ENCOUNTER
----- Message from Rosibel Bueno sent at 1/6/2020  1:53 PM CST -----  Contact: Lázaro pt  Type:  Sooner Apoointment Request    Caller is requesting a sooner appointment.  Caller declined first available appointment listed below.  Caller will not accept being placed on the waitlist and is requesting a message be sent to doctor.    Name of Caller:  Lázaro  When is the first available appointment?  06/08/20  Symptoms:  Pt is very fatigued, has trouble falling asleep and staying asleep  Best Call Back Number:  [phone]  Additional Information:  Pls call pt regarding scheduling a sooner appt

## 2020-01-07 ENCOUNTER — LAB VISIT (OUTPATIENT)
Dept: LAB | Facility: HOSPITAL | Age: 85
End: 2020-01-07
Attending: FAMILY MEDICINE
Payer: MEDICARE

## 2020-01-07 DIAGNOSIS — E78.5 DYSLIPIDEMIA: ICD-10-CM

## 2020-01-07 DIAGNOSIS — E11.43 TYPE II DIABETES MELLITUS WITH PERIPHERAL AUTONOMIC NEUROPATHY: ICD-10-CM

## 2020-01-07 DIAGNOSIS — I10 ESSENTIAL HYPERTENSION: ICD-10-CM

## 2020-01-07 LAB
ALBUMIN SERPL BCP-MCNC: 3.6 G/DL (ref 3.5–5.2)
ALP SERPL-CCNC: 85 U/L (ref 55–135)
ALT SERPL W/O P-5'-P-CCNC: 18 U/L (ref 10–44)
ANION GAP SERPL CALC-SCNC: 10 MMOL/L (ref 8–16)
AST SERPL-CCNC: 17 U/L (ref 10–40)
BILIRUB SERPL-MCNC: 0.5 MG/DL (ref 0.1–1)
BUN SERPL-MCNC: 12 MG/DL (ref 8–23)
CALCIUM SERPL-MCNC: 9.1 MG/DL (ref 8.7–10.5)
CHLORIDE SERPL-SCNC: 106 MMOL/L (ref 95–110)
CHOLEST SERPL-MCNC: 172 MG/DL (ref 120–199)
CHOLEST/HDLC SERPL: 3.4 {RATIO} (ref 2–5)
CO2 SERPL-SCNC: 27 MMOL/L (ref 23–29)
CREAT SERPL-MCNC: 1.3 MG/DL (ref 0.5–1.4)
EST. GFR  (AFRICAN AMERICAN): 57.5 ML/MIN/1.73 M^2
EST. GFR  (NON AFRICAN AMERICAN): 49.8 ML/MIN/1.73 M^2
ESTIMATED AVG GLUCOSE: 140 MG/DL (ref 68–131)
GLUCOSE SERPL-MCNC: 109 MG/DL (ref 70–110)
HBA1C MFR BLD HPLC: 6.5 % (ref 4–5.6)
HDLC SERPL-MCNC: 51 MG/DL (ref 40–75)
HDLC SERPL: 29.7 % (ref 20–50)
LDLC SERPL CALC-MCNC: 99.4 MG/DL (ref 63–159)
NONHDLC SERPL-MCNC: 121 MG/DL
POTASSIUM SERPL-SCNC: 4 MMOL/L (ref 3.5–5.1)
PROT SERPL-MCNC: 6.7 G/DL (ref 6–8.4)
SODIUM SERPL-SCNC: 143 MMOL/L (ref 136–145)
TRIGL SERPL-MCNC: 108 MG/DL (ref 30–150)

## 2020-01-07 PROCEDURE — 80053 COMPREHEN METABOLIC PANEL: CPT | Mod: HCNC

## 2020-01-07 PROCEDURE — 83036 HEMOGLOBIN GLYCOSYLATED A1C: CPT | Mod: HCNC

## 2020-01-07 PROCEDURE — 36415 COLL VENOUS BLD VENIPUNCTURE: CPT | Mod: HCNC,PN

## 2020-01-07 PROCEDURE — 80061 LIPID PANEL: CPT | Mod: HCNC

## 2020-01-10 ENCOUNTER — LAB VISIT (OUTPATIENT)
Dept: LAB | Facility: HOSPITAL | Age: 85
End: 2020-01-10
Attending: PHYSICIAN ASSISTANT
Payer: MEDICARE

## 2020-01-10 ENCOUNTER — OFFICE VISIT (OUTPATIENT)
Dept: CARDIOLOGY | Facility: CLINIC | Age: 85
End: 2020-01-10
Payer: MEDICARE

## 2020-01-10 VITALS
HEIGHT: 68 IN | DIASTOLIC BLOOD PRESSURE: 81 MMHG | WEIGHT: 222.88 LBS | BODY MASS INDEX: 33.78 KG/M2 | HEART RATE: 62 BPM | SYSTOLIC BLOOD PRESSURE: 121 MMHG

## 2020-01-10 DIAGNOSIS — I70.0 ATHEROSCLEROSIS OF AORTA: ICD-10-CM

## 2020-01-10 DIAGNOSIS — I10 ESSENTIAL HYPERTENSION: Chronic | ICD-10-CM

## 2020-01-10 DIAGNOSIS — I25.10 CORONARY ARTERY DISEASE INVOLVING NATIVE CORONARY ARTERY OF NATIVE HEART WITHOUT ANGINA PECTORIS: Chronic | ICD-10-CM

## 2020-01-10 DIAGNOSIS — I48.91 ATRIAL FIBRILLATION, UNSPECIFIED TYPE: ICD-10-CM

## 2020-01-10 DIAGNOSIS — Z95.0 PACEMAKER: ICD-10-CM

## 2020-01-10 DIAGNOSIS — G47.10 HYPERSOMNIA, UNSPECIFIED: ICD-10-CM

## 2020-01-10 DIAGNOSIS — R06.02 SHORTNESS OF BREATH: ICD-10-CM

## 2020-01-10 DIAGNOSIS — R53.83 FATIGUE, UNSPECIFIED TYPE: ICD-10-CM

## 2020-01-10 DIAGNOSIS — R53.83 FATIGUE, UNSPECIFIED TYPE: Primary | ICD-10-CM

## 2020-01-10 DIAGNOSIS — E78.5 DYSLIPIDEMIA: Chronic | ICD-10-CM

## 2020-01-10 DIAGNOSIS — I25.84 CORONARY ATHEROSCLEROSIS DUE TO CALCIFIED CORONARY LESION (CODE): ICD-10-CM

## 2020-01-10 DIAGNOSIS — N18.30 CHRONIC KIDNEY DISEASE, STAGE III (MODERATE): ICD-10-CM

## 2020-01-10 DIAGNOSIS — E11.43 TYPE II DIABETES MELLITUS WITH PERIPHERAL AUTONOMIC NEUROPATHY: ICD-10-CM

## 2020-01-10 LAB
BASOPHILS # BLD AUTO: 0.03 K/UL (ref 0–0.2)
BASOPHILS NFR BLD: 0.5 % (ref 0–1.9)
DIFFERENTIAL METHOD: ABNORMAL
EOSINOPHIL # BLD AUTO: 0.1 K/UL (ref 0–0.5)
EOSINOPHIL NFR BLD: 1.9 % (ref 0–8)
ERYTHROCYTE [DISTWIDTH] IN BLOOD BY AUTOMATED COUNT: 12.9 % (ref 11.5–14.5)
HCT VFR BLD AUTO: 43.8 % (ref 40–54)
HGB BLD-MCNC: 13.9 G/DL (ref 14–18)
IMM GRANULOCYTES # BLD AUTO: 0.02 K/UL (ref 0–0.04)
IMM GRANULOCYTES NFR BLD AUTO: 0.3 % (ref 0–0.5)
LYMPHOCYTES # BLD AUTO: 2.5 K/UL (ref 1–4.8)
LYMPHOCYTES NFR BLD: 38.6 % (ref 18–48)
MCH RBC QN AUTO: 31.5 PG (ref 27–31)
MCHC RBC AUTO-ENTMCNC: 31.7 G/DL (ref 32–36)
MCV RBC AUTO: 99 FL (ref 82–98)
MONOCYTES # BLD AUTO: 0.7 K/UL (ref 0.3–1)
MONOCYTES NFR BLD: 11 % (ref 4–15)
NEUTROPHILS # BLD AUTO: 3.1 K/UL (ref 1.8–7.7)
NEUTROPHILS NFR BLD: 47.7 % (ref 38–73)
NRBC BLD-RTO: 0 /100 WBC
PLATELET # BLD AUTO: 191 K/UL (ref 150–350)
PMV BLD AUTO: 11.1 FL (ref 9.2–12.9)
RBC # BLD AUTO: 4.41 M/UL (ref 4.6–6.2)
WBC # BLD AUTO: 6.43 K/UL (ref 3.9–12.7)

## 2020-01-10 PROCEDURE — 1101F PT FALLS ASSESS-DOCD LE1/YR: CPT | Mod: HCNC,CPTII,S$GLB, | Performed by: PHYSICIAN ASSISTANT

## 2020-01-10 PROCEDURE — 3074F PR MOST RECENT SYSTOLIC BLOOD PRESSURE < 130 MM HG: ICD-10-PCS | Mod: HCNC,CPTII,S$GLB, | Performed by: PHYSICIAN ASSISTANT

## 2020-01-10 PROCEDURE — 3079F DIAST BP 80-89 MM HG: CPT | Mod: HCNC,CPTII,S$GLB, | Performed by: PHYSICIAN ASSISTANT

## 2020-01-10 PROCEDURE — 1125F PR PAIN SEVERITY QUANTIFIED, PAIN PRESENT: ICD-10-PCS | Mod: HCNC,S$GLB,, | Performed by: PHYSICIAN ASSISTANT

## 2020-01-10 PROCEDURE — 3074F SYST BP LT 130 MM HG: CPT | Mod: HCNC,CPTII,S$GLB, | Performed by: PHYSICIAN ASSISTANT

## 2020-01-10 PROCEDURE — 84443 ASSAY THYROID STIM HORMONE: CPT | Mod: HCNC

## 2020-01-10 PROCEDURE — 1101F PR PT FALLS ASSESS DOC 0-1 FALLS W/OUT INJ PAST YR: ICD-10-PCS | Mod: HCNC,CPTII,S$GLB, | Performed by: PHYSICIAN ASSISTANT

## 2020-01-10 PROCEDURE — 1159F PR MEDICATION LIST DOCUMENTED IN MEDICAL RECORD: ICD-10-PCS | Mod: HCNC,S$GLB,, | Performed by: PHYSICIAN ASSISTANT

## 2020-01-10 PROCEDURE — 3079F PR MOST RECENT DIASTOLIC BLOOD PRESSURE 80-89 MM HG: ICD-10-PCS | Mod: HCNC,CPTII,S$GLB, | Performed by: PHYSICIAN ASSISTANT

## 2020-01-10 PROCEDURE — 99999 PR PBB SHADOW E&M-EST. PATIENT-LVL IV: ICD-10-PCS | Mod: PBBFAC,HCNC,, | Performed by: PHYSICIAN ASSISTANT

## 2020-01-10 PROCEDURE — 99999 PR PBB SHADOW E&M-EST. PATIENT-LVL IV: CPT | Mod: PBBFAC,HCNC,, | Performed by: PHYSICIAN ASSISTANT

## 2020-01-10 PROCEDURE — 84439 ASSAY OF FREE THYROXINE: CPT | Mod: HCNC

## 2020-01-10 PROCEDURE — 99214 OFFICE O/P EST MOD 30 MIN: CPT | Mod: HCNC,S$GLB,, | Performed by: PHYSICIAN ASSISTANT

## 2020-01-10 PROCEDURE — 36415 COLL VENOUS BLD VENIPUNCTURE: CPT | Mod: HCNC,PO

## 2020-01-10 PROCEDURE — 1125F AMNT PAIN NOTED PAIN PRSNT: CPT | Mod: HCNC,S$GLB,, | Performed by: PHYSICIAN ASSISTANT

## 2020-01-10 PROCEDURE — 85025 COMPLETE CBC W/AUTO DIFF WBC: CPT | Mod: HCNC

## 2020-01-10 PROCEDURE — 99214 PR OFFICE/OUTPT VISIT, EST, LEVL IV, 30-39 MIN: ICD-10-PCS | Mod: HCNC,S$GLB,, | Performed by: PHYSICIAN ASSISTANT

## 2020-01-10 PROCEDURE — 1159F MED LIST DOCD IN RCRD: CPT | Mod: HCNC,S$GLB,, | Performed by: PHYSICIAN ASSISTANT

## 2020-01-10 RX ORDER — TAMSULOSIN HYDROCHLORIDE 0.4 MG/1
CAPSULE ORAL
Qty: 90 CAPSULE | Refills: 1 | Status: SHIPPED | OUTPATIENT
Start: 2020-01-10 | End: 2020-07-08

## 2020-01-10 NOTE — ASSESSMENT & PLAN NOTE
S/p 07/06/2008 LM, 30% stenosis; mid LAD, 90% stenosis; mid LCX, 90%             stenosis; RCA, 60% stenosis; PDA, 90% stenosis;                              Previous PCI:                                                                S/P stent to PDA, 07/31/2008                                                 S/P stent to LCX, 07/06/2008                                                 S/P stent to LAD, 07/06/2008      On ASA, b-blocker, and statin.

## 2020-01-10 NOTE — PROGRESS NOTES
Subjective:    Patient ID:  Lázaro Wang is a 85 y.o. male who presents for follow-up of Fatigue and Chest Pain      HPI  Mr. Wang is a pleasant gentleman who follows with Dr. Dominguez. He has a hx of CAD s/p PCI and SSS s/p PPM.  He presents today with complaints of intermittent episodes of chest discomfort and SMALLS. He states his most significant issue however, is generalized fatigue and malaise. When he has CP, he takes an extra ASA and this seems to improve his discomfort. He states the discomfort is similar to his prior angina. He has SL nitro, but he does not take it. He admits he is mostly sedentary and feels some of hx symptoms may be related to debility.     He has difficulty falling asleep and also has hypersomnolence during the day. He admits to snoring. In reviewing his chart, it appears he was referred for a polysomnogram in 2018, but he did not have the test.     Review of Systems   Constitution: Positive for malaise/fatigue. Negative for chills, diaphoresis, fever, weight gain and weight loss.   HENT: Negative for sore throat.    Eyes: Negative for blurred vision, vision loss in left eye, vision loss in right eye and visual disturbance.   Cardiovascular: Positive for dyspnea on exertion. Negative for chest pain, claudication, leg swelling, near-syncope, orthopnea, palpitations, paroxysmal nocturnal dyspnea and syncope.   Respiratory: Positive for shortness of breath, sleep disturbances due to breathing and snoring. Negative for cough, hemoptysis, sputum production and wheezing.    Endocrine: Negative for cold intolerance and heat intolerance.   Hematologic/Lymphatic: Negative for adenopathy. Does not bruise/bleed easily.   Skin: Negative for rash.   Musculoskeletal: Negative for falls, muscle weakness and myalgias.   Gastrointestinal: Negative for abdominal pain, change in bowel habit, constipation, diarrhea, melena and nausea.   Genitourinary: Negative for bladder incontinence.   Neurological: Negative  "for dizziness, focal weakness, headaches, light-headedness, numbness and weakness.   Psychiatric/Behavioral: Negative for altered mental status.         Vitals:    01/10/20 1512   BP: 121/81   BP Location: Right arm   Patient Position: Sitting   BP Method: Large (Automatic)   Pulse: 62   Weight: 101.1 kg (222 lb 14.2 oz)   Height: 5' 8" (1.727 m)   Body mass index is 33.89 kg/m².    Objective:    Physical Exam   Constitutional: He is oriented to person, place, and time. He appears well-developed and well-nourished.   HENT:   Head: Normocephalic and atraumatic.   Eyes: Pupils are equal, round, and reactive to light. EOM are normal.   Neck: Neck supple. No JVD present. No tracheal deviation present. No thyromegaly present.   Cardiovascular: Normal rate, regular rhythm, S1 normal, S2 normal, intact distal pulses and normal pulses. PMI is not displaced. Exam reveals no gallop and no friction rub.   Murmur heard.   Diastolic murmur is present with a grade of 2/6 at the upper right sternal border.  Pulmonary/Chest: Effort normal and breath sounds normal. No respiratory distress. He has no wheezes. He has no rales. He exhibits no tenderness.   Abdominal: Soft. Bowel sounds are normal. He exhibits no distension and no mass. There is no tenderness.   Musculoskeletal: Normal range of motion. He exhibits no edema or tenderness.   Neurological: He is alert and oriented to person, place, and time.   Skin: Skin is warm and dry. No rash noted.   Psychiatric: He has a normal mood and affect. His behavior is normal.         Assessment:       CAD (coronary artery disease)  S/p 07/06/2008 LM, 30% stenosis; mid LAD, 90% stenosis; mid LCX, 90%             stenosis; RCA, 60% stenosis; PDA, 90% stenosis;                              Previous PCI:                                                                S/P stent to PDA, 07/31/2008                                                 S/P stent to LCX, 07/06/2008                            "                      S/P stent to LAD, 07/06/2008      On ASA, b-blocker, and statin.    Essential hypertension  Well controlled on current regimen.     Atherosclerosis of aorta  On ASA and statin.     Pacemaker  For SSS.   Stable.     Atrial fibrillation  In SR.   Last interrogation shows < 1% AF burden.   On Amiodarone.       Chronic kidney disease, stage III (moderate)  Stable on recent blood work.     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  Diet-controlled.   Last A1C 6.5%.       Dyslipidemia  LDL noot currently at goal.   Discussed diet and exercise.        Plan:       CBC, TSH, and Free T4 today.   Nuclear stress test to evaluate for ischemia.   Echo to evaluate for structural heart disease (hx of moderate AI).   Home Sleep Study to evaluate for TAMELA.   Further recommendations pending the results of above.   We discussed at length the importance of exercise and the cycle of inactivity (less activity leads to not wanting to/being unable to be more active, ect) as well as diet and weight loss.   F/U with Dr. Dominguez as scheduled.

## 2020-01-11 LAB
T4 FREE SERPL-MCNC: 0.98 NG/DL (ref 0.71–1.51)
TSH SERPL DL<=0.005 MIU/L-ACNC: 1.59 UIU/ML (ref 0.4–4)

## 2020-01-13 ENCOUNTER — OFFICE VISIT (OUTPATIENT)
Dept: FAMILY MEDICINE | Facility: CLINIC | Age: 85
End: 2020-01-13
Payer: MEDICARE

## 2020-01-13 VITALS
RESPIRATION RATE: 16 BRPM | DIASTOLIC BLOOD PRESSURE: 80 MMHG | WEIGHT: 220.25 LBS | SYSTOLIC BLOOD PRESSURE: 116 MMHG | BODY MASS INDEX: 33.38 KG/M2 | HEIGHT: 68 IN | TEMPERATURE: 98 F | HEART RATE: 60 BPM

## 2020-01-13 DIAGNOSIS — E78.5 DYSLIPIDEMIA: ICD-10-CM

## 2020-01-13 DIAGNOSIS — R63.5 WEIGHT GAIN: ICD-10-CM

## 2020-01-13 DIAGNOSIS — G47.00 INSOMNIA, UNSPECIFIED TYPE: ICD-10-CM

## 2020-01-13 DIAGNOSIS — M51.9 LUMBAR DISC DISEASE: ICD-10-CM

## 2020-01-13 DIAGNOSIS — I10 ESSENTIAL HYPERTENSION: ICD-10-CM

## 2020-01-13 DIAGNOSIS — E11.43 TYPE II DIABETES MELLITUS WITH PERIPHERAL AUTONOMIC NEUROPATHY: Primary | ICD-10-CM

## 2020-01-13 PROCEDURE — 3079F DIAST BP 80-89 MM HG: CPT | Mod: HCNC,CPTII,S$GLB, | Performed by: FAMILY MEDICINE

## 2020-01-13 PROCEDURE — 99499 RISK ADDL DX/OHS AUDIT: ICD-10-PCS | Mod: HCNC,S$GLB,, | Performed by: FAMILY MEDICINE

## 2020-01-13 PROCEDURE — 99215 OFFICE O/P EST HI 40 MIN: CPT | Mod: HCNC,S$GLB,, | Performed by: FAMILY MEDICINE

## 2020-01-13 PROCEDURE — 99999 PR PBB SHADOW E&M-EST. PATIENT-LVL III: CPT | Mod: PBBFAC,HCNC,, | Performed by: FAMILY MEDICINE

## 2020-01-13 PROCEDURE — 1101F PT FALLS ASSESS-DOCD LE1/YR: CPT | Mod: HCNC,CPTII,S$GLB, | Performed by: FAMILY MEDICINE

## 2020-01-13 PROCEDURE — 1101F PR PT FALLS ASSESS DOC 0-1 FALLS W/OUT INJ PAST YR: ICD-10-PCS | Mod: HCNC,CPTII,S$GLB, | Performed by: FAMILY MEDICINE

## 2020-01-13 PROCEDURE — 1126F PR PAIN SEVERITY QUANTIFIED, NO PAIN PRESENT: ICD-10-PCS | Mod: HCNC,S$GLB,, | Performed by: FAMILY MEDICINE

## 2020-01-13 PROCEDURE — 3079F PR MOST RECENT DIASTOLIC BLOOD PRESSURE 80-89 MM HG: ICD-10-PCS | Mod: HCNC,CPTII,S$GLB, | Performed by: FAMILY MEDICINE

## 2020-01-13 PROCEDURE — 1159F MED LIST DOCD IN RCRD: CPT | Mod: HCNC,S$GLB,, | Performed by: FAMILY MEDICINE

## 2020-01-13 PROCEDURE — 1126F AMNT PAIN NOTED NONE PRSNT: CPT | Mod: HCNC,S$GLB,, | Performed by: FAMILY MEDICINE

## 2020-01-13 PROCEDURE — 1159F PR MEDICATION LIST DOCUMENTED IN MEDICAL RECORD: ICD-10-PCS | Mod: HCNC,S$GLB,, | Performed by: FAMILY MEDICINE

## 2020-01-13 PROCEDURE — 99215 PR OFFICE/OUTPT VISIT, EST, LEVL V, 40-54 MIN: ICD-10-PCS | Mod: HCNC,S$GLB,, | Performed by: FAMILY MEDICINE

## 2020-01-13 PROCEDURE — 3074F PR MOST RECENT SYSTOLIC BLOOD PRESSURE < 130 MM HG: ICD-10-PCS | Mod: HCNC,CPTII,S$GLB, | Performed by: FAMILY MEDICINE

## 2020-01-13 PROCEDURE — 99499 UNLISTED E&M SERVICE: CPT | Mod: HCNC,S$GLB,, | Performed by: FAMILY MEDICINE

## 2020-01-13 PROCEDURE — 99999 PR PBB SHADOW E&M-EST. PATIENT-LVL III: ICD-10-PCS | Mod: PBBFAC,HCNC,, | Performed by: FAMILY MEDICINE

## 2020-01-13 PROCEDURE — 3074F SYST BP LT 130 MM HG: CPT | Mod: HCNC,CPTII,S$GLB, | Performed by: FAMILY MEDICINE

## 2020-01-13 NOTE — PROGRESS NOTES
THIS DOCUMENT WAS MADE IN PART WITH VOICE RECOGNITION SOFTWARE.  OCCASIONALLY THIS SOFTWARE WILL MISINTERPRET WORDS OR PHRASES.      Lázaro Wang  10/8/1934    Lázaro was seen today for follow-up and other mentions.    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  However I do not want to see the A1c increase any further.  More details discussed below    Essential hypertension  This is a chronic condition.  This condition has been reviewed and evaluated and it is currently stable.    Dyslipidemia  Noticeable increase in lipids.  Discussed in detail, significant room for dietary improvement exercise and weight loss    Lumbar disc disease  Stable on current therapy. opiod precautions reviewed, he will continue to see me every 3 months    Insomnia, unspecified type  Long history of intermittent insomnia that has been largely controlled with Seroquel.  I do not remember who or when this was started but was a long time ago and it has seemed to help with a mood disorder as well.  But it seems to be less effective currently.  He does occasionally take 1-1/2 tablets.  I advised on regular increase of this medication, concerned about long-term side effects including weight gain, diabetes, as well as other.  Look for other ways to control sleep, see below    Weight gain  I recommend significant dietary changes.  Significantly limit if not eliminate refined sugars and carbohydrates.  May have complex carbs, vegetables, low-fat protein, legumes, and limited amounts of fruits.  This with regular exercise.  This should help improve weight as well as sleep cycle.  He will proceed with the home sleep study as discussed as well.    Today's visit was mostly counseling, Total time more than 40 min.  Patient was here with his wife.      Subjective      Chief Complaint   Patient presents with    Follow-up     diabetes    other mentions       HPI    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  Mild increase in a1c, weight gain  Diet with room to improve    Essential hypertension  This is a chronic condition.  This condition has been reviewed and evaluated and it is currently stable.    Dyslipidemia  Increase in lipids    Lumbar disc disease  This is a chronic condition.  This condition has been reviewed and evaluated and it is currently stable.    Not sleeping well, home sleep study order        HPI elements addressed above in the assessment and plan including problems, diagnosis, stability/instability,  improving/worsening, and chronicity will not be duplicated in this section. Any important additional HPI topics will be discussed here if needed.    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    BPV (benign positional vertigo) 02/17/2016    Nuclear sclerosis 02/17/2016    Posterior vitreous detachment of both eyes 02/17/2016    Blepharitis of both eyes 02/17/2016    Hyperopia with astigmatism and presbyopia 02/17/2016    Ectasis aorta 10/31/2017    Carotid disease, bilateral 10/31/2017    Irritability 10/31/2017    Anxiety 10/31/2017    Chronic fatigue 03/27/2018    Chronic kidney disease, stage III (moderate) 07/24/2018    Atherosclerosis of aorta 07/24/2018    Carpal tunnel syndrome on right 09/24/2018    Sinus pause  "12/10/2018    Pacemaker 01/22/2019    Atrial fibrillation 06/06/2019     Resolved Ambulatory Problems     Diagnosis Date Noted    Degenerative arthritis of knee 03/15/2012    Bradycardia 05/23/2013    Hypoxemia 07/16/2014    Change in bowel habits 05/25/2017     Past Medical History:   Diagnosis Date    Anticoagulant long-term use     Arthritis     Cataract     Chronic back pain     Coronary artery disease     Diabetes mellitus     Elevated cholesterol     General anesthetics causing adverse effect in therapeutic use     GERD (gastroesophageal reflux disease)     Heart disease     Hypertension     Myocardial infarction     Prostate disorder     Trouble in sleeping          Review of Systems   Constitutional: Positive for fatigue and unexpected weight change.   Cardiovascular: Positive for chest pain (improved, discussed with cardiology).   Gastrointestinal: Negative.    Psychiatric/Behavioral: Positive for sleep disturbance.       Objective     Physical Exam   Constitutional: He is oriented to person, place, and time. He appears well-developed and well-nourished. No distress.   HENT:   Head: Normocephalic and atraumatic.   Eyes: Conjunctivae are normal. No scleral icterus.   Pulmonary/Chest: Effort normal. No respiratory distress.   Neurological: He is alert and oriented to person, place, and time. Coordination normal.   Skin: He is not diaphoretic.   Psychiatric: He has a normal mood and affect. His behavior is normal.     Vitals:    01/13/20 1409   BP: 116/80   BP Location: Left arm   Patient Position: Sitting   BP Method: Medium (Manual)   Pulse: 60   Resp: 16   Temp: 97.7 °F (36.5 °C)   TempSrc: Oral   Weight: 99.9 kg (220 lb 3.8 oz)   Height: 5' 8" (1.727 m)       MOST RECENT LABS IN OUR ELECTRONIC MEDICAL RECORD:     Results for orders placed or performed in visit on 01/10/20   CBC auto differential   Result Value Ref Range    WBC 6.43 3.90 - 12.70 K/uL    RBC 4.41 (L) 4.60 - 6.20 M/uL    " Hemoglobin 13.9 (L) 14.0 - 18.0 g/dL    Hematocrit 43.8 40.0 - 54.0 %    Mean Corpuscular Volume 99 (H) 82 - 98 fL    Mean Corpuscular Hemoglobin 31.5 (H) 27.0 - 31.0 pg    Mean Corpuscular Hemoglobin Conc 31.7 (L) 32.0 - 36.0 g/dL    RDW 12.9 11.5 - 14.5 %    Platelets 191 150 - 350 K/uL    MPV 11.1 9.2 - 12.9 fL    Immature Granulocytes 0.3 0.0 - 0.5 %    Gran # (ANC) 3.1 1.8 - 7.7 K/uL    Immature Grans (Abs) 0.02 0.00 - 0.04 K/uL    Lymph # 2.5 1.0 - 4.8 K/uL    Mono # 0.7 0.3 - 1.0 K/uL    Eos # 0.1 0.0 - 0.5 K/uL    Baso # 0.03 0.00 - 0.20 K/uL    nRBC 0 0 /100 WBC    Gran% 47.7 38.0 - 73.0 %    Lymph% 38.6 18.0 - 48.0 %    Mono% 11.0 4.0 - 15.0 %    Eosinophil% 1.9 0.0 - 8.0 %    Basophil% 0.5 0.0 - 1.9 %    Differential Method Automated    TSH   Result Value Ref Range    TSH 1.595 0.400 - 4.000 uIU/mL   T4, free   Result Value Ref Range    Free T4 0.98 0.71 - 1.51 ng/dL

## 2020-01-27 ENCOUNTER — HOSPITAL ENCOUNTER (OUTPATIENT)
Dept: RADIOLOGY | Facility: HOSPITAL | Age: 85
Discharge: HOME | End: 2020-01-27
Attending: PHYSICIAN ASSISTANT
Payer: MEDICARE

## 2020-01-27 ENCOUNTER — CLINICAL SUPPORT (OUTPATIENT)
Dept: CARDIOLOGY | Facility: CLINIC | Age: 85
End: 2020-01-27
Attending: PHYSICIAN ASSISTANT
Payer: MEDICARE

## 2020-01-27 VITALS — WEIGHT: 220 LBS | BODY MASS INDEX: 33.34 KG/M2 | HEIGHT: 68 IN

## 2020-01-27 DIAGNOSIS — R53.83 FATIGUE, UNSPECIFIED TYPE: ICD-10-CM

## 2020-01-27 DIAGNOSIS — I25.84 CORONARY ATHEROSCLEROSIS DUE TO CALCIFIED CORONARY LESION (CODE): ICD-10-CM

## 2020-01-27 DIAGNOSIS — R06.02 SHORTNESS OF BREATH: ICD-10-CM

## 2020-01-27 LAB
CV STRESS BASE HR: 60 BPM
DIASTOLIC BLOOD PRESSURE: 72 MMHG
NUC REST EJECTION FRACTION: 68
OHS CV CPX 1 MINUTE RECOVERY HEART RATE: 67 BPM
OHS CV CPX 85 PERCENT MAX PREDICTED HEART RATE MALE: 115
OHS CV CPX MAX PREDICTED HEART RATE: 135
OHS CV CPX PATIENT IS FEMALE: 0
OHS CV CPX PATIENT IS MALE: 1
OHS CV CPX PEAK DIASTOLIC BLOOD PRESSURE: 72 MMHG
OHS CV CPX PEAK HEAR RATE: 84 BPM
OHS CV CPX PEAK RATE PRESSURE PRODUCT: NORMAL
OHS CV CPX PEAK SYSTOLIC BLOOD PRESSURE: 140 MMHG
OHS CV CPX PERCENT MAX PREDICTED HEART RATE ACHIEVED: 62
OHS CV CPX RATE PRESSURE PRODUCT PRESENTING: 8400
STRESS ECHO TARGET HR: 115 BPM
SYSTOLIC BLOOD PRESSURE: 140 MMHG

## 2020-01-27 PROCEDURE — 93015 CV STRESS TEST SUPVJ I&R: CPT | Mod: HCNC,,, | Performed by: INTERNAL MEDICINE

## 2020-01-27 PROCEDURE — 93306 TTE W/DOPPLER COMPLETE: CPT | Mod: HCNC,S$GLB,, | Performed by: INTERNAL MEDICINE

## 2020-01-27 PROCEDURE — 99999 PR PBB SHADOW E&M-EST. PATIENT-LVL I: ICD-10-PCS | Mod: PBBFAC,HCNC,,

## 2020-01-27 PROCEDURE — 78452 HT MUSCLE IMAGE SPECT MULT: CPT | Mod: 26,HCNC,, | Performed by: INTERNAL MEDICINE

## 2020-01-27 PROCEDURE — 93306 ECHO (CUPID ONLY): ICD-10-PCS | Mod: HCNC,S$GLB,, | Performed by: INTERNAL MEDICINE

## 2020-01-27 PROCEDURE — 99999 PR PBB SHADOW E&M-EST. PATIENT-LVL I: CPT | Mod: PBBFAC,HCNC,,

## 2020-01-27 PROCEDURE — 78452 STRESS TEST WITH MYOCARDIAL PERFUSION (CUPID ONLY): ICD-10-PCS | Mod: 26,HCNC,, | Performed by: INTERNAL MEDICINE

## 2020-01-27 PROCEDURE — 93015 STRESS TEST WITH MYOCARDIAL PERFUSION (CUPID ONLY): ICD-10-PCS | Mod: HCNC,,, | Performed by: INTERNAL MEDICINE

## 2020-01-28 DIAGNOSIS — M51.9 LUMBAR DISC DISEASE: ICD-10-CM

## 2020-01-28 LAB
ASCENDING AORTA: 3.69 CM
AV INDEX (PROSTH): 0.56
AV MEAN GRADIENT: 8 MMHG
AV PEAK GRADIENT: 17 MMHG
AV VALVE AREA: 2.19 CM2
AV VELOCITY RATIO: 0.46
BSA FOR ECHO PROCEDURE: 2.19 M2
CV ECHO LV RWT: 0.57 CM
DOP CALC AO PEAK VEL: 2.08 M/S
DOP CALC AO VTI: 38.32 CM
DOP CALC LVOT AREA: 3.9 CM2
DOP CALC LVOT DIAMETER: 2.22 CM
DOP CALC LVOT PEAK VEL: 0.95 M/S
DOP CALC LVOT STROKE VOLUME: 83.76 CM3
DOP CALCLVOT PEAK VEL VTI: 21.65 CM
E WAVE DECELERATION TIME: 251.21 MSEC
E/A RATIO: 0.72
E/E' RATIO: 12.17 M/S
ECHO LV POSTERIOR WALL: 1.18 CM (ref 0.6–1.1)
FRACTIONAL SHORTENING: 46 % (ref 28–44)
INTERVENTRICULAR SEPTUM: 1.23 CM (ref 0.6–1.1)
IVRT: 0.13 MSEC
LA MAJOR: 4.69 CM
LA MINOR: 5.29 CM
LA WIDTH: 2.57 CM
LEFT ATRIUM SIZE: 3.41 CM
LEFT ATRIUM VOLUME INDEX: 17.4 ML/M2
LEFT ATRIUM VOLUME: 37.04 CM3
LEFT INTERNAL DIMENSION IN SYSTOLE: 2.2 CM (ref 2.1–4)
LEFT VENTRICLE DIASTOLIC VOLUME INDEX: 35.05 ML/M2
LEFT VENTRICLE DIASTOLIC VOLUME: 74.59 ML
LEFT VENTRICLE MASS INDEX: 81 G/M2
LEFT VENTRICLE SYSTOLIC VOLUME INDEX: 7.6 ML/M2
LEFT VENTRICLE SYSTOLIC VOLUME: 16.28 ML
LEFT VENTRICULAR INTERNAL DIMENSION IN DIASTOLE: 4.11 CM (ref 3.5–6)
LEFT VENTRICULAR MASS: 173.44 G
LV LATERAL E/E' RATIO: 10.43 M/S
LV SEPTAL E/E' RATIO: 14.6 M/S
MV PEAK A VEL: 1.02 M/S
MV PEAK E VEL: 0.73 M/S
PISA TR MAX VEL: 2.85 M/S
PULM VEIN S/D RATIO: 1.48
PV PEAK D VEL: 0.4 M/S
PV PEAK S VEL: 0.59 M/S
RA MAJOR: 4.99 CM
RA PRESSURE: 3 MMHG
RA WIDTH: 2.44 CM
RIGHT VENTRICULAR END-DIASTOLIC DIMENSION: 3.3 CM
SINUS: 3.31 CM
STJ: 3.11 CM
TDI LATERAL: 0.07 M/S
TDI SEPTAL: 0.05 M/S
TDI: 0.06 M/S
TR MAX PG: 32 MMHG
TRICUSPID ANNULAR PLANE SYSTOLIC EXCURSION: 2.21 CM
TV REST PULMONARY ARTERY PRESSURE: 35 MMHG

## 2020-01-28 RX ORDER — HYDROCODONE BITARTRATE AND ACETAMINOPHEN 5; 325 MG/1; MG/1
1 TABLET ORAL EVERY 6 HOURS PRN
Qty: 120 TABLET | Refills: 0 | Status: SHIPPED | OUTPATIENT
Start: 2020-01-28 | End: 2020-03-03 | Stop reason: SDUPTHER

## 2020-01-28 RX ORDER — METOPROLOL SUCCINATE 25 MG/1
TABLET, EXTENDED RELEASE ORAL
Qty: 90 TABLET | Refills: 4 | Status: SHIPPED | OUTPATIENT
Start: 2020-01-28 | End: 2020-03-03 | Stop reason: SDUPTHER

## 2020-01-28 NOTE — TELEPHONE ENCOUNTER
----- Message from Brooke Dominguez sent at 1/28/2020  2:23 PM CST -----  Contact: self  Type:  RX Refill Request    Who Called:  self  Refill or New Rx:  refill  RX Name and Strength:  HYDROcodone-acetaminophen (NORCO) 5-325 mg per tablet  How is the patient currently taking it? (ex. 1XDay):  4Xday  Is this a 30 day or 90 day RX:  30  Preferred Pharmacy with phone number:    [facility] #[id number] [address] & [address] [address]  Phone: [phone] Fax: [phone]  Local or Mail Order:  local  Ordering Provider:  Dr Hannah Jimenez Call Back Number:  [phone] (home)   Additional Information: Please call patient to confirm sent. Thanks!

## 2020-02-06 ENCOUNTER — PES CALL (OUTPATIENT)
Dept: ADMINISTRATIVE | Facility: CLINIC | Age: 85
End: 2020-02-06

## 2020-02-13 ENCOUNTER — TELEPHONE (OUTPATIENT)
Dept: FAMILY MEDICINE | Facility: CLINIC | Age: 85
End: 2020-02-13

## 2020-02-13 NOTE — TELEPHONE ENCOUNTER
----- Message from Cecelia Avila sent at 2/13/2020 11:47 AM CST -----  Type:  RX Refill Request    Who Called:  Patient  Refill or New Rx:  Refill  RX Name and Strength:metoprolol succinate (TOPROL-XL) 25 MG 24 hr tablet     How is the patient currently taking it? (ex. 1XDay):  1xday  Is this a 30 day or 90 day RX:  90 pills  Preferred Pharmacy with phone number:    [id number] [place] [address] AT [facility] & [address] [address]  Phone: [phone] Fax: [phone]    Local or Mail Order:  Local  Ordering Provider:  same  Best Call Back Number:  [phone] (home)     Additional Information:  Please call when completed.

## 2020-02-18 ENCOUNTER — CLINICAL SUPPORT (OUTPATIENT)
Dept: CARDIOLOGY | Facility: CLINIC | Age: 85
End: 2020-02-18
Attending: INTERNAL MEDICINE
Payer: MEDICARE

## 2020-02-18 DIAGNOSIS — I45.5 SINUS PAUSE: ICD-10-CM

## 2020-02-18 DIAGNOSIS — Z95.0 CARDIAC PACEMAKER IN SITU: ICD-10-CM

## 2020-03-02 ENCOUNTER — TELEPHONE (OUTPATIENT)
Dept: FAMILY MEDICINE | Facility: CLINIC | Age: 85
End: 2020-03-02

## 2020-03-02 DIAGNOSIS — M51.9 LUMBAR DISC DISEASE: ICD-10-CM

## 2020-03-02 RX ORDER — HYDROCODONE BITARTRATE AND ACETAMINOPHEN 5; 325 MG/1; MG/1
1 TABLET ORAL EVERY 6 HOURS PRN
Qty: 120 TABLET | Refills: 0 | Status: CANCELLED | OUTPATIENT
Start: 2020-03-02

## 2020-03-02 NOTE — TELEPHONE ENCOUNTER
----- Message from Angelica Mao sent at 3/2/2020 11:33 AM CST -----  Contact: Pt  Type: Needs Medical Advice    Who Called:  Pt  Symptoms (please be specific):    How long has patient had these symptoms:    Pharmacy name and phone #:    Best Call Back Number: [phone]  Additional Information:Pt called requesting a sooner appt for his skin . Pt is going out of town asking for a appt this Tuesday, Wednesday before he leave. Please contact pt.

## 2020-03-02 NOTE — TELEPHONE ENCOUNTER
----- Message from Angelica Mao sent at 3/2/2020 11:28 AM CST -----  Contact: Pt  Type: Needs Medical Advice    Who Called: Pt  Symptoms (please be specific):    How long has patient had these symptoms:    Pharmacy name and phone #:    Best Call Back Number: [phone]  Additional Information: pt called requesting a refill of HYDROcodone-acetaminophen (NORCO) 5-325 mg per tablet [[id number]] and nitroglycerin

## 2020-03-03 ENCOUNTER — OFFICE VISIT (OUTPATIENT)
Dept: FAMILY MEDICINE | Facility: CLINIC | Age: 85
End: 2020-03-03
Payer: MEDICARE

## 2020-03-03 VITALS
DIASTOLIC BLOOD PRESSURE: 68 MMHG | HEIGHT: 68 IN | RESPIRATION RATE: 18 BRPM | TEMPERATURE: 98 F | BODY MASS INDEX: 32.88 KG/M2 | WEIGHT: 216.94 LBS | HEART RATE: 60 BPM | SYSTOLIC BLOOD PRESSURE: 136 MMHG

## 2020-03-03 DIAGNOSIS — M51.9 LUMBAR DISC DISEASE: ICD-10-CM

## 2020-03-03 DIAGNOSIS — L85.3 DRY SKIN: ICD-10-CM

## 2020-03-03 DIAGNOSIS — L82.1 SEBORRHEIC KERATOSIS: Primary | ICD-10-CM

## 2020-03-03 DIAGNOSIS — F11.90 CHRONIC NARCOTIC USE: ICD-10-CM

## 2020-03-03 DIAGNOSIS — I10 ESSENTIAL HYPERTENSION: ICD-10-CM

## 2020-03-03 PROCEDURE — 3075F SYST BP GE 130 - 139MM HG: CPT | Mod: HCNC,CPTII,S$GLB, | Performed by: FAMILY MEDICINE

## 2020-03-03 PROCEDURE — 99999 PR PBB SHADOW E&M-EST. PATIENT-LVL III: CPT | Mod: PBBFAC,HCNC,, | Performed by: FAMILY MEDICINE

## 2020-03-03 PROCEDURE — 1159F PR MEDICATION LIST DOCUMENTED IN MEDICAL RECORD: ICD-10-PCS | Mod: HCNC,S$GLB,, | Performed by: FAMILY MEDICINE

## 2020-03-03 PROCEDURE — 99214 OFFICE O/P EST MOD 30 MIN: CPT | Mod: HCNC,S$GLB,, | Performed by: FAMILY MEDICINE

## 2020-03-03 PROCEDURE — 99999 PR PBB SHADOW E&M-EST. PATIENT-LVL III: ICD-10-PCS | Mod: PBBFAC,HCNC,, | Performed by: FAMILY MEDICINE

## 2020-03-03 PROCEDURE — 3078F DIAST BP <80 MM HG: CPT | Mod: HCNC,CPTII,S$GLB, | Performed by: FAMILY MEDICINE

## 2020-03-03 PROCEDURE — 1126F AMNT PAIN NOTED NONE PRSNT: CPT | Mod: HCNC,S$GLB,, | Performed by: FAMILY MEDICINE

## 2020-03-03 PROCEDURE — 3078F PR MOST RECENT DIASTOLIC BLOOD PRESSURE < 80 MM HG: ICD-10-PCS | Mod: HCNC,CPTII,S$GLB, | Performed by: FAMILY MEDICINE

## 2020-03-03 PROCEDURE — 1101F PR PT FALLS ASSESS DOC 0-1 FALLS W/OUT INJ PAST YR: ICD-10-PCS | Mod: HCNC,CPTII,S$GLB, | Performed by: FAMILY MEDICINE

## 2020-03-03 PROCEDURE — 1101F PT FALLS ASSESS-DOCD LE1/YR: CPT | Mod: HCNC,CPTII,S$GLB, | Performed by: FAMILY MEDICINE

## 2020-03-03 PROCEDURE — 1126F PR PAIN SEVERITY QUANTIFIED, NO PAIN PRESENT: ICD-10-PCS | Mod: HCNC,S$GLB,, | Performed by: FAMILY MEDICINE

## 2020-03-03 PROCEDURE — 99214 PR OFFICE/OUTPT VISIT, EST, LEVL IV, 30-39 MIN: ICD-10-PCS | Mod: HCNC,S$GLB,, | Performed by: FAMILY MEDICINE

## 2020-03-03 PROCEDURE — 3075F PR MOST RECENT SYSTOLIC BLOOD PRESS GE 130-139MM HG: ICD-10-PCS | Mod: HCNC,CPTII,S$GLB, | Performed by: FAMILY MEDICINE

## 2020-03-03 PROCEDURE — 1159F MED LIST DOCD IN RCRD: CPT | Mod: HCNC,S$GLB,, | Performed by: FAMILY MEDICINE

## 2020-03-03 RX ORDER — TRIAMCINOLONE ACETONIDE 0.25 MG/G
CREAM TOPICAL 2 TIMES DAILY
Qty: 80 G | Refills: 3 | Status: SHIPPED | OUTPATIENT
Start: 2020-03-03 | End: 2020-11-06

## 2020-03-03 RX ORDER — METOPROLOL SUCCINATE 25 MG/1
TABLET, EXTENDED RELEASE ORAL
Qty: 90 TABLET | Refills: 3 | Status: SHIPPED | OUTPATIENT
Start: 2020-03-03 | End: 2020-10-01 | Stop reason: SDUPTHER

## 2020-03-03 RX ORDER — HYDROCODONE BITARTRATE AND ACETAMINOPHEN 5; 325 MG/1; MG/1
1 TABLET ORAL EVERY 6 HOURS PRN
Qty: 120 TABLET | Refills: 0 | Status: SHIPPED | OUTPATIENT
Start: 2020-03-03 | End: 2020-03-31 | Stop reason: SDUPTHER

## 2020-03-03 NOTE — PROGRESS NOTES
THIS DOCUMENT WAS MADE IN PART WITH VOICE RECOGNITION SOFTWARE.  OCCASIONALLY THIS SOFTWARE WILL MISINTERPRET WORDS OR PHRASES.      Lázaro Wang  10/8/1934    Lázaro was seen today for dry skin.    Diagnoses and all orders for this visit:    Seborrheic keratosis  Several, the lesions we look that today have a benign appearance.  Some somewhat inflamed, offered cryotherapy but he declined.  If they become bothersome or if he develops new or worrisome lesions we should continuously re-evaluate these.    Dry skin  Triamcinolone to affected areas for 1 week then resume regular more strides in lotion    Essential hypertension  I refilled his metoprolol.  His insurance may not pay for it.  He should continue to discuss with his pharmacy as to why they claim he pick it up and he has no recollection of this.  I can't resolve that issue but I can give him a new prescription, he may have to pay for this month.  That is if his insurance refuses because they have already been charged    Lumbar disc disease  Chronic narcotic use  Continue hydrocodone, follow every 3 months    Other orders  -     triamcinolone acetonide 0.025% (KENALOG) 0.025 % cream; Apply topically 2 (two) times daily.  -     metoprolol succinate (TOPROL-XL) 25 MG 24 hr tablet; TAKE 1 TABLET(25 MG) BY MOUTH EVERY DAY        Subjective     Chief Complaint   Patient presents with    Dry Skin     patient reported dry patchy skin on his forehead and bilat arms and legs x 1 year        HPI  Here for a few skin concerns but we did address some of his chronic conditions     Scaly patch on forehead.  Not bothersome but he tends to scratch it.  Similar patches on each leg.  Also dry flaky skin on legs.  Vaseline helps the flaking.    Hypertension, borderline today.  States could not get metoprolol refilled.  His cardiologist sent this in a month ago but he states the pharmacy is insistent that he picked up the prescription but he knows he did not so they will not  refill it early    .  Back pain, chronic condition, he has been stable on current dosage of hydrocodone for an extended period of time.  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    BPV (benign positional vertigo) 02/17/2016    Nuclear sclerosis 02/17/2016    Posterior vitreous detachment of both eyes 02/17/2016    Blepharitis of both eyes 02/17/2016    Hyperopia with astigmatism and presbyopia 02/17/2016    Ectasis aorta 10/31/2017    Carotid disease, bilateral 10/31/2017    Irritability 10/31/2017    Anxiety 10/31/2017    Chronic fatigue 03/27/2018    Chronic kidney disease, stage III (moderate) 07/24/2018    Atherosclerosis of aorta 07/24/2018    Carpal tunnel syndrome on right 09/24/2018    Sinus pause 12/10/2018    Pacemaker 01/22/2019    Atrial fibrillation 06/06/2019     Resolved Ambulatory Problems     Diagnosis Date Noted    Degenerative arthritis of knee 03/15/2012    Bradycardia 05/23/2013    Hypoxemia 07/16/2014    Change in bowel habits 05/25/2017     Past Medical History:   Diagnosis Date    Anticoagulant long-term use     Arthritis     Cataract     Chronic back pain     Coronary artery disease     Diabetes mellitus     Elevated cholesterol     General anesthetics causing adverse effect in therapeutic use     GERD (gastroesophageal reflux disease)     Heart disease     Hypertension     Myocardial infarction     Prostate  "disorder     Trouble in sleeping          Review of Systems   Respiratory: Negative.    Cardiovascular: Negative.    Musculoskeletal: Positive for arthralgias and back pain.       Objective     Physical Exam   Constitutional: He is oriented to person, place, and time. He appears well-developed and well-nourished. No distress.   HENT:   Head: Normocephalic and atraumatic.   Eyes: Conjunctivae are normal. No scleral icterus.   Pulmonary/Chest: Effort normal. No respiratory distress.   Neurological: He is alert and oriented to person, place, and time. Coordination normal.   Skin: He is not diaphoretic.   Some dry skin on legs and arms.  On the top center of his forehead is a light brown raised lesion with appearance consistent with a seborrheic keratosis.  Stuck on appearance, well-defined border.  There are several other similar lesions on the legs all with a benign appearance although these have been more extensively excoriated and he admits to continuously picking at them   Psychiatric: He has a normal mood and affect. His behavior is normal.     Vitals:    03/03/20 1148   BP: 136/68   BP Location: Left arm   Patient Position: Sitting   BP Method: Medium (Manual)   Pulse: 60   Resp: 18   Temp: 97.7 °F (36.5 °C)   TempSrc: Oral   Weight: 98.4 kg (216 lb 14.9 oz)   Height: 5' 8" (1.727 m)       MOST RECENT LABS IN OUR ELECTRONIC MEDICAL RECORD:     Results for orders placed or performed in visit on 01/27/20   Echo Color Flow Doppler? Yes   Result Value Ref Range    BSA 2.19 m2    TDI SEPTAL 0.05 m/s    LV LATERAL E/E' RATIO 10.43 m/s    LV SEPTAL E/E' RATIO 14.60 m/s    LA WIDTH 2.57 cm    TDI LATERAL 0.07 m/s    LVIDD 4.11 3.5 - 6.0 cm    IVS 1.23 (A) 0.6 - 1.1 cm    PW 1.18 (A) 0.6 - 1.1 cm    LVIDS 2.20 2.1 - 4.0 cm    FS 46 28 - 44 %    LA volume 37.04 cm3    Sinus 3.31 cm    STJ 3.11 cm    Ascending aorta 3.69 cm    LV mass 173.44 g    LA size 3.41 cm    RVDD 3.30 cm    TAPSE 2.21 cm    Left Ventricle Relative " Wall Thickness 0.57 cm    AV mean gradient 8 mmHg    AV valve area 2.19 cm2    AV Velocity Ratio 0.46     AV index (prosthetic) 0.56     E/A ratio 0.72     Mean e' 0.06 m/s    E wave decelartion time 251.21 msec    IVRT 0.13 msec    Pulm vein S/D ratio 1.48     LVOT diameter 2.22 cm    LVOT area 3.9 cm2    LVOT peak pardeep 0.95 m/s    LVOT peak VTI 21.65 cm    Ao peak pardeep 2.08 m/s    Ao VTI 38.32 cm    LVOT stroke volume 83.76 cm3    AV peak gradient 17 mmHg    E/E' ratio 12.17 m/s    MV Peak E Pardeep 0.73 m/s    TR Max Pardeep 2.85 m/s    MV Peak A Pardeep 1.02 m/s    PV Peak S Pardeep 0.59 m/s    PV Peak D Pardeep 0.40 m/s    LV Systolic Volume 16.28 mL    LV Systolic Volume Index 7.6 mL/m2    LV Diastolic Volume 74.59 mL    LV Diastolic Volume Index 35.05 mL/m2    LA Volume Index 17.4 mL/m2    LV Mass Index 81 g/m2    RA Major Axis 4.99 cm    Left Atrium Minor Axis 5.29 cm    Left Atrium Major Axis 4.69 cm    Triscuspid Valve Regurgitation Peak Gradient 32 mmHg    RA Width 2.44 cm    Right Atrial Pressure (from IVC) 3 mmHg    TV rest pulmonary artery pressure 35 mmHg

## 2020-03-03 NOTE — TELEPHONE ENCOUNTER
----- Message from Beatrice Horn sent at 3/3/2020  2:56 PM CST -----  Contact: patient  Type: Needs Medical Advice    Who Called:  patient  Best Call Back Number: [phone]  Additional Information: Patient requested a refill for Hydrocodone yesterday and is asking to be notified when this has been called in so he know when he can go pick it up

## 2020-03-18 ENCOUNTER — PES CALL (OUTPATIENT)
Dept: ADMINISTRATIVE | Facility: CLINIC | Age: 85
End: 2020-03-18

## 2020-03-31 DIAGNOSIS — M51.9 LUMBAR DISC DISEASE: ICD-10-CM

## 2020-03-31 RX ORDER — QUETIAPINE FUMARATE 100 MG/1
TABLET, FILM COATED ORAL
Qty: 66 TABLET | Refills: 1 | Status: SHIPPED | OUTPATIENT
Start: 2020-03-31 | End: 2020-04-23

## 2020-03-31 RX ORDER — HYDROCODONE BITARTRATE AND ACETAMINOPHEN 5; 325 MG/1; MG/1
1 TABLET ORAL EVERY 6 HOURS PRN
Qty: 120 TABLET | Refills: 0 | Status: SHIPPED | OUTPATIENT
Start: 2020-03-31 | End: 2020-05-04 | Stop reason: SDUPTHER

## 2020-03-31 NOTE — TELEPHONE ENCOUNTER
----- Message from Princess TAMELA Briones sent at 3/31/2020 10:24 AM CDT -----  Contact: pt  Type:  RX Refill Request    Who Called:  patient  Refill or New Rx:  Refill  RX Name and Strength: HYDROcodone-acetaminophen (NORCO) 5-325 mg per tablet and QUEtiapine (SEROQUEL) 100 MG Tab  Preferred Pharmacy with phone number:   [id number] [place] [address] AT [facility] & [address] [address]  Phone: [phone] Fax: [phone]  Local or Mail Order:  Local  Ordering Provider:  Dr. Brodie Jimenez Call Back Number: [phone]  Additional Information:  Patient would like a call back when rx is sent over

## 2020-04-03 ENCOUNTER — PES CALL (OUTPATIENT)
Dept: ADMINISTRATIVE | Facility: CLINIC | Age: 85
End: 2020-04-03

## 2020-04-20 ENCOUNTER — TELEPHONE (OUTPATIENT)
Dept: FAMILY MEDICINE | Facility: CLINIC | Age: 85
End: 2020-04-20

## 2020-04-20 ENCOUNTER — PATIENT MESSAGE (OUTPATIENT)
Dept: FAMILY MEDICINE | Facility: CLINIC | Age: 85
End: 2020-04-20

## 2020-04-20 NOTE — TELEPHONE ENCOUNTER
Left message with patients wife to r/s appt as VV. She stated it would be best to call patient back later this afternoon to explain visit instructions. We will call him back at a later time.

## 2020-04-23 ENCOUNTER — OFFICE VISIT (OUTPATIENT)
Dept: FAMILY MEDICINE | Facility: CLINIC | Age: 85
End: 2020-04-23
Payer: MEDICARE

## 2020-04-23 DIAGNOSIS — G47.00 INSOMNIA, UNSPECIFIED TYPE: ICD-10-CM

## 2020-04-23 DIAGNOSIS — I10 ESSENTIAL HYPERTENSION: Chronic | ICD-10-CM

## 2020-04-23 DIAGNOSIS — M51.9 LUMBAR DISC DISEASE: ICD-10-CM

## 2020-04-23 DIAGNOSIS — E11.43 TYPE II DIABETES MELLITUS WITH PERIPHERAL AUTONOMIC NEUROPATHY: Primary | ICD-10-CM

## 2020-04-23 DIAGNOSIS — F11.90 CHRONIC NARCOTIC USE: ICD-10-CM

## 2020-04-23 PROCEDURE — 1101F PR PT FALLS ASSESS DOC 0-1 FALLS W/OUT INJ PAST YR: ICD-10-PCS | Mod: HCNC,CPTII,95, | Performed by: FAMILY MEDICINE

## 2020-04-23 PROCEDURE — 99499 RISK ADDL DX/OHS AUDIT: ICD-10-PCS | Mod: HCNC,95,, | Performed by: FAMILY MEDICINE

## 2020-04-23 PROCEDURE — 1101F PT FALLS ASSESS-DOCD LE1/YR: CPT | Mod: HCNC,CPTII,95, | Performed by: FAMILY MEDICINE

## 2020-04-23 PROCEDURE — 99214 PR OFFICE/OUTPT VISIT, EST, LEVL IV, 30-39 MIN: ICD-10-PCS | Mod: HCNC,95,, | Performed by: FAMILY MEDICINE

## 2020-04-23 PROCEDURE — 1159F PR MEDICATION LIST DOCUMENTED IN MEDICAL RECORD: ICD-10-PCS | Mod: HCNC,95,, | Performed by: FAMILY MEDICINE

## 2020-04-23 PROCEDURE — 1159F MED LIST DOCD IN RCRD: CPT | Mod: HCNC,95,, | Performed by: FAMILY MEDICINE

## 2020-04-23 PROCEDURE — 99499 UNLISTED E&M SERVICE: CPT | Mod: HCNC,95,, | Performed by: FAMILY MEDICINE

## 2020-04-23 PROCEDURE — 99214 OFFICE O/P EST MOD 30 MIN: CPT | Mod: HCNC,95,, | Performed by: FAMILY MEDICINE

## 2020-04-23 RX ORDER — QUETIAPINE FUMARATE 100 MG/1
TABLET, FILM COATED ORAL
Qty: 120 TABLET | Refills: 2 | Status: SHIPPED | OUTPATIENT
Start: 2020-04-23 | End: 2020-12-19

## 2020-04-23 NOTE — PROGRESS NOTES
THIS DOCUMENT WAS MADE IN PART WITH VOICE RECOGNITION SOFTWARE.  OCCASIONALLY THIS SOFTWARE WILL MISINTERPRET WORDS OR PHRASES.      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  I would like for him to start checking get him home.  Will get him set up with a new glucometer etcetera at his next appointment.  He should follow a diabetic diet    Essential hypertension  Again this has usually been well controlled but he has not been checking.  He was encouraged to do so    Insomnia, unspecified type  Chronic condition with recent worsening.  He should try to maintain with 100 mg Seroquel but may occasionally take 150.      Lumbar disc disease  Chronic narcotic use  Chronic and stable.  Reviewed hydrocodone usage.  Continue to follow every 3 months.    Other orders  -     QUEtiapine (SEROQUEL) 100 MG Tab; Take take one to one and 1/2 tablets (up to 150 mg max) every evening for sleep        Subjective      The patient is being seen by virtual visit because of the COVID19 pandemic and necessity to reduce risk of exposure.  The patient location is:  Patient Home   The chief complaint leading to consultation is:  Chronic pain management, insomnia, and other chronic conditions    Visit type: Virtual visit with synchronous audio and video  Total time spent with patient:  10-12 minutes  Each patient to whom he or she provides medical services by telemedicine is:  (1) informed of the relationship between the physician and patient and the respective role of any other health care provider with respect to management of the patient; and (2) notified that he or she may decline to receive medical services by telemedicine and may withdraw from such care at any time.      HPI  Routine follow-up.  This is an 85-year-old male with diabetes that has been satisfactory.  Although he has been out  of testing supplies and states he has not been checking it.  He has hypertension which has generally been under very good control but states he has not been checking blood pressure either.  But he feels well no headaches no chest pain or shortness of breath.    Chronic back pain, degenerative disc disease, remains on hydrocodone.  He reports this manages his pain reasonably well.    Chronic insomnia.  Recent worsening.  He states he has had to take up to 150 mg of the Seroquel regularly.  Previously was on 100 mg, I had asked him to reduce the dosage but he did not do well with the reduction but  Rough spots on forehead    He does report some rough spots on the forehead.  He had tried some triamcinolone cream that I had given to him for something else in the past and nothing rapidly changing nothing darkly pigmented.    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    BPV (benign positional vertigo) 02/17/2016    Nuclear sclerosis 02/17/2016    Posterior vitreous detachment of both eyes 02/17/2016    Blepharitis of both eyes 02/17/2016    Hyperopia with astigmatism and presbyopia 02/17/2016    Ectasis aorta 10/31/2017    Carotid disease, bilateral 10/31/2017    Irritability 10/31/2017    Anxiety 10/31/2017    Chronic fatigue 03/27/2018    Chronic kidney disease, stage III (moderate) 07/24/2018    Atherosclerosis of aorta 07/24/2018    Carpal tunnel syndrome on right 09/24/2018    Sinus pause 12/10/2018     Pacemaker 01/22/2019    Atrial fibrillation 06/06/2019     Resolved Ambulatory Problems     Diagnosis Date Noted    Degenerative arthritis of knee 03/15/2012    Bradycardia 05/23/2013    Hypoxemia 07/16/2014    Change in bowel habits 05/25/2017     Past Medical History:   Diagnosis Date    Anticoagulant long-term use     Arthritis     Cataract     Chronic back pain     Coronary artery disease     Diabetes mellitus     Elevated cholesterol     General anesthetics causing adverse effect in therapeutic use     GERD (gastroesophageal reflux disease)     Heart disease     Hypertension     Myocardial infarction     Prostate disorder     Trouble in sleeping          Review of Systems   Constitutional: Positive for activity change.   Cardiovascular: Negative.    Gastrointestinal: Negative.    Musculoskeletal: Positive for arthralgias and back pain.   Psychiatric/Behavioral: Positive for sleep disturbance. Negative for dysphoric mood.       Objective     Physical Exam   Constitutional: He is oriented to person, place, and time. He appears well-developed and well-nourished.  Non-toxic appearance. He does not have a sickly appearance. He does not appear ill. No distress.   He appears to be sitting comfortably in a recliner, no distress fully awake and alert   HENT:   Head: Normocephalic and atraumatic.   Eyes: No scleral icterus.   Pulmonary/Chest: Effort normal. No accessory muscle usage or stridor. No tachypnea and no bradypnea. No respiratory distress.   Neurological: He is alert and oriented to person, place, and time.   Psychiatric: His behavior is normal. Thought content normal. His mood appears not anxious. His affect is not inappropriate. His speech is not rapid and/or pressured, not delayed, not tangential and not slurred. He is not agitated, not hyperactive and not combative. Thought content is not delusional. He is attentive.     Physical exam limited as this was a virtual visit.  But it is  apparent that the individual is in no acute distress, well groomed, well kept.  Speech was normal.  Patient is alert and oriented x3.  Mood and affect appear normal.    Patient reported blood pressure:  None recorded  Patient reported pulse:  None recorded  Respirations observed:  16/minute

## 2020-05-04 DIAGNOSIS — M51.9 LUMBAR DISC DISEASE: ICD-10-CM

## 2020-05-04 RX ORDER — HYDROCODONE BITARTRATE AND ACETAMINOPHEN 5; 325 MG/1; MG/1
1 TABLET ORAL EVERY 6 HOURS PRN
Qty: 120 TABLET | Refills: 0 | Status: SHIPPED | OUTPATIENT
Start: 2020-05-04 | End: 2020-06-01 | Stop reason: SDUPTHER

## 2020-05-04 NOTE — TELEPHONE ENCOUNTER
----- Message from Leticia Nesbitt sent at 5/4/2020 10:24 AM CDT -----  Contact: PT  Type:  RX Refill Request    Who Called:  Pt    Refill or New Rx:  refill  RX Name and Strength:  Norco 5-325 mg  How is the patient currently taking it? (ex. 1XDay):  As Directed  Is this a 30 day or 90 day RX:  as Directed  Preferred Pharmacy with phone number:    [id number] [place] [address] AT [facility] & [address] [address]  Phone: [phone] Fax: [phone]  Best Call Back Number:  [phone]  Additional Information:  Please Advise ---Thank you

## 2020-05-06 ENCOUNTER — PATIENT MESSAGE (OUTPATIENT)
Dept: ADMINISTRATIVE | Facility: HOSPITAL | Age: 85
End: 2020-05-06

## 2020-05-27 ENCOUNTER — TELEPHONE (OUTPATIENT)
Dept: FAMILY MEDICINE | Facility: CLINIC | Age: 85
End: 2020-05-27

## 2020-05-27 NOTE — TELEPHONE ENCOUNTER
----- Message from Princess TAMELA Briones sent at 5/27/2020  1:29 PM CDT -----  Contact: pt  Type: Needs Medical Advice  Who Called:  Patient   Best Call Back Number: [phone] (home)     Additional Information: requesting a call in regards to a refill request (QUEtiapine (SEROQUEL) 100 MG Tab)  he has been working on for a couple days. Patient is out of the rx.

## 2020-05-27 NOTE — TELEPHONE ENCOUNTER
"This was sent to [facility] on 4/23/20 #120 x 2R. Pt should have refills available. Spoke w/ pt to advise. Advised that this was for a 90 day supply with 2 refills. Pt states "the one in [place]" doesn't have it/ Advised that [facility] [address] and asked is he was referring to a [facility] in [place]. Pt got irrate and stated "there is no [facility] in [place]". Pt proceeded to be very anxious stating that we need "you need to do something to get my medicine filled" and that the pharmacy won't fill it, something about it being too early. Tried to get more info and advise pt to reach out to [facility] as we cannot make them fill it early. Pt yelled that he has called us several times about this and that he refused to talk to me and wants to talk to Dr Trotter about this in between pt's this afternoon. Attempted to let pt know we would sent this to him, and that it may be after clinic, but pt hung up. Please call.  "

## 2020-05-27 NOTE — TELEPHONE ENCOUNTER
Check with the pharmacy to see if there is a problem.  If the patient escalates this please defer to management.

## 2020-05-27 NOTE — TELEPHONE ENCOUNTER
Spoke w/ Chelita. Pt last filled quetiapine, #66, sig 1 tab daily on 4/1/20. Rx was changed on 4/23/20 and new rx for 1 - 1.5 daily. She has the rx but states it is on hold for some reason and is not able to determine why. She attempted to process it and it went through. Pt cost $15.48. She states pt can pick this up. Advised her we will call pt to advise.

## 2020-06-01 ENCOUNTER — OFFICE VISIT (OUTPATIENT)
Dept: HOME HEALTH SERVICES | Facility: CLINIC | Age: 85
End: 2020-06-01
Payer: MEDICARE

## 2020-06-01 VITALS
TEMPERATURE: 98 F | SYSTOLIC BLOOD PRESSURE: 136 MMHG | HEART RATE: 60 BPM | OXYGEN SATURATION: 98 % | HEIGHT: 68 IN | BODY MASS INDEX: 31.83 KG/M2 | DIASTOLIC BLOOD PRESSURE: 85 MMHG | WEIGHT: 210 LBS

## 2020-06-01 DIAGNOSIS — Z95.0 PACEMAKER: ICD-10-CM

## 2020-06-01 DIAGNOSIS — H52.4 HYPEROPIA OF BOTH EYES WITH ASTIGMATISM AND PRESBYOPIA: ICD-10-CM

## 2020-06-01 DIAGNOSIS — H25.13 NUCLEAR SCLEROSIS OF BOTH EYES: ICD-10-CM

## 2020-06-01 DIAGNOSIS — H52.203 HYPEROPIA OF BOTH EYES WITH ASTIGMATISM AND PRESBYOPIA: ICD-10-CM

## 2020-06-01 DIAGNOSIS — N18.30 CHRONIC KIDNEY DISEASE, STAGE III (MODERATE): ICD-10-CM

## 2020-06-01 DIAGNOSIS — H52.03 HYPEROPIA OF BOTH EYES WITH ASTIGMATISM AND PRESBYOPIA: ICD-10-CM

## 2020-06-01 DIAGNOSIS — I48.91 ATRIAL FIBRILLATION, UNSPECIFIED TYPE: ICD-10-CM

## 2020-06-01 DIAGNOSIS — E78.5 DYSLIPIDEMIA: Chronic | ICD-10-CM

## 2020-06-01 DIAGNOSIS — E11.43 TYPE II DIABETES MELLITUS WITH PERIPHERAL AUTONOMIC NEUROPATHY: ICD-10-CM

## 2020-06-01 DIAGNOSIS — Z00.00 ENCOUNTER FOR PREVENTIVE HEALTH EXAMINATION: Primary | ICD-10-CM

## 2020-06-01 DIAGNOSIS — M51.9 LUMBAR DISC DISEASE: ICD-10-CM

## 2020-06-01 DIAGNOSIS — Z98.61 HISTORY OF PTCA: Chronic | ICD-10-CM

## 2020-06-01 DIAGNOSIS — I77.819 ECTASIS AORTA: ICD-10-CM

## 2020-06-01 DIAGNOSIS — E11.8 TYPE 2 DIABETES MELLITUS WITH COMPLICATION: ICD-10-CM

## 2020-06-01 DIAGNOSIS — F41.9 RECURRENT MILD MAJOR DEPRESSIVE DISORDER WITH ANXIETY: ICD-10-CM

## 2020-06-01 DIAGNOSIS — F33.0 RECURRENT MILD MAJOR DEPRESSIVE DISORDER WITH ANXIETY: ICD-10-CM

## 2020-06-01 DIAGNOSIS — F11.20 CHRONIC NARCOTIC DEPENDENCE: ICD-10-CM

## 2020-06-01 DIAGNOSIS — K21.9 GASTROESOPHAGEAL REFLUX DISEASE WITHOUT ESOPHAGITIS: ICD-10-CM

## 2020-06-01 DIAGNOSIS — I70.0 ATHEROSCLEROSIS OF AORTA: ICD-10-CM

## 2020-06-01 DIAGNOSIS — G89.29 OTHER CHRONIC PAIN: ICD-10-CM

## 2020-06-01 DIAGNOSIS — N32.81 OVERACTIVE BLADDER: ICD-10-CM

## 2020-06-01 DIAGNOSIS — I65.23 BILATERAL CAROTID ARTERY STENOSIS: ICD-10-CM

## 2020-06-01 DIAGNOSIS — I25.10 CORONARY ARTERY DISEASE INVOLVING NATIVE CORONARY ARTERY OF NATIVE HEART WITHOUT ANGINA PECTORIS: Chronic | ICD-10-CM

## 2020-06-01 DIAGNOSIS — I10 ESSENTIAL HYPERTENSION: Chronic | ICD-10-CM

## 2020-06-01 PROCEDURE — 99499 RISK ADDL DX/OHS AUDIT: ICD-10-PCS | Mod: S$GLB,,, | Performed by: NURSE PRACTITIONER

## 2020-06-01 PROCEDURE — 3079F DIAST BP 80-89 MM HG: CPT | Mod: CPTII,S$GLB,, | Performed by: NURSE PRACTITIONER

## 2020-06-01 PROCEDURE — G0439 PPPS, SUBSEQ VISIT: HCPCS | Mod: S$GLB,,, | Performed by: NURSE PRACTITIONER

## 2020-06-01 PROCEDURE — 99499 UNLISTED E&M SERVICE: CPT | Mod: S$GLB,,, | Performed by: NURSE PRACTITIONER

## 2020-06-01 PROCEDURE — 3079F PR MOST RECENT DIASTOLIC BLOOD PRESSURE 80-89 MM HG: ICD-10-PCS | Mod: CPTII,S$GLB,, | Performed by: NURSE PRACTITIONER

## 2020-06-01 PROCEDURE — 3075F SYST BP GE 130 - 139MM HG: CPT | Mod: CPTII,S$GLB,, | Performed by: NURSE PRACTITIONER

## 2020-06-01 PROCEDURE — G0439 PR MEDICARE ANNUAL WELLNESS SUBSEQUENT VISIT: ICD-10-PCS | Mod: S$GLB,,, | Performed by: NURSE PRACTITIONER

## 2020-06-01 PROCEDURE — 3075F PR MOST RECENT SYSTOLIC BLOOD PRESS GE 130-139MM HG: ICD-10-PCS | Mod: CPTII,S$GLB,, | Performed by: NURSE PRACTITIONER

## 2020-06-01 RX ORDER — HYDROCODONE BITARTRATE AND ACETAMINOPHEN 5; 325 MG/1; MG/1
1 TABLET ORAL EVERY 6 HOURS PRN
Qty: 120 TABLET | Refills: 0 | Status: SHIPPED | OUTPATIENT
Start: 2020-06-01 | End: 2020-07-06 | Stop reason: SDUPTHER

## 2020-06-01 RX ORDER — AMIODARONE HYDROCHLORIDE 200 MG/1
200 TABLET ORAL
COMMUNITY
End: 2020-11-06

## 2020-06-01 NOTE — TELEPHONE ENCOUNTER
----- Message from Reji Morales sent at 6/1/2020  9:58 AM CDT -----  Contact: pt  Type:  RX Refill Request    Who Called:  pt  Refill or New Rx:  refill  RX Name and Strength:  HYDROcodone-acetaminophen (NORCO) 5-325 mg per tablet  How is the patient currently taking it? (ex. 1XDay):  Take 1 tablet by mouth every 6 (six) hours as needed for Pain. >7 day supply of opioid is medically necessary for chronic pain. - Oral  Is this a 30 day or 90 day RX:  120 tablet  Preferred Pharmacy with phone number:    [facility] #[id number] [address] & [address] [address]  Phone: [phone] Fax: [phone]      Local or Mail Order:  local  Ordering Provider:  Brodie Trotter MD  Best Call Back Number:  [phone]  Additional Information: Would like a call back when the medication has been sent to the pharmacy.

## 2020-06-01 NOTE — PATIENT INSTRUCTIONS
Counseling and Referral of Other Preventative  (Italic type indicates deductible and co-insurance are waived)    Patient Name: Lázaro Wang  Today's Date: 6/1/2020    Health Maintenance       Date Due Completion Date    Shingles Vaccine (2 of 3) 06/30/2009 5/5/2009    TETANUS VACCINE 05/05/2019 5/5/2009    Urine Microalbumin 06/07/2020 6/7/2019    Hemoglobin A1c 07/07/2020 1/7/2020    Foot Exam 08/23/2020 8/23/2019    Eye Exam 10/23/2020 10/23/2019    Lipid Panel 01/07/2021 1/7/2020    Aspirin/Antiplatelet Therapy 03/03/2021 3/3/2020        No orders of the defined types were placed in this encounter.    The following information is provided to all patients.  This information is to help you find resources for any of the problems found today that may be affecting your health:                Living healthy guide: www.[facility].[place].gov      Understanding Diabetes: www.diabetes.org      Eating healthy: www.cdc.gov/healthyweight      [facility] home safety checklist: www.cdc.gov/steadi/patient.html      Agency on Aging: www.goea.[place].[facility]      Alcoholics anonymous (AA): www.aa.org      Physical Activity: www.shadi.nih.gov/cd4bezk      Tobacco use: www.quitwithusla.org

## 2020-06-01 NOTE — PROGRESS NOTES
"Lázaro Wang presented for a  Medicare AWV and comprehensive Health Risk Assessment today. The following components were reviewed and updated:    · Medical history  · Family History  · Social history  · Allergies and Current Medications  · Health Risk Assessment  · Health Maintenance  · Care Team     ** See Completed Assessments for Annual Wellness Visit within the encounter summary.**       The following assessments were completed:  · Living Situation  · CAGE  · Depression Screening  · Timed Get Up and Go  · Whisper Test  · Cognitive Function Screening  ·   ·   ·   · Nutrition Screening  · ADL Screening  · PAQ Screening    Vitals:    06/01/20 1043   BP: 136/85   Pulse: 60   Temp: 97.8 °F (36.6 °C)   SpO2: 98%   Weight: 95.3 kg (210 lb)   Height: 5' 8" (1.727 m)     Body mass index is 31.93 kg/m².  Physical Exam   Constitutional: He is oriented to person, place, and time. He appears well-developed and well-nourished.   HENT:   Head: Normocephalic and atraumatic.   Eyes: Pupils are equal, round, and reactive to light. EOM are normal.   Neck: Normal range of motion. Neck supple.   Cardiovascular: Normal rate, regular rhythm and normal heart sounds.   Pulmonary/Chest: Effort normal and breath sounds normal.   Neurological: He is alert and oriented to person, place, and time.   Skin: Skin is warm and dry.   Psychiatric: He has a normal mood and affect. His behavior is normal. Judgment and thought content normal.   Nursing note and vitals reviewed.        Diagnoses and health risks identified today and associated recommendations/orders:    1. Encounter for preventive health examination  AWV completed     2. Pacemaker  Stable- follows with cardiology     3. History of PTCA  Stable follows with cardiology      4. Essential hypertension  Stable followd by PCP and cardiologist      5. Bilateral carotid artery stenosis  Stable , followed with cardiology  See last US 2017     6. Coronary artery disease involving native " coronary artery of native heart without angina pectoris  Stable followed with PCP     7. Dyslipidemia  LDL followed  With labs - Last LDL 99   Managed by PCP       8. Ectasis aorta  Stable -followed by PCP       9. Atherosclerosis of aorta  Stable - followed with cardiology and PCP     10. Atrial fibrillation, unspecified type  Followed with cardiology  - on antiarrythmic       11. Chronic kidney disease, stage III (moderate)  Stable - followed with labs and by PCP     12. Overactive bladder  Stable - followed by PCP    13. 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 Gastroesophageal reflux disease without esophagitis  Stable on meds       15. Chronic narcotic dependence  On chronic narcotic meds for back pain - monitored by PCP    16. Lumbar disc disease  Stable - encouraged movement - followed with PCP    17. Other chronic pain  Stable - Followed by PCP     18. 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 Type 2 diabetes mellitus with complication  Stable- diet controlled - followed with PCP       20. Nuclear sclerosis of both eyes  Stable - followed with PCP       21. Hyperopia of both eyes with astigmatism and presbyopia  Stable- followed with ophthalmology       Provided Lázaro with a 5-10 year written screening schedule and personal prevention plan. Recommendations were developed using the USPSTF age appropriate recommendations. Education, counseling, and referrals were provided as needed. After Visit Summary printed and given to patient which includes a list of additional screenings\tests needed.        Monica Guillen NP  I offered to discuss end of life issues, including information on how to make advance directives that the patient could use to name someone who would make medical decisions on their behalf if they became too ill to make themselves.    ___Patient  declined  _X_Patient is interested, I provided paper work and offered to discuss.

## 2020-06-02 PROBLEM — F11.20 CHRONIC NARCOTIC DEPENDENCE: Status: ACTIVE | Noted: 2020-06-02

## 2020-06-02 PROBLEM — G56.01 CARPAL TUNNEL SYNDROME ON RIGHT: Status: RESOLVED | Noted: 2018-09-24 | Resolved: 2020-06-02

## 2020-06-02 PROBLEM — F33.0 RECURRENT MILD MAJOR DEPRESSIVE DISORDER WITH ANXIETY: Status: ACTIVE | Noted: 2020-06-02

## 2020-06-02 PROBLEM — G89.29 OTHER CHRONIC PAIN: Status: ACTIVE | Noted: 2020-06-02

## 2020-06-02 PROBLEM — F41.9 RECURRENT MILD MAJOR DEPRESSIVE DISORDER WITH ANXIETY: Status: ACTIVE | Noted: 2020-06-02

## 2020-06-02 PROBLEM — I45.5 SINUS PAUSE: Status: RESOLVED | Noted: 2018-12-10 | Resolved: 2020-06-02

## 2020-06-21 ENCOUNTER — CLINICAL SUPPORT (OUTPATIENT)
Dept: CARDIOLOGY | Facility: CLINIC | Age: 85
End: 2020-06-21
Payer: MEDICARE

## 2020-06-21 DIAGNOSIS — Z95.0 PRESENCE OF CARDIAC PACEMAKER: ICD-10-CM

## 2020-06-21 PROCEDURE — 93296 CARDIAC DEVICE CHECK - REMOTE: ICD-10-PCS | Mod: S$GLB,,, | Performed by: INTERNAL MEDICINE

## 2020-06-21 PROCEDURE — 93294 CARDIAC DEVICE CHECK - REMOTE: ICD-10-PCS | Mod: S$GLB,,, | Performed by: INTERNAL MEDICINE

## 2020-06-21 PROCEDURE — 93294 REM INTERROG EVL PM/LDLS PM: CPT | Mod: S$GLB,,, | Performed by: INTERNAL MEDICINE

## 2020-06-21 PROCEDURE — 93296 REM INTERROG EVL PM/IDS: CPT | Mod: S$GLB,,, | Performed by: INTERNAL MEDICINE

## 2020-06-26 ENCOUNTER — OFFICE VISIT (OUTPATIENT)
Dept: FAMILY MEDICINE | Facility: CLINIC | Age: 85
End: 2020-06-26
Payer: MEDICARE

## 2020-06-26 VITALS
WEIGHT: 215.5 LBS | RESPIRATION RATE: 16 BRPM | HEIGHT: 67 IN | DIASTOLIC BLOOD PRESSURE: 78 MMHG | BODY MASS INDEX: 33.82 KG/M2 | TEMPERATURE: 98 F | SYSTOLIC BLOOD PRESSURE: 124 MMHG | HEART RATE: 66 BPM

## 2020-06-26 DIAGNOSIS — R49.0 HOARSE: Primary | ICD-10-CM

## 2020-06-26 PROCEDURE — 99999 PR PBB SHADOW E&M-EST. PATIENT-LVL IV: CPT | Mod: PBBFAC,HCNC,, | Performed by: FAMILY MEDICINE

## 2020-06-26 PROCEDURE — 1159F MED LIST DOCD IN RCRD: CPT | Mod: HCNC,S$GLB,, | Performed by: FAMILY MEDICINE

## 2020-06-26 PROCEDURE — 1101F PT FALLS ASSESS-DOCD LE1/YR: CPT | Mod: HCNC,CPTII,S$GLB, | Performed by: FAMILY MEDICINE

## 2020-06-26 PROCEDURE — 3074F PR MOST RECENT SYSTOLIC BLOOD PRESSURE < 130 MM HG: ICD-10-PCS | Mod: HCNC,CPTII,S$GLB, | Performed by: FAMILY MEDICINE

## 2020-06-26 PROCEDURE — 1101F PR PT FALLS ASSESS DOC 0-1 FALLS W/OUT INJ PAST YR: ICD-10-PCS | Mod: HCNC,CPTII,S$GLB, | Performed by: FAMILY MEDICINE

## 2020-06-26 PROCEDURE — 99214 OFFICE O/P EST MOD 30 MIN: CPT | Mod: HCNC,S$GLB,, | Performed by: FAMILY MEDICINE

## 2020-06-26 PROCEDURE — 3078F DIAST BP <80 MM HG: CPT | Mod: HCNC,CPTII,S$GLB, | Performed by: FAMILY MEDICINE

## 2020-06-26 PROCEDURE — 99214 PR OFFICE/OUTPT VISIT, EST, LEVL IV, 30-39 MIN: ICD-10-PCS | Mod: HCNC,S$GLB,, | Performed by: FAMILY MEDICINE

## 2020-06-26 PROCEDURE — 1126F AMNT PAIN NOTED NONE PRSNT: CPT | Mod: HCNC,S$GLB,, | Performed by: FAMILY MEDICINE

## 2020-06-26 PROCEDURE — 1159F PR MEDICATION LIST DOCUMENTED IN MEDICAL RECORD: ICD-10-PCS | Mod: HCNC,S$GLB,, | Performed by: FAMILY MEDICINE

## 2020-06-26 PROCEDURE — 3074F SYST BP LT 130 MM HG: CPT | Mod: HCNC,CPTII,S$GLB, | Performed by: FAMILY MEDICINE

## 2020-06-26 PROCEDURE — 99999 PR PBB SHADOW E&M-EST. PATIENT-LVL IV: ICD-10-PCS | Mod: PBBFAC,HCNC,, | Performed by: FAMILY MEDICINE

## 2020-06-26 PROCEDURE — 1126F PR PAIN SEVERITY QUANTIFIED, NO PAIN PRESENT: ICD-10-PCS | Mod: HCNC,S$GLB,, | Performed by: FAMILY MEDICINE

## 2020-06-26 PROCEDURE — 3078F PR MOST RECENT DIASTOLIC BLOOD PRESSURE < 80 MM HG: ICD-10-PCS | Mod: HCNC,CPTII,S$GLB, | Performed by: FAMILY MEDICINE

## 2020-06-26 RX ORDER — LORATADINE 10 MG/1
10 TABLET ORAL DAILY
Qty: 30 TABLET | Refills: 11 | Status: SHIPPED | OUTPATIENT
Start: 2020-06-26 | End: 2022-06-21

## 2020-06-26 RX ORDER — FLUTICASONE PROPIONATE 50 MCG
2 SPRAY, SUSPENSION (ML) NASAL DAILY
Qty: 16 G | Refills: 11 | Status: SHIPPED | OUTPATIENT
Start: 2020-06-26 | End: 2020-06-26

## 2020-06-26 RX ORDER — MONTELUKAST SODIUM 10 MG/1
10 TABLET ORAL NIGHTLY
Qty: 30 TABLET | Refills: 0 | Status: SHIPPED | OUTPATIENT
Start: 2020-06-26 | End: 2020-06-26

## 2020-06-26 NOTE — PROGRESS NOTES
THIS DOCUMENT WAS MADE IN PART WITH VOICE RECOGNITION SOFTWARE.  OCCASIONALLY THIS SOFTWARE WILL MISINTERPRET WORDS OR PHRASES.    Assessment and Plan:    1. Hoarse  Treat allergic rhinitis with Claritin, Singulair  Patient reports trying Flonase in the past, did not work.  Referral to ENT for larynx evaluation  - Ambulatory referral/consult to ENT; Future  - loratadine (CLARITIN) 10 mg tablet; Take 1 tablet (10 mg total) by mouth once daily.  Dispense: 30 tablet; Refill: 11        ______________________________________________________________________  Subjective:    Chief Complaint:  Chief Complaint   Patient presents with    Laryngitis     X 2 months wont go away. Denies sore throat or cough.        HPI:  Lázaro is a 85 y.o. year old     Laryngitis  Duration 2 months  Denies sore throat or cough  Positive history of vocal cord nodules, Presbylarynges per chart  Denies any trouble swallowing, sore throat, recent infection.  Denies any unexplained weight loss.  Father had history of esophageal cancer  Patient reports history of heavy drinking in the past, no history of smoking.    Past Medical History:  Past Medical History:   Diagnosis Date    Anticoagulant long-term use     325 asa    Anxiety     Arthritis     Carpal tunnel syndrome on right 09/2018    Cataract     OU    Chronic back pain     Coronary artery disease     stents x3    Diabetes mellitus     LBSL 70 today---stable    Elevated cholesterol     General anesthetics causing adverse effect in therapeutic use     confusion for 7-8 days following min tka    GERD (gastroesophageal reflux disease)     Heart disease     Hypertension     Myocardial infarction     Prostate disorder     Sinus pause 12/10/2018    Trouble in sleeping        Past Surgical History:  Past Surgical History:   Procedure Laterality Date    CARPAL TUNNEL RELEASE Right 9/24/2018    Procedure: RELEASE, CARPAL TUNNEL;  Surgeon: ADELINE Jorgensen MD;  [facility];   Service: General;  Laterality: Right;    CIRCUMCISION      COLONOSCOPY  11/14/2008  Ray    Diverticulosis.  Small Internal hemorrhoids.    COLONOSCOPY N/A 5/25/2017    Procedure: COLONOSCOPY;  Surgeon: Nito Larsen MD;  Location: [facility] ENDO;  Service: Endoscopy;  Laterality: N/A;    COLONOSCOPY W/ POLYPECTOMY  12/2002    Adenomatous polyp    COLONOSCOPY W/ POLYPECTOMY  10/12/2005  Ray    One 2-3 mm polyp in the rectum. ADENOMATOUS POLYP.  Diverticulosis.  Internal hemorrhoids.      CORONARY ANGIOPLASTY WITH STENT PLACEMENT      x 3    CORONARY ANGIOPLASTY WITH STENT PLACEMENT      x2    ESOPHAGOGASTRODUODENOSCOPY  10/12/2005  Ray    Reflux esophagitis.  Small Hiatal Hernia.  Esophageal spasm?  Antral erythema.  CLOtest negative.    INSERTION OF PACEMAKER N/A 12/10/2018    Procedure: INSERTION, PACEMAKER-[employer];  Surgeon: Cj Stevens MD;  Location: [facility] CATH;  Service: Cardiology;  Laterality: N/A;    TOTAL KNEE ARTHROPLASTY Bilateral     bilateral       Family History:  Family History   Problem Relation Age of Onset    Glaucoma Mother     Cataracts Mother     Cancer Father         esophageal, did not die of    COPD Father         emphysema (2 ppd)       Social History:  Social History     Socioeconomic History    Marital status:      Spouse name: Not on file    Number of children: Not on file    Years of education: Not on file    Highest education level: Not on file   Occupational History    Occupation: retired    Social Needs    Financial resource strain: Not on file    Food insecurity     Worry: Not on file     Inability: Not on file    Transportation needs     Medical: Not on file     Non-medical: Not on file   Tobacco Use    Smoking status: Never Smoker    Smokeless tobacco: Never Used   Substance and Sexual Activity    Alcohol use: No    Drug use: No    Sexual activity: Not on file   Lifestyle    Physical activity     Days per  week: Not on file     Minutes per session: Not on file    Stress: Not on file   Relationships    Social connections     Talks on phone: Not on file     Gets together: Not on file     Attends Hoahaoism service: Not on file     Active member of club or organization: Not on file     Attends meetings of clubs or organizations: Not on file     Relationship status: Not on file   Other Topics Concern    Not on file   Social History Narrative    Not on file       Medications:  Current Outpatient Medications on File Prior to Visit   Medication Sig Dispense Refill    amiodarone (PACERONE) 200 MG Tab Take 200 mg by mouth 3 (three) times a week.      aspirin 325 MG tablet Take 325 mg by mouth once daily.      blood sugar diagnostic Strp One touch ultra test strips . Test two times a day DX E11.43 200 each 3    diabetic supplies, miscellan. Kit One true metrix testing device to be used two times a day DX E11.49 1 kit 1    HYDROcodone-acetaminophen (NORCO) 5-325 mg per tablet Take 1 tablet by mouth every 6 (six) hours as needed for Pain. >7 day supply of opioid is medically necessary for chronic pain. 120 tablet 0    lancets (ONETOUCH DELICA LANCETS) 33 gauge Misc 1 lancet by Misc.(Non-Drug; Combo Route) route 2 (two) times daily. Trueplus 33guage lancet use two times a day. DX E11.40 200 each 4    metoprolol succinate (TOPROL-XL) 25 MG 24 hr tablet TAKE 1 TABLET(25 MG) BY MOUTH EVERY DAY 90 tablet 3    multivitamin capsule Take 1 capsule by mouth once daily.      nitroGLYCERIN (NITROQUICK) 0.4 MG SL tablet Place 1 tablet (0.4 mg total) under the tongue every 5 (five) minutes as needed. PRN 25 tablet 5    omeprazole (PRILOSEC) 20 MG capsule Take 1 capsule (20 mg total) by mouth once daily. 30 capsule 11    QUEtiapine (SEROQUEL) 100 MG Tab Take take one to one and 1/2 tablets (up to 150 mg max) every evening for sleep 120 tablet 2    tamsulosin (FLOMAX) 0.4 mg Cap TAKE 1 CAPSULE BY MOUTH EVERY NIGHT AT BEDTIME 90  "capsule 1    triamcinolone acetonide 0.025% (KENALOG) 0.025 % cream Apply topically 2 (two) times daily. 80 g 3    TRUE METRIX AIR GLUCOSE METER kit       VIT C/VIT E AC/LUT/COPPER/ZINC (PRESERVISION LUTEIN ORAL) Take by mouth once daily.        No current facility-administered medications on file prior to visit.        Allergies:  No known drug allergies    Immunizations:  Immunization History   Administered Date(s) Administered    Influenza 10/23/2007, 11/19/2008, 02/24/2010, 09/14/2010    Influenza - High Dose - PF (65 years and older) 12/04/2012, 12/04/2012, 10/21/2015, 10/24/2016, 09/27/2017, 11/15/2019    Influenza - Trivalent (ADULT) 10/23/2007, 11/19/2008, 02/24/2010, 09/14/2010    Influenza - Trivalent - Adjuvanted - PF 09/28/2018    Influenza A (H1N1) 2009 Monovalent - IM 02/24/2010    Pneumococcal Conjugate - 13 Valent 03/08/2016    Pneumococcal Polysaccharide - 23 Valent 10/23/2007    Td - PF (ADULT) 05/05/2009    Zoster 05/05/2009       Review of Systems:  Review of Systems   Respiratory:        Hoarseness   All other systems reviewed and are negative.      Objective:    Vitals:  Vitals:    06/26/20 1143   BP: 124/78   Pulse: 66   Resp: 16   Temp: 97.5 °F (36.4 °C)   TempSrc: Skin   Weight: 97.7 kg (215 lb 8 oz)   Height: 5' 7" (1.702 m)   PainSc: 0-No pain       Physical Exam  Constitutional:       General: He is not in acute distress.  HENT:      Head: Normocephalic and atraumatic.   Eyes:      Pupils: Pupils are equal, round, and reactive to light.   Neck:      Musculoskeletal: Neck supple.   Cardiovascular:      Rate and Rhythm: Normal rate and regular rhythm.      Heart sounds: No murmur. No friction rub.   Pulmonary:      Effort: Pulmonary effort is normal.      Breath sounds: Normal breath sounds.   Abdominal:      General: Bowel sounds are normal. There is no distension.      Palpations: Abdomen is soft.      Tenderness: There is no abdominal tenderness.   Skin:     General: Skin is " warm and dry.      Findings: No rash.   Psychiatric:         Behavior: Behavior normal.         Data:  No previous labs, imaging, or notes available.        Augustin Canseco MD  Family Medicine

## 2020-07-06 DIAGNOSIS — M51.9 LUMBAR DISC DISEASE: ICD-10-CM

## 2020-07-06 RX ORDER — HYDROCODONE BITARTRATE AND ACETAMINOPHEN 5; 325 MG/1; MG/1
1 TABLET ORAL EVERY 6 HOURS PRN
Qty: 120 TABLET | Refills: 0 | Status: SHIPPED | OUTPATIENT
Start: 2020-07-06 | End: 2020-08-05 | Stop reason: SDUPTHER

## 2020-07-06 RX ORDER — OMEPRAZOLE 20 MG/1
20 CAPSULE, DELAYED RELEASE ORAL DAILY
Qty: 30 CAPSULE | Refills: 11 | Status: SHIPPED | OUTPATIENT
Start: 2020-07-06 | End: 2021-06-17

## 2020-07-06 NOTE — TELEPHONE ENCOUNTER
----- Message from Mckenzie Martinez sent at 7/6/2020 12:05 PM CDT -----  Type:  RX Refill Request    Who Called:  Lázaro Salguero or New Rx:  refills  RX Name and Strength:  omeprazole (PRILOSEC) 20 MG capsule, once daily and HYDROcodone-acetaminophen (NORCO) 5-325 mg per tablet one every 6 hours    Is this a 30 day or 90 day RX:  30  Preferred Pharmacy with phone number:    [facility] #[id number] [address] & [address] [address]  Phone: [phone] Fax: [phone]  Local or Mail Order:  local  Ordering Provider:  Dr Trotter  Best Call Back Number:  [phone]  Additional Information:  thank you!

## 2020-07-07 ENCOUNTER — TELEPHONE (OUTPATIENT)
Dept: CARDIOLOGY | Facility: CLINIC | Age: 85
End: 2020-07-07

## 2020-07-07 RX ORDER — LANCETS 33 GAUGE
1 EACH MISCELLANEOUS 2 TIMES DAILY
Qty: 200 EACH | Refills: 4 | Status: SHIPPED | OUTPATIENT
Start: 2020-07-07

## 2020-07-07 NOTE — TELEPHONE ENCOUNTER
atrial fibrillation noted during  device  remote check:on 7/7/2020    Overall burden:  <1 % since 2/18/2020    Max duration seen: 1 hr. 49 mins    Most recent episode: 7/5/2020    Ventricular rates:   Controlled  (average V rate 73 bpm)    Anticoagulation status:  None    Patient symptoms: Pt. Asymptomatic    Meds:  Amiodarone 200 mg  3 x a week  Aspirin 325 mg daily  Toprol XL 25 mg every day    See attached for interrogation      Message sent to Dr. Dominguez for review

## 2020-07-07 NOTE — TELEPHONE ENCOUNTER
----- Message from Thais Cardona sent at 7/7/2020 12:39 PM CDT -----  Regarding: Pt Rx  Contact: [phone]  Type:  RX Refill Request    Who Called:  Patient   Refill or New Rx:  refill  RX Name and Strength:   diabetic supplies, miscellan. Kit  How is the patient currently taking it? (ex. 1XDay):  ...  Is this a 30 day or 90 day RX:  90 day   Preferred Pharmacy with phone number:    [facility] #[id number] [place] [address] AT [facility] & [address] [address]  Phone: [phone] Fax: [phone]    Local or Mail Order:  Local   Ordering Provider:  Sergo   Best Call Back Number:  [phone]      Need diabetic supplies

## 2020-07-23 ENCOUNTER — TELEPHONE (OUTPATIENT)
Dept: FAMILY MEDICINE | Facility: CLINIC | Age: 85
End: 2020-07-23

## 2020-07-23 NOTE — TELEPHONE ENCOUNTER
Attempted to contact patient via telephone x3. Unable to reach patient left several message to notify patient of cancellation. Letter sent.

## 2020-08-05 DIAGNOSIS — M51.9 LUMBAR DISC DISEASE: ICD-10-CM

## 2020-08-05 RX ORDER — HYDROCODONE BITARTRATE AND ACETAMINOPHEN 5; 325 MG/1; MG/1
1 TABLET ORAL EVERY 6 HOURS PRN
Qty: 120 TABLET | Refills: 0 | Status: SHIPPED | OUTPATIENT
Start: 2020-08-05 | End: 2020-09-02 | Stop reason: SDUPTHER

## 2020-08-05 NOTE — TELEPHONE ENCOUNTER
----- Message from Christopher Padron sent at 8/5/2020 10:17 AM CDT -----  Regarding: pt  Type:  RX Refill Request    Who Called:  pt  Refill or New Rx:  refill  RX Name and Strength:    HYDROcodone-acetaminophen (NORCO) 5-325 mg per tablet    Sig - Route: Take 1 tablet by mouth every 6 (six) hours as needed for Pain. >7 day supply of opioid is medically necessary for chronic pain. - Oral   Sent to pharmacy as: HYDROcodone-acetaminophen (NORCO) 5-325 mg per tablet   Earliest Fill Date: 7/6/2020   Notes to Pharmacy: GREATER THAN 7 DAY SUPPLY IS MEDICALLY NECESSARY     How is the patient currently taking it? (ex. 1XDay):  see above  Is this a 30 day or 90 day RX:  30  Preferred Pharmacy with phone number:    [facility]  [address] [place] [address] [phone]    Local or Mail Order:  local  Ordering Provider:  Sergo  Best Call Back Number:  [phone]  Additional Information:  pt is on vacation in [place] and needs meds called in to [facility] in [place] this month please.

## 2020-09-02 DIAGNOSIS — M51.9 LUMBAR DISC DISEASE: ICD-10-CM

## 2020-09-02 RX ORDER — HYDROCODONE BITARTRATE AND ACETAMINOPHEN 5; 325 MG/1; MG/1
1 TABLET ORAL EVERY 6 HOURS PRN
Qty: 120 TABLET | Refills: 0 | Status: SHIPPED | OUTPATIENT
Start: 2020-09-02 | End: 2020-10-01 | Stop reason: SDUPTHER

## 2020-09-02 NOTE — TELEPHONE ENCOUNTER
----- Message from Yvon CHENG Jaun sent at 9/2/2020 10:10 AM CDT -----  Regarding: refill  Contact: patient  Type:  RX Refill Request    Who Called:  patient  Refill or New Rx:  new  RX Name and Strength:  HYDROcodone-acetaminophen (NORCO) 5-325 mg per tablet  How is the patient currently taking it? (ex. 1XDay):  Take 1 tablet by mouth every 6 (six) hours as needed for Pain. >7 day supply of opioid is medically necessary for chronic pain. - Oral  Is this a 30 day or 90 day RX:  120 tablet 0 8/5/2020   Preferred Pharmacy with phone number:    [facility] #[id number] [place] [address] AT [facility] & [address] [address]  Phone: [phone] Fax: [phone]  Ordering Provider:  Sergo  Best Call Back Number:  [phone]  Additional Information:  n/a

## 2020-09-04 ENCOUNTER — PATIENT OUTREACH (OUTPATIENT)
Dept: ADMINISTRATIVE | Facility: OTHER | Age: 85
End: 2020-09-04

## 2020-09-04 NOTE — PROGRESS NOTES
LINKS immunization registry updated  Care Everywhere updated  Health Maintenance updated  Chart reviewed for overdue Proactive [facility] Encounters (KAUSHIK) health maintenance testing (CRS, Breast Ca, Diabetic Eye Exam)   Orders entered:N/A

## 2020-09-08 PROBLEM — I48.0 PAF (PAROXYSMAL ATRIAL FIBRILLATION): Status: ACTIVE | Noted: 2019-06-06

## 2020-09-14 ENCOUNTER — TELEPHONE (OUTPATIENT)
Dept: CARDIOLOGY | Facility: CLINIC | Age: 85
End: 2020-09-14

## 2020-09-15 ENCOUNTER — DOCUMENTATION ONLY (OUTPATIENT)
Dept: CARDIOLOGY | Facility: CLINIC | Age: 85
End: 2020-09-15

## 2020-09-15 NOTE — PROGRESS NOTES
AF noted during interim device remote check:    Overall burden:  <1%    Max duration seen: 1 hr. 29 mins on 9/11/2020    Most recent episode: 9/11    Ventricular rates:   Controlled       Anticoagulation status: none on aspirin only    Reviewed with Dr. Dominguez, per Dr. Dominguez continue to monitor

## 2020-09-16 ENCOUNTER — OFFICE VISIT (OUTPATIENT)
Dept: OTOLARYNGOLOGY | Facility: CLINIC | Age: 85
End: 2020-09-16
Payer: MEDICARE

## 2020-09-16 VITALS — BODY MASS INDEX: 33.8 KG/M2 | WEIGHT: 215.38 LBS | HEIGHT: 67 IN

## 2020-09-16 DIAGNOSIS — J38.3 VOCAL CORD CYST: Primary | ICD-10-CM

## 2020-09-16 DIAGNOSIS — R49.0 HOARSE: ICD-10-CM

## 2020-09-16 DIAGNOSIS — Z01.818 PRE-OP TESTING: ICD-10-CM

## 2020-09-16 PROCEDURE — 99999 PR PBB SHADOW E&M-EST. PATIENT-LVL V: CPT | Mod: PBBFAC,HCNC,, | Performed by: OTOLARYNGOLOGY

## 2020-09-16 PROCEDURE — 1101F PR PT FALLS ASSESS DOC 0-1 FALLS W/OUT INJ PAST YR: ICD-10-PCS | Mod: HCNC,CPTII,S$GLB, | Performed by: OTOLARYNGOLOGY

## 2020-09-16 PROCEDURE — 3288F FALL RISK ASSESSMENT DOCD: CPT | Mod: HCNC,CPTII,S$GLB, | Performed by: OTOLARYNGOLOGY

## 2020-09-16 PROCEDURE — 1101F PT FALLS ASSESS-DOCD LE1/YR: CPT | Mod: HCNC,CPTII,S$GLB, | Performed by: OTOLARYNGOLOGY

## 2020-09-16 PROCEDURE — 99214 OFFICE O/P EST MOD 30 MIN: CPT | Mod: 25,HCNC,S$GLB, | Performed by: OTOLARYNGOLOGY

## 2020-09-16 PROCEDURE — 1126F AMNT PAIN NOTED NONE PRSNT: CPT | Mod: HCNC,S$GLB,, | Performed by: OTOLARYNGOLOGY

## 2020-09-16 PROCEDURE — 99999 PR PBB SHADOW E&M-EST. PATIENT-LVL V: ICD-10-PCS | Mod: PBBFAC,HCNC,, | Performed by: OTOLARYNGOLOGY

## 2020-09-16 PROCEDURE — 1159F PR MEDICATION LIST DOCUMENTED IN MEDICAL RECORD: ICD-10-PCS | Mod: HCNC,S$GLB,, | Performed by: OTOLARYNGOLOGY

## 2020-09-16 PROCEDURE — 99214 PR OFFICE/OUTPT VISIT, EST, LEVL IV, 30-39 MIN: ICD-10-PCS | Mod: 25,HCNC,S$GLB, | Performed by: OTOLARYNGOLOGY

## 2020-09-16 PROCEDURE — 1126F PR PAIN SEVERITY QUANTIFIED, NO PAIN PRESENT: ICD-10-PCS | Mod: HCNC,S$GLB,, | Performed by: OTOLARYNGOLOGY

## 2020-09-16 PROCEDURE — 3288F PR FALLS RISK ASSESSMENT DOCUMENTED: ICD-10-PCS | Mod: HCNC,CPTII,S$GLB, | Performed by: OTOLARYNGOLOGY

## 2020-09-16 PROCEDURE — 31575 PR LARYNGOSCOPY, FLEXIBLE; DIAGNOSTIC: ICD-10-PCS | Mod: HCNC,S$GLB,, | Performed by: OTOLARYNGOLOGY

## 2020-09-16 PROCEDURE — 1159F MED LIST DOCD IN RCRD: CPT | Mod: HCNC,S$GLB,, | Performed by: OTOLARYNGOLOGY

## 2020-09-16 PROCEDURE — 31575 DIAGNOSTIC LARYNGOSCOPY: CPT | Mod: HCNC,S$GLB,, | Performed by: OTOLARYNGOLOGY

## 2020-09-16 NOTE — PATIENT INSTRUCTIONS
Direct Laryngoscopy  Direct laryngoscopy is a procedure to look at the vocal cords. A laryngoscope is a rigid, hollow tube with a light attached. Using this tool, your healthcare provider can look behind your tongue and down your throat to your vocal cords. A tissue sample (biopsy) can be taken for study in a lab. Or a growth can be removed. Your healthcare provider can tell you more about your procedure depending on why its being done. This sheet gives you general information about what to expect.    Getting ready for the procedure  Prepare for the surgery as you have been instructed. Be sure to tell your healthcare provider about all medicines you take. This includes over-the-counter medicines. It also includes herbs and other supplements. You may need to stop taking some or all of them before surgery. Your healthcare provider will let you know. Also follow any directions youre given for not eating or drinking before surgery.  The day of the procedure  The procedure takes 30 to 60 minutes. You will likely go home the same day.  Before the procedure  Here is what to expect before the procedure begins:  · An IV line is put into a vein in your arm or hand. This line delivers fluids and medicines.  · You will be given medicine (anesthesia) to keep you pain free during surgery. This may be general anesthesia, which puts you in a state like deep sleep. Or you may have sedation, which makes you relaxed and drowsy. Local anesthesia may also be injected or sprayed into your throat to numb it.  During the procedure  Here is what to expect during the procedure:  · You will lie on your back on a table. The scope is put into your mouth and passed down into the throat.  · Your healthcare provider examines the larynx and surrounding areas with the scope. If needed, a biopsy is done using small tools put through the scope.  · If a growth is found, tools can be put through the scope to remove it.  After the procedure  You will  be taken to a room to wake up from the anesthesia. At first, your throat may be sore and scratchy. Your voice may be hoarse, making talking difficult. And it may be hard to swallow. You will receive medicine to control pain. When you are released to go home, have an adult family member or friend ready to drive you.  Recovering at home  Once home, follow any instructions you have been given. Be sure to:  · Take pain medicine as directed.  · Drink plenty of water as soon as you can swallow normally.  · Use throat lozenges as advised by your healthcare provider to help ease throat soreness.  · Rest your voice as instructed by your healthcare provider.  When to call your healthcare provider  After you get home, call the healthcare provider if you have any of the following:  · Chest pain or trouble breathing (call 911)  · Fever of 100.4°F (38°C) or higher, or as directed by your healthcare provider  · Problems swallowing that prevent you from eating or drinking  · Pain that does not go away even after taking pain medicine  · Severe nausea or vomiting  · Bloody vomit  · Cough that brings up blood   Follow-up  Within a few weeks, you will see your healthcare provider for a follow-up visit. During this visit, your provider will discuss the results of the procedure. Depending on what was found, you may need further evaluation and treatment.  Risks and possible complications   The risks of this procedure include:  · Bleeding  · Infection  · Gagging  · Vomiting  · Cuts in the mouth or throat  · Injury to the teeth  · Vocal cord injury  · Breathing problems  · Risks of anesthesia   Date Last Reviewed: 6/11/2015  © 6854-6895 [employer] [place]. [address] [place] [address], [place] [address]. All rights reserved. This information is not intended as a substitute for professional medical care. Always follow your healthcare professional's instructions.

## 2020-09-16 NOTE — LETTER
September 16, 2020      Augustin Canseco MD  [address]  [facility] [address]           [facility]  [address] [place] [address]  Phone: [phone]  Fax: [phone]          Patient: Lázaro Wang   MR Number: [id number]   YOB: 1934   Date of Visit: 9/16/2020       Dear Dr. Augustin Canseco:    Thank you for referring Lázaro Wang to me for evaluation. Attached you will find relevant portions of my assessment and plan of care.    If you have questions, please do not hesitate to call me. I look forward to following Lázaro Wang along with you.    Sincerely,    Yves Ernst MD    Enclosure  CC:  No Recipients    If you would like to receive this communication electronically, please contact [email or web address] or [phone] to request more information on [facility] [employer] access.    For providers and/or their staff who would like to refer a patient to [facility], please contact us through our one-stop-shop provider referral line, [facility], [place], at [phone].    If you feel you have received this communication in error or would no longer like to receive these types of communications, please e-mail [email or web address]

## 2020-09-16 NOTE — H&P (VIEW-ONLY)
"Subjective:       Patient ID: Lázaro Wang is a 85 y.o. male.    Chief Complaint: Hoarse and Post nasal drip    Lázaro is here for hoarseness.   Length of symptoms: 5-6 mos. Has been worsening during this time. He has some good days, but generally describes "ragged" voice that is persistent. Minimal fluctuations throughout the day. His wife is hard of hearing and he has trouble projecting his voice. Denies dyspnea or dysphagia. No previous history of this. No pain or bleeding. No otalgia. No previous intubations.    Prev seen by Sharona for ear issues.    VHI-10 = 23     Pertinent meds: ASA  Pertinent medical issues: CAD    Social History     Tobacco Use   Smoking Status Never Smoker   Smokeless Tobacco Never Used     Social History     Substance and Sexual Activity   Alcohol Use No          Review of Systems   Constitutional: Negative for activity change and appetite change.   Eyes: Negative for discharge.   Respiratory: Negative for difficulty breathing and wheezing   Cardiovascular: Negative for chest pain.   Gastrointestinal: Negative for abdominal distention and abdominal pain.   Endocrine: Negative for cold intolerance and heat intolerance.   Genitourinary: Negative for dysuria.   Musculoskeletal: Negative for gait problem and joint swelling.   Skin: Negative for color change and pallor.   Neurological: Negative for syncope and weakness.   Psychiatric/Behavioral: Negative for agitation and confusion.     Objective:        Constitutional:   He is oriented to person, place, and time. He appears well-developed and well-nourished. He appears alert. He is active. Normal speech.      Head:  Normocephalic and atraumatic. Head is without TMJ tenderness. No scars. Salivary glands normal.  Facial strength is normal.      Ears:    Right Ear: No drainage or swelling. No middle ear effusion.   Left Ear: No drainage or swelling.  No middle ear effusion.     Nose:  No mucosal edema, rhinorrhea or sinus tenderness. No " turbinate hypertrophy.      Mouth/Throat  Oropharynx clear and moist without lesions or asymmetry, normal uvula midline and mirror exam normal. Normal dentition. No uvula swelling, lacerations or trismus. No oropharyngeal exudate. Tonsillar erythema, tonsillar exudate.    Mild strained and at times, mod raspy quality to voice.  Strong gag, unable to mirror      Neck:  Full range of motion with neck supple and no adenopathy. Thyroid tenderness is present. No tracheal deviation, no edema, no erythema, normal range of motion, no stridor, no crepitus and no neck rigidity present. No thyroid mass present.     Cardiovascular:   Intact distal pulses and normal pulses.      Pulmonary/Chest:   Effort normal and breath sounds normal. No stridor.     Psychiatric:   His speech is normal and behavior is normal. His mood appears not anxious. His affect is not labile.     Neurological:   He is alert and oriented to person, place, and time. No sensory deficit.     Skin:   No abrasions, lacerations, lesions, or rashes. No abrasion and no bruising noted.         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 A diagnosis of Hoarse was also pertinent to this visit.     Post-procedure diagnosis: same    Procedure: Flexible fiberoptic laryngoscopy    Surgeon: Yves Ernst MD    Anesthesia: 2% Lidocaine with Phenylephrine topical    Risks, benefits, and alternatives of the procedure were discussed with the patient, and the patient consented to the fiberoptic examination.  We applied a topical nasal decongestant and analgesic.  After adequate anesthesia was obtained, the flexible fiberoptic scope was passed through the nasal cavity. The entire pharynx (nasopharynx to hypopharynx) and the larynx were visualized. At the end of the examination, the scope was removed. The patient tolerated the procedure well with no complications.     Findings:  -     Laryngeal mucosa is normal  -      Post-cricoid region: normal  -     Lingual tonsils have no hypertrophy  -     Adenoids have no  hypertrophy  -     Right vocal fold: normal mobility     mass/lesion: medial edge cyst jxn of ant and mid cord  -     Left vocal fold: normal mobility     mass/lesion: reactive change of ant/mid jxn  -     Other findings: hourglass closure        Assessment:       1. Vocal cord cyst    2. Hoarse          Plan:         Reviewed with pt. Benign in nature.  Unlikely to improve with ST alone.  Discussed DML with excision vs obs. May need adjuvant ST    I discussed the risks of microlaryngoscopy including pain, recurrence / persistent, worsening voice, swallows disturbance (typically temporary), injury to dentition, inability to expose the lesion 2/2 anatomy, airway fire (if laser), taste changes/tongue numbness, pneumothorax.

## 2020-09-19 ENCOUNTER — CLINICAL SUPPORT (OUTPATIENT)
Dept: CARDIOLOGY | Facility: CLINIC | Age: 85
End: 2020-09-19
Payer: MEDICARE

## 2020-09-19 DIAGNOSIS — Z95.0 PRESENCE OF CARDIAC PACEMAKER: ICD-10-CM

## 2020-09-19 PROCEDURE — 93296 REM INTERROG EVL PM/IDS: CPT | Mod: S$GLB,,, | Performed by: INTERNAL MEDICINE

## 2020-09-19 PROCEDURE — 93294 CARDIAC DEVICE CHECK - REMOTE: ICD-10-PCS | Mod: S$GLB,,, | Performed by: INTERNAL MEDICINE

## 2020-09-19 PROCEDURE — 93294 REM INTERROG EVL PM/LDLS PM: CPT | Mod: S$GLB,,, | Performed by: INTERNAL MEDICINE

## 2020-09-19 PROCEDURE — 93296 CARDIAC DEVICE CHECK - REMOTE: ICD-10-PCS | Mod: S$GLB,,, | Performed by: INTERNAL MEDICINE

## 2020-09-22 ENCOUNTER — LAB VISIT (OUTPATIENT)
Dept: FAMILY MEDICINE | Facility: CLINIC | Age: 85
End: 2020-09-22
Payer: MEDICARE

## 2020-09-22 DIAGNOSIS — Z01.818 PRE-OP TESTING: ICD-10-CM

## 2020-09-22 PROCEDURE — U0003 INFECTIOUS AGENT DETECTION BY NUCLEIC ACID (DNA OR RNA); SEVERE ACUTE RESPIRATORY SYNDROME CORONAVIRUS 2 (SARS-COV-2) (CORONAVIRUS DISEASE [COVID-19]), AMPLIFIED PROBE TECHNIQUE, MAKING USE OF HIGH THROUGHPUT TECHNOLOGIES AS DESCRIBED BY CMS-2020-01-R: HCPCS | Mod: HCNC

## 2020-09-23 LAB — SARS-COV-2 RNA RESP QL NAA+PROBE: NOT DETECTED

## 2020-09-24 ENCOUNTER — ANESTHESIA EVENT (OUTPATIENT)
Dept: SURGERY | Facility: HOSPITAL | Age: 85
End: 2020-09-24
Payer: MEDICARE

## 2020-09-25 ENCOUNTER — ANESTHESIA (OUTPATIENT)
Dept: SURGERY | Facility: HOSPITAL | Age: 85
End: 2020-09-25
Payer: MEDICARE

## 2020-09-25 ENCOUNTER — HOSPITAL ENCOUNTER (OUTPATIENT)
Facility: HOSPITAL | Age: 85
Discharge: HOME | End: 2020-09-25
Attending: OTOLARYNGOLOGY | Admitting: OTOLARYNGOLOGY
Payer: MEDICARE

## 2020-09-25 DIAGNOSIS — R49.0 DYSPHONIA: Primary | ICD-10-CM

## 2020-09-25 DIAGNOSIS — J38.3 VOCAL CORD CYST: ICD-10-CM

## 2020-09-25 PROCEDURE — 88305 TISSUE EXAM BY PATHOLOGIST: CPT | Mod: HCNC | Performed by: PATHOLOGY

## 2020-09-25 PROCEDURE — 36000709 HC OR TIME LEV III EA ADD 15 MIN: Mod: HCNC,PO | Performed by: OTOLARYNGOLOGY

## 2020-09-25 PROCEDURE — 88305 TISSUE EXAM BY PATHOLOGIST: ICD-10-PCS | Mod: 26,HCNC,, | Performed by: PATHOLOGY

## 2020-09-25 PROCEDURE — 37000009 HC ANESTHESIA EA ADD 15 MINS: Mod: HCNC,PO | Performed by: OTOLARYNGOLOGY

## 2020-09-25 PROCEDURE — 37000008 HC ANESTHESIA 1ST 15 MINUTES: Mod: HCNC,PO | Performed by: OTOLARYNGOLOGY

## 2020-09-25 PROCEDURE — D9220A PRA ANESTHESIA: ICD-10-PCS | Mod: HCNC,ANES,, | Performed by: ANESTHESIOLOGY

## 2020-09-25 PROCEDURE — D9220A PRA ANESTHESIA: ICD-10-PCS | Mod: HCNC,CRNA,, | Performed by: NURSE ANESTHETIST, CERTIFIED REGISTERED

## 2020-09-25 PROCEDURE — 25000003 PHARM REV CODE 250: Mod: HCNC,PO | Performed by: NURSE ANESTHETIST, CERTIFIED REGISTERED

## 2020-09-25 PROCEDURE — 31541 LARYNSCOP W/TUMR EXC + SCOPE: CPT | Mod: HCNC,,, | Performed by: OTOLARYNGOLOGY

## 2020-09-25 PROCEDURE — 31541 PR LARYNGOSCOPY,DIRCT,OP SCOP,EXC TUMR: ICD-10-PCS | Mod: HCNC,,, | Performed by: OTOLARYNGOLOGY

## 2020-09-25 PROCEDURE — 88305 TISSUE EXAM BY PATHOLOGIST: CPT | Mod: 26,HCNC,, | Performed by: PATHOLOGY

## 2020-09-25 PROCEDURE — 63600175 PHARM REV CODE 636 W HCPCS: Mod: HCNC,PO | Performed by: ANESTHESIOLOGY

## 2020-09-25 PROCEDURE — 63600175 PHARM REV CODE 636 W HCPCS: Mod: HCNC,PO | Performed by: NURSE ANESTHETIST, CERTIFIED REGISTERED

## 2020-09-25 PROCEDURE — 71000033 HC RECOVERY, INTIAL HOUR: Mod: HCNC,PO | Performed by: OTOLARYNGOLOGY

## 2020-09-25 PROCEDURE — D9220A PRA ANESTHESIA: Mod: HCNC,ANES,, | Performed by: ANESTHESIOLOGY

## 2020-09-25 PROCEDURE — 25000003 PHARM REV CODE 250: Mod: HCNC,PO | Performed by: OTOLARYNGOLOGY

## 2020-09-25 PROCEDURE — 36000708 HC OR TIME LEV III 1ST 15 MIN: Mod: HCNC,PO | Performed by: OTOLARYNGOLOGY

## 2020-09-25 PROCEDURE — D9220A PRA ANESTHESIA: Mod: HCNC,CRNA,, | Performed by: NURSE ANESTHETIST, CERTIFIED REGISTERED

## 2020-09-25 PROCEDURE — 71000015 HC POSTOP RECOV 1ST HR: Mod: HCNC,PO | Performed by: OTOLARYNGOLOGY

## 2020-09-25 RX ORDER — LIDOCAINE HCL/PF 100 MG/5ML
SYRINGE (ML) INTRAVENOUS
Status: DISCONTINUED | OUTPATIENT
Start: 2020-09-25 | End: 2020-09-25

## 2020-09-25 RX ORDER — SODIUM CHLORIDE 0.9 G/100ML
IRRIGANT IRRIGATION
Status: DISCONTINUED | OUTPATIENT
Start: 2020-09-25 | End: 2020-09-25 | Stop reason: HOSPADM

## 2020-09-25 RX ORDER — SODIUM CHLORIDE, SODIUM LACTATE, POTASSIUM CHLORIDE, CALCIUM CHLORIDE 600; 310; 30; 20 MG/100ML; MG/100ML; MG/100ML; MG/100ML
INJECTION, SOLUTION INTRAVENOUS CONTINUOUS
Status: DISCONTINUED | OUTPATIENT
Start: 2020-09-25 | End: 2021-02-19

## 2020-09-25 RX ORDER — HYDROMORPHONE HYDROCHLORIDE 2 MG/ML
0.2 INJECTION, SOLUTION INTRAMUSCULAR; INTRAVENOUS; SUBCUTANEOUS EVERY 5 MIN PRN
Status: DISCONTINUED | OUTPATIENT
Start: 2020-09-25 | End: 2020-09-25 | Stop reason: HOSPADM

## 2020-09-25 RX ORDER — FENTANYL CITRATE 50 UG/ML
25 INJECTION, SOLUTION INTRAMUSCULAR; INTRAVENOUS EVERY 5 MIN PRN
Status: DISCONTINUED | OUTPATIENT
Start: 2020-09-25 | End: 2020-09-25 | Stop reason: HOSPADM

## 2020-09-25 RX ORDER — PROPOFOL 10 MG/ML
VIAL (ML) INTRAVENOUS
Status: DISCONTINUED | OUTPATIENT
Start: 2020-09-25 | End: 2020-09-25

## 2020-09-25 RX ORDER — OXYCODONE HYDROCHLORIDE 5 MG/1
5 TABLET ORAL
Status: DISCONTINUED | OUTPATIENT
Start: 2020-09-25 | End: 2020-09-25 | Stop reason: HOSPADM

## 2020-09-25 RX ORDER — LABETALOL HYDROCHLORIDE 5 MG/ML
INJECTION, SOLUTION INTRAVENOUS
Status: DISCONTINUED | OUTPATIENT
Start: 2020-09-25 | End: 2020-09-25

## 2020-09-25 RX ORDER — ROCURONIUM BROMIDE 10 MG/ML
INJECTION, SOLUTION INTRAVENOUS
Status: DISCONTINUED | OUTPATIENT
Start: 2020-09-25 | End: 2020-09-25

## 2020-09-25 RX ORDER — ONDANSETRON 2 MG/ML
INJECTION INTRAMUSCULAR; INTRAVENOUS
Status: DISCONTINUED | OUTPATIENT
Start: 2020-09-25 | End: 2020-09-25

## 2020-09-25 RX ORDER — LIDOCAINE HYDROCHLORIDE 10 MG/ML
1 INJECTION, SOLUTION EPIDURAL; INFILTRATION; INTRACAUDAL; PERINEURAL ONCE
Status: DISCONTINUED | OUTPATIENT
Start: 2020-09-25 | End: 2021-02-19

## 2020-09-25 RX ORDER — FENTANYL CITRATE 50 UG/ML
INJECTION, SOLUTION INTRAMUSCULAR; INTRAVENOUS
Status: DISCONTINUED | OUTPATIENT
Start: 2020-09-25 | End: 2020-09-25

## 2020-09-25 RX ORDER — OXYMETAZOLINE HCL 0.05 %
SPRAY, NON-AEROSOL (ML) NASAL
Status: DISCONTINUED | OUTPATIENT
Start: 2020-09-25 | End: 2020-09-25 | Stop reason: HOSPADM

## 2020-09-25 RX ADMIN — ROCURONIUM BROMIDE 20 MG: 10 INJECTION, SOLUTION INTRAVENOUS at 01:09

## 2020-09-25 RX ADMIN — PROPOFOL 30 MG: 10 INJECTION, EMULSION INTRAVENOUS at 01:09

## 2020-09-25 RX ADMIN — FENTANYL CITRATE 50 MCG: 50 INJECTION, SOLUTION INTRAMUSCULAR; INTRAVENOUS at 12:09

## 2020-09-25 RX ADMIN — SODIUM CHLORIDE, SODIUM LACTATE, POTASSIUM CHLORIDE, AND CALCIUM CHLORIDE: .6; .31; .03; .02 INJECTION, SOLUTION INTRAVENOUS at 12:09

## 2020-09-25 RX ADMIN — PROPOFOL 150 MG: 10 INJECTION, EMULSION INTRAVENOUS at 01:09

## 2020-09-25 RX ADMIN — ROCURONIUM BROMIDE 30 MG: 10 INJECTION, SOLUTION INTRAVENOUS at 01:09

## 2020-09-25 RX ADMIN — LIDOCAINE HYDROCHLORIDE 100 MG: 20 INJECTION PARENTERAL at 01:09

## 2020-09-25 RX ADMIN — ONDANSETRON 4 MG: 2 INJECTION, SOLUTION INTRAMUSCULAR; INTRAVENOUS at 01:09

## 2020-09-25 RX ADMIN — LABETALOL HYDROCHLORIDE 10 MG: 5 INJECTION, SOLUTION INTRAVENOUS at 01:09

## 2020-09-25 RX ADMIN — FENTANYL CITRATE 50 MCG: 50 INJECTION, SOLUTION INTRAMUSCULAR; INTRAVENOUS at 01:09

## 2020-09-25 NOTE — ANESTHESIA POSTPROCEDURE EVALUATION
Anesthesia Post Evaluation    Patient: Lázaro Wang    Procedure(s) Performed: Procedure(s) (LRB):  LARYNGOSCOPY with excision (Right)  BIOPSY right vocal cord (Right)    Final Anesthesia Type: general    Patient location during evaluation: PACU  Patient participation: Yes- Able to Participate  Level of consciousness: awake and alert, oriented and awake  Post-procedure vital signs: reviewed and stable  Pain management: adequate  Airway patency: patent    PONV status at discharge: No PONV  Anesthetic complications: no      Cardiovascular status: blood pressure returned to baseline and hemodynamically stable  Respiratory status: unassisted, spontaneous ventilation and room air  Hydration status: euvolemic  Follow-up not needed.          Vitals Value Taken Time   /67 09/25/20 1411   Temp 36.4 °C (97.5 °F) 09/25/20 1411   Pulse 60 09/25/20 1411   Resp 18 09/25/20 1411   SpO2 95 % 09/25/20 1411         No case tracking events are documented in the log.      Pain/Juana Score: No data recorded

## 2020-09-25 NOTE — OP NOTE
09/25/2020     Name: Lázaro Wang   MRN: [id number]   YOB: 1934     Pre-procedure diagnoses:  1. Dysphonia    2. Vocal cord cyst         Post-procedure diagnoses:  1. Dysphonia    2. Vocal cord cyst         Procedures performed  1. Direct microlaryngoscopy with excision of lesion    Surgeon: Yves Ernst    Assistants: None    Anesthesia: General, Endotracheal    Intraoperative Findings:   1. Grade III view with Dedo  2. Grade I/II view with anterior commissure and significant cricoid pressure  3. 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 Right vocal cord lesion    Complications: None apparent    Blood Loss: Minimal    Disposition: PACU    Indications:     The patient was seen and evaluated in the [facility] outpatient clinic. After history and physical examination, recommendations were made to proceed to the operating room for the above listed procedures. Indications, risks and benefits were discussed with the patient, who agreed to proceed and signed proper informed consent. Specific risks include but are not limited to bleeding, infection, pain, scarring, injury to dentition / oral mucosa / lips / tongue, airway fire, pneumothorax, need for further procedures, worsening of voice, persistence of issues.     Procedure in detail:     The patient was taken to the operating room and laid supine on the operating room table. General inhalational anesthesia was administered by the anesthesia team and the patient was intubated with a 6.5 endotracheal tube. Proper surgeon-initiated time-out was performed.    Once an adequate level of anesthesia was achieved, the head of bed was turned 90 degrees. The FiO2 was low and communication with Anesthesia provider was maintained through the case. A maxillary tooth guard was placed.    The Dedo laryngoscope was inserted atraumatically and advanced to the oral cavity. I was unable to exposure the lesion  given the patient's reduced mouth opening and neck stiffness. I used the anterior commissure and this was still very difficult to exposure the anterior cords, but I was able to view with this. I placed patient in suspension and cricoid pressure was applied continuously. Photodocumentation was obtained with a 0 degree endoscope. Operative findings are noted above. The microscope was then brought into the field.      The sickle knife was used to incise the mucosa of the right vocal fold immediately lateral to the lesion. I undermined the lesion in a plane between then and the vocal ligament. The planes were obscured. The lesion involved the mucosa and I was unable to preserve the mucosa overlying the lesion. I used a micro scissor to carefully excision the lesion. The lesion was excised     Pledgets were placed on the larynx for hemostasis and removed. Laryngoscope was withdrawn and the dentition was noted to be intact. The patient's care was turned back over to anesthesia, and was transported to PACU in stable condition.

## 2020-09-25 NOTE — BRIEF OP NOTE
[facility]  Brief Operative Note     SUMMARY     Surgery Date: 9/25/2020     Surgeon(s) and Role:     * Yves Ernst MD - Primary    Assisting Surgeon: None    Pre-op Diagnosis:  Hoarse [R49.0]  Vocal cord cyst [J38.3]    Post-op Diagnosis:  Post-Op Diagnosis Codes:     * Hoarse [R49.0]     * Vocal cord cyst [J38.3]    Procedure(s) (LRB):  LARYNGOSCOPY with excision (Bilateral)    Anesthesia: General    Description of the findings of the procedure: DML with excision    Findings/Key Components: DML with excision    Estimated Blood Loss: * No values recorded between 9/25/2020  1:10 PM and 9/25/2020  1:58 PM *         Specimens:   Specimen (12h ago, onward)    None          Discharge Note    SUMMARY     Admit Date: 9/25/2020    Discharge Date and Time:  09/25/2020 1:58 PM    Hospital Course (synopsis of major diagnoses, care, treatment, and services provided during the course of the hospital stay): Did well following surgery and was discharged uneventfully     Final Diagnosis: Post-Op Diagnosis Codes:     * Hoarse [R49.0]     * Vocal cord cyst [J38.3]    Disposition: Home or Self Care    Follow Up/Patient Instructions: Regular diet, Follow-up 3 weeks. Activity light    Medications:  Reconciled Home Medications:   Current Discharge Medication List      CONTINUE these medications which have NOT CHANGED    Details   amiodarone (PACERONE) 200 MG Tab Take 200 mg by mouth 3 (three) times a week.      aspirin 325 MG tablet Take 325 mg by mouth once daily.      HYDROcodone-acetaminophen (NORCO) 5-325 mg per tablet Take 1 tablet by mouth every 6 (six) hours as needed for Pain. >7 day supply of opioid is medically necessary for chronic pain.  Qty: 120 tablet, Refills: 0    Comments: GREATER THAN 7 DAY SUPPLY IS MEDICALLY NECESSARY  Associated Diagnoses: Lumbar disc disease      metoprolol succinate (TOPROL-XL) 25 MG 24 hr tablet TAKE 1 TABLET(25 MG) BY MOUTH EVERY DAY  Qty: 90 tablet, Refills: 3       multivitamin capsule Take 1 capsule by mouth once daily.      omeprazole (PRILOSEC) 20 MG capsule Take 1 capsule (20 mg total) by mouth once daily.  Qty: 30 capsule, Refills: 11      QUEtiapine (SEROQUEL) 100 MG Tab Take take one to one and 1/2 tablets (up to 150 mg max) every evening for sleep  Qty: 120 tablet, Refills: 2      tamsulosin (FLOMAX) 0.4 mg Cap TAKE ONE CAPSULE BY MOUTH EVERY NIGHT AT BEDTIME  Qty: 90 capsule, Refills: 1      VIT C/VIT E AC/LUT/COPPER/ZINC (PRESERVISION LUTEIN ORAL) Take by mouth once daily.       blood sugar diagnostic Strp One touch ultra test strips . Test two times a day DX E11.43  Qty: 200 each, Refills: 3      diabetic supplies, miscellan. Kit One true metrix testing device to be used two times a day DX E11.49  Qty: 1 kit, Refills: 1      lancets (ONETOUCH DELICA LANCETS) 33 gauge Misc 1 lancet by Misc.(Non-Drug; Combo Route) route 2 (two) times daily. Trueplus 33guage lancet use two times a day. DX E11.40  Qty: 200 each, Refills: 4      loratadine (CLARITIN) 10 mg tablet Take 1 tablet (10 mg total) by mouth once daily.  Qty: 30 tablet, Refills: 11    Associated Diagnoses: Hoarse      montelukast (SINGULAIR) 10 mg tablet TAKE 1 TABLET(10 MG) BY MOUTH EVERY EVENING  Qty: 90 tablet, Refills: 3    Associated Diagnoses: Hoarse      nitroGLYCERIN (NITROQUICK) 0.4 MG SL tablet Place 1 tablet (0.4 mg total) under the tongue every 5 (five) minutes as needed. PRN  Qty: 25 tablet, Refills: 5      triamcinolone acetonide 0.025% (KENALOG) 0.025 % cream Apply topically 2 (two) times daily.  Qty: 80 g, Refills: 3      TRUE METRIX AIR GLUCOSE METER kit            No discharge procedures on file.

## 2020-09-25 NOTE — ANESTHESIA PREPROCEDURE EVALUATION
09/25/2020  Lázaro Wang is a 85 y.o., male.    Anesthesia Evaluation    I have reviewed the Patient Summary Reports.    I have reviewed the Nursing Notes.       Review of Systems  Cardiovascular:   Hypertension Past MI CAD      Renal/:   Chronic Renal Disease    Hepatic/GI:   GERD, well controlled    Neurological:   Neuromuscular Disease,    Endocrine:   Diabetes    Psych:   Psychiatric History          Physical Exam  General:  Well nourished, Obesity    Airway/Jaw/Neck:  Airway Findings: Mouth Opening: Normal Tongue: Normal  Mallampati: IV  Improves to III with phonation.  TM Distance: Normal, at least 6 cm  Jaw/Neck Findings:  Neck ROM: Normal ROM     Eyes/Ears/Nose:  Eyes/Ears/Nose Findings:    Dental:  Dental Findings: In tact   Chest/Lungs:  Chest/Lungs Findings: Normal Respiratory Rate     Heart/Vascular:  Heart Findings: Rate: Normal  Rhythm: Regular Rhythm        Mental Status:  Mental Status Findings:  Cooperative, Alert and Oriented         Anesthesia Plan  Type of Anesthesia, risks & benefits discussed:  Anesthesia Type:  general  Patient's Preference: General  Intra-op Monitoring Plan: standard ASA monitors  Intra-op Monitoring Plan Comments:   Post Op Pain Control Plan: per primary service following discharge from PACU and multimodal analgesia  Post Op Pain Control Plan Comments:   Induction:   IV  Beta Blocker:  Patient is not currently on a Beta-Blocker (No further documentation required).       Informed Consent: Patient understands risks and agrees with Anesthesia plan.  Questions answered. Anesthesia consent signed with patient.  ASA Score: 3     Day of Surgery Review of History & Physical:    H&P update referred to the surgeon.         Ready For Surgery From Anesthesia Perspective.

## 2020-09-25 NOTE — TRANSFER OF CARE
"Anesthesia Transfer of Care Note    Patient: Lázaro Wang    Procedure(s) Performed: Procedure(s) (LRB):  LARYNGOSCOPY with excision (Right)  BIOPSY right vocal cord (Right)    Patient location: PACU    Anesthesia Type: general    Transport from OR: Transported from OR on room air with adequate spontaneous ventilation    Post pain: adequate analgesia    Post assessment: no apparent anesthetic complications and tolerated procedure well    Post vital signs: stable    Level of consciousness: awake, alert and oriented    Nausea/Vomiting: no nausea/vomiting    Complications: none    Transfer of care protocol was followed      Last vitals:   Visit Vitals  /67 (BP Location: Left arm, Patient Position: Lying)   Pulse 60   Temp 36.4 °C (97.5 °F) (Skin)   Resp 18   Ht 5' 8" (1.727 m)   Wt 97.5 kg (215 lb)   SpO2 95%   BMI 32.69 kg/m²     "

## 2020-09-25 NOTE — ANESTHESIA PROCEDURE NOTES
Intubation  Performed by: Ella Perez CRNA  Authorized by: Damaris Barragan MD     Intubation:     Induction:  Intravenous    Intubated:  Postinduction    Mask Ventilation:  Easy mask    Attempts:  2    Attempted By:  Staff anesthesiologist    Method of Intubation:  Direct    Laryngeal View Grade: Grade IIb - only the arytenoids and epiglottis seen      Attempted By (2nd Attempt):  Staff anesthesiologist    Method of Intubation (2nd Attempt):  Direct    Blade (2nd Attempt):  Wang 2    Laryngeal View Grade (2nd Attempt): Grade IIb - only the arytenoids and epiglottis seen      Difficult Airway Encountered?: Yes      Airway Device Size:  6.5    Style/Cuff Inflation:  Cuffed (inflated to minimal occlusive pressure)    Tube secured:  21    Secured at:  The lips    Placement Verified By:  Capnometry    DIFFICULT INTUBATION DESCRIPTOR: decreased range of mothion of neck.    Findings Post-Intubation:  BS equal bilateral

## 2020-09-25 NOTE — DISCHARGE INSTRUCTIONS
Post-op Microlaryngoscopy with excision  Yves Ernst MD  Otolaryngology - [facility] - [phone]  Cell Phone (after hours) - [phone]    Pain and Activity  · Expect sore throat for 3-5 days. Typically you do not require narcotic pain medication, but your provider will provide a prescription if needed.  · It is common to cough up a small amount of blood in the first 24-48 hours. This will improve quickly.  · Avoid coughing. Call me if you are having uncontrolled coughing or nausea.  · Light activity for 1-2 days, then you can resume your normal activity if you are feeling well.  · Voice rest:   ·  There is no voice rest required for your procedure. You may begin to use your voice normally. You may notice a slight strain or tightness in your voice, but this should improve. If you have sore throat while talking, you can rest your voice as needed.    Diet  · Drink plenty of fluids. Good choices are water, popsicles, and mild juices. Hydration is the MOST IMPORTANT factor in recovery during the healing process.  · No diet restrictions. Resume your usual diet.    Medication  Give only medications approved by your doctor. Follow directions closely when taking medications.  · Antibiotics are typically not needed following laryngoscopy surgery. Dr. Ernst will let you know if this is different.  · It is generally OK to resume all of your medications.  · You may take Ibuprofen 600 mg every 6 hours for soreness. Your wife also mentioned you have some Vicodin, which I am OK taking as well for breakthrough pain.  · Please call Dr. Ernst if the pain is not controlled with medicines above so that he can take care of it for you.      When to Call the Doctor  Mild pain and a slight fever are normal after surgery. Call if you have any of the following:  · Fever:   ¨ > 101  · Trouble breathing  · Bright red bleeding that does not stop with pressure  · Any other concerns

## 2020-09-28 VITALS
WEIGHT: 215 LBS | OXYGEN SATURATION: 98 % | HEIGHT: 68 IN | SYSTOLIC BLOOD PRESSURE: 125 MMHG | DIASTOLIC BLOOD PRESSURE: 74 MMHG | RESPIRATION RATE: 18 BRPM | BODY MASS INDEX: 32.58 KG/M2 | HEART RATE: 65 BPM | TEMPERATURE: 98 F

## 2020-09-29 ENCOUNTER — PATIENT MESSAGE (OUTPATIENT)
Dept: OTHER | Facility: OTHER | Age: 85
End: 2020-09-29

## 2020-09-30 LAB
FINAL PATHOLOGIC DIAGNOSIS: NORMAL
GROSS: NORMAL

## 2020-10-01 ENCOUNTER — TELEPHONE (OUTPATIENT)
Dept: FAMILY MEDICINE | Facility: CLINIC | Age: 85
End: 2020-10-01

## 2020-10-01 DIAGNOSIS — M51.9 LUMBAR DISC DISEASE: ICD-10-CM

## 2020-10-01 RX ORDER — METOPROLOL SUCCINATE 25 MG/1
TABLET, EXTENDED RELEASE ORAL
Qty: 90 TABLET | Refills: 3 | Status: SHIPPED | OUTPATIENT
Start: 2020-10-01 | End: 2021-02-08

## 2020-10-01 NOTE — TELEPHONE ENCOUNTER
Please call regarding refill request.  I sent in the metoprolol.  However I can't refill the hydrocodone yet, he did not  the last Rx for hydrocodone from the pharmacy until September 8th.  It cannot be filled sooner than 30 days.  Should not be out yet.  If there is a discrepancy or if he does not accept this then please let me know why he needs this filled early.

## 2020-10-01 NOTE — TELEPHONE ENCOUNTER
----- Message from Yvon Zamorano sent at 10/1/2020  1:44 PM CDT -----  Regarding: refill  Contact: patient  Type:  RX Refill Request    Who Called:  patient  Refill or New Rx:  new  RX Name and Strength:      HYDROcodone-acetaminophen (NORCO) 5-325 mg per tablet 120 tablet 0 9/2/2020 Sig - Route: Take 1 tablet by mouth every 6 (six) hours as needed for Pain. >7 day supply of opioid is medically necessary for chronic pain. - Oral    metoprolol succinate (TOPROL-XL) 25 MG 24 hr tablet 90 tablet 3 3/3/2020 Sig: TAKE 1 TABLET(25 MG) BY MOUTH EVERY DAY    Preferred Pharmacy with phone number:    [id number] [place] [address] AT [facility] & [address] [address]  Phone: [phone] Fax: [phone]    Ordering Provider:  Sergo  Best Call Back Number:  [phone]  Additional Information:  n/a

## 2020-10-02 ENCOUNTER — PATIENT MESSAGE (OUTPATIENT)
Dept: FAMILY MEDICINE | Facility: CLINIC | Age: 85
End: 2020-10-02

## 2020-10-05 ENCOUNTER — TELEPHONE (OUTPATIENT)
Dept: FAMILY MEDICINE | Facility: CLINIC | Age: 85
End: 2020-10-05

## 2020-10-05 DIAGNOSIS — H91.90 HEARING LOSS, UNSPECIFIED HEARING LOSS TYPE, UNSPECIFIED LATERALITY: Primary | ICD-10-CM

## 2020-10-05 DIAGNOSIS — R09.89 RUNNY NOSE: ICD-10-CM

## 2020-10-05 DIAGNOSIS — D48.5 NEOPLASM OF UNCERTAIN BEHAVIOR OF SKIN: ICD-10-CM

## 2020-10-05 DIAGNOSIS — H53.9 VISION CHANGES: ICD-10-CM

## 2020-10-05 RX ORDER — HYDROCODONE BITARTRATE AND ACETAMINOPHEN 5; 325 MG/1; MG/1
1 TABLET ORAL EVERY 6 HOURS PRN
Qty: 120 TABLET | Refills: 0 | Status: SHIPPED | OUTPATIENT
Start: 2020-10-05 | End: 2020-11-05 | Stop reason: SDUPTHER

## 2020-10-05 NOTE — TELEPHONE ENCOUNTER
----- Message from Samson Rodas sent at 10/5/2020  1:54 PM CDT -----  Contact: pt  Type:  Patient Requesting Referral    Who Called:  pt  Does the patient already have the specialty appointment scheduled?:  no  If yes, what is the date of that appointment?:    Referral to What Specialty:  eye dr, dermatologist, ENT  Reason for Referral:  runny discharge / dark spots on skin / hearing  Does the patient want the referral with a specific physician?:    [facility]  [facility]  Patient Requesting a Call Back?:  yes  Best Call Back Number:  [phone]    Additional Information:   requesting referrals for all 3 specialties

## 2020-10-06 NOTE — TELEPHONE ENCOUNTER
No reason given for the ophthalmology appointment so I assume vision changes.  He can call to schedule those himself.  Just advised him of such

## 2020-10-07 ENCOUNTER — TELEPHONE (OUTPATIENT)
Dept: OPTOMETRY | Facility: CLINIC | Age: 85
End: 2020-10-07

## 2020-10-07 NOTE — TELEPHONE ENCOUNTER
----- Message from Nery Jones sent at 10/7/2020  4:45 PM CDT -----  Regarding: sooner appt  Type:  Sooner Apoointment Request    Caller is requesting a sooner appointment.  Caller declined first available appointment listed below.  Caller will not accept being placed on the waitlist and is requesting a message be sent to doctor.    Name of Caller:  pt  When is the first available appointment?  12/08  Symptoms:  exam  Best Call Back Number:  [phone] (home)     Additional Information:  na

## 2020-10-08 ENCOUNTER — LAB VISIT (OUTPATIENT)
Dept: LAB | Facility: HOSPITAL | Age: 85
End: 2020-10-08
Attending: FAMILY MEDICINE
Payer: MEDICARE

## 2020-10-08 DIAGNOSIS — E11.43 TYPE II DIABETES MELLITUS WITH PERIPHERAL AUTONOMIC NEUROPATHY: ICD-10-CM

## 2020-10-08 PROCEDURE — 80053 COMPREHEN METABOLIC PANEL: CPT | Mod: HCNC

## 2020-10-08 PROCEDURE — 83036 HEMOGLOBIN GLYCOSYLATED A1C: CPT | Mod: HCNC

## 2020-10-08 PROCEDURE — 36415 COLL VENOUS BLD VENIPUNCTURE: CPT | Mod: HCNC,PN

## 2020-10-09 LAB
ALBUMIN SERPL BCP-MCNC: 3.9 G/DL (ref 3.5–5.2)
ALP SERPL-CCNC: 70 U/L (ref 55–135)
ALT SERPL W/O P-5'-P-CCNC: 14 U/L (ref 10–44)
ANION GAP SERPL CALC-SCNC: 10 MMOL/L (ref 8–16)
AST SERPL-CCNC: 16 U/L (ref 10–40)
BILIRUB SERPL-MCNC: 0.8 MG/DL (ref 0.1–1)
BUN SERPL-MCNC: 16 MG/DL (ref 8–23)
CALCIUM SERPL-MCNC: 9.3 MG/DL (ref 8.7–10.5)
CHLORIDE SERPL-SCNC: 106 MMOL/L (ref 95–110)
CO2 SERPL-SCNC: 27 MMOL/L (ref 23–29)
CREAT SERPL-MCNC: 1.3 MG/DL (ref 0.5–1.4)
EST. GFR  (AFRICAN AMERICAN): 57.1 ML/MIN/1.73 M^2
EST. GFR  (NON AFRICAN AMERICAN): 49.4 ML/MIN/1.73 M^2
ESTIMATED AVG GLUCOSE: 128 MG/DL (ref 68–131)
GLUCOSE SERPL-MCNC: 95 MG/DL (ref 70–110)
HBA1C MFR BLD HPLC: 6.1 % (ref 4–5.6)
POTASSIUM SERPL-SCNC: 4 MMOL/L (ref 3.5–5.1)
PROT SERPL-MCNC: 6.7 G/DL (ref 6–8.4)
SODIUM SERPL-SCNC: 143 MMOL/L (ref 136–145)

## 2020-10-27 ENCOUNTER — PATIENT OUTREACH (OUTPATIENT)
Dept: ADMINISTRATIVE | Facility: OTHER | Age: 85
End: 2020-10-27

## 2020-10-27 NOTE — PROGRESS NOTES
Health Maintenance Due   Topic Date Due    Shingles Vaccine (2 of 3) 06/30/2009    TETANUS VACCINE  05/05/2019    Foot Exam  08/23/2020    Eye Exam  10/23/2020     Updates were requested from care everywhere.  Chart was reviewed for overdue Proactive [facility] Encounters ([facility]) topics (CRS, Breast Cancer Screening, Eye exam)  Health Maintenance has been updated.  LINKS immunization registry triggered.  Immunizations were reconciled.

## 2020-10-30 ENCOUNTER — TELEPHONE (OUTPATIENT)
Dept: OTOLARYNGOLOGY | Facility: CLINIC | Age: 85
End: 2020-10-30

## 2020-10-30 NOTE — TELEPHONE ENCOUNTER
----- Message from Fer Chapman sent at 10/30/2020  2:29 PM CDT -----  Regarding: Appt access  Contact: Pt  Type:  Patient Returning Call    Who Called:  pt  Does the patient know what this is regarding?:  pt would like office to call to have appt rescheduled due to early clinic closure  Best Call Back Number:    Additional Information:  Thank you

## 2020-11-05 DIAGNOSIS — M51.9 LUMBAR DISC DISEASE: ICD-10-CM

## 2020-11-05 RX ORDER — HYDROCODONE BITARTRATE AND ACETAMINOPHEN 5; 325 MG/1; MG/1
1 TABLET ORAL EVERY 6 HOURS PRN
Qty: 120 TABLET | Refills: 0 | Status: SHIPPED | OUTPATIENT
Start: 2020-11-05 | End: 2020-12-03 | Stop reason: SDUPTHER

## 2020-11-05 RX ORDER — HYDROCODONE BITARTRATE AND ACETAMINOPHEN 5; 325 MG/1; MG/1
1 TABLET ORAL EVERY 6 HOURS PRN
Qty: 120 TABLET | Refills: 0 | Status: CANCELLED | OUTPATIENT
Start: 2020-11-05

## 2020-11-05 NOTE — TELEPHONE ENCOUNTER
----- Message from Dung Polo sent at 11/5/2020  9:52 AM CST -----  Regarding: rx  Contact: self  Type:  RX Refill Request    Who Called:  self  Refill or New Rx:  new rx  RX Name and Strength:  hydrocodone   How is the patient currently taking it? (ex. 1XDay):  4 x day  Is this a 30 day or 90 day RX:  30 day supply  Preferred Pharmacy with phone number:  [facility] [address]   Local or Mail Order:  local  Ordering Provider: Dr Trotter  Best Call Back Number:  [phone]  Additional Information:  Please call pt when rx has been called into the pharmacy.

## 2020-11-05 NOTE — TELEPHONE ENCOUNTER
Spoke with pt and he states that he never received a reminder regarding his appt in October. Scheduled pt to be seen tomorrow.

## 2020-11-05 NOTE — TELEPHONE ENCOUNTER
The patient has requested a refill for hydrocodone, a controlled substance.  He did not show up for his last appointment with me in October and nothing is scheduled.  Please remind him that he is requesting a controlled substance and remind him of our clinic policy that has been in place for many many years.  We have not change the policy he needs to schedule appointment.

## 2020-11-06 ENCOUNTER — OFFICE VISIT (OUTPATIENT)
Dept: FAMILY MEDICINE | Facility: CLINIC | Age: 85
End: 2020-11-06
Payer: MEDICARE

## 2020-11-06 VITALS
HEART RATE: 70 BPM | DIASTOLIC BLOOD PRESSURE: 88 MMHG | SYSTOLIC BLOOD PRESSURE: 130 MMHG | BODY MASS INDEX: 32.77 KG/M2 | HEIGHT: 68 IN | OXYGEN SATURATION: 96 % | TEMPERATURE: 98 F | WEIGHT: 216.25 LBS

## 2020-11-06 DIAGNOSIS — E11.43 TYPE II DIABETES MELLITUS WITH PERIPHERAL AUTONOMIC NEUROPATHY: Primary | ICD-10-CM

## 2020-11-06 DIAGNOSIS — M51.9 LUMBAR DISC DISEASE: ICD-10-CM

## 2020-11-06 DIAGNOSIS — I48.91 ATRIAL FIBRILLATION, UNSPECIFIED TYPE: ICD-10-CM

## 2020-11-06 DIAGNOSIS — F51.04 CHRONIC INSOMNIA: ICD-10-CM

## 2020-11-06 DIAGNOSIS — I10 ESSENTIAL HYPERTENSION: ICD-10-CM

## 2020-11-06 DIAGNOSIS — G47.00 INSOMNIA, UNSPECIFIED TYPE: ICD-10-CM

## 2020-11-06 PROCEDURE — 3072F PR LOW RISK FOR RETINOPATHY: ICD-10-PCS | Mod: HCNC,S$GLB,, | Performed by: FAMILY MEDICINE

## 2020-11-06 PROCEDURE — 99999 PR PBB SHADOW E&M-EST. PATIENT-LVL IV: CPT | Mod: PBBFAC,HCNC,, | Performed by: FAMILY MEDICINE

## 2020-11-06 PROCEDURE — 1159F PR MEDICATION LIST DOCUMENTED IN MEDICAL RECORD: ICD-10-PCS | Mod: HCNC,S$GLB,, | Performed by: FAMILY MEDICINE

## 2020-11-06 PROCEDURE — 1159F MED LIST DOCD IN RCRD: CPT | Mod: HCNC,S$GLB,, | Performed by: FAMILY MEDICINE

## 2020-11-06 PROCEDURE — 1101F PR PT FALLS ASSESS DOC 0-1 FALLS W/OUT INJ PAST YR: ICD-10-PCS | Mod: HCNC,CPTII,S$GLB, | Performed by: FAMILY MEDICINE

## 2020-11-06 PROCEDURE — 3072F LOW RISK FOR RETINOPATHY: CPT | Mod: HCNC,S$GLB,, | Performed by: FAMILY MEDICINE

## 2020-11-06 PROCEDURE — 3288F FALL RISK ASSESSMENT DOCD: CPT | Mod: HCNC,CPTII,S$GLB, | Performed by: FAMILY MEDICINE

## 2020-11-06 PROCEDURE — 99215 OFFICE O/P EST HI 40 MIN: CPT | Mod: HCNC,S$GLB,, | Performed by: FAMILY MEDICINE

## 2020-11-06 PROCEDURE — 99215 PR OFFICE/OUTPT VISIT, EST, LEVL V, 40-54 MIN: ICD-10-PCS | Mod: HCNC,S$GLB,, | Performed by: FAMILY MEDICINE

## 2020-11-06 PROCEDURE — 99999 PR PBB SHADOW E&M-EST. PATIENT-LVL IV: ICD-10-PCS | Mod: PBBFAC,HCNC,, | Performed by: FAMILY MEDICINE

## 2020-11-06 PROCEDURE — 3288F PR FALLS RISK ASSESSMENT DOCUMENTED: ICD-10-PCS | Mod: HCNC,CPTII,S$GLB, | Performed by: FAMILY MEDICINE

## 2020-11-06 PROCEDURE — 1101F PT FALLS ASSESS-DOCD LE1/YR: CPT | Mod: HCNC,CPTII,S$GLB, | Performed by: FAMILY MEDICINE

## 2020-11-06 RX ORDER — INFLUENZA A VIRUS A/MICHIGAN/45/2015 X-275 (H1N1) ANTIGEN (FORMALDEHYDE INACTIVATED), INFLUENZA A VIRUS A/SINGAPORE/INFIMH-16-0019/2016 IVR-186 (H3N2) ANTIGEN (FORMALDEHYDE INACTIVATED), INFLUENZA B VIRUS B/PHUKET/3073/2013 ANTIGEN (FORMALDEHYDE INACTIVATED), AND INFLUENZA B VIRUS B/MARYLAND/15/2016 BX-69A ANTIGEN (FORMALDEHYDE INACTIVATED) 60; 60; 60; 60 UG/.7ML; UG/.7ML; UG/.7ML; UG/.7ML
INJECTION, SUSPENSION INTRAMUSCULAR
COMMUNITY
Start: 2020-09-23 | End: 2020-12-01 | Stop reason: ALTCHOICE

## 2020-11-06 NOTE — Clinical Note
Please check with Dr. Dominguez regarding this patient's amiodarone.  I saw him last week and he informed me he stopped taking amiodarone a few months ago I believe because of refill issues.  He denies any palpitations and his rhythm was regular when I saw him.  I will defer to Cardiology as to whether this should be renewed or further evaluated.  Thanks

## 2020-11-06 NOTE — PROGRESS NOTES
THIS DOCUMENT WAS MADE IN PART WITH VOICE RECOGNITION SOFTWARE.  OCCASIONALLY THIS SOFTWARE WILL MISINTERPRET WORDS OR PHRASES.      Lázaro Wang  10/8/1934    Lázaro was seen today for annual exam.    Diagnoses and all orders for this visit:    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  -     Microalbumin/Creatinine Ratio, Urine; Future  -     Hemoglobin A1C; Future  -     Lipid Panel; Future  -     TSH; Future  -     CBC Auto Differential; Future  Stable well controlled    Essential hypertension  Relatively stable    Insomnia, unspecified type  He is well controlled on Seroquel.  Note that he does have underlying mood disorder and I think this helps as well.  There are risk and precautions especially in elderly but he has been stable on this for many years and quality of life is significantly diminished when he has tried to come off of this or reduce the dosage    Lumbar disc disease  Stable, he continues on hydrocodone.  He missed his last appointment.  Discussed the importance of adequate follow-up if were going to keep his controlled substances current    Chronic insomnia  As above    Atrial fibrillation, note he was in a normal sinus rhythm today it would appear, he informed me that he stop the amiodarone a couple of months ago.  He has not notify his cardiologist.  I will send a message to his cardiologist seeing if this is something they wish for him to continue    Total time greater than 40 minutes.  Components of this time include chart review, lab review, radiology review, examination, documentation, consultation/specialist reports (when applicable), medication reconciliation, as well as discussion/counseling.  At least 50% of the time was spent face-to-face discussion/counseling.      Subjective     Chief Complaint   Patient presents with    Annual Exam       HPI    Multiple topics today, counseling regarding his controlled substance, insomnia, as well as reviewing his  other    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    BPV (benign positional vertigo) 02/17/2016    Nuclear sclerosis 02/17/2016    Posterior vitreous detachment of both eyes 02/17/2016    Blepharitis of both eyes 02/17/2016    Hyperopia with astigmatism and presbyopia 02/17/2016    Ectasis aorta 10/31/2017    Carotid disease, bilateral 10/31/2017    Irritability 10/31/2017    Anxiety 10/31/2017    Chronic fatigue 03/27/2018    Chronic kidney disease, stage III (moderate) 07/24/2018    Atherosclerosis of aorta 07/24/2018    Pacemaker 01/22/2019    PAF (paroxysmal atrial fibrillation) 06/06/2019    Chronic narcotic dependence 06/02/2020    Other chronic pain 06/02/2020    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 06/02/2020    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     Resolved Ambulatory Problems     Diagnosis Date Noted    Degenerative arthritis of knee 03/15/2012    Bradycardia 05/23/2013    Hypoxemia 07/16/2014    Change in bowel habits 05/25/2017    Carpal tunnel syndrome on right 09/24/2018    Sinus pause 12/10/2018     Past Medical History:   Diagnosis Date    Anticoagulant long-term use     Arthritis     Cataract     Chronic back pain     Coronary artery disease     Diabetes mellitus     Elevated cholesterol     General anesthetics causing adverse effect in therapeutic use     GERD (gastroesophageal reflux disease)     Heart disease     Hypertension     Myocardial  infarction     Prostate disorder     Trouble in sleeping          Review of Systems   Constitutional: Negative for activity change and fever.   HENT: Positive for voice change.    Eyes: Negative for visual disturbance.   Cardiovascular: Negative.    Gastrointestinal: Negative.    Musculoskeletal: Positive for arthralgias and back pain.   Psychiatric/Behavioral: Positive for sleep disturbance. Negative for dysphoric mood.       Objective     Physical Exam  Vitals signs reviewed.   Constitutional:       General: He is not in acute distress.     Appearance: He is well-developed. He is not diaphoretic.   HENT:      Head: Normocephalic and atraumatic.   Eyes:      General: No scleral icterus.  Neck:      Musculoskeletal: Normal range of motion and neck supple.   Cardiovascular:      Rate and Rhythm: Normal rate and regular rhythm.      Pulses:           Dorsalis pedis pulses are 2+ on the right side and 1+ on the left side.        Posterior tibial pulses are 1+ on the right side and 1+ on the left side.      Heart sounds: Murmur (2/6 ANABELLE) present. No gallop.    Pulmonary:      Effort: Pulmonary effort is normal. No respiratory distress.   Feet:      Right foot:      Protective Sensation: 6 sites tested. 6 sites sensed.      Skin integrity: Callus and dry skin present. No skin breakdown.      Left foot:      Protective Sensation: 6 sites tested. 6 sites sensed.      Skin integrity: Callus and dry skin present. No skin breakdown.      Toenail Condition: Left toenails are abnormally thick.   Skin:     General: Skin is warm and dry.   Neurological:      Mental Status: He is alert and oriented to person, place, and time.      Deep Tendon Reflexes: Reflexes are normal and symmetric.   Psychiatric:         Behavior: Behavior normal.       Vitals:    11/06/20 1040   BP: 130/88   BP Location: Left arm   Patient Position: Sitting   BP Method: Medium (Manual)   Pulse: 70   Temp: 97.7 °F (36.5 °C)   TempSrc: Skin   SpO2: 96%  "  Weight: 98.1 kg (216 lb 4.3 oz)   Height: 5' 8" (1.727 m)       MOST RECENT LABS IN OUR ELECTRONIC MEDICAL RECORD:     Results for orders placed or performed in visit on 10/08/20   Hemoglobin A1c   Result Value Ref Range    Hemoglobin A1C 6.1 (H) 4.0 - 5.6 %    Estimated Avg Glucose 128 68 - 131 mg/dL   Comprehensive metabolic panel   Result Value Ref Range    Sodium 143 136 - 145 mmol/L    Potassium 4.0 3.5 - 5.1 mmol/L    Chloride 106 95 - 110 mmol/L    CO2 27 23 - 29 mmol/L    Glucose 95 70 - 110 mg/dL    BUN 16 8 - 23 mg/dL    Creatinine 1.3 0.5 - 1.4 mg/dL    Calcium 9.3 8.7 - 10.5 mg/dL    Total Protein 6.7 6.0 - 8.4 g/dL    Albumin 3.9 3.5 - 5.2 g/dL    Total Bilirubin 0.8 0.1 - 1.0 mg/dL    Alkaline Phosphatase 70 55 - 135 U/L    AST 16 10 - 40 U/L    ALT 14 10 - 44 U/L    Anion Gap 10 8 - 16 mmol/L    eGFR if African American 57.1 (A) >60 mL/min/1.73 m^2    eGFR if non  49.4 (A) >60 mL/min/1.73 m^2           "

## 2020-11-09 ENCOUNTER — TELEPHONE (OUTPATIENT)
Dept: OTOLARYNGOLOGY | Facility: CLINIC | Age: 85
End: 2020-11-09

## 2020-11-09 ENCOUNTER — CLINICAL SUPPORT (OUTPATIENT)
Dept: AUDIOLOGY | Facility: CLINIC | Age: 85
End: 2020-11-09
Payer: MEDICARE

## 2020-11-09 ENCOUNTER — OFFICE VISIT (OUTPATIENT)
Dept: OTOLARYNGOLOGY | Facility: CLINIC | Age: 85
End: 2020-11-09
Payer: MEDICARE

## 2020-11-09 VITALS — WEIGHT: 215.81 LBS | HEIGHT: 68 IN | BODY MASS INDEX: 32.71 KG/M2

## 2020-11-09 DIAGNOSIS — J38.3 VOCAL CORD CYST: ICD-10-CM

## 2020-11-09 DIAGNOSIS — H90.A31 MIXED CONDUCTIVE AND SENSORINEURAL HEARING LOSS OF RIGHT EAR WITH RESTRICTED HEARING OF LEFT EAR: ICD-10-CM

## 2020-11-09 DIAGNOSIS — H90.3 SENSORINEURAL HEARING LOSS (SNHL), BILATERAL: ICD-10-CM

## 2020-11-09 DIAGNOSIS — H69.93 ETD (EUSTACHIAN TUBE DYSFUNCTION), BILATERAL: ICD-10-CM

## 2020-11-09 DIAGNOSIS — H90.A22 SENSORINEURAL HEARING LOSS (SNHL) OF LEFT EAR WITH RESTRICTED HEARING OF RIGHT EAR: Primary | ICD-10-CM

## 2020-11-09 DIAGNOSIS — R49.0 MUSCLE TENSION DYSPHONIA: Primary | ICD-10-CM

## 2020-11-09 DIAGNOSIS — Z98.890 HISTORY OF LARYNGOSCOPY: ICD-10-CM

## 2020-11-09 PROCEDURE — 99999 PR PBB SHADOW E&M-EST. PATIENT-LVL I: ICD-10-PCS | Mod: PBBFAC,HCNC,,

## 2020-11-09 PROCEDURE — 3072F LOW RISK FOR RETINOPATHY: CPT | Mod: HCNC,S$GLB,, | Performed by: OTOLARYNGOLOGY

## 2020-11-09 PROCEDURE — 92567 TYMPANOMETRY: CPT | Mod: HCNC,S$GLB,, | Performed by: AUDIOLOGIST

## 2020-11-09 PROCEDURE — 99999 PR PBB SHADOW E&M-EST. PATIENT-LVL IV: CPT | Mod: PBBFAC,HCNC,, | Performed by: OTOLARYNGOLOGY

## 2020-11-09 PROCEDURE — 31575 DIAGNOSTIC LARYNGOSCOPY: CPT | Mod: HCNC,S$GLB,, | Performed by: OTOLARYNGOLOGY

## 2020-11-09 PROCEDURE — 92557 COMPREHENSIVE HEARING TEST: CPT | Mod: HCNC,S$GLB,, | Performed by: AUDIOLOGIST

## 2020-11-09 PROCEDURE — 3288F PR FALLS RISK ASSESSMENT DOCUMENTED: ICD-10-PCS | Mod: HCNC,CPTII,S$GLB, | Performed by: OTOLARYNGOLOGY

## 2020-11-09 PROCEDURE — 1126F AMNT PAIN NOTED NONE PRSNT: CPT | Mod: HCNC,S$GLB,, | Performed by: OTOLARYNGOLOGY

## 2020-11-09 PROCEDURE — 99999 PR PBB SHADOW E&M-EST. PATIENT-LVL I: CPT | Mod: PBBFAC,HCNC,,

## 2020-11-09 PROCEDURE — 1126F PR PAIN SEVERITY QUANTIFIED, NO PAIN PRESENT: ICD-10-PCS | Mod: HCNC,S$GLB,, | Performed by: OTOLARYNGOLOGY

## 2020-11-09 PROCEDURE — 99999 PR PBB SHADOW E&M-EST. PATIENT-LVL IV: ICD-10-PCS | Mod: PBBFAC,HCNC,, | Performed by: OTOLARYNGOLOGY

## 2020-11-09 PROCEDURE — 99499 UNLISTED E&M SERVICE: CPT | Mod: HCNC,S$GLB,, | Performed by: OTOLARYNGOLOGY

## 2020-11-09 PROCEDURE — 92567 PR TYMPA2METRY: ICD-10-PCS | Mod: HCNC,S$GLB,, | Performed by: AUDIOLOGIST

## 2020-11-09 PROCEDURE — 92557 PR COMPREHENSIVE HEARING TEST: ICD-10-PCS | Mod: HCNC,S$GLB,, | Performed by: AUDIOLOGIST

## 2020-11-09 PROCEDURE — 1101F PR PT FALLS ASSESS DOC 0-1 FALLS W/OUT INJ PAST YR: ICD-10-PCS | Mod: HCNC,CPTII,S$GLB, | Performed by: OTOLARYNGOLOGY

## 2020-11-09 PROCEDURE — 3072F PR LOW RISK FOR RETINOPATHY: ICD-10-PCS | Mod: HCNC,S$GLB,, | Performed by: OTOLARYNGOLOGY

## 2020-11-09 PROCEDURE — 31575 PR LARYNGOSCOPY, FLEXIBLE; DIAGNOSTIC: ICD-10-PCS | Mod: HCNC,S$GLB,, | Performed by: OTOLARYNGOLOGY

## 2020-11-09 PROCEDURE — 1101F PT FALLS ASSESS-DOCD LE1/YR: CPT | Mod: HCNC,CPTII,S$GLB, | Performed by: OTOLARYNGOLOGY

## 2020-11-09 PROCEDURE — 3288F FALL RISK ASSESSMENT DOCD: CPT | Mod: HCNC,CPTII,S$GLB, | Performed by: OTOLARYNGOLOGY

## 2020-11-09 PROCEDURE — 99499 NO LOS: ICD-10-PCS | Mod: HCNC,S$GLB,, | Performed by: OTOLARYNGOLOGY

## 2020-11-09 RX ORDER — IPRATROPIUM BROMIDE 42 UG/1
2 SPRAY, METERED NASAL 2 TIMES DAILY PRN
Qty: 15 ML | Refills: 6 | Status: SHIPPED | OUTPATIENT
Start: 2020-11-09

## 2020-11-09 NOTE — PROGRESS NOTES
Subjective:       Patient ID: Lázaro Wang is a 86 y.o. male.    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     Operative findings:  Intraoperative Findings:   1. Grade III view with Dedo  2. Grade I/II view with anterior commissure and significant cricoid pressure  3. Mucoid cyst of right anterior vocal fold with obscured planes overlying vocal ligament    Feels voice still low in volume, still with raspy quality.    Also with ear fullness bilaterally R>L which is longstanding. He has B SNHL known and has not worn HA. He has had intermittent ETD issues doc on tympanograms. Ear fullness always present on R, int on L.    Review of Systems   Constitutional: Negative for activity change and appetite change.   Respiratory: Negative for difficulty breathing and wheezing   Cardiovascular: Negative for chest pain.      Objective:        Constitutional:   Vital signs are normal. He appears well-developed and well-nourished.     Head:  Normocephalic and atraumatic.     Ears:  Hearing normal to normal and whispered voice; external ear normal without scars, lesions, or masses; ear canal, tympanic membrane, and middle ear normal..   Right Ear: A middle ear effusion is present.     Nose:  Nose normal including turbinates, nasal mucosa, sinuses and nasal septum.     Mouth/Throat  Oropharynx clear and moist without lesions or asymmetry.     Neck:  Neck normal without thyromegaly masses, asymmetry, normal tracheal structure, crepitus, and tenderness.         Tests / Results:  Pre-procedure diagnosis: The primary encounter diagnosis was Muscle tension dysphonia. Diagnoses of Vocal cord cyst, History of laryngoscopy, ETD (Eustachian tube dysfunction), bilateral, and Sensorineural hearing loss (SNHL), bilateral were also pertinent to this visit.     Post-procedure diagnosis: same    Procedure: Flexible fiberoptic laryngoscopy    Surgeon:  Yves Ernst MD    Anesthesia: 2% Lidocaine with Phenylephrine topical    Risks, benefits, and alternatives of the procedure were discussed with the patient, and the patient consented to the fiberoptic examination.  We applied a topical nasal decongestant and analgesic.  After adequate anesthesia was obtained, the flexible fiberoptic scope was passed through the nasal cavity. The entire pharynx (nasopharynx to hypopharynx) and the larynx were visualized. At the end of the examination, the scope was removed. The patient tolerated the procedure well with no complications.     Findings:  -     Laryngeal mucosa is normal  -     Post-cricoid region: normal  -     Lingual tonsils have no hypertrophy  -     Adenoids have no  hypertrophy  -     Right vocal fold: normal mobility     mass/lesion: cord healing well, mild edema of anterior cord near commissure. Resolution of cyst  -     Left vocal fold: normal mobility     mass/lesion: none  -     Other findings: none      Assessment:       1. Muscle tension dysphonia    2. Vocal cord cyst    3. History of laryngoscopy    4. ETD (Eustachian tube dysfunction), bilateral    5. Sensorineural hearing loss (SNHL), bilateral          Plan:       Doing well do far, but persistent dysphonia following surgery. Discussed that underlying MTD contributing.   ST for underlying MTD  FU 2 mos    He is interested in tube for R ear for ETD, will plan for future.

## 2020-11-09 NOTE — PATIENT INSTRUCTIONS
Atrovent (Ipratropium bromide)     This is a nasal spray that primarily treats nasal drainage (rhinorrhea).    You may use this medication 2-3 times per day depending on leakage. This works fairly quickly after onset and can be used as needed (does not have to be used as a scheduled medication)    Helpful hints for maximizing medication into the nose  - Use the opposite hand to spray the nostril (example: right hand for left nostril). This will help avoid spraying the medication onto the septum (the area that divides the left and right nasal cavity.)  - Tilt the bottle so that it is facing at a slight angle up or straight back, but avoid pointing the bottle straight up while spraying.   - Sniff in while you are spraying.    Please use caution when spraying nose if on anticoagulants (coumadin, aspirin, plavix) as a common side effect is nasal dryness and possible nosebleed.

## 2020-11-09 NOTE — TELEPHONE ENCOUNTER
----- Message from Yves Ernst MD sent at 11/9/2020  4:51 PM CST -----  Please call to schedule placement of R ear tube in clinic

## 2020-11-12 ENCOUNTER — NURSE TRIAGE (OUTPATIENT)
Dept: ADMINISTRATIVE | Facility: CLINIC | Age: 85
End: 2020-11-12

## 2020-11-12 ENCOUNTER — PROCEDURE VISIT (OUTPATIENT)
Dept: OTOLARYNGOLOGY | Facility: CLINIC | Age: 85
End: 2020-11-12
Payer: MEDICARE

## 2020-11-12 ENCOUNTER — TELEPHONE (OUTPATIENT)
Dept: OTOLARYNGOLOGY | Facility: CLINIC | Age: 85
End: 2020-11-12

## 2020-11-12 VITALS — HEIGHT: 68 IN | BODY MASS INDEX: 32.71 KG/M2 | WEIGHT: 215.81 LBS

## 2020-11-12 DIAGNOSIS — H65.491 CHRONIC OTITIS MEDIA OF RIGHT EAR WITH EFFUSION: Primary | ICD-10-CM

## 2020-11-12 DIAGNOSIS — H90.3 SENSORINEURAL HEARING LOSS (SNHL), BILATERAL: ICD-10-CM

## 2020-11-12 DIAGNOSIS — H69.93 ETD (EUSTACHIAN TUBE DYSFUNCTION), BILATERAL: ICD-10-CM

## 2020-11-12 PROCEDURE — 69433 PR CREATE EARDRUM OPENING,LOCAL ANESTH: ICD-10-PCS | Mod: HCNC,RT,S$GLB, | Performed by: OTOLARYNGOLOGY

## 2020-11-12 PROCEDURE — 69433 CREATE EARDRUM OPENING: CPT | Mod: HCNC,RT,S$GLB, | Performed by: OTOLARYNGOLOGY

## 2020-11-12 RX ORDER — OFLOXACIN 3 MG/ML
3 SOLUTION AURICULAR (OTIC) 2 TIMES DAILY
Qty: 10 ML | Refills: 3 | Status: SHIPPED | OUTPATIENT
Start: 2020-11-12 | End: 2020-11-19

## 2020-11-12 NOTE — TELEPHONE ENCOUNTER
Pt hung up on front end. RN reached out. Someone answered phone then hung up on RN.    Reason for Disposition   Caller hangs up    Protocols used: ST DIFFICULT CALLER-A-AH

## 2020-11-12 NOTE — PROGRESS NOTES
Lázaro Wang   [id number],   : 10/8/1934      Procedure date:2020  Patient's medications, allergies, past medical, surgical, social and family histories were reviewed and updated as appropriate.    Pre-procedure diagnosis: Chronic otitis media of right ear with effusion [H65.491]     Procedure: right  tympanostomy, with ventilation tube placement     Procedure in detail: Risks, benefits, and alternatives of the procedure were discussed with the patient, and consent was obtained to perform for the right ear.  The procedure required a high level of expertise and use of an operating microscope and multiple micro-instruments.     With the patient in the supine position, the operating microscope was used to examine the ear(s)  A variety of sterile, micro-instruments were utilized to remove the cerumen atraumatically. Lidocaine topically applied to EAC for anesthesia given small ear canal. I applied a small amount of phenol topically (local analgesic) to the tympanic membrane.  The tympanic membrane was incised radially in the appropriate location.  I suctioned the middle ear through the incision. A ventilation tube was placed (position & patency confirmed).  I performed the procedure. The patient tolerated the procedure well and there were no complications.    Findings:   Right ear : serous effusion; notably narrow EAC

## 2020-11-12 NOTE — TELEPHONE ENCOUNTER
----- Message from Zoey Peguero sent at 11/12/2020 12:44 PM CST -----  Type: Needs Medical Advice  Who Called:  Tonia Wang (Spouse)  Best Call Back Number: [phone]  Additional Information: Patient is feeling dizzy and nauseated and they are taking him to the hospital. Calling to speak with the nurse to find out the name of the injection that he got that caused him to feel like this.

## 2020-11-30 ENCOUNTER — PATIENT OUTREACH (OUTPATIENT)
Dept: ADMINISTRATIVE | Facility: OTHER | Age: 85
End: 2020-11-30

## 2020-11-30 NOTE — PROGRESS NOTES
Health Maintenance Due   Topic Date Due    Shingles Vaccine (2 of 3) 06/30/2009    TETANUS VACCINE  05/05/2019    Eye Exam  10/23/2020     Updates were requested from care everywhere.  Chart was reviewed for overdue [facility]active [facility] Encounters ([facility]) topics (CRS, Breast Cancer Screening, Eye exam)  Health Maintenance has been updated.  LINKS immunization registry triggered.  LINKS not responding.

## 2020-12-01 ENCOUNTER — OFFICE VISIT (OUTPATIENT)
Dept: DERMATOLOGY | Facility: CLINIC | Age: 85
End: 2020-12-01
Payer: MEDICARE

## 2020-12-01 VITALS — HEIGHT: 68 IN | RESPIRATION RATE: 20 BRPM | BODY MASS INDEX: 32.82 KG/M2

## 2020-12-01 DIAGNOSIS — L82.0 INFLAMED SEBORRHEIC KERATOSIS: Primary | ICD-10-CM

## 2020-12-01 DIAGNOSIS — L57.0 ACTINIC KERATOSIS: ICD-10-CM

## 2020-12-01 DIAGNOSIS — L82.1 SEBORRHEIC KERATOSES: ICD-10-CM

## 2020-12-01 DIAGNOSIS — Z12.83 SKIN CANCER SCREENING: ICD-10-CM

## 2020-12-01 PROCEDURE — 99999 PR PBB SHADOW E&M-EST. PATIENT-LVL III: ICD-10-PCS | Mod: PBBFAC,HCNC,, | Performed by: DERMATOLOGY

## 2020-12-01 PROCEDURE — 99202 PR OFFICE/OUTPT VISIT, NEW, LEVL II, 15-29 MIN: ICD-10-PCS | Mod: 25,HCNC,S$GLB, | Performed by: DERMATOLOGY

## 2020-12-01 PROCEDURE — 1101F PR PT FALLS ASSESS DOC 0-1 FALLS W/OUT INJ PAST YR: ICD-10-PCS | Mod: HCNC,CPTII,S$GLB, | Performed by: DERMATOLOGY

## 2020-12-01 PROCEDURE — 17110 PR DESTRUCTION BENIGN LESIONS UP TO 14: ICD-10-PCS | Mod: HCNC,S$GLB,, | Performed by: DERMATOLOGY

## 2020-12-01 PROCEDURE — 99202 OFFICE O/P NEW SF 15 MIN: CPT | Mod: 25,HCNC,S$GLB, | Performed by: DERMATOLOGY

## 2020-12-01 PROCEDURE — 1126F AMNT PAIN NOTED NONE PRSNT: CPT | Mod: HCNC,S$GLB,, | Performed by: DERMATOLOGY

## 2020-12-01 PROCEDURE — 17110 DESTRUCTION B9 LES UP TO 14: CPT | Mod: HCNC,S$GLB,, | Performed by: DERMATOLOGY

## 2020-12-01 PROCEDURE — 99999 PR PBB SHADOW E&M-EST. PATIENT-LVL III: CPT | Mod: PBBFAC,HCNC,, | Performed by: DERMATOLOGY

## 2020-12-01 PROCEDURE — 1159F MED LIST DOCD IN RCRD: CPT | Mod: HCNC,S$GLB,, | Performed by: DERMATOLOGY

## 2020-12-01 PROCEDURE — 3072F LOW RISK FOR RETINOPATHY: CPT | Mod: HCNC,S$GLB,, | Performed by: DERMATOLOGY

## 2020-12-01 PROCEDURE — 1159F PR MEDICATION LIST DOCUMENTED IN MEDICAL RECORD: ICD-10-PCS | Mod: HCNC,S$GLB,, | Performed by: DERMATOLOGY

## 2020-12-01 PROCEDURE — 3072F PR LOW RISK FOR RETINOPATHY: ICD-10-PCS | Mod: HCNC,S$GLB,, | Performed by: DERMATOLOGY

## 2020-12-01 PROCEDURE — 3288F FALL RISK ASSESSMENT DOCD: CPT | Mod: HCNC,CPTII,S$GLB, | Performed by: DERMATOLOGY

## 2020-12-01 PROCEDURE — 1126F PR PAIN SEVERITY QUANTIFIED, NO PAIN PRESENT: ICD-10-PCS | Mod: HCNC,S$GLB,, | Performed by: DERMATOLOGY

## 2020-12-01 PROCEDURE — 1101F PT FALLS ASSESS-DOCD LE1/YR: CPT | Mod: HCNC,CPTII,S$GLB, | Performed by: DERMATOLOGY

## 2020-12-01 PROCEDURE — 17000 PR DESTRUCTION(LASER SURGERY,CRYOSURGERY,CHEMOSURGERY),PREMALIGNANT LESIONS,FIRST LESION: ICD-10-PCS | Mod: 59,HCNC,S$GLB, | Performed by: DERMATOLOGY

## 2020-12-01 PROCEDURE — 3288F PR FALLS RISK ASSESSMENT DOCUMENTED: ICD-10-PCS | Mod: HCNC,CPTII,S$GLB, | Performed by: DERMATOLOGY

## 2020-12-01 PROCEDURE — 17000 DESTRUCT PREMALG LESION: CPT | Mod: 59,HCNC,S$GLB, | Performed by: DERMATOLOGY

## 2020-12-01 NOTE — PROGRESS NOTES
Subjective:       Patient ID:  Lázaro Wang is a 86 y.o. male who presents for   Chief Complaint   Patient presents with    Lesion     Patient present for initial visit.     C/o dark spots on his face, chest and abdomen for years. Pt reports these spots have changed in size over the years.  Denies any change in shape or color, denies spontaneous bleeding or itching associated with these lesions. Pt reports he treated spots with a prescription topical medication approximately 1 1/2 years ago but name unknown.      Past Medical History:  No date: Anticoagulant long-term use      Comment:  325 asa  No date: Anxiety  No date: Arthritis  09/2018: Carpal tunnel syndrome on right  No date: Cataract      Comment:  OU  No date: Chronic back pain  No date: Coronary artery disease      Comment:  stents x3  No date: Diabetes mellitus      Comment:  LBSL 70 today---stable  No date: Elevated cholesterol  No date: General anesthetics causing adverse effect in therapeutic use      Comment:  confusion for 7-8 days following min tka  No date: GERD (gastroesophageal reflux disease)  No date: Heart disease  No date: Hypertension  No date: Myocardial infarction  No date: Prostate disorder  12/10/2018: Sinus pause  No date: Trouble in sleeping      Review of Systems   Constitutional: Negative for fever, chills and fatigue.   HENT: Negative for nosebleeds and congestion.    Respiratory: Negative for cough.    Skin: Negative for daily sunscreen use, activity-related sunscreen use, recent sunburn and wears hat.   Hematologic/Lymphatic: Does not bruise/bleed easily.        Objective:    Physical Exam   Constitutional: He appears well-developed and well-nourished. He is obese.  No distress.   HENT:   Head:       Eyes: No conjunctival no injection.   Neurological: He is alert and oriented to person, place, and time. He is not disoriented.   Psychiatric: He has a normal mood and affect.   Skin:   Areas Examined (abnormalities noted in  diagram):   Scalp / Hair Palpated and Inspected  Head / Face Inspection Performed  Neck Inspection Performed  Chest / Axilla Inspection Performed  Abdomen Inspection Performed  Back Inspection Performed  RUE Inspected  LUE Inspection Performed              Diagram Legend     Erythematous scaling macule/papule c/w actinic keratosis       Vascular papule c/w angioma      Pigmented verrucoid papule/plaque c/w seborrheic keratosis      Yellow umbilicated papule c/w sebaceous hyperplasia      Irregularly shaped tan macule c/w lentigo     1-2 mm smooth white papules consistent with Milia      Movable subcutaneous cyst with punctum c/w epidermal inclusion cyst      Subcutaneous movable cyst c/w pilar cyst      Firm pink to brown papule c/w dermatofibroma      Pedunculated fleshy papule(s) c/w skin tag(s)      Evenly pigmented macule c/w junctional nevus     Mildly variegated pigmented, slightly irregular-bordered macule c/w mildly atypical nevus      Flesh colored to evenly pigmented papule c/w intradermal nevus       Pink pearly papule/plaque c/w basal cell carcinoma      Erythematous hyperkeratotic cursted plaque c/w SCC      Surgical scar with no sign of skin cancer recurrence      Open and closed comedones      Inflammatory papules and pustules      Verrucoid papule consistent consistent with wart     Erythematous eczematous patches and plaques     Dystrophic onycholytic nail with subungual debris c/w onychomycosis     Umbilicated papule    Erythematous-base heme-crusted tan verrucoid plaque consistent with inflamed seborrheic keratosis     Erythematous Silvery Scaling Plaque c/w Psoriasis     See annotation      Assessment / Plan:        Inflamed seborrheic keratosis  Abdomen and face  Cryosurgery procedure note:    Verbal consent from the patient is obtained. Liquid nitrogen cryosurgery is applied to 2 lesions to produce a freeze injury. The patient is aware that blisters may form and is instructed on wound care with  gentle cleansing and use of vaseline ointment to keep moist until healed. The patient is supplied a handout on cryosurgery and is instructed to call if lesions do not completely resolve. Risk of dyspigmentation discussed.       Actinic keratosis  Forehead  Cryosurgery Procedure Note    Verbal consent from the patient is obtained and the patient is aware of the precancerous quality and need for treatment of these lesions. Liquid nitrogen cryosurgery is applied to the 1 actinic keratoses, as detailed in the physical exam, to produce a freeze injury. The patient is aware that blisters may form and is instructed on wound care with gentle cleansing and use of vaseline ointment to keep moist until healed. The patient is supplied a handout on cryosurgery and is instructed to call if lesions do not completely resolve. Discussed risk postinflammatory pigmentary changes.       Skin cancer screening  Upper body skin examination performed today including at least 6 points as noted in physical examination. No lesions suspicious for malignancy noted.        Seborrheic keratoses  Trunk  These are benign inherited growths without a malignant potential. Reassurance given to patient. No treatment is necessary.              Follow up in about 6 months (around 6/1/2021).

## 2020-12-01 NOTE — LETTER
December 1, 2020      Brodie Trotter MD  [address]  [facility] [address]           [place] [facility] [address] [place] [address]  Phone: [phone]  Fax: [phone]          Patient: Lázaro Wang   MR Number: [id number]   YOB: 1934   Date of Visit: 12/1/2020       Dear Dr. Brodie Trotter:    Thank you for referring Lázaro Wang to me for evaluation. Attached you will find relevant portions of my assessment and plan of care.    If you have questions, please do not hesitate to call me. I look forward to following Lázaro Wang along with you.    Sincerely,    Chelsea Cullen MD    Enclosure  CC:  No Recipients    If you would like to receive this communication electronically, please contact [email or web address] or [phone] to request more information on [facility] Link access.    For providers and/or their staff who would like to refer a patient to [facility], please contact us through our one-stop-shop provider referral line, [facility], at [phone].    If you feel you have received this communication in error or would no longer like to receive these types of communications, please e-mail [email or web address]

## 2020-12-03 DIAGNOSIS — M51.9 LUMBAR DISC DISEASE: ICD-10-CM

## 2020-12-03 RX ORDER — HYDROCODONE BITARTRATE AND ACETAMINOPHEN 5; 325 MG/1; MG/1
1 TABLET ORAL EVERY 6 HOURS PRN
Qty: 120 TABLET | Refills: 0 | Status: SHIPPED | OUTPATIENT
Start: 2020-12-03 | End: 2021-01-06 | Stop reason: SDUPTHER

## 2020-12-03 NOTE — TELEPHONE ENCOUNTER
----- Message from [facility] sent at 12/3/2020 12:45 PM CST -----  Pt called to get a refill on HYDROcodone-acetaminophen (NORCO) 5-325 mg per tablet    [facility] [phone]     Pt can be reached at [phone]

## 2020-12-07 ENCOUNTER — TELEPHONE (OUTPATIENT)
Dept: CARDIOLOGY | Facility: CLINIC | Age: 85
End: 2020-12-07

## 2020-12-07 NOTE — TELEPHONE ENCOUNTER
Call placed to patient in regards to alert received from home monitoring company in reference to episode of AF.  Left message with pt's wife, Tonia, he will return call when he returns home

## 2020-12-08 ENCOUNTER — TELEPHONE (OUTPATIENT)
Dept: CARDIOLOGY | Facility: CLINIC | Age: 85
End: 2020-12-08

## 2020-12-08 NOTE — TELEPHONE ENCOUNTER
Patient asymptomatic, does not recall having any palpitations, SOB, or feeling tired.   Reviewed medications:  Aspirin 325mg daily  Amiodarone 3xweek, he does not recall dose    Will send notice to MD

## 2020-12-08 NOTE — TELEPHONE ENCOUNTER
Received alert from [employer]:     Interim PPM report.  Since last reset on 02/18/2020  Last remote transmission on 11/30/2020    Alert: Atrial arrhythmia burden of at least > 0 hours in a 24 hour period.     11 ATR episodes, AF burden < 1.0%, Most recent episode on 12/05/2020 lasted 59 mins, A/V rate 223/78 bpm. EGM illustrates AFL with variable conduction.         Patient asymptomatic, does not recall having any palpitations, SOB, or feeling tired.   Reviewed medications:  Aspirin 325mg daily  Amiodarone 3xweek, he does not recall dose

## 2020-12-11 ENCOUNTER — PATIENT MESSAGE (OUTPATIENT)
Dept: OTHER | Facility: OTHER | Age: 85
End: 2020-12-11

## 2020-12-14 ENCOUNTER — OFFICE VISIT (OUTPATIENT)
Dept: OTOLARYNGOLOGY | Facility: CLINIC | Age: 85
End: 2020-12-14
Payer: MEDICARE

## 2020-12-14 VITALS — HEIGHT: 68 IN | WEIGHT: 218.25 LBS | BODY MASS INDEX: 33.08 KG/M2

## 2020-12-14 DIAGNOSIS — Z45.89 TYMPANOSTOMY TUBE CHECK: ICD-10-CM

## 2020-12-14 DIAGNOSIS — J30.0 VASOMOTOR RHINITIS: ICD-10-CM

## 2020-12-14 DIAGNOSIS — H69.93 ETD (EUSTACHIAN TUBE DYSFUNCTION), BILATERAL: Primary | ICD-10-CM

## 2020-12-14 PROCEDURE — 99212 PR OFFICE/OUTPT VISIT, EST, LEVL II, 10-19 MIN: ICD-10-PCS | Mod: HCNC,S$GLB,, | Performed by: OTOLARYNGOLOGY

## 2020-12-14 PROCEDURE — 1126F PR PAIN SEVERITY QUANTIFIED, NO PAIN PRESENT: ICD-10-PCS | Mod: HCNC,S$GLB,, | Performed by: OTOLARYNGOLOGY

## 2020-12-14 PROCEDURE — 1159F MED LIST DOCD IN RCRD: CPT | Mod: HCNC,S$GLB,, | Performed by: OTOLARYNGOLOGY

## 2020-12-14 PROCEDURE — 1101F PT FALLS ASSESS-DOCD LE1/YR: CPT | Mod: HCNC,CPTII,S$GLB, | Performed by: OTOLARYNGOLOGY

## 2020-12-14 PROCEDURE — 3288F PR FALLS RISK ASSESSMENT DOCUMENTED: ICD-10-PCS | Mod: HCNC,CPTII,S$GLB, | Performed by: OTOLARYNGOLOGY

## 2020-12-14 PROCEDURE — 99999 PR PBB SHADOW E&M-EST. PATIENT-LVL III: ICD-10-PCS | Mod: PBBFAC,HCNC,, | Performed by: OTOLARYNGOLOGY

## 2020-12-14 PROCEDURE — 3288F FALL RISK ASSESSMENT DOCD: CPT | Mod: HCNC,CPTII,S$GLB, | Performed by: OTOLARYNGOLOGY

## 2020-12-14 PROCEDURE — 3072F PR LOW RISK FOR RETINOPATHY: ICD-10-PCS | Mod: HCNC,S$GLB,, | Performed by: OTOLARYNGOLOGY

## 2020-12-14 PROCEDURE — 99212 OFFICE O/P EST SF 10 MIN: CPT | Mod: HCNC,S$GLB,, | Performed by: OTOLARYNGOLOGY

## 2020-12-14 PROCEDURE — 1159F PR MEDICATION LIST DOCUMENTED IN MEDICAL RECORD: ICD-10-PCS | Mod: HCNC,S$GLB,, | Performed by: OTOLARYNGOLOGY

## 2020-12-14 PROCEDURE — 99999 PR PBB SHADOW E&M-EST. PATIENT-LVL III: CPT | Mod: PBBFAC,HCNC,, | Performed by: OTOLARYNGOLOGY

## 2020-12-14 PROCEDURE — 3072F LOW RISK FOR RETINOPATHY: CPT | Mod: HCNC,S$GLB,, | Performed by: OTOLARYNGOLOGY

## 2020-12-14 PROCEDURE — 1126F AMNT PAIN NOTED NONE PRSNT: CPT | Mod: HCNC,S$GLB,, | Performed by: OTOLARYNGOLOGY

## 2020-12-14 PROCEDURE — 1101F PR PT FALLS ASSESS DOC 0-1 FALLS W/OUT INJ PAST YR: ICD-10-PCS | Mod: HCNC,CPTII,S$GLB, | Performed by: OTOLARYNGOLOGY

## 2020-12-14 RX ORDER — OFLOXACIN 3 MG/ML
SOLUTION AURICULAR (OTIC)
COMMUNITY
Start: 2020-12-05 | End: 2021-02-08

## 2020-12-14 NOTE — PROGRESS NOTES
Subjective:       Patient ID: Lázaro Wang is a 86 y.o. male.    Chief Complaint: Follow-up    Lázaro is here for follow-up of R ETD, s/p PE tube.  Procedure was uncomplicated and he left with improvement in symptoms. Once he got home, Had severe vertigo episode following tube which he went to ER. He felt pain throughout his whole body with increased sensitivity. No otorrhea. No pain in ear specifically.  Denies issues with lidocaine in the past.     Voice is doing a little better. Referral for ST placed but unsure if done.   DML with excision 9/25/2020  Operative findings:  Intraoperative Findings:   1. Grade III view with Dedo  2. Grade I/II view with anterior commissure and significant cricoid pressure  3. Mucoid cyst of right anterior vocal fold with obscured planes overlying vocal ligament    Atrovent working well for rhinitis    Review of Systems   Constitutional: Negative for activity change and appetite change.   Respiratory: Negative for difficulty breathing and wheezing   Cardiovascular: Negative for chest pain.      Objective:        Constitutional:   Vital signs are normal. He appears well-developed and well-nourished.     Head:  Normocephalic and atraumatic.     Ears:  Hearing normal to normal and whispered voice; external ear normal without scars, lesions, or masses; ear canal, tympanic membrane, and middle ear normal..   Right Ear: A PE tube (patent) is seen.     Nose:  Nose normal including turbinates, nasal mucosa, sinuses and nasal septum.     Mouth/Throat  Oropharynx clear and moist without lesions or asymmetry.     Neck:  Neck normal without thyromegaly masses, asymmetry, normal tracheal structure, crepitus, and tenderness.         Tests / Results:  None    Assessment:       1. ETD (Eustachian tube dysfunction), bilateral    2. Tympanostomy tube check    3. Vasomotor rhinitis          Plan:       Tube looks good. Offered reassurance following delayed vertigo episode - unclear whether delayed  Lidocine or Phenol effect? Nonethless, no further issues and hearing improved  Continue Atrovent  ST if persistent voice concerns  FU 1 year, sooner prn

## 2020-12-18 ENCOUNTER — CLINICAL SUPPORT (OUTPATIENT)
Dept: CARDIOLOGY | Facility: CLINIC | Age: 85
End: 2020-12-18
Payer: MEDICARE

## 2020-12-18 DIAGNOSIS — Z95.0 PRESENCE OF CARDIAC PACEMAKER: ICD-10-CM

## 2020-12-18 PROCEDURE — 93296 REM INTERROG EVL PM/IDS: CPT | Mod: S$GLB,,, | Performed by: INTERNAL MEDICINE

## 2020-12-18 PROCEDURE — 93296 CARDIAC DEVICE CHECK - REMOTE: ICD-10-PCS | Mod: S$GLB,,, | Performed by: INTERNAL MEDICINE

## 2020-12-18 PROCEDURE — 93294 REM INTERROG EVL PM/LDLS PM: CPT | Mod: S$GLB,,, | Performed by: INTERNAL MEDICINE

## 2020-12-18 PROCEDURE — 93294 CARDIAC DEVICE CHECK - REMOTE: ICD-10-PCS | Mod: S$GLB,,, | Performed by: INTERNAL MEDICINE

## 2020-12-31 RX ORDER — TAMSULOSIN HYDROCHLORIDE 0.4 MG/1
1 CAPSULE ORAL NIGHTLY
Qty: 90 CAPSULE | Refills: 1 | Status: SHIPPED | OUTPATIENT
Start: 2020-12-31 | End: 2021-06-17

## 2021-01-06 DIAGNOSIS — M51.9 LUMBAR DISC DISEASE: ICD-10-CM

## 2021-01-06 RX ORDER — HYDROCODONE BITARTRATE AND ACETAMINOPHEN 5; 325 MG/1; MG/1
1 TABLET ORAL EVERY 6 HOURS PRN
Qty: 120 TABLET | Refills: 0 | Status: SHIPPED | OUTPATIENT
Start: 2021-01-06 | End: 2021-02-05 | Stop reason: SDUPTHER

## 2021-01-18 ENCOUNTER — IMMUNIZATION (OUTPATIENT)
Dept: INTERNAL MEDICINE | Facility: CLINIC | Age: 86
End: 2021-01-18
Payer: MEDICARE

## 2021-01-18 DIAGNOSIS — Z23 NEED FOR VACCINATION: Primary | ICD-10-CM

## 2021-01-18 PROCEDURE — 91300 COVID-19, MRNA, LNP-S, PF, 30 MCG/0.3 ML DOSE VACCINE: CPT | Mod: PBBFAC | Performed by: FAMILY MEDICINE

## 2021-02-02 DIAGNOSIS — Z95.0 PACEMAKER: Primary | ICD-10-CM

## 2021-02-02 DIAGNOSIS — I45.5 SINUS PAUSE: ICD-10-CM

## 2021-02-05 DIAGNOSIS — M51.9 LUMBAR DISC DISEASE: ICD-10-CM

## 2021-02-05 RX ORDER — HYDROCODONE BITARTRATE AND ACETAMINOPHEN 5; 325 MG/1; MG/1
1 TABLET ORAL EVERY 6 HOURS PRN
Qty: 120 TABLET | Refills: 0 | Status: SHIPPED | OUTPATIENT
Start: 2021-02-05 | End: 2021-03-05 | Stop reason: SDUPTHER

## 2021-02-08 ENCOUNTER — IMMUNIZATION (OUTPATIENT)
Dept: INTERNAL MEDICINE | Facility: CLINIC | Age: 86
End: 2021-02-08
Payer: MEDICARE

## 2021-02-08 ENCOUNTER — OFFICE VISIT (OUTPATIENT)
Dept: PRIMARY CARE CLINIC | Facility: CLINIC | Age: 86
End: 2021-02-08
Payer: MEDICARE

## 2021-02-08 VITALS
HEIGHT: 68 IN | WEIGHT: 217.13 LBS | DIASTOLIC BLOOD PRESSURE: 64 MMHG | BODY MASS INDEX: 32.91 KG/M2 | SYSTOLIC BLOOD PRESSURE: 106 MMHG | TEMPERATURE: 98 F | RESPIRATION RATE: 18 BRPM | HEART RATE: 60 BPM | OXYGEN SATURATION: 98 %

## 2021-02-08 DIAGNOSIS — E78.5 DYSLIPIDEMIA: Chronic | ICD-10-CM

## 2021-02-08 DIAGNOSIS — E11.43 TYPE II DIABETES MELLITUS WITH PERIPHERAL AUTONOMIC NEUROPATHY: Primary | ICD-10-CM

## 2021-02-08 DIAGNOSIS — I25.10 CORONARY ARTERY DISEASE INVOLVING NATIVE CORONARY ARTERY OF NATIVE HEART WITHOUT ANGINA PECTORIS: Chronic | ICD-10-CM

## 2021-02-08 DIAGNOSIS — Z23 NEED FOR VACCINATION: Primary | ICD-10-CM

## 2021-02-08 DIAGNOSIS — Z95.0 PACEMAKER: Chronic | ICD-10-CM

## 2021-02-08 DIAGNOSIS — Z98.61 HISTORY OF PTCA: Chronic | ICD-10-CM

## 2021-02-08 DIAGNOSIS — I48.91 ATRIAL FIBRILLATION, UNSPECIFIED TYPE: ICD-10-CM

## 2021-02-08 DIAGNOSIS — F33.0 RECURRENT MILD MAJOR DEPRESSIVE DISORDER WITH ANXIETY: ICD-10-CM

## 2021-02-08 DIAGNOSIS — F51.04 CHRONIC INSOMNIA: ICD-10-CM

## 2021-02-08 DIAGNOSIS — F41.9 ANXIETY: Chronic | ICD-10-CM

## 2021-02-08 DIAGNOSIS — I77.819 ECTASIS AORTA: Chronic | ICD-10-CM

## 2021-02-08 DIAGNOSIS — E11.8 TYPE 2 DIABETES MELLITUS WITH COMPLICATION: Chronic | ICD-10-CM

## 2021-02-08 DIAGNOSIS — N18.31 STAGE 3A CHRONIC KIDNEY DISEASE: Chronic | ICD-10-CM

## 2021-02-08 DIAGNOSIS — I65.23 BILATERAL CAROTID ARTERY STENOSIS: Chronic | ICD-10-CM

## 2021-02-08 DIAGNOSIS — I10 ESSENTIAL HYPERTENSION: Chronic | ICD-10-CM

## 2021-02-08 DIAGNOSIS — F11.20 CHRONIC NARCOTIC DEPENDENCE: ICD-10-CM

## 2021-02-08 DIAGNOSIS — M51.9 LUMBAR DISC DISEASE: Chronic | ICD-10-CM

## 2021-02-08 DIAGNOSIS — F41.9 RECURRENT MILD MAJOR DEPRESSIVE DISORDER WITH ANXIETY: ICD-10-CM

## 2021-02-08 DIAGNOSIS — I70.0 ATHEROSCLEROSIS OF AORTA: ICD-10-CM

## 2021-02-08 PROBLEM — I77.9 CAROTID DISEASE, BILATERAL: Chronic | Status: ACTIVE | Noted: 2017-10-31

## 2021-02-08 PROBLEM — I48.0 PAF (PAROXYSMAL ATRIAL FIBRILLATION): Chronic | Status: ACTIVE | Noted: 2019-06-06

## 2021-02-08 PROBLEM — N18.30 CHRONIC KIDNEY DISEASE, STAGE III (MODERATE): Chronic | Status: ACTIVE | Noted: 2018-07-24

## 2021-02-08 PROCEDURE — 99214 PR OFFICE/OUTPT VISIT, EST, LEVL IV, 30-39 MIN: ICD-10-PCS | Mod: S$GLB,,, | Performed by: FAMILY MEDICINE

## 2021-02-08 PROCEDURE — 99214 OFFICE O/P EST MOD 30 MIN: CPT | Mod: S$GLB,,, | Performed by: FAMILY MEDICINE

## 2021-02-08 PROCEDURE — 91300 COVID-19, MRNA, LNP-S, PF, 30 MCG/0.3 ML DOSE VACCINE: CPT | Mod: PBBFAC | Performed by: FAMILY MEDICINE

## 2021-02-08 PROCEDURE — 99999 PR PBB SHADOW E&M-EST. PATIENT-LVL V: ICD-10-PCS | Mod: PBBFAC,,, | Performed by: FAMILY MEDICINE

## 2021-02-08 PROCEDURE — 0002A COVID-19, MRNA, LNP-S, PF, 30 MCG/0.3 ML DOSE VACCINE: CPT | Mod: PBBFAC | Performed by: FAMILY MEDICINE

## 2021-02-08 PROCEDURE — 99499 RISK ADDL DX/OHS AUDIT: ICD-10-PCS | Mod: S$GLB,,, | Performed by: FAMILY MEDICINE

## 2021-02-08 PROCEDURE — 1101F PR PT FALLS ASSESS DOC 0-1 FALLS W/OUT INJ PAST YR: ICD-10-PCS | Mod: CPTII,S$GLB,, | Performed by: FAMILY MEDICINE

## 2021-02-08 PROCEDURE — 3288F FALL RISK ASSESSMENT DOCD: CPT | Mod: CPTII,S$GLB,, | Performed by: FAMILY MEDICINE

## 2021-02-08 PROCEDURE — 1159F MED LIST DOCD IN RCRD: CPT | Mod: S$GLB,,, | Performed by: FAMILY MEDICINE

## 2021-02-08 PROCEDURE — 99499 UNLISTED E&M SERVICE: CPT | Mod: S$GLB,,, | Performed by: FAMILY MEDICINE

## 2021-02-08 PROCEDURE — 1101F PT FALLS ASSESS-DOCD LE1/YR: CPT | Mod: CPTII,S$GLB,, | Performed by: FAMILY MEDICINE

## 2021-02-08 PROCEDURE — 1159F PR MEDICATION LIST DOCUMENTED IN MEDICAL RECORD: ICD-10-PCS | Mod: S$GLB,,, | Performed by: FAMILY MEDICINE

## 2021-02-08 PROCEDURE — 99999 PR PBB SHADOW E&M-EST. PATIENT-LVL V: CPT | Mod: PBBFAC,,, | Performed by: FAMILY MEDICINE

## 2021-02-08 PROCEDURE — 1126F PR PAIN SEVERITY QUANTIFIED, NO PAIN PRESENT: ICD-10-PCS | Mod: S$GLB,,, | Performed by: FAMILY MEDICINE

## 2021-02-08 PROCEDURE — 3288F PR FALLS RISK ASSESSMENT DOCUMENTED: ICD-10-PCS | Mod: CPTII,S$GLB,, | Performed by: FAMILY MEDICINE

## 2021-02-08 PROCEDURE — 1126F AMNT PAIN NOTED NONE PRSNT: CPT | Mod: S$GLB,,, | Performed by: FAMILY MEDICINE

## 2021-02-10 ENCOUNTER — PES CALL (OUTPATIENT)
Dept: ADMINISTRATIVE | Facility: CLINIC | Age: 86
End: 2021-02-10

## 2021-02-19 ENCOUNTER — TELEPHONE (OUTPATIENT)
Dept: HOME HEALTH SERVICES | Facility: CLINIC | Age: 86
End: 2021-02-19

## 2021-02-19 ENCOUNTER — OFFICE VISIT (OUTPATIENT)
Dept: HOME HEALTH SERVICES | Facility: CLINIC | Age: 86
End: 2021-02-19
Payer: MEDICARE

## 2021-02-19 VITALS — BODY MASS INDEX: 32.28 KG/M2 | HEIGHT: 68 IN | WEIGHT: 213 LBS

## 2021-02-19 DIAGNOSIS — K21.9 GASTROESOPHAGEAL REFLUX DISEASE WITHOUT ESOPHAGITIS: ICD-10-CM

## 2021-02-19 DIAGNOSIS — E11.43 TYPE II DIABETES MELLITUS WITH PERIPHERAL AUTONOMIC NEUROPATHY: Chronic | ICD-10-CM

## 2021-02-19 DIAGNOSIS — I70.0 ATHEROSCLEROSIS OF AORTA: Chronic | ICD-10-CM

## 2021-02-19 DIAGNOSIS — E78.5 DYSLIPIDEMIA: Chronic | ICD-10-CM

## 2021-02-19 DIAGNOSIS — E11.8 TYPE 2 DIABETES MELLITUS WITH COMPLICATION: ICD-10-CM

## 2021-02-19 DIAGNOSIS — I25.10 CORONARY ARTERY DISEASE INVOLVING NATIVE CORONARY ARTERY OF NATIVE HEART WITHOUT ANGINA PECTORIS: Chronic | ICD-10-CM

## 2021-02-19 DIAGNOSIS — R49.0 DYSPHONIA: ICD-10-CM

## 2021-02-19 DIAGNOSIS — F51.04 CHRONIC INSOMNIA: Chronic | ICD-10-CM

## 2021-02-19 DIAGNOSIS — I48.91 ATRIAL FIBRILLATION, UNSPECIFIED TYPE: ICD-10-CM

## 2021-02-19 DIAGNOSIS — H81.10 BENIGN PAROXYSMAL POSITIONAL VERTIGO, UNSPECIFIED LATERALITY: ICD-10-CM

## 2021-02-19 DIAGNOSIS — I10 ESSENTIAL HYPERTENSION: Chronic | ICD-10-CM

## 2021-02-19 DIAGNOSIS — F41.9 ANXIETY: Chronic | ICD-10-CM

## 2021-02-19 DIAGNOSIS — J38.2 VOCAL CORD NODULES: ICD-10-CM

## 2021-02-19 DIAGNOSIS — I77.819 ECTASIS AORTA: Chronic | ICD-10-CM

## 2021-02-19 DIAGNOSIS — F33.0 RECURRENT MILD MAJOR DEPRESSIVE DISORDER WITH ANXIETY: Chronic | ICD-10-CM

## 2021-02-19 DIAGNOSIS — F41.9 RECURRENT MILD MAJOR DEPRESSIVE DISORDER WITH ANXIETY: Chronic | ICD-10-CM

## 2021-02-19 DIAGNOSIS — F11.20 CHRONIC NARCOTIC DEPENDENCE: Chronic | ICD-10-CM

## 2021-02-19 DIAGNOSIS — N18.31 STAGE 3A CHRONIC KIDNEY DISEASE: Chronic | ICD-10-CM

## 2021-02-19 DIAGNOSIS — I65.23 BILATERAL CAROTID ARTERY STENOSIS: Chronic | ICD-10-CM

## 2021-02-19 DIAGNOSIS — Z00.00 ENCOUNTER FOR PREVENTIVE HEALTH EXAMINATION: Primary | ICD-10-CM

## 2021-02-19 PROCEDURE — 3288F PR FALLS RISK ASSESSMENT DOCUMENTED: ICD-10-PCS | Mod: CPTII,S$GLB,, | Performed by: NURSE PRACTITIONER

## 2021-02-19 PROCEDURE — 99499 RISK ADDL DX/OHS AUDIT: ICD-10-PCS | Mod: S$GLB,,, | Performed by: NURSE PRACTITIONER

## 2021-02-19 PROCEDURE — G0439 PPPS, SUBSEQ VISIT: HCPCS | Mod: S$GLB,,, | Performed by: NURSE PRACTITIONER

## 2021-02-19 PROCEDURE — G0439 PR MEDICARE ANNUAL WELLNESS SUBSEQUENT VISIT: ICD-10-PCS | Mod: S$GLB,,, | Performed by: NURSE PRACTITIONER

## 2021-02-19 PROCEDURE — 1101F PT FALLS ASSESS-DOCD LE1/YR: CPT | Mod: CPTII,S$GLB,, | Performed by: NURSE PRACTITIONER

## 2021-02-19 PROCEDURE — 3288F FALL RISK ASSESSMENT DOCD: CPT | Mod: CPTII,S$GLB,, | Performed by: NURSE PRACTITIONER

## 2021-02-19 PROCEDURE — 1158F PR ADVANCE CARE PLANNING DISCUSS DOCUMENTED IN MEDICAL RECORD: ICD-10-PCS | Mod: S$GLB,,, | Performed by: NURSE PRACTITIONER

## 2021-02-19 PROCEDURE — 1158F ADVNC CARE PLAN TLK DOCD: CPT | Mod: S$GLB,,, | Performed by: NURSE PRACTITIONER

## 2021-02-19 PROCEDURE — 1101F PR PT FALLS ASSESS DOC 0-1 FALLS W/OUT INJ PAST YR: ICD-10-PCS | Mod: CPTII,S$GLB,, | Performed by: NURSE PRACTITIONER

## 2021-02-19 PROCEDURE — 99499 UNLISTED E&M SERVICE: CPT | Mod: S$GLB,,, | Performed by: NURSE PRACTITIONER

## 2021-02-19 RX ORDER — DEXTROSE 4 G
1 TABLET,CHEWABLE ORAL DAILY
Qty: 1 EACH | Refills: 0 | Status: SHIPPED | OUTPATIENT
Start: 2021-02-19 | End: 2023-10-24

## 2021-02-23 ENCOUNTER — TELEPHONE (OUTPATIENT)
Dept: CARDIOLOGY | Facility: CLINIC | Age: 86
End: 2021-02-23

## 2021-02-23 NOTE — PROGRESS NOTES
Subjective:    Patient ID:  Lázaro Wang is a 83 y.o. male who presents for evaluation of Hypertension (extreme fatigue- dizzy); Hyperlipidemia; and Coronary Artery Disease      Pt of Dr. Amaro who is switching care to Dr. Dominguez wanted to be seen early due to extreme fatigue. He had been started last year on lexapro and gained 15#. He recently stopped the medicine on his own. Since then he reports his mood has changed back to pre treatment and has developed fatigue and feels somewhat lightheaded all day long. Possibly a bit more SOB but no chest pain. He denies any palpitations, dizzy spells, syncope or pre-syncope.        Review of Systems   Constitution: Positive for weakness and malaise/fatigue. Negative for weight gain and weight loss.   HENT: Negative.    Eyes: Negative.    Cardiovascular: Negative for chest pain, claudication, cyanosis, dyspnea on exertion, irregular heartbeat, leg swelling, near-syncope, orthopnea (no PND), palpitations and syncope.   Respiratory: Negative for cough, hemoptysis, shortness of breath and snoring.    Endocrine: Negative.    Skin: Negative.    Musculoskeletal: Negative for joint pain, muscle cramps, muscle weakness and myalgias.   Gastrointestinal: Negative for diarrhea, hematemesis, nausea and vomiting.   Genitourinary: Negative.    Neurological: Negative for dizziness, focal weakness, light-headedness, loss of balance, numbness, paresthesias and seizures.   Psychiatric/Behavioral: Positive for depression.        Objective:    Physical Exam   Constitutional: He is oriented to person, place, and time. He appears well-developed and well-nourished.   HENT:   Mouth/Throat: Oropharynx is clear and moist.   Eyes: Pupils are equal, round, and reactive to light.   Neck: Normal range of motion. No thyromegaly present.   Cardiovascular: Normal rate, regular rhythm, S1 normal, S2 normal, intact distal pulses and normal pulses.   No extrasystoles are present. PMI is not displaced.   Exam reveals no friction rub.    Murmur heard.   Medium-pitched systolic murmur is present with a grade of 1/6  at the upper right sternal border  Pulmonary/Chest: Effort normal and breath sounds normal. He has no wheezes. He has no rales. He exhibits no tenderness.   Abdominal: Soft. Bowel sounds are normal. He exhibits no distension and no mass. There is no tenderness.   Musculoskeletal: Normal range of motion. He exhibits no edema.   Neurological: He is alert and oriented to person, place, and time.   Skin: Skin is warm and dry.   Vitals reviewed.        Assessment:       1. Coronary artery disease involving native coronary artery of native heart without angina pectoris    2. History of PTCA    3. Essential hypertension    4. Dyslipidemia    5. Chronic kidney disease, stage III (moderate)         Plan:       Pt's symptoms seem to have started with the stopping of the lexapro   He has not had recent stress or Echo  SPECT stress  Echo  Will call with results  He has appt with Dr. Dominguez in October  Recommended he f/u with PCP about possible change in antidepressant    Negative

## 2021-03-02 RX ORDER — SERTRALINE HYDROCHLORIDE 50 MG/1
50 TABLET, FILM COATED ORAL DAILY
Qty: 30 TABLET | Refills: 1 | Status: SHIPPED | OUTPATIENT
Start: 2021-03-02 | End: 2021-04-23

## 2021-03-05 DIAGNOSIS — M51.9 LUMBAR DISC DISEASE: ICD-10-CM

## 2021-03-05 RX ORDER — HYDROCODONE BITARTRATE AND ACETAMINOPHEN 5; 325 MG/1; MG/1
1 TABLET ORAL EVERY 6 HOURS PRN
Qty: 120 TABLET | Refills: 0 | Status: SHIPPED | OUTPATIENT
Start: 2021-03-05 | End: 2021-04-07 | Stop reason: SDUPTHER

## 2021-03-18 ENCOUNTER — CLINICAL SUPPORT (OUTPATIENT)
Dept: CARDIOLOGY | Facility: CLINIC | Age: 86
End: 2021-03-18
Attending: INTERNAL MEDICINE
Payer: MEDICARE

## 2021-03-18 ENCOUNTER — CLINICAL SUPPORT (OUTPATIENT)
Dept: CARDIOLOGY | Facility: CLINIC | Age: 86
End: 2021-03-18
Payer: MEDICARE

## 2021-03-18 DIAGNOSIS — I45.5 SINUS PAUSE: ICD-10-CM

## 2021-03-18 DIAGNOSIS — Z95.0 PACEMAKER: ICD-10-CM

## 2021-03-18 DIAGNOSIS — Z95.0 PRESENCE OF CARDIAC PACEMAKER: ICD-10-CM

## 2021-03-18 PROCEDURE — 93294 REM INTERROG EVL PM/LDLS PM: CPT | Mod: S$GLB,,, | Performed by: INTERNAL MEDICINE

## 2021-03-18 PROCEDURE — 93294 CARDIAC DEVICE CHECK - REMOTE: ICD-10-PCS | Mod: S$GLB,,, | Performed by: INTERNAL MEDICINE

## 2021-03-18 PROCEDURE — 93296 REM INTERROG EVL PM/IDS: CPT | Mod: S$GLB,,, | Performed by: INTERNAL MEDICINE

## 2021-03-18 PROCEDURE — 93280 PM DEVICE PROGR EVAL DUAL: CPT | Mod: S$GLB,,, | Performed by: INTERNAL MEDICINE

## 2021-03-18 PROCEDURE — 93280 CARDIAC DEVICE CHECK - IN CLINIC & HOSPITAL: ICD-10-PCS | Mod: S$GLB,,, | Performed by: INTERNAL MEDICINE

## 2021-03-18 PROCEDURE — 93296 CARDIAC DEVICE CHECK - REMOTE: ICD-10-PCS | Mod: S$GLB,,, | Performed by: INTERNAL MEDICINE

## 2021-04-07 DIAGNOSIS — M51.9 LUMBAR DISC DISEASE: ICD-10-CM

## 2021-04-07 RX ORDER — HYDROCODONE BITARTRATE AND ACETAMINOPHEN 5; 325 MG/1; MG/1
1 TABLET ORAL EVERY 6 HOURS PRN
Qty: 120 TABLET | Refills: 0 | Status: SHIPPED | OUTPATIENT
Start: 2021-04-07 | End: 2021-05-10 | Stop reason: SDUPTHER

## 2021-04-13 ENCOUNTER — PATIENT OUTREACH (OUTPATIENT)
Dept: ADMINISTRATIVE | Facility: OTHER | Age: 86
End: 2021-04-13

## 2021-04-15 ENCOUNTER — OFFICE VISIT (OUTPATIENT)
Dept: OPTOMETRY | Facility: CLINIC | Age: 86
End: 2021-04-15
Payer: MEDICARE

## 2021-04-15 DIAGNOSIS — E11.43 TYPE II DIABETES MELLITUS WITH PERIPHERAL AUTONOMIC NEUROPATHY: ICD-10-CM

## 2021-04-15 DIAGNOSIS — Z13.5 GLAUCOMA SCREENING: ICD-10-CM

## 2021-04-15 DIAGNOSIS — H52.4 HYPEROPIA WITH ASTIGMATISM AND PRESBYOPIA, BILATERAL: ICD-10-CM

## 2021-04-15 DIAGNOSIS — H01.00A BLEPHARITIS OF UPPER AND LOWER EYELIDS OF BOTH EYES, UNSPECIFIED TYPE: ICD-10-CM

## 2021-04-15 DIAGNOSIS — E11.9 DIABETES MELLITUS TYPE 2 WITHOUT RETINOPATHY: Primary | ICD-10-CM

## 2021-04-15 DIAGNOSIS — H52.03 HYPEROPIA WITH ASTIGMATISM AND PRESBYOPIA, BILATERAL: ICD-10-CM

## 2021-04-15 DIAGNOSIS — H18.003 CORNEA VERTICILLATA OF BOTH EYES: ICD-10-CM

## 2021-04-15 DIAGNOSIS — H43.813 POSTERIOR VITREOUS DETACHMENT, BILATERAL: ICD-10-CM

## 2021-04-15 DIAGNOSIS — E11.36 CATARACT ASSOCIATED WITH TYPE 2 DIABETES MELLITUS: ICD-10-CM

## 2021-04-15 DIAGNOSIS — H52.203 HYPEROPIA WITH ASTIGMATISM AND PRESBYOPIA, BILATERAL: ICD-10-CM

## 2021-04-15 DIAGNOSIS — H01.00B BLEPHARITIS OF UPPER AND LOWER EYELIDS OF BOTH EYES, UNSPECIFIED TYPE: ICD-10-CM

## 2021-04-15 PROCEDURE — 2023F DILAT RTA XM W/O RTNOPTHY: CPT | Mod: S$GLB,,, | Performed by: OPTOMETRIST

## 2021-04-15 PROCEDURE — 92014 COMPRE OPH EXAM EST PT 1/>: CPT | Mod: S$GLB,,, | Performed by: OPTOMETRIST

## 2021-04-15 PROCEDURE — 3288F FALL RISK ASSESSMENT DOCD: CPT | Mod: CPTII,S$GLB,, | Performed by: OPTOMETRIST

## 2021-04-15 PROCEDURE — 1101F PT FALLS ASSESS-DOCD LE1/YR: CPT | Mod: CPTII,S$GLB,, | Performed by: OPTOMETRIST

## 2021-04-15 PROCEDURE — 99999 PR PBB SHADOW E&M-EST. PATIENT-LVL IV: ICD-10-PCS | Mod: PBBFAC,,, | Performed by: OPTOMETRIST

## 2021-04-15 PROCEDURE — 99999 PR PBB SHADOW E&M-EST. PATIENT-LVL IV: CPT | Mod: PBBFAC,,, | Performed by: OPTOMETRIST

## 2021-04-15 PROCEDURE — 1101F PR PT FALLS ASSESS DOC 0-1 FALLS W/OUT INJ PAST YR: ICD-10-PCS | Mod: CPTII,S$GLB,, | Performed by: OPTOMETRIST

## 2021-04-15 PROCEDURE — 1126F PR PAIN SEVERITY QUANTIFIED, NO PAIN PRESENT: ICD-10-PCS | Mod: S$GLB,,, | Performed by: OPTOMETRIST

## 2021-04-15 PROCEDURE — 2023F PR DILATED RETINAL EXAM W/O EVID OF RETINOPATHY: ICD-10-PCS | Mod: S$GLB,,, | Performed by: OPTOMETRIST

## 2021-04-15 PROCEDURE — 1126F AMNT PAIN NOTED NONE PRSNT: CPT | Mod: S$GLB,,, | Performed by: OPTOMETRIST

## 2021-04-15 PROCEDURE — 3288F PR FALLS RISK ASSESSMENT DOCUMENTED: ICD-10-PCS | Mod: CPTII,S$GLB,, | Performed by: OPTOMETRIST

## 2021-04-15 PROCEDURE — 92014 PR EYE EXAM, EST PATIENT,COMPREHESV: ICD-10-PCS | Mod: S$GLB,,, | Performed by: OPTOMETRIST

## 2021-05-10 DIAGNOSIS — M51.9 LUMBAR DISC DISEASE: ICD-10-CM

## 2021-05-10 RX ORDER — HYDROCODONE BITARTRATE AND ACETAMINOPHEN 5; 325 MG/1; MG/1
1 TABLET ORAL EVERY 6 HOURS PRN
Qty: 120 TABLET | Refills: 0 | Status: SHIPPED | OUTPATIENT
Start: 2021-05-10 | End: 2021-06-07 | Stop reason: SDUPTHER

## 2021-05-21 ENCOUNTER — OFFICE VISIT (OUTPATIENT)
Dept: PRIMARY CARE CLINIC | Facility: CLINIC | Age: 86
End: 2021-05-21
Payer: MEDICARE

## 2021-05-21 VITALS
HEART RATE: 60 BPM | DIASTOLIC BLOOD PRESSURE: 84 MMHG | OXYGEN SATURATION: 98 % | BODY MASS INDEX: 32.94 KG/M2 | RESPIRATION RATE: 18 BRPM | SYSTOLIC BLOOD PRESSURE: 116 MMHG | HEIGHT: 68 IN | WEIGHT: 217.38 LBS

## 2021-05-21 DIAGNOSIS — I10 ESSENTIAL HYPERTENSION: ICD-10-CM

## 2021-05-21 DIAGNOSIS — M51.9 LUMBAR DISC DISEASE: Primary | Chronic | ICD-10-CM

## 2021-05-21 DIAGNOSIS — F11.20 CHRONIC NARCOTIC DEPENDENCE: ICD-10-CM

## 2021-05-21 DIAGNOSIS — E11.8 TYPE 2 DIABETES MELLITUS WITH COMPLICATION: ICD-10-CM

## 2021-05-21 DIAGNOSIS — F51.04 CHRONIC INSOMNIA: ICD-10-CM

## 2021-05-21 DIAGNOSIS — F41.9 ANXIETY: ICD-10-CM

## 2021-05-21 DIAGNOSIS — N18.31 STAGE 3A CHRONIC KIDNEY DISEASE: ICD-10-CM

## 2021-05-21 DIAGNOSIS — E11.43 TYPE II DIABETES MELLITUS WITH PERIPHERAL AUTONOMIC NEUROPATHY: ICD-10-CM

## 2021-05-21 PROCEDURE — 1125F AMNT PAIN NOTED PAIN PRSNT: CPT | Mod: S$GLB,,, | Performed by: FAMILY MEDICINE

## 2021-05-21 PROCEDURE — 99214 PR OFFICE/OUTPT VISIT, EST, LEVL IV, 30-39 MIN: ICD-10-PCS | Mod: S$GLB,,, | Performed by: FAMILY MEDICINE

## 2021-05-21 PROCEDURE — 3288F PR FALLS RISK ASSESSMENT DOCUMENTED: ICD-10-PCS | Mod: CPTII,S$GLB,, | Performed by: FAMILY MEDICINE

## 2021-05-21 PROCEDURE — 1101F PT FALLS ASSESS-DOCD LE1/YR: CPT | Mod: CPTII,S$GLB,, | Performed by: FAMILY MEDICINE

## 2021-05-21 PROCEDURE — 99214 OFFICE O/P EST MOD 30 MIN: CPT | Mod: S$GLB,,, | Performed by: FAMILY MEDICINE

## 2021-05-21 PROCEDURE — 1159F MED LIST DOCD IN RCRD: CPT | Mod: S$GLB,,, | Performed by: FAMILY MEDICINE

## 2021-05-21 PROCEDURE — 1101F PR PT FALLS ASSESS DOC 0-1 FALLS W/OUT INJ PAST YR: ICD-10-PCS | Mod: CPTII,S$GLB,, | Performed by: FAMILY MEDICINE

## 2021-05-21 PROCEDURE — 99499 UNLISTED E&M SERVICE: CPT | Mod: HCNC,S$GLB,, | Performed by: FAMILY MEDICINE

## 2021-05-21 PROCEDURE — 99999 PR PBB SHADOW E&M-EST. PATIENT-LVL V: CPT | Mod: PBBFAC,,, | Performed by: FAMILY MEDICINE

## 2021-05-21 PROCEDURE — 1159F PR MEDICATION LIST DOCUMENTED IN MEDICAL RECORD: ICD-10-PCS | Mod: S$GLB,,, | Performed by: FAMILY MEDICINE

## 2021-05-21 PROCEDURE — 1125F PR PAIN SEVERITY QUANTIFIED, PAIN PRESENT: ICD-10-PCS | Mod: S$GLB,,, | Performed by: FAMILY MEDICINE

## 2021-05-21 PROCEDURE — 99499 RISK ADDL DX/OHS AUDIT: ICD-10-PCS | Mod: HCNC,S$GLB,, | Performed by: FAMILY MEDICINE

## 2021-05-21 PROCEDURE — 99999 PR PBB SHADOW E&M-EST. PATIENT-LVL V: ICD-10-PCS | Mod: PBBFAC,,, | Performed by: FAMILY MEDICINE

## 2021-05-21 PROCEDURE — 3288F FALL RISK ASSESSMENT DOCD: CPT | Mod: CPTII,S$GLB,, | Performed by: FAMILY MEDICINE

## 2021-05-21 RX ORDER — QUETIAPINE FUMARATE 100 MG/1
TABLET, FILM COATED ORAL
Qty: 180 TABLET | Refills: 1 | Status: SHIPPED | OUTPATIENT
Start: 2021-05-21 | End: 2021-11-20

## 2021-05-25 ENCOUNTER — LAB VISIT (OUTPATIENT)
Dept: LAB | Facility: HOSPITAL | Age: 86
End: 2021-05-25
Attending: FAMILY MEDICINE
Payer: MEDICARE

## 2021-05-25 DIAGNOSIS — E11.43 TYPE II DIABETES MELLITUS WITH PERIPHERAL AUTONOMIC NEUROPATHY: ICD-10-CM

## 2021-05-25 LAB
BASOPHILS # BLD AUTO: 0.04 K/UL (ref 0–0.2)
BASOPHILS NFR BLD: 0.6 % (ref 0–1.9)
DIFFERENTIAL METHOD: ABNORMAL
EOSINOPHIL # BLD AUTO: 0.3 K/UL (ref 0–0.5)
EOSINOPHIL NFR BLD: 4.5 % (ref 0–8)
ERYTHROCYTE [DISTWIDTH] IN BLOOD BY AUTOMATED COUNT: 13.2 % (ref 11.5–14.5)
ESTIMATED AVG GLUCOSE: 131 MG/DL (ref 68–131)
HBA1C MFR BLD: 6.2 % (ref 4–5.6)
HCT VFR BLD AUTO: 44.2 % (ref 40–54)
HGB BLD-MCNC: 14.5 G/DL (ref 14–18)
IMM GRANULOCYTES # BLD AUTO: 0.02 K/UL (ref 0–0.04)
IMM GRANULOCYTES NFR BLD AUTO: 0.3 % (ref 0–0.5)
LYMPHOCYTES # BLD AUTO: 2.3 K/UL (ref 1–4.8)
LYMPHOCYTES NFR BLD: 34.9 % (ref 18–48)
MCH RBC QN AUTO: 32 PG (ref 27–31)
MCHC RBC AUTO-ENTMCNC: 32.8 G/DL (ref 32–36)
MCV RBC AUTO: 98 FL (ref 82–98)
MONOCYTES # BLD AUTO: 0.8 K/UL (ref 0.3–1)
MONOCYTES NFR BLD: 11.7 % (ref 4–15)
NEUTROPHILS # BLD AUTO: 3.2 K/UL (ref 1.8–7.7)
NEUTROPHILS NFR BLD: 48 % (ref 38–73)
NRBC BLD-RTO: 0 /100 WBC
PLATELET # BLD AUTO: 204 K/UL (ref 150–450)
PMV BLD AUTO: 10.8 FL (ref 9.2–12.9)
RBC # BLD AUTO: 4.53 M/UL (ref 4.6–6.2)
WBC # BLD AUTO: 6.68 K/UL (ref 3.9–12.7)

## 2021-05-25 PROCEDURE — 36415 COLL VENOUS BLD VENIPUNCTURE: CPT | Mod: PN | Performed by: FAMILY MEDICINE

## 2021-05-25 PROCEDURE — 83036 HEMOGLOBIN GLYCOSYLATED A1C: CPT | Performed by: FAMILY MEDICINE

## 2021-05-25 PROCEDURE — 80061 LIPID PANEL: CPT | Performed by: FAMILY MEDICINE

## 2021-05-25 PROCEDURE — 84443 ASSAY THYROID STIM HORMONE: CPT | Performed by: FAMILY MEDICINE

## 2021-05-25 PROCEDURE — 85025 COMPLETE CBC W/AUTO DIFF WBC: CPT | Performed by: FAMILY MEDICINE

## 2021-05-26 ENCOUNTER — PATIENT OUTREACH (OUTPATIENT)
Dept: ADMINISTRATIVE | Facility: OTHER | Age: 86
End: 2021-05-26

## 2021-05-26 LAB
CHOLEST SERPL-MCNC: 163 MG/DL (ref 120–199)
CHOLEST/HDLC SERPL: 3.6 {RATIO} (ref 2–5)
HDLC SERPL-MCNC: 45 MG/DL (ref 40–75)
HDLC SERPL: 27.6 % (ref 20–50)
LDLC SERPL CALC-MCNC: 96 MG/DL (ref 63–159)
NONHDLC SERPL-MCNC: 118 MG/DL
TRIGL SERPL-MCNC: 110 MG/DL (ref 30–150)
TSH SERPL DL<=0.005 MIU/L-ACNC: 1.58 UIU/ML (ref 0.4–4)

## 2021-06-07 DIAGNOSIS — M51.9 LUMBAR DISC DISEASE: ICD-10-CM

## 2021-06-07 RX ORDER — HYDROCODONE BITARTRATE AND ACETAMINOPHEN 5; 325 MG/1; MG/1
1 TABLET ORAL EVERY 6 HOURS PRN
Qty: 120 TABLET | Refills: 0 | Status: SHIPPED | OUTPATIENT
Start: 2021-06-07 | End: 2021-07-09 | Stop reason: SDUPTHER

## 2021-06-16 ENCOUNTER — HOSPITAL ENCOUNTER (OUTPATIENT)
Dept: CARDIOLOGY | Facility: HOSPITAL | Age: 86
Discharge: HOME | End: 2021-06-16
Payer: MEDICARE

## 2021-06-16 ENCOUNTER — CLINICAL SUPPORT (OUTPATIENT)
Dept: CARDIOLOGY | Facility: HOSPITAL | Age: 86
End: 2021-06-16
Payer: MEDICARE

## 2021-06-16 DIAGNOSIS — Z95.0 PRESENCE OF CARDIAC PACEMAKER: ICD-10-CM

## 2021-06-16 PROCEDURE — 93294 REM INTERROG EVL PM/LDLS PM: CPT | Mod: ,,, | Performed by: INTERNAL MEDICINE

## 2021-06-16 PROCEDURE — 93296 REM INTERROG EVL PM/IDS: CPT | Mod: PO | Performed by: INTERNAL MEDICINE

## 2021-06-16 PROCEDURE — 93294 CARDIAC DEVICE CHECK - REMOTE: ICD-10-PCS | Mod: ,,, | Performed by: INTERNAL MEDICINE

## 2021-06-22 NOTE — TELEPHONE ENCOUNTER
----- Message from Danny Monk sent at 8/26/2019 10:14 AM CDT -----  Contact: Patient  Type:  RX Refill Request    Who Called:  Patient  Refill or New Rx:  Refill  RX Name and Strength:  HYDROcodone-acetaminophen (NORCO) 5-325 mg per tablet  How is the patient currently taking it? (ex. 1XDay):  x4 daily  Is this a 30 day or 90 day RX:  30  Preferred Pharmacy with phone number:    [facility] #[id number] [address] & [address] [address]  Phone: [phone] Fax: [phone]  Local or Mail Order:  Local  Ordering Provider:  Karan Jimenez Call Back Number:  [phone]  Additional Information:  Please advise when refill is sent      show

## 2021-06-28 ENCOUNTER — PATIENT OUTREACH (OUTPATIENT)
Dept: ADMINISTRATIVE | Facility: OTHER | Age: 86
End: 2021-06-28

## 2021-06-28 ENCOUNTER — OFFICE VISIT (OUTPATIENT)
Dept: PAIN MEDICINE | Facility: CLINIC | Age: 86
End: 2021-06-28
Payer: MEDICARE

## 2021-06-28 ENCOUNTER — HOSPITAL ENCOUNTER (OUTPATIENT)
Dept: RADIOLOGY | Facility: HOSPITAL | Age: 86
Discharge: HOME | End: 2021-06-28
Attending: ANESTHESIOLOGY
Payer: MEDICARE

## 2021-06-28 VITALS
WEIGHT: 219.13 LBS | SYSTOLIC BLOOD PRESSURE: 147 MMHG | HEIGHT: 67 IN | BODY MASS INDEX: 34.39 KG/M2 | TEMPERATURE: 98 F | DIASTOLIC BLOOD PRESSURE: 67 MMHG | HEART RATE: 60 BPM | OXYGEN SATURATION: 96 % | RESPIRATION RATE: 20 BRPM

## 2021-06-28 DIAGNOSIS — M54.50 CHRONIC BILATERAL LOW BACK PAIN WITHOUT SCIATICA: ICD-10-CM

## 2021-06-28 DIAGNOSIS — M47.816 LUMBAR SPONDYLOSIS: Primary | ICD-10-CM

## 2021-06-28 DIAGNOSIS — M54.9 DORSALGIA, UNSPECIFIED: ICD-10-CM

## 2021-06-28 DIAGNOSIS — M47.816 LUMBAR SPONDYLOSIS: ICD-10-CM

## 2021-06-28 DIAGNOSIS — G89.29 CHRONIC BILATERAL LOW BACK PAIN WITHOUT SCIATICA: ICD-10-CM

## 2021-06-28 PROCEDURE — 1159F PR MEDICATION LIST DOCUMENTED IN MEDICAL RECORD: ICD-10-PCS | Mod: S$GLB,,, | Performed by: ANESTHESIOLOGY

## 2021-06-28 PROCEDURE — 99204 PR OFFICE/OUTPT VISIT, NEW, LEVL IV, 45-59 MIN: ICD-10-PCS | Mod: S$GLB,,, | Performed by: ANESTHESIOLOGY

## 2021-06-28 PROCEDURE — 1101F PT FALLS ASSESS-DOCD LE1/YR: CPT | Mod: CPTII,S$GLB,, | Performed by: ANESTHESIOLOGY

## 2021-06-28 PROCEDURE — 1125F PR PAIN SEVERITY QUANTIFIED, PAIN PRESENT: ICD-10-PCS | Mod: S$GLB,,, | Performed by: ANESTHESIOLOGY

## 2021-06-28 PROCEDURE — 1125F AMNT PAIN NOTED PAIN PRSNT: CPT | Mod: S$GLB,,, | Performed by: ANESTHESIOLOGY

## 2021-06-28 PROCEDURE — 72110 X-RAY EXAM L-2 SPINE 4/>VWS: CPT | Mod: TC,PO

## 2021-06-28 PROCEDURE — 99204 OFFICE O/P NEW MOD 45 MIN: CPT | Mod: S$GLB,,, | Performed by: ANESTHESIOLOGY

## 2021-06-28 PROCEDURE — 99999 PR PBB SHADOW E&M-EST. PATIENT-LVL V: CPT | Mod: PBBFAC,,, | Performed by: ANESTHESIOLOGY

## 2021-06-28 PROCEDURE — 1159F MED LIST DOCD IN RCRD: CPT | Mod: S$GLB,,, | Performed by: ANESTHESIOLOGY

## 2021-06-28 PROCEDURE — 1101F PR PT FALLS ASSESS DOC 0-1 FALLS W/OUT INJ PAST YR: ICD-10-PCS | Mod: CPTII,S$GLB,, | Performed by: ANESTHESIOLOGY

## 2021-06-28 PROCEDURE — 3288F FALL RISK ASSESSMENT DOCD: CPT | Mod: CPTII,S$GLB,, | Performed by: ANESTHESIOLOGY

## 2021-06-28 PROCEDURE — 72110 XR LUMBAR SPINE 5 VIEW WITH FLEX AND EXT: ICD-10-PCS | Mod: 26,,, | Performed by: RADIOLOGY

## 2021-06-28 PROCEDURE — 3288F PR FALLS RISK ASSESSMENT DOCUMENTED: ICD-10-PCS | Mod: CPTII,S$GLB,, | Performed by: ANESTHESIOLOGY

## 2021-06-28 PROCEDURE — 99999 PR PBB SHADOW E&M-EST. PATIENT-LVL V: ICD-10-PCS | Mod: PBBFAC,,, | Performed by: ANESTHESIOLOGY

## 2021-06-28 PROCEDURE — 72110 X-RAY EXAM L-2 SPINE 4/>VWS: CPT | Mod: 26,,, | Performed by: RADIOLOGY

## 2021-06-28 RX ORDER — ALPRAZOLAM 0.5 MG/1
1 TABLET, ORALLY DISINTEGRATING ORAL ONCE AS NEEDED
Status: CANCELLED | OUTPATIENT
Start: 2021-06-28 | End: 2032-11-24

## 2021-07-09 DIAGNOSIS — M51.9 LUMBAR DISC DISEASE: ICD-10-CM

## 2021-07-09 RX ORDER — HYDROCODONE BITARTRATE AND ACETAMINOPHEN 5; 325 MG/1; MG/1
1 TABLET ORAL EVERY 6 HOURS PRN
Qty: 120 TABLET | Refills: 0 | Status: SHIPPED | OUTPATIENT
Start: 2021-07-09 | End: 2021-08-09 | Stop reason: SDUPTHER

## 2021-07-20 DIAGNOSIS — M47.816 LUMBAR SPONDYLOSIS: Primary | ICD-10-CM

## 2021-07-21 ENCOUNTER — HOSPITAL ENCOUNTER (OUTPATIENT)
Dept: RADIOLOGY | Facility: HOSPITAL | Age: 86
Discharge: HOME | End: 2021-07-21
Attending: ANESTHESIOLOGY
Payer: MEDICARE

## 2021-07-21 ENCOUNTER — HOSPITAL ENCOUNTER (OUTPATIENT)
Facility: HOSPITAL | Age: 86
Discharge: HOME | End: 2021-07-21
Attending: ANESTHESIOLOGY | Admitting: ANESTHESIOLOGY
Payer: MEDICARE

## 2021-07-21 VITALS
HEART RATE: 61 BPM | RESPIRATION RATE: 15 BRPM | HEIGHT: 67 IN | TEMPERATURE: 98 F | DIASTOLIC BLOOD PRESSURE: 65 MMHG | WEIGHT: 219 LBS | BODY MASS INDEX: 34.37 KG/M2 | OXYGEN SATURATION: 94 % | SYSTOLIC BLOOD PRESSURE: 129 MMHG

## 2021-07-21 DIAGNOSIS — M47.816 LUMBAR SPONDYLOSIS: ICD-10-CM

## 2021-07-21 DIAGNOSIS — M47.816 LUMBAR SPONDYLOSIS: Primary | ICD-10-CM

## 2021-07-21 PROCEDURE — 64493 INJ PARAVERT F JNT L/S 1 LEV: CPT | Mod: 50,PO | Performed by: ANESTHESIOLOGY

## 2021-07-21 PROCEDURE — 64494 PR INJ DX/THER AGNT PARAVERT FACET JOINT,IMG GUIDE,LUMBAR/SAC, 2ND LEVEL: ICD-10-PCS | Mod: 50,,, | Performed by: ANESTHESIOLOGY

## 2021-07-21 PROCEDURE — 64493 PR INJ DX/THER AGNT PARAVERT FACET JOINT,IMG GUIDE,LUMBAR/SAC,1ST LVL: ICD-10-PCS | Mod: 50,,, | Performed by: ANESTHESIOLOGY

## 2021-07-21 PROCEDURE — 64494 INJ PARAVERT F JNT L/S 2 LEV: CPT | Mod: 50,,, | Performed by: ANESTHESIOLOGY

## 2021-07-21 PROCEDURE — 64494 INJ PARAVERT F JNT L/S 2 LEV: CPT | Mod: 50,PO | Performed by: ANESTHESIOLOGY

## 2021-07-21 PROCEDURE — 25000003 PHARM REV CODE 250: Mod: PO | Performed by: ANESTHESIOLOGY

## 2021-07-21 PROCEDURE — 64493 INJ PARAVERT F JNT L/S 1 LEV: CPT | Mod: 50,,, | Performed by: ANESTHESIOLOGY

## 2021-07-21 PROCEDURE — 76000 FLUOROSCOPY <1 HR PHYS/QHP: CPT | Mod: TC,PO

## 2021-07-21 RX ORDER — BUPIVACAINE HYDROCHLORIDE 2.5 MG/ML
INJECTION, SOLUTION EPIDURAL; INFILTRATION; INTRACAUDAL
Status: DISCONTINUED | OUTPATIENT
Start: 2021-07-21 | End: 2021-07-21 | Stop reason: HOSPADM

## 2021-07-21 RX ORDER — ALPRAZOLAM 0.5 MG/1
1 TABLET, ORALLY DISINTEGRATING ORAL ONCE AS NEEDED
Status: COMPLETED | OUTPATIENT
Start: 2021-07-21 | End: 2021-07-21

## 2021-07-21 RX ORDER — LIDOCAINE HYDROCHLORIDE 10 MG/ML
INJECTION, SOLUTION EPIDURAL; INFILTRATION; INTRACAUDAL; PERINEURAL
Status: DISCONTINUED | OUTPATIENT
Start: 2021-07-21 | End: 2021-07-21 | Stop reason: HOSPADM

## 2021-07-21 RX ADMIN — ALPRAZOLAM 1 MG: 0.5 TABLET, ORALLY DISINTEGRATING ORAL at 03:07

## 2021-07-22 ENCOUNTER — TELEPHONE (OUTPATIENT)
Dept: PAIN MEDICINE | Facility: CLINIC | Age: 86
End: 2021-07-22

## 2021-07-23 ENCOUNTER — OFFICE VISIT (OUTPATIENT)
Dept: PAIN MEDICINE | Facility: CLINIC | Age: 86
End: 2021-07-23
Payer: MEDICARE

## 2021-07-23 VITALS
RESPIRATION RATE: 18 BRPM | SYSTOLIC BLOOD PRESSURE: 140 MMHG | HEART RATE: 60 BPM | OXYGEN SATURATION: 97 % | DIASTOLIC BLOOD PRESSURE: 62 MMHG | WEIGHT: 222.75 LBS | BODY MASS INDEX: 34.89 KG/M2 | TEMPERATURE: 97 F

## 2021-07-23 DIAGNOSIS — M54.16 LUMBAR RADICULOPATHY: Primary | ICD-10-CM

## 2021-07-23 DIAGNOSIS — M54.9 DORSALGIA, UNSPECIFIED: ICD-10-CM

## 2021-07-23 PROCEDURE — 99999 PR PBB SHADOW E&M-EST. PATIENT-LVL IV: CPT | Mod: PBBFAC,,, | Performed by: ANESTHESIOLOGY

## 2021-07-23 PROCEDURE — 1159F PR MEDICATION LIST DOCUMENTED IN MEDICAL RECORD: ICD-10-PCS | Mod: CPTII,S$GLB,, | Performed by: ANESTHESIOLOGY

## 2021-07-23 PROCEDURE — 99214 PR OFFICE/OUTPT VISIT, EST, LEVL IV, 30-39 MIN: ICD-10-PCS | Mod: S$GLB,,, | Performed by: ANESTHESIOLOGY

## 2021-07-23 PROCEDURE — 1101F PR PT FALLS ASSESS DOC 0-1 FALLS W/OUT INJ PAST YR: ICD-10-PCS | Mod: CPTII,S$GLB,, | Performed by: ANESTHESIOLOGY

## 2021-07-23 PROCEDURE — 99499 RISK ADDL DX/OHS AUDIT: ICD-10-PCS | Mod: HCNC,S$GLB,, | Performed by: ANESTHESIOLOGY

## 2021-07-23 PROCEDURE — 99214 OFFICE O/P EST MOD 30 MIN: CPT | Mod: S$GLB,,, | Performed by: ANESTHESIOLOGY

## 2021-07-23 PROCEDURE — 1125F PR PAIN SEVERITY QUANTIFIED, PAIN PRESENT: ICD-10-PCS | Mod: CPTII,S$GLB,, | Performed by: ANESTHESIOLOGY

## 2021-07-23 PROCEDURE — 99999 PR PBB SHADOW E&M-EST. PATIENT-LVL IV: ICD-10-PCS | Mod: PBBFAC,,, | Performed by: ANESTHESIOLOGY

## 2021-07-23 PROCEDURE — 1159F MED LIST DOCD IN RCRD: CPT | Mod: CPTII,S$GLB,, | Performed by: ANESTHESIOLOGY

## 2021-07-23 PROCEDURE — 1101F PT FALLS ASSESS-DOCD LE1/YR: CPT | Mod: CPTII,S$GLB,, | Performed by: ANESTHESIOLOGY

## 2021-07-23 PROCEDURE — 99499 UNLISTED E&M SERVICE: CPT | Mod: HCNC,S$GLB,, | Performed by: ANESTHESIOLOGY

## 2021-07-23 PROCEDURE — 3288F PR FALLS RISK ASSESSMENT DOCUMENTED: ICD-10-PCS | Mod: CPTII,S$GLB,, | Performed by: ANESTHESIOLOGY

## 2021-07-23 PROCEDURE — 1125F AMNT PAIN NOTED PAIN PRSNT: CPT | Mod: CPTII,S$GLB,, | Performed by: ANESTHESIOLOGY

## 2021-07-23 PROCEDURE — 3288F FALL RISK ASSESSMENT DOCD: CPT | Mod: CPTII,S$GLB,, | Performed by: ANESTHESIOLOGY

## 2021-07-23 RX ORDER — ALPRAZOLAM 0.5 MG/1
1 TABLET, ORALLY DISINTEGRATING ORAL ONCE AS NEEDED
Status: CANCELLED | OUTPATIENT
Start: 2021-08-27 | End: 2033-01-22

## 2021-08-09 DIAGNOSIS — M51.9 LUMBAR DISC DISEASE: ICD-10-CM

## 2021-08-09 RX ORDER — HYDROCODONE BITARTRATE AND ACETAMINOPHEN 5; 325 MG/1; MG/1
1 TABLET ORAL EVERY 6 HOURS PRN
Qty: 120 TABLET | Refills: 0 | Status: SHIPPED | OUTPATIENT
Start: 2021-08-09 | End: 2021-09-09 | Stop reason: SDUPTHER

## 2021-08-25 ENCOUNTER — OFFICE VISIT (OUTPATIENT)
Dept: PRIMARY CARE CLINIC | Facility: CLINIC | Age: 86
End: 2021-08-25
Payer: MEDICARE

## 2021-08-25 VITALS
BODY MASS INDEX: 34.64 KG/M2 | RESPIRATION RATE: 16 BRPM | DIASTOLIC BLOOD PRESSURE: 72 MMHG | WEIGHT: 220.69 LBS | HEART RATE: 60 BPM | HEIGHT: 67 IN | SYSTOLIC BLOOD PRESSURE: 120 MMHG

## 2021-08-25 DIAGNOSIS — F11.20 CHRONIC NARCOTIC DEPENDENCE: ICD-10-CM

## 2021-08-25 DIAGNOSIS — F41.9 ANXIETY: ICD-10-CM

## 2021-08-25 DIAGNOSIS — E11.43 TYPE II DIABETES MELLITUS WITH PERIPHERAL AUTONOMIC NEUROPATHY: ICD-10-CM

## 2021-08-25 DIAGNOSIS — I10 ESSENTIAL HYPERTENSION: ICD-10-CM

## 2021-08-25 DIAGNOSIS — M51.9 LUMBAR DISC DISEASE: Primary | ICD-10-CM

## 2021-08-25 DIAGNOSIS — F51.04 CHRONIC INSOMNIA: ICD-10-CM

## 2021-08-25 PROCEDURE — 99999 PR PBB SHADOW E&M-EST. PATIENT-LVL IV: ICD-10-PCS | Mod: PBBFAC,,, | Performed by: FAMILY MEDICINE

## 2021-08-25 PROCEDURE — 1101F PT FALLS ASSESS-DOCD LE1/YR: CPT | Mod: CPTII,S$GLB,, | Performed by: FAMILY MEDICINE

## 2021-08-25 PROCEDURE — 3288F FALL RISK ASSESSMENT DOCD: CPT | Mod: CPTII,S$GLB,, | Performed by: FAMILY MEDICINE

## 2021-08-25 PROCEDURE — 99999 PR PBB SHADOW E&M-EST. PATIENT-LVL IV: CPT | Mod: PBBFAC,,, | Performed by: FAMILY MEDICINE

## 2021-08-25 PROCEDURE — 1160F PR REVIEW ALL MEDS BY PRESCRIBER/CLIN PHARMACIST DOCUMENTED: ICD-10-PCS | Mod: CPTII,S$GLB,, | Performed by: FAMILY MEDICINE

## 2021-08-25 PROCEDURE — 1160F RVW MEDS BY RX/DR IN RCRD: CPT | Mod: CPTII,S$GLB,, | Performed by: FAMILY MEDICINE

## 2021-08-25 PROCEDURE — 1125F AMNT PAIN NOTED PAIN PRSNT: CPT | Mod: CPTII,S$GLB,, | Performed by: FAMILY MEDICINE

## 2021-08-25 PROCEDURE — 99214 OFFICE O/P EST MOD 30 MIN: CPT | Mod: S$GLB,,, | Performed by: FAMILY MEDICINE

## 2021-08-25 PROCEDURE — 1101F PR PT FALLS ASSESS DOC 0-1 FALLS W/OUT INJ PAST YR: ICD-10-PCS | Mod: CPTII,S$GLB,, | Performed by: FAMILY MEDICINE

## 2021-08-25 PROCEDURE — 1159F PR MEDICATION LIST DOCUMENTED IN MEDICAL RECORD: ICD-10-PCS | Mod: CPTII,S$GLB,, | Performed by: FAMILY MEDICINE

## 2021-08-25 PROCEDURE — 3288F PR FALLS RISK ASSESSMENT DOCUMENTED: ICD-10-PCS | Mod: CPTII,S$GLB,, | Performed by: FAMILY MEDICINE

## 2021-08-25 PROCEDURE — 99214 PR OFFICE/OUTPT VISIT, EST, LEVL IV, 30-39 MIN: ICD-10-PCS | Mod: S$GLB,,, | Performed by: FAMILY MEDICINE

## 2021-08-25 PROCEDURE — 1159F MED LIST DOCD IN RCRD: CPT | Mod: CPTII,S$GLB,, | Performed by: FAMILY MEDICINE

## 2021-08-25 PROCEDURE — 1125F PR PAIN SEVERITY QUANTIFIED, PAIN PRESENT: ICD-10-PCS | Mod: CPTII,S$GLB,, | Performed by: FAMILY MEDICINE

## 2021-08-25 RX ORDER — NITROGLYCERIN 0.4 MG/1
0.4 TABLET SUBLINGUAL EVERY 5 MIN PRN
Qty: 25 TABLET | Refills: 5 | Status: SHIPPED | OUTPATIENT
Start: 2021-08-25

## 2021-08-26 ENCOUNTER — TELEPHONE (OUTPATIENT)
Dept: PAIN MEDICINE | Facility: CLINIC | Age: 86
End: 2021-08-26

## 2021-08-26 ENCOUNTER — TELEPHONE (OUTPATIENT)
Dept: SURGERY | Facility: HOSPITAL | Age: 86
End: 2021-08-26

## 2021-08-26 DIAGNOSIS — Z01.818 PRE-OP TESTING: ICD-10-CM

## 2021-09-09 DIAGNOSIS — M51.9 LUMBAR DISC DISEASE: ICD-10-CM

## 2021-09-09 RX ORDER — HYDROCODONE BITARTRATE AND ACETAMINOPHEN 5; 325 MG/1; MG/1
1 TABLET ORAL EVERY 6 HOURS PRN
Qty: 120 TABLET | Refills: 0 | Status: SHIPPED | OUTPATIENT
Start: 2021-09-09 | End: 2021-10-11 | Stop reason: SDUPTHER

## 2021-09-10 ENCOUNTER — OFFICE VISIT (OUTPATIENT)
Dept: PAIN MEDICINE | Facility: CLINIC | Age: 86
End: 2021-09-10
Payer: MEDICARE

## 2021-09-10 VITALS
HEART RATE: 60 BPM | DIASTOLIC BLOOD PRESSURE: 66 MMHG | WEIGHT: 219.13 LBS | HEIGHT: 67 IN | SYSTOLIC BLOOD PRESSURE: 133 MMHG | OXYGEN SATURATION: 95 % | BODY MASS INDEX: 34.39 KG/M2

## 2021-09-10 DIAGNOSIS — Z11.9 SCREENING EXAMINATION FOR INFECTIOUS DISEASE: ICD-10-CM

## 2021-09-10 DIAGNOSIS — G57.12 MERALGIA PARESTHETICA OF LEFT SIDE: ICD-10-CM

## 2021-09-10 DIAGNOSIS — M54.16 LUMBAR RADICULOPATHY: Primary | ICD-10-CM

## 2021-09-10 PROCEDURE — 1159F PR MEDICATION LIST DOCUMENTED IN MEDICAL RECORD: ICD-10-PCS | Mod: CPTII,S$GLB,, | Performed by: ANESTHESIOLOGY

## 2021-09-10 PROCEDURE — 1101F PR PT FALLS ASSESS DOC 0-1 FALLS W/OUT INJ PAST YR: ICD-10-PCS | Mod: CPTII,S$GLB,, | Performed by: ANESTHESIOLOGY

## 2021-09-10 PROCEDURE — 99999 PR PBB SHADOW E&M-EST. PATIENT-LVL IV: ICD-10-PCS | Mod: PBBFAC,,, | Performed by: ANESTHESIOLOGY

## 2021-09-10 PROCEDURE — 99214 PR OFFICE/OUTPT VISIT, EST, LEVL IV, 30-39 MIN: ICD-10-PCS | Mod: S$GLB,,, | Performed by: ANESTHESIOLOGY

## 2021-09-10 PROCEDURE — 1101F PT FALLS ASSESS-DOCD LE1/YR: CPT | Mod: CPTII,S$GLB,, | Performed by: ANESTHESIOLOGY

## 2021-09-10 PROCEDURE — 3288F FALL RISK ASSESSMENT DOCD: CPT | Mod: CPTII,S$GLB,, | Performed by: ANESTHESIOLOGY

## 2021-09-10 PROCEDURE — 1160F PR REVIEW ALL MEDS BY PRESCRIBER/CLIN PHARMACIST DOCUMENTED: ICD-10-PCS | Mod: CPTII,S$GLB,, | Performed by: ANESTHESIOLOGY

## 2021-09-10 PROCEDURE — 99999 PR PBB SHADOW E&M-EST. PATIENT-LVL IV: CPT | Mod: PBBFAC,,, | Performed by: ANESTHESIOLOGY

## 2021-09-10 PROCEDURE — 1159F MED LIST DOCD IN RCRD: CPT | Mod: CPTII,S$GLB,, | Performed by: ANESTHESIOLOGY

## 2021-09-10 PROCEDURE — 99214 OFFICE O/P EST MOD 30 MIN: CPT | Mod: S$GLB,,, | Performed by: ANESTHESIOLOGY

## 2021-09-10 PROCEDURE — 1160F RVW MEDS BY RX/DR IN RCRD: CPT | Mod: CPTII,S$GLB,, | Performed by: ANESTHESIOLOGY

## 2021-09-10 PROCEDURE — 1125F AMNT PAIN NOTED PAIN PRSNT: CPT | Mod: CPTII,S$GLB,, | Performed by: ANESTHESIOLOGY

## 2021-09-10 PROCEDURE — 3288F PR FALLS RISK ASSESSMENT DOCUMENTED: ICD-10-PCS | Mod: CPTII,S$GLB,, | Performed by: ANESTHESIOLOGY

## 2021-09-10 PROCEDURE — 1125F PR PAIN SEVERITY QUANTIFIED, PAIN PRESENT: ICD-10-PCS | Mod: CPTII,S$GLB,, | Performed by: ANESTHESIOLOGY

## 2021-09-14 ENCOUNTER — CLINICAL SUPPORT (OUTPATIENT)
Dept: CARDIOLOGY | Facility: HOSPITAL | Age: 86
End: 2021-09-14
Payer: MEDICARE

## 2021-09-14 DIAGNOSIS — Z95.0 PRESENCE OF CARDIAC PACEMAKER: ICD-10-CM

## 2021-09-14 PROCEDURE — 93294 REM INTERROG EVL PM/LDLS PM: CPT | Mod: ,,, | Performed by: INTERNAL MEDICINE

## 2021-09-14 PROCEDURE — 93294 CARDIAC DEVICE CHECK - REMOTE: ICD-10-PCS | Mod: ,,, | Performed by: INTERNAL MEDICINE

## 2021-09-14 PROCEDURE — 93296 REM INTERROG EVL PM/IDS: CPT | Mod: PO | Performed by: INTERNAL MEDICINE

## 2021-09-17 ENCOUNTER — LAB VISIT (OUTPATIENT)
Dept: FAMILY MEDICINE | Facility: CLINIC | Age: 86
End: 2021-09-17
Payer: MEDICARE

## 2021-09-17 DIAGNOSIS — Z11.9 SCREENING EXAMINATION FOR INFECTIOUS DISEASE: ICD-10-CM

## 2021-09-17 PROCEDURE — U0003 INFECTIOUS AGENT DETECTION BY NUCLEIC ACID (DNA OR RNA); SEVERE ACUTE RESPIRATORY SYNDROME CORONAVIRUS 2 (SARS-COV-2) (CORONAVIRUS DISEASE [COVID-19]), AMPLIFIED PROBE TECHNIQUE, MAKING USE OF HIGH THROUGHPUT TECHNOLOGIES AS DESCRIBED BY CMS-2020-01-R: HCPCS | Performed by: ANESTHESIOLOGY

## 2021-09-17 PROCEDURE — U0005 INFEC AGEN DETEC AMPLI PROBE: HCPCS | Performed by: ANESTHESIOLOGY

## 2021-09-18 LAB
SARS-COV-2 RNA RESP QL NAA+PROBE: NOT DETECTED
SARS-COV-2- CYCLE NUMBER: NORMAL

## 2021-09-20 ENCOUNTER — HOSPITAL ENCOUNTER (OUTPATIENT)
Facility: HOSPITAL | Age: 86
Discharge: HOME | End: 2021-09-20
Attending: ANESTHESIOLOGY | Admitting: ANESTHESIOLOGY
Payer: MEDICARE

## 2021-09-20 ENCOUNTER — HOSPITAL ENCOUNTER (OUTPATIENT)
Dept: RADIOLOGY | Facility: HOSPITAL | Age: 86
Discharge: HOME | End: 2021-09-20
Attending: ANESTHESIOLOGY
Payer: MEDICARE

## 2021-09-20 VITALS
BODY MASS INDEX: 34.37 KG/M2 | HEIGHT: 67 IN | HEART RATE: 61 BPM | DIASTOLIC BLOOD PRESSURE: 74 MMHG | TEMPERATURE: 98 F | WEIGHT: 219 LBS | OXYGEN SATURATION: 96 % | SYSTOLIC BLOOD PRESSURE: 130 MMHG | RESPIRATION RATE: 16 BRPM

## 2021-09-20 DIAGNOSIS — M54.16 LUMBAR RADICULOPATHY: Primary | ICD-10-CM

## 2021-09-20 DIAGNOSIS — M54.16 LUMBAR RADICULOPATHY: ICD-10-CM

## 2021-09-20 PROCEDURE — 76000 FLUOROSCOPY <1 HR PHYS/QHP: CPT | Mod: TC,PO

## 2021-09-20 PROCEDURE — 62323 NJX INTERLAMINAR LMBR/SAC: CPT | Mod: ,,, | Performed by: ANESTHESIOLOGY

## 2021-09-20 PROCEDURE — 25500020 PHARM REV CODE 255: Mod: PO | Performed by: ANESTHESIOLOGY

## 2021-09-20 PROCEDURE — 25000003 PHARM REV CODE 250: Mod: PO | Performed by: ANESTHESIOLOGY

## 2021-09-20 PROCEDURE — 62323 NJX INTERLAMINAR LMBR/SAC: CPT | Mod: PO | Performed by: ANESTHESIOLOGY

## 2021-09-20 PROCEDURE — 63600175 PHARM REV CODE 636 W HCPCS: Mod: PO | Performed by: ANESTHESIOLOGY

## 2021-09-20 PROCEDURE — 62323 PR INJ LUMBAR/SACRAL, W/IMAGING GUIDANCE: ICD-10-PCS | Mod: ,,, | Performed by: ANESTHESIOLOGY

## 2021-09-20 RX ORDER — ALPRAZOLAM 0.5 MG/1
1 TABLET, ORALLY DISINTEGRATING ORAL ONCE AS NEEDED
Status: COMPLETED | OUTPATIENT
Start: 2021-09-20 | End: 2021-09-20

## 2021-09-20 RX ORDER — METHYLPREDNISOLONE ACETATE 80 MG/ML
INJECTION, SUSPENSION INTRA-ARTICULAR; INTRALESIONAL; INTRAMUSCULAR; SOFT TISSUE
Status: DISCONTINUED | OUTPATIENT
Start: 2021-09-20 | End: 2021-09-20 | Stop reason: HOSPADM

## 2021-09-20 RX ORDER — LIDOCAINE HYDROCHLORIDE 10 MG/ML
INJECTION, SOLUTION EPIDURAL; INFILTRATION; INTRACAUDAL; PERINEURAL
Status: DISCONTINUED | OUTPATIENT
Start: 2021-09-20 | End: 2021-09-20 | Stop reason: HOSPADM

## 2021-09-20 RX ORDER — SODIUM BICARBONATE 42 MG/ML
INJECTION, SOLUTION INTRAVENOUS
Status: DISCONTINUED | OUTPATIENT
Start: 2021-09-20 | End: 2021-09-20 | Stop reason: HOSPADM

## 2021-09-20 RX ADMIN — ALPRAZOLAM 0.5 MG: 0.5 TABLET, ORALLY DISINTEGRATING ORAL at 10:09

## 2021-09-28 ENCOUNTER — IMMUNIZATION (OUTPATIENT)
Dept: FAMILY MEDICINE | Facility: CLINIC | Age: 86
End: 2021-09-28
Payer: MEDICARE

## 2021-09-28 DIAGNOSIS — Z23 NEED FOR VACCINATION: Primary | ICD-10-CM

## 2021-09-28 PROCEDURE — 91300 COVID-19, MRNA, LNP-S, PF, 30 MCG/0.3 ML DOSE VACCINE: CPT | Mod: PBBFAC | Performed by: RADIOLOGY

## 2021-10-11 ENCOUNTER — TELEPHONE (OUTPATIENT)
Dept: PRIMARY CARE CLINIC | Facility: CLINIC | Age: 86
End: 2021-10-11

## 2021-10-11 DIAGNOSIS — M51.9 LUMBAR DISC DISEASE: ICD-10-CM

## 2021-10-11 RX ORDER — HYDROCODONE BITARTRATE AND ACETAMINOPHEN 5; 325 MG/1; MG/1
1 TABLET ORAL EVERY 6 HOURS PRN
Qty: 120 TABLET | Refills: 0 | Status: SHIPPED | OUTPATIENT
Start: 2021-10-11 | End: 2021-11-09 | Stop reason: SDUPTHER

## 2021-10-16 ENCOUNTER — PATIENT OUTREACH (OUTPATIENT)
Dept: ADMINISTRATIVE | Facility: OTHER | Age: 86
End: 2021-10-16

## 2021-11-03 ENCOUNTER — PATIENT MESSAGE (OUTPATIENT)
Dept: PRIMARY CARE CLINIC | Facility: CLINIC | Age: 86
End: 2021-11-03
Payer: MEDICARE

## 2021-11-09 DIAGNOSIS — M51.9 LUMBAR DISC DISEASE: ICD-10-CM

## 2021-11-09 RX ORDER — HYDROCODONE BITARTRATE AND ACETAMINOPHEN 5; 325 MG/1; MG/1
1 TABLET ORAL EVERY 6 HOURS PRN
Qty: 120 TABLET | Refills: 0 | Status: SHIPPED | OUTPATIENT
Start: 2021-11-09 | End: 2021-12-10 | Stop reason: SDUPTHER

## 2021-11-23 ENCOUNTER — TELEPHONE (OUTPATIENT)
Dept: CARDIOLOGY | Facility: HOSPITAL | Age: 86
End: 2021-11-23
Payer: MEDICARE

## 2021-12-10 ENCOUNTER — TELEPHONE (OUTPATIENT)
Dept: PRIMARY CARE CLINIC | Facility: CLINIC | Age: 86
End: 2021-12-10
Payer: MEDICARE

## 2021-12-10 DIAGNOSIS — M51.9 LUMBAR DISC DISEASE: ICD-10-CM

## 2021-12-10 RX ORDER — HYDROCODONE BITARTRATE AND ACETAMINOPHEN 5; 325 MG/1; MG/1
1 TABLET ORAL EVERY 6 HOURS PRN
Qty: 120 TABLET | Refills: 0 | Status: SHIPPED | OUTPATIENT
Start: 2021-12-10 | End: 2022-01-10 | Stop reason: SDUPTHER

## 2021-12-13 ENCOUNTER — TELEPHONE (OUTPATIENT)
Dept: PRIMARY CARE CLINIC | Facility: CLINIC | Age: 86
End: 2021-12-13

## 2021-12-13 NOTE — TELEPHONE ENCOUNTER
Spoke to patient and rescheduled follow up appointment that was missed. Verbalized understanding.

## 2021-12-13 NOTE — TELEPHONE ENCOUNTER
----- Message from Marbin Almaguer sent at 12/13/2021 11:20 AM CST -----  Type:  Sooner Apoointment Request    Caller is requesting a sooner appointment.  Caller declined first available appointment listed below.  Caller will not accept being placed on the waitlist and is requesting a message be sent to doctor.    Name of Caller:  Patient  When is the first available appointment?  ---  Symptoms:  3 month FU  Best Call Back Number:  [phone]  Additional Information:

## 2021-12-17 RX ORDER — TAMSULOSIN HYDROCHLORIDE 0.4 MG/1
CAPSULE ORAL
Qty: 90 CAPSULE | Refills: 2 | Status: SHIPPED | OUTPATIENT
Start: 2021-12-17 | End: 2022-12-27

## 2021-12-17 RX ORDER — OMEPRAZOLE 20 MG/1
CAPSULE, DELAYED RELEASE ORAL
Qty: 90 CAPSULE | Refills: 2 | Status: SHIPPED | OUTPATIENT
Start: 2021-12-17 | End: 2022-10-25

## 2021-12-20 ENCOUNTER — OFFICE VISIT (OUTPATIENT)
Dept: PRIMARY CARE CLINIC | Facility: CLINIC | Age: 86
End: 2021-12-20
Payer: MEDICARE

## 2021-12-20 ENCOUNTER — LAB VISIT (OUTPATIENT)
Dept: LAB | Facility: HOSPITAL | Age: 86
End: 2021-12-20
Attending: FAMILY MEDICINE
Payer: MEDICARE

## 2021-12-20 VITALS
SYSTOLIC BLOOD PRESSURE: 124 MMHG | DIASTOLIC BLOOD PRESSURE: 68 MMHG | HEIGHT: 67 IN | BODY MASS INDEX: 34.5 KG/M2 | WEIGHT: 219.81 LBS | OXYGEN SATURATION: 96 % | HEART RATE: 60 BPM

## 2021-12-20 DIAGNOSIS — E11.43 TYPE II DIABETES MELLITUS WITH PERIPHERAL AUTONOMIC NEUROPATHY: Primary | Chronic | ICD-10-CM

## 2021-12-20 DIAGNOSIS — F11.20 CHRONIC NARCOTIC DEPENDENCE: Chronic | ICD-10-CM

## 2021-12-20 DIAGNOSIS — E78.5 DYSLIPIDEMIA: Chronic | ICD-10-CM

## 2021-12-20 DIAGNOSIS — I10 ESSENTIAL HYPERTENSION: ICD-10-CM

## 2021-12-20 DIAGNOSIS — N18.31 STAGE 3A CHRONIC KIDNEY DISEASE: ICD-10-CM

## 2021-12-20 DIAGNOSIS — M51.9 LUMBAR DISC DISEASE: Chronic | ICD-10-CM

## 2021-12-20 DIAGNOSIS — E11.43 TYPE II DIABETES MELLITUS WITH PERIPHERAL AUTONOMIC NEUROPATHY: ICD-10-CM

## 2021-12-20 DIAGNOSIS — F51.04 CHRONIC INSOMNIA: Chronic | ICD-10-CM

## 2021-12-20 DIAGNOSIS — I10 ESSENTIAL HYPERTENSION: Chronic | ICD-10-CM

## 2021-12-20 LAB
ALBUMIN SERPL BCP-MCNC: 3.7 G/DL (ref 3.5–5.2)
ALP SERPL-CCNC: 75 U/L (ref 55–135)
ALT SERPL W/O P-5'-P-CCNC: 12 U/L (ref 10–44)
ANION GAP SERPL CALC-SCNC: 11 MMOL/L (ref 8–16)
AST SERPL-CCNC: 22 U/L (ref 10–40)
BILIRUB SERPL-MCNC: 0.8 MG/DL (ref 0.1–1)
BUN SERPL-MCNC: 12 MG/DL (ref 8–23)
CALCIUM SERPL-MCNC: 9.3 MG/DL (ref 8.7–10.5)
CHLORIDE SERPL-SCNC: 105 MMOL/L (ref 95–110)
CO2 SERPL-SCNC: 22 MMOL/L (ref 23–29)
CREAT SERPL-MCNC: 1.1 MG/DL (ref 0.5–1.4)
EST. GFR  (AFRICAN AMERICAN): >60 ML/MIN/1.73 M^2
EST. GFR  (NON AFRICAN AMERICAN): >60 ML/MIN/1.73 M^2
ESTIMATED AVG GLUCOSE: 128 MG/DL (ref 68–131)
GLUCOSE SERPL-MCNC: 110 MG/DL (ref 70–110)
HBA1C MFR BLD: 6.1 % (ref 4–5.6)
POTASSIUM SERPL-SCNC: 5 MMOL/L (ref 3.5–5.1)
PROT SERPL-MCNC: 7 G/DL (ref 6–8.4)
SODIUM SERPL-SCNC: 138 MMOL/L (ref 136–145)

## 2021-12-20 PROCEDURE — 99214 OFFICE O/P EST MOD 30 MIN: CPT | Mod: HCNC,S$GLB,, | Performed by: FAMILY MEDICINE

## 2021-12-20 PROCEDURE — 90694 VACC AIIV4 NO PRSRV 0.5ML IM: CPT | Mod: HCNC,S$GLB,, | Performed by: FAMILY MEDICINE

## 2021-12-20 PROCEDURE — 99499 UNLISTED E&M SERVICE: CPT | Mod: S$GLB,,, | Performed by: FAMILY MEDICINE

## 2021-12-20 PROCEDURE — 80061 LIPID PANEL: CPT | Mod: HCNC | Performed by: FAMILY MEDICINE

## 2021-12-20 PROCEDURE — 80053 COMPREHEN METABOLIC PANEL: CPT | Mod: HCNC | Performed by: FAMILY MEDICINE

## 2021-12-20 PROCEDURE — 83036 HEMOGLOBIN GLYCOSYLATED A1C: CPT | Mod: HCNC | Performed by: FAMILY MEDICINE

## 2021-12-20 PROCEDURE — 99999 PR PBB SHADOW E&M-EST. PATIENT-LVL IV: ICD-10-PCS | Mod: PBBFAC,HCNC,, | Performed by: FAMILY MEDICINE

## 2021-12-20 PROCEDURE — 36415 COLL VENOUS BLD VENIPUNCTURE: CPT | Mod: HCNC,PN | Performed by: FAMILY MEDICINE

## 2021-12-20 PROCEDURE — 90694 FLU VACCINE - QUADRIVALENT - ADJUVANTED: ICD-10-PCS | Mod: HCNC,S$GLB,, | Performed by: FAMILY MEDICINE

## 2021-12-20 PROCEDURE — 99999 PR PBB SHADOW E&M-EST. PATIENT-LVL IV: CPT | Mod: PBBFAC,HCNC,, | Performed by: FAMILY MEDICINE

## 2021-12-20 PROCEDURE — 99214 PR OFFICE/OUTPT VISIT, EST, LEVL IV, 30-39 MIN: ICD-10-PCS | Mod: HCNC,S$GLB,, | Performed by: FAMILY MEDICINE

## 2021-12-20 PROCEDURE — 99499 RISK ADDL DX/OHS AUDIT: ICD-10-PCS | Mod: S$GLB,,, | Performed by: FAMILY MEDICINE

## 2021-12-20 PROCEDURE — G0008 FLU VACCINE - QUADRIVALENT - ADJUVANTED: ICD-10-PCS | Mod: HCNC,S$GLB,, | Performed by: FAMILY MEDICINE

## 2021-12-20 PROCEDURE — G0008 ADMIN INFLUENZA VIRUS VAC: HCPCS | Mod: HCNC,S$GLB,, | Performed by: FAMILY MEDICINE

## 2021-12-20 RX ORDER — FLUTICASONE PROPIONATE 50 MCG
2 SPRAY, SUSPENSION (ML) NASAL DAILY
Qty: 16 G | Refills: 3 | Status: SHIPPED | OUTPATIENT
Start: 2021-12-20 | End: 2022-06-21

## 2021-12-20 RX ORDER — AZELASTINE 1 MG/ML
1 SPRAY, METERED NASAL 2 TIMES DAILY
Qty: 30 ML | Refills: 3 | Status: SHIPPED | OUTPATIENT
Start: 2021-12-20 | End: 2022-06-21

## 2021-12-20 RX ORDER — SERTRALINE HYDROCHLORIDE 50 MG/1
50 TABLET, FILM COATED ORAL DAILY
Qty: 90 TABLET | Refills: 1 | Status: SHIPPED | OUTPATIENT
Start: 2021-12-20 | End: 2022-05-30

## 2021-12-21 ENCOUNTER — PES CALL (OUTPATIENT)
Dept: ADMINISTRATIVE | Facility: CLINIC | Age: 86
End: 2021-12-21
Payer: MEDICARE

## 2021-12-21 LAB
CHOLEST SERPL-MCNC: 164 MG/DL (ref 120–199)
CHOLEST/HDLC SERPL: 3.3 {RATIO} (ref 2–5)
HDLC SERPL-MCNC: 49 MG/DL (ref 40–75)
HDLC SERPL: 29.9 % (ref 20–50)
LDLC SERPL CALC-MCNC: 88 MG/DL (ref 63–159)
NONHDLC SERPL-MCNC: 115 MG/DL
TRIGL SERPL-MCNC: 135 MG/DL (ref 30–150)

## 2022-01-10 DIAGNOSIS — M51.9 LUMBAR DISC DISEASE: ICD-10-CM

## 2022-01-10 RX ORDER — HYDROCODONE BITARTRATE AND ACETAMINOPHEN 5; 325 MG/1; MG/1
1 TABLET ORAL EVERY 6 HOURS PRN
Qty: 120 TABLET | Refills: 0 | Status: SHIPPED | OUTPATIENT
Start: 2022-01-10 | End: 2022-02-09 | Stop reason: SDUPTHER

## 2022-01-10 NOTE — TELEPHONE ENCOUNTER
----- Message from Kesha Joshi, Patient Care Assistant sent at 1/10/2022  2:41 PM CST -----  Contact: Pt  Type:  RX Refill Request    Who Called:  Pt  Refill or New Rx:  Refill  RX Name and Strength:  HYDROcodone-acetaminophen (NORCO) 5-325 mg per tablet    How is the patient currently taking it? (ex. 1XDay):  As Directed  Is this a 30 day or 90 day RX:  30  Preferred Pharmacy with phone number:    [facility] #[id number] [place] [address] AT [facility] & [address] [address]  Phone: [phone] Fax: [phone]      Local or Mail Order:  Local  Ordering Provider:  Dr Trotter  Best Call Back Number:  [phone]    Additional Information:  Please contact pt upon completion-Thank you~

## 2022-02-09 DIAGNOSIS — M51.9 LUMBAR DISC DISEASE: ICD-10-CM

## 2022-02-09 RX ORDER — HYDROCODONE BITARTRATE AND ACETAMINOPHEN 5; 325 MG/1; MG/1
1 TABLET ORAL EVERY 6 HOURS PRN
Qty: 120 TABLET | Refills: 0 | Status: SHIPPED | OUTPATIENT
Start: 2022-02-09 | End: 2022-03-10 | Stop reason: SDUPTHER

## 2022-02-11 ENCOUNTER — TELEPHONE (OUTPATIENT)
Dept: CARDIOLOGY | Facility: HOSPITAL | Age: 87
End: 2022-02-11
Payer: MEDICARE

## 2022-02-11 NOTE — TELEPHONE ENCOUNTER
Spoke with pt.. he does not recall getting a letter or getting an upgraded cell adapter.. will order one to be sent to address on file.   Pt is going to call an we can walk through changing out the cell adapter

## 2022-02-22 DIAGNOSIS — R49.0 HOARSE: ICD-10-CM

## 2022-02-23 NOTE — TELEPHONE ENCOUNTER
No new care gaps identified.  Powered by [employer]. Reference number: [id number].   2/23/2022 4:38:35 PM CST

## 2022-02-23 NOTE — TELEPHONE ENCOUNTER
Encounter details require adjustment(s)/ updating by [facility] Staff  As of this time Protocols: did not populate or display   Adjustment(s) made: Department  CDM should display. Medication(s) delegated by the OR.  Will resend refill request encounter to P Centralized Refill Staff Pool.   [facility]   Note composed:4:37 PM 02/23/2022

## 2022-02-24 RX ORDER — MONTELUKAST SODIUM 10 MG/1
TABLET ORAL
Qty: 90 TABLET | Refills: 3 | Status: SHIPPED | OUTPATIENT
Start: 2022-02-24 | End: 2022-06-21

## 2022-02-24 NOTE — TELEPHONE ENCOUNTER
Refill Routing Note   Medication(s) are not appropriate for processing by [facility] for the following reason(s):      - Outside of protocol    [facility] action(s):  Route       Medication Therapy Plan: Prescribed for Hoarse, outside of protocol  --->Care Gap information included in message below if applicable.   Medication reconciliation completed: No   Automatic Epic Generated Protocol Data:        Requested Prescriptions   Pending Prescriptions Disp Refills    montelukast (SINGULAIR) 10 mg tablet [Pharmacy Med Name: MONTELUKAST 10MG TABLETS] 90 tablet 3     Sig: TAKE 1 TABLET(10 MG) BY MOUTH EVERY EVENING       Leukotriene Inhibitors Protocol Passed - 2/22/2022  2:10 PM        Passed - Visit with authorizing provider in past 12 months or upcoming 90 days        Pulmonology:  Leukotriene Inhibitors Passed - 2/23/2022  4:38 PM        Passed - Patient is at least 18 years old        Passed - Valid encounter within last 15 months     Recent Visits  Date Type Provider Dept   12/20/21 Office Visit Brodie Trotter MD [facility]   08/25/21 Office Visit Brodie Trotter MD [facility]   05/21/21 Office Visit Brodie Trotter MD [facility]   02/08/21 Office Visit Brodie Trotter MD [facility]   11/06/20 Office Visit Brodie Trotter MD [facility]   04/23/20 Office Visit Brodie Trotter MD [facility]   Showing recent visits within past 720 days and meeting all other requirements  Future Appointments  No visits were found meeting these conditions.  Showing future appointments within next 150 days and meeting all other requirements      Future Appointments              In 3 weeks Brodie Trotter MD [facility]                      Appointments  past 12m or future 3m with PCP    Date Provider   Last Visit   12/20/2021 Brodie Trotter MD   Next Visit   3/21/2022 Brodie CHEN  MD Sergo   ED visits in past 90 days: 0        Note composed:8:54 AM 02/24/2022

## 2022-03-10 ENCOUNTER — TELEPHONE (OUTPATIENT)
Dept: CARDIOLOGY | Facility: HOSPITAL | Age: 87
End: 2022-03-10
Payer: MEDICARE

## 2022-03-10 DIAGNOSIS — M51.9 LUMBAR DISC DISEASE: ICD-10-CM

## 2022-03-10 RX ORDER — HYDROCODONE BITARTRATE AND ACETAMINOPHEN 5; 325 MG/1; MG/1
1 TABLET ORAL EVERY 6 HOURS PRN
Qty: 120 TABLET | Refills: 0 | Status: SHIPPED | OUTPATIENT
Start: 2022-03-10 | End: 2022-04-11 | Stop reason: SDUPTHER

## 2022-03-10 NOTE — TELEPHONE ENCOUNTER
----- Message from Ani Casiano sent at 3/10/2022  2:36 PM CST -----  Contact: pt  Type:  RX Refill Request    Who Called:  Pt     Refill or New Rx:  Refill    RX Name and Strength:  HYDROcodone-acetaminophen (NORCO) 5-325 mg per tablet    How is the patient currently taking it? (ex. 1XDay):  As Directed  Is this a 30 day or 90 day RX:  30    Preferred Pharmacy with phone number:    [facility] #[id number] [address] & [address] [address]  Phone: [phone] Fax: [phone]    Local or Mail Order:  Local     Ordering Provider:  Sergo Jimenez Call Back Number:  [phone]    Additional Information:  Please contact pt upon completion-Thank you~

## 2022-03-10 NOTE — TELEPHONE ENCOUNTER
Spoke to patient and notified him that medication had been sent to the pharmacy for refill. Verbalized understanding.

## 2022-03-10 NOTE — TELEPHONE ENCOUNTER
No answer/no  [phone].  Mailbox if full [phone]  No answer/no  [phone].    Called to schedule device check and f/u cardiologist appt

## 2022-03-13 ENCOUNTER — CLINICAL SUPPORT (OUTPATIENT)
Dept: CARDIOLOGY | Facility: HOSPITAL | Age: 87
End: 2022-03-13
Payer: MEDICARE

## 2022-03-13 DIAGNOSIS — Z95.0 PRESENCE OF CARDIAC PACEMAKER: ICD-10-CM

## 2022-03-13 PROCEDURE — 93296 REM INTERROG EVL PM/IDS: CPT | Mod: PO | Performed by: INTERNAL MEDICINE

## 2022-03-14 ENCOUNTER — TELEPHONE (OUTPATIENT)
Dept: CARDIOLOGY | Facility: HOSPITAL | Age: 87
End: 2022-03-14
Payer: MEDICARE

## 2022-03-14 NOTE — TELEPHONE ENCOUNTER
pt has new cell adapter.  Walked through instructions on how to replace.  Pt is going to try to change it out today.  Pt also agreed on date and time for cardiologist appt and device check.  Mailed appt to address on file

## 2022-03-22 ENCOUNTER — LAB VISIT (OUTPATIENT)
Dept: LAB | Facility: HOSPITAL | Age: 87
End: 2022-03-22
Attending: FAMILY MEDICINE
Payer: MEDICARE

## 2022-03-22 ENCOUNTER — OFFICE VISIT (OUTPATIENT)
Dept: PRIMARY CARE CLINIC | Facility: CLINIC | Age: 87
End: 2022-03-22
Payer: MEDICARE

## 2022-03-22 VITALS
WEIGHT: 211.75 LBS | HEART RATE: 62 BPM | HEIGHT: 67 IN | RESPIRATION RATE: 18 BRPM | SYSTOLIC BLOOD PRESSURE: 136 MMHG | DIASTOLIC BLOOD PRESSURE: 74 MMHG | BODY MASS INDEX: 33.24 KG/M2 | OXYGEN SATURATION: 97 %

## 2022-03-22 DIAGNOSIS — I25.10 CORONARY ARTERY DISEASE INVOLVING NATIVE CORONARY ARTERY OF NATIVE HEART WITHOUT ANGINA PECTORIS: Chronic | ICD-10-CM

## 2022-03-22 DIAGNOSIS — N18.31 STAGE 3A CHRONIC KIDNEY DISEASE: Chronic | ICD-10-CM

## 2022-03-22 DIAGNOSIS — I70.0 ATHEROSCLEROSIS OF AORTA: Primary | Chronic | ICD-10-CM

## 2022-03-22 DIAGNOSIS — I10 ESSENTIAL HYPERTENSION: Chronic | ICD-10-CM

## 2022-03-22 DIAGNOSIS — I48.91 ATRIAL FIBRILLATION, UNSPECIFIED TYPE: ICD-10-CM

## 2022-03-22 DIAGNOSIS — F51.04 CHRONIC INSOMNIA: Chronic | ICD-10-CM

## 2022-03-22 DIAGNOSIS — M51.9 LUMBAR DISC DISEASE: Chronic | ICD-10-CM

## 2022-03-22 DIAGNOSIS — R06.09 DOE (DYSPNEA ON EXERTION): ICD-10-CM

## 2022-03-22 DIAGNOSIS — L57.0 ACTINIC KERATOSIS: ICD-10-CM

## 2022-03-22 DIAGNOSIS — F41.9 RECURRENT MILD MAJOR DEPRESSIVE DISORDER WITH ANXIETY: Chronic | ICD-10-CM

## 2022-03-22 DIAGNOSIS — F41.9 ANXIETY: Chronic | ICD-10-CM

## 2022-03-22 DIAGNOSIS — E11.8 TYPE 2 DIABETES MELLITUS WITH COMPLICATION: Chronic | ICD-10-CM

## 2022-03-22 DIAGNOSIS — E11.43 TYPE II DIABETES MELLITUS WITH PERIPHERAL AUTONOMIC NEUROPATHY: Chronic | ICD-10-CM

## 2022-03-22 DIAGNOSIS — F11.20 CHRONIC NARCOTIC DEPENDENCE: Chronic | ICD-10-CM

## 2022-03-22 DIAGNOSIS — F33.0 RECURRENT MILD MAJOR DEPRESSIVE DISORDER WITH ANXIETY: Chronic | ICD-10-CM

## 2022-03-22 DIAGNOSIS — Z95.0 PACEMAKER: Chronic | ICD-10-CM

## 2022-03-22 DIAGNOSIS — E78.5 DYSLIPIDEMIA: Chronic | ICD-10-CM

## 2022-03-22 LAB
ALBUMIN SERPL BCP-MCNC: 3.9 G/DL (ref 3.5–5.2)
ALP SERPL-CCNC: 81 U/L (ref 55–135)
ALT SERPL W/O P-5'-P-CCNC: 13 U/L (ref 10–44)
ANION GAP SERPL CALC-SCNC: 10 MMOL/L (ref 8–16)
AST SERPL-CCNC: 23 U/L (ref 10–40)
BASOPHILS # BLD AUTO: 0.03 K/UL (ref 0–0.2)
BASOPHILS NFR BLD: 0.4 % (ref 0–1.9)
BILIRUB SERPL-MCNC: 0.7 MG/DL (ref 0.1–1)
BNP SERPL-MCNC: 105 PG/ML (ref 0–99)
BUN SERPL-MCNC: 14 MG/DL (ref 8–23)
CALCIUM SERPL-MCNC: 9.7 MG/DL (ref 8.7–10.5)
CHLORIDE SERPL-SCNC: 104 MMOL/L (ref 95–110)
CO2 SERPL-SCNC: 27 MMOL/L (ref 23–29)
CREAT SERPL-MCNC: 1.1 MG/DL (ref 0.5–1.4)
DIFFERENTIAL METHOD: ABNORMAL
EOSINOPHIL # BLD AUTO: 0.2 K/UL (ref 0–0.5)
EOSINOPHIL NFR BLD: 2.6 % (ref 0–8)
ERYTHROCYTE [DISTWIDTH] IN BLOOD BY AUTOMATED COUNT: 12.8 % (ref 11.5–14.5)
EST. GFR  (AFRICAN AMERICAN): >60 ML/MIN/1.73 M^2
EST. GFR  (NON AFRICAN AMERICAN): >60 ML/MIN/1.73 M^2
ESTIMATED AVG GLUCOSE: 128 MG/DL (ref 68–131)
GLUCOSE SERPL-MCNC: 97 MG/DL (ref 70–110)
HBA1C MFR BLD: 6.1 % (ref 4–5.6)
HCT VFR BLD AUTO: 46.6 % (ref 40–54)
HGB BLD-MCNC: 15.3 G/DL (ref 14–18)
IMM GRANULOCYTES # BLD AUTO: 0.02 K/UL (ref 0–0.04)
IMM GRANULOCYTES NFR BLD AUTO: 0.3 % (ref 0–0.5)
LYMPHOCYTES # BLD AUTO: 2.4 K/UL (ref 1–4.8)
LYMPHOCYTES NFR BLD: 33.7 % (ref 18–48)
MCH RBC QN AUTO: 33.4 PG (ref 27–31)
MCHC RBC AUTO-ENTMCNC: 32.8 G/DL (ref 32–36)
MCV RBC AUTO: 102 FL (ref 82–98)
MONOCYTES # BLD AUTO: 0.8 K/UL (ref 0.3–1)
MONOCYTES NFR BLD: 11.5 % (ref 4–15)
NEUTROPHILS # BLD AUTO: 3.7 K/UL (ref 1.8–7.7)
NEUTROPHILS NFR BLD: 51.5 % (ref 38–73)
NRBC BLD-RTO: 0 /100 WBC
PLATELET # BLD AUTO: 204 K/UL (ref 150–450)
PMV BLD AUTO: 11.3 FL (ref 9.2–12.9)
POTASSIUM SERPL-SCNC: 4.9 MMOL/L (ref 3.5–5.1)
PROT SERPL-MCNC: 7.3 G/DL (ref 6–8.4)
RBC # BLD AUTO: 4.58 M/UL (ref 4.6–6.2)
SODIUM SERPL-SCNC: 141 MMOL/L (ref 136–145)
TSH SERPL DL<=0.005 MIU/L-ACNC: 1.17 UIU/ML (ref 0.4–4)
WBC # BLD AUTO: 7.21 K/UL (ref 3.9–12.7)

## 2022-03-22 PROCEDURE — 85025 COMPLETE CBC W/AUTO DIFF WBC: CPT | Performed by: FAMILY MEDICINE

## 2022-03-22 PROCEDURE — 99999 PR PBB SHADOW E&M-EST. PATIENT-LVL V: CPT | Mod: PBBFAC,,, | Performed by: FAMILY MEDICINE

## 2022-03-22 PROCEDURE — 1159F PR MEDICATION LIST DOCUMENTED IN MEDICAL RECORD: ICD-10-PCS | Mod: CPTII,S$GLB,, | Performed by: FAMILY MEDICINE

## 2022-03-22 PROCEDURE — 1126F AMNT PAIN NOTED NONE PRSNT: CPT | Mod: CPTII,S$GLB,, | Performed by: FAMILY MEDICINE

## 2022-03-22 PROCEDURE — 3288F PR FALLS RISK ASSESSMENT DOCUMENTED: ICD-10-PCS | Mod: CPTII,S$GLB,, | Performed by: FAMILY MEDICINE

## 2022-03-22 PROCEDURE — 1126F PR PAIN SEVERITY QUANTIFIED, NO PAIN PRESENT: ICD-10-PCS | Mod: CPTII,S$GLB,, | Performed by: FAMILY MEDICINE

## 2022-03-22 PROCEDURE — 99999 PR PBB SHADOW E&M-EST. PATIENT-LVL V: ICD-10-PCS | Mod: PBBFAC,,, | Performed by: FAMILY MEDICINE

## 2022-03-22 PROCEDURE — 99499 RISK ADDL DX/OHS AUDIT: ICD-10-PCS | Mod: S$GLB,,, | Performed by: FAMILY MEDICINE

## 2022-03-22 PROCEDURE — 1101F PT FALLS ASSESS-DOCD LE1/YR: CPT | Mod: CPTII,S$GLB,, | Performed by: FAMILY MEDICINE

## 2022-03-22 PROCEDURE — 1101F PR PT FALLS ASSESS DOC 0-1 FALLS W/OUT INJ PAST YR: ICD-10-PCS | Mod: CPTII,S$GLB,, | Performed by: FAMILY MEDICINE

## 2022-03-22 PROCEDURE — 1160F RVW MEDS BY RX/DR IN RCRD: CPT | Mod: CPTII,S$GLB,, | Performed by: FAMILY MEDICINE

## 2022-03-22 PROCEDURE — 3288F FALL RISK ASSESSMENT DOCD: CPT | Mod: CPTII,S$GLB,, | Performed by: FAMILY MEDICINE

## 2022-03-22 PROCEDURE — 99499 UNLISTED E&M SERVICE: CPT | Mod: S$GLB,,, | Performed by: FAMILY MEDICINE

## 2022-03-22 PROCEDURE — 36415 COLL VENOUS BLD VENIPUNCTURE: CPT | Mod: PN | Performed by: FAMILY MEDICINE

## 2022-03-22 PROCEDURE — 83880 ASSAY OF NATRIURETIC PEPTIDE: CPT | Performed by: FAMILY MEDICINE

## 2022-03-22 PROCEDURE — 3072F PR LOW RISK FOR RETINOPATHY: ICD-10-PCS | Mod: CPTII,S$GLB,, | Performed by: FAMILY MEDICINE

## 2022-03-22 PROCEDURE — 99214 PR OFFICE/OUTPT VISIT, EST, LEVL IV, 30-39 MIN: ICD-10-PCS | Mod: 25,S$GLB,, | Performed by: FAMILY MEDICINE

## 2022-03-22 PROCEDURE — 1160F PR REVIEW ALL MEDS BY PRESCRIBER/CLIN PHARMACIST DOCUMENTED: ICD-10-PCS | Mod: CPTII,S$GLB,, | Performed by: FAMILY MEDICINE

## 2022-03-22 PROCEDURE — 3072F LOW RISK FOR RETINOPATHY: CPT | Mod: CPTII,S$GLB,, | Performed by: FAMILY MEDICINE

## 2022-03-22 PROCEDURE — 83036 HEMOGLOBIN GLYCOSYLATED A1C: CPT | Performed by: FAMILY MEDICINE

## 2022-03-22 PROCEDURE — 80053 COMPREHEN METABOLIC PANEL: CPT | Performed by: FAMILY MEDICINE

## 2022-03-22 PROCEDURE — 84443 ASSAY THYROID STIM HORMONE: CPT | Performed by: FAMILY MEDICINE

## 2022-03-22 PROCEDURE — 1159F MED LIST DOCD IN RCRD: CPT | Mod: CPTII,S$GLB,, | Performed by: FAMILY MEDICINE

## 2022-03-22 PROCEDURE — 99214 OFFICE O/P EST MOD 30 MIN: CPT | Mod: 25,S$GLB,, | Performed by: FAMILY MEDICINE

## 2022-03-22 NOTE — ASSESSMENT & PLAN NOTE
Diabetes is stable and under satisfactory control  Lab Results   Component Value Date    HGBA1C 6.1 (H) 12/20/2021     Reviewed health maintenance topics.  He is up-to-date.

## 2022-03-22 NOTE — ASSESSMENT & PLAN NOTE
This is a very chronic condition.  He has been stable on Seroquel 100 mg for many years and prefers to remain on this.

## 2022-03-22 NOTE — ASSESSMENT & PLAN NOTE
He reports he has had progressive shortness of breath over the last two months.  Gradual progression.  He denies orthopnea.  Although his activity has been severely limited for years because of his back pain as well.  He denies chest pain.  Shortness of breath is only with exertion and resolves upon sitting.  Will check labs.  He has an appointment with his cardiologist next week.  If there is any sudden worsening or development of angina before then he will go to the emergency room.

## 2022-03-22 NOTE — ASSESSMENT & PLAN NOTE
GFR remains stable, age-appropriate.  No NSAID usage.  No obstructive urinary symptoms.  No uremic symptoms.

## 2022-03-22 NOTE — ASSESSMENT & PLAN NOTE
Chronic back pain.  He reports worsening.  He reports only partial relief with the Norco 5 mg. He did consult with pain management awhile back, did not have great results with injections but did not follow up to report to them his response so no therapeutic changes were offered.  Even though the hydrocodone does not completely control his pain he states he typically only takes about three a day.  He did ask about a dosage increase.  I advised him I do not feel comfortable with this.  I do not generally managed long-term opioids.  I want to follow back with pain management look at other options, if they feel that a dosage increase is flat will help him best then I would ask that they help intervene for the best interest of this patient, otherwise helps with other options

## 2022-03-22 NOTE — ASSESSMENT & PLAN NOTE
He remains on Norco for chronic back pain.  He has been stable for many years at current dosage of Norco 5-325 mg. #120/month, one every 6 hours

## 2022-03-22 NOTE — ASSESSMENT & PLAN NOTE
Stable.  He remains on aspirin per Cardiology.  Previously on a statin but discontinued secondary to side effects

## 2022-03-22 NOTE — PROGRESS NOTES
THIS DOCUMENT WAS MADE IN PART WITH VOICE RECOGNITION SOFTWARE.  OCCASIONALLY THIS SOFTWARE WILL MISINTERPRET WORDS OR PHRASES.      Primary Care Provider Appointment - Sangeetha ESCALANTE [facility] ([facility])      Patient ID: Lázaro Wang is a 87 y.o. male.    ASSESSMENT/PLAN by Problem List:  Problem List Items Addressed This Visit     CAD (coronary artery disease) (Chronic)     He reports he has had progressive shortness of breath over the last two months.  Gradual progression.  He denies orthopnea.  Although his activity has been severely limited for years because of his back pain as well.  He denies chest pain.  Shortness of breath is only with exertion and resolves upon sitting.  Will check labs.  He has an appointment with his cardiologist next week.  If there is any sudden worsening or development of angina before then he will go to the emergency room.           SMALLS (dyspnea on exertion)     Recent increase in dyspnea on exertion.  No angina.           Relevant Orders    CBC Auto Differential    BNP    Atherosclerosis of aorta - Primary (Chronic)     Stable.  He remains on aspirin per Cardiology.  Previously on a statin but discontinued secondary to side effects           Pacemaker (Chronic)     Stable rate and rhythm.  He follows with cardiology           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 (Chronic)     Mild neuropathy, stable           Relevant Orders    Hemoglobin A1C    Type 2 diabetes mellitus with complication (Chronic)     Diabetes is stable and under satisfactory control  Lab Results   Component Value Date    HGBA1C 6.1 (H) 12/20/2021     Reviewed health maintenance topics.  He is up-to-date.           Relevant Orders    Hemoglobin A1C    TSH    Essential hypertension (Chronic)     Chronic condition, stable satisfactory control           Relevant Orders    Comprehensive Metabolic Panel    TSH    Stage 3a chronic kidney disease (Chronic)     GFR remains  stable, age-appropriate.  No NSAID usage.  No obstructive urinary symptoms.  No uremic symptoms.           Dyslipidemia (Chronic)     Improved, previously on atorvastatin but discontinued secondary to side effects.           Lumbar disc disease (Chronic)     Chronic back pain.  He reports worsening.  He reports only partial relief with the Norco 5 mg. He did consult with pain management awhile back, did not have great results with injections but did not follow up to report to them his response so no therapeutic changes were offered.  Even though the hydrocodone does not completely control his pain he states he typically only takes about three a day.  He did ask about a dosage increase.  I advised him I do not feel comfortable with this.  I do not generally managed long-term opioids.  I want to follow back with pain management look at other options, if they feel that a dosage increase is flat will help him best then I would ask that they help intervene for the best interest of this patient, otherwise helps with other options           Relevant Orders    Ambulatory referral/consult to Pain Clinic    Chronic narcotic dependence (Chronic)     He remains on Norco for chronic back pain.  He has been stable for many years at current dosage of Norco 5-325 mg. 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  Symptoms are well controlled.           Anxiety (Chronic)     Anxiety is doing much better on the sertraline.           Chronic insomnia (Chronic)     This is a very chronic condition.  He has been stable on Seroquel 100 mg for many years and prefers to remain on this.           Atrial fibrillation     History of atrial fibrillation.  Followed by Cardiology.  Rhythm appears to be regular.  No tachycardia.           Actinic keratosis     Two lesions on the upper  "right neck posteriorly behind the year better non pigmented raised scaly consistent with actinic keratosis.  Discussed with patient.  He wanted treatment with cryotherapy Procedure note:  Both lesions were treated with cryotherapy.  Post treatment instructions given.  No complications.  Should he develop any concerns or issues he will contact us.  Otherwise will re-evaluate when he returns in three months.                 Follow Up:  Three months      Health Maintenance       Date Due Completion Date    Sign Pain Contract Never done ---    Shingles Vaccine (2 of 3) 06/30/2009 5/5/2009    TETANUS VACCINE 05/05/2019 5/5/2009    COVID-19 Vaccine (4 - Booster for Pfizer series) 02/28/2022 9/28/2021    Eye Exam 04/15/2022 4/15/2021    Foot Exam 05/21/2022 5/21/2021    Override on 11/6/2020: Done    Hemoglobin A1c 06/20/2022 12/20/2021    Aspirin/Antiplatelet Therapy 12/20/2022 12/20/2021    Diabetes Urine Screening 12/20/2022 12/20/2021    Lipid Panel 12/20/2022 12/20/2021              Subjective:     Chief Complaint   Patient presents with    Diabetes     F/u visit     Shortness of Breath     C/o SOB x "a couple of months"; denies any other symptoms.     Skin Problem     Has a scabby spot behind the Rt. Ear that he wants checked.      I have reviewed the information entered by the ancillary staff regarding the chief complaint as well as the related history.    HPI    Patient is a/an 87 y.o.  male   RISK of ADMIT/ED: 5    Follow-up multiple conditions.  See above for each condition discussed today      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 10/24/2015    Overactive bladder 02/17/2016    Essential hypertension 02/17/2016    Type 2 diabetes mellitus with complication 02/17/2016    Gastroesophageal " reflux disease without esophagitis 02/17/2016    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    Dysphonia 02/17/2016    Vocal cord nodules 02/17/2016    BPV (benign positional vertigo) 02/17/2016    Nuclear sclerosis 02/17/2016    Posterior vitreous detachment of both eyes 02/17/2016    Blepharitis of both eyes 02/17/2016    Hyperopia with astigmatism and presbyopia 02/17/2016    Ectasis aorta 10/31/2017    Carotid disease, bilateral 10/31/2017    Irritability 10/31/2017    Anxiety 10/31/2017    Chronic fatigue 03/27/2018    Stage 3a chronic kidney disease 07/24/2018    Atherosclerosis of aorta 07/24/2018    Pacemaker 01/22/2019    Atrial fibrillation 06/06/2019    Chronic narcotic dependence 06/02/2020    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 06/02/2020    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    Chronic insomnia 02/08/2021    Lumbar spondylosis 07/21/2021    Lumbar radiculopathy 09/20/2021    SMALLS (dyspnea on exertion) 03/22/2022    Actinic keratosis 03/22/2022     Resolved Ambulatory Problems     Diagnosis Date Noted    Degenerative arthritis of knee 03/15/2012    Bradycardia 05/23/2013    Hypoxemia 07/16/2014    Change in bowel habits 05/25/2017    Carpal tunnel syndrome on right 09/24/2018    Sinus pause 12/10/2018     Past Medical History:   Diagnosis Date    Anticoagulant long-term use     Arthritis     Cataract     Chronic back pain     Coronary artery disease     Diabetes mellitus     Elevated cholesterol     General anesthetics causing adverse effect in therapeutic use     GERD (gastroesophageal reflux disease)     Heart disease     Hypertension     Myocardial infarction     Prostate disorder     Trouble in sleeping        Past Surgical History:   Procedure Laterality Date    BIOPSY Right 9/25/2020    Procedure: BIOPSY right vocal cord;  Surgeon: Yves Ernst MD;  [facility];  Service: ENT;  Laterality: Right;    CARPAL TUNNEL RELEASE Right 9/24/2018     Procedure: RELEASE, CARPAL TUNNEL;  Surgeon: ADELINE Jorgensen MD;  Location: [facility];  Service: General;  Laterality: Right;    CIRCUMCISION      COLONOSCOPY  11/14/2008  Ray    Diverticulosis.  Small Internal hemorrhoids.    COLONOSCOPY N/A 5/25/2017    Procedure: COLONOSCOPY;  Surgeon: Nito Larsen MD;  Location: [facility];  Service: Endoscopy;  Laterality: N/A;    COLONOSCOPY W/ POLYPECTOMY  12/2002    Adenomatous polyp    COLONOSCOPY W/ POLYPECTOMY  10/12/2005  Ray    One 2-3 mm polyp in the rectum. ADENOMATOUS POLYP.  Diverticulosis.  Internal hemorrhoids.      CORONARY ANGIOPLASTY WITH STENT PLACEMENT      x 3    CORONARY ANGIOPLASTY WITH STENT PLACEMENT      x2    EPIDURAL STEROID INJECTION INTO LUMBAR SPINE N/A 9/20/2021    Procedure: Injection-steroid-epidural-lumbar L5/S1;  Surgeon: Michael Gotti MD;  Location: [facility] OR;  Service: Pain Management;  Laterality: N/A;    ESOPHAGOGASTRODUODENOSCOPY  10/12/2005  Ray    Reflux esophagitis.  Small Hiatal Hernia.  Esophageal spasm?  Antral erythema.  CLOtest negative.    INJECTION OF ANESTHETIC AGENT AROUND MEDIAL BRANCH NERVES INNERVATING LUMBAR FACET JOINT Bilateral 7/21/2021    Procedure: Block-nerve-medial branch-lumbar L4/5 L5/S1;  Surgeon: Michael Gotti MD;  Location: [facility] OR;  Service: Pain Management;  Laterality: Bilateral;    INSERTION OF PACEMAKER N/A 12/10/2018    Procedure: INSERTION, PACEMAKER-[employer]ON SCIENTIFIC;  Surgeon: Cj Stevens MD;  Location: [facility] CATH;  Service: Cardiology;  Laterality: N/A;    LARYNGOSCOPY Right 9/25/2020    Procedure: LARYNGOSCOPY with excision;  Surgeon: Yves Ernst MD;  Location: [facility] OR;  Service: ENT;  Laterality: Right;    TOTAL KNEE ARTHROPLASTY Bilateral     bilateral       Past Medical History:   Diagnosis Date    Anticoagulant long-term use     325 asa    Anxiety     Arthritis     Carpal tunnel syndrome on right 09/2018    Cataract     OU    Chronic back pain      Coronary artery disease     stents x3    Diabetes mellitus     LBSL 70 today---stable    Elevated cholesterol     General anesthetics causing adverse effect in therapeutic use     confusion for 7-8 days following min tka    GERD (gastroesophageal reflux disease)     Heart disease     Hypertension     Myocardial infarction     Prostate disorder     Sinus pause 12/10/2018    Trouble in sleeping        Social History     Socioeconomic History    Marital status:    Occupational History    Occupation: retired    Tobacco Use    Smoking status: Never Smoker    Smokeless tobacco: Never Used   Substance and Sexual Activity    Alcohol use: No     Comment: quit 2015    Drug use: No       Review of Systems   Constitutional: Positive for activity change.   HENT: Negative.    Eyes: Negative.    Respiratory: Positive for shortness of breath. Negative for wheezing.    Cardiovascular: Negative for chest pain and leg swelling.   Gastrointestinal: Negative.    Musculoskeletal: Positive for arthralgias and back pain.   Psychiatric/Behavioral: Negative for dysphoric mood and sleep disturbance. The patient is not nervous/anxious.        Objective     Physical Exam  Constitutional:       General: He is not in acute distress.     Appearance: He is well-developed. He is not diaphoretic.   HENT:      Head: Normocephalic and atraumatic.   Eyes:      General: No scleral icterus.     Conjunctiva/sclera: Conjunctivae normal.   Cardiovascular:      Rate and Rhythm: Normal rate and regular rhythm.      Heart sounds: Murmur (2/6 systolic ejection murmur) heard.      Comments: Heart sounds are distant,  no JVD but positive hepatic jugular reflex  No peripheral edema.  No crackles  Pulmonary:      Effort: Pulmonary effort is normal. No respiratory distress.      Breath sounds: No wheezing or rales.   Neurological:      Mental Status: He is alert and oriented to person, place, and time.      Coordination:  "Coordination normal.   Psychiatric:         Behavior: Behavior normal.       Vitals:    03/22/22 1502   BP: 136/74   BP Location: Right arm   Patient Position: Sitting   BP Method: Medium (Manual)   Pulse: 62   Resp: 18   SpO2: 97%   Weight: 96.1 kg (211 lb 12 oz)   Height: 5' 7" (1.702 m)       RECENT LABS:    Lab Results   Component Value Date    WBC 6.68 05/25/2021    HGB 14.5 05/25/2021    HCT 44.2 05/25/2021     05/25/2021    CHOL 164 12/20/2021    TRIG 135 12/20/2021    HDL 49 12/20/2021    ALT 12 12/20/2021    AST 22 12/20/2021     12/20/2021    K 5.0 12/20/2021     12/20/2021    CREATININE 1.1 12/20/2021    BUN 12 12/20/2021    CO2 22 (L) 12/20/2021    TSH 1.580 05/25/2021    PSA 1.4 09/15/2015    GLUF 112 (H) 01/28/2008    HGBA1C 6.1 (H) 12/20/2021       Results for orders placed or performed in visit on 12/20/21   Hemoglobin A1C   Result Value Ref Range    Hemoglobin A1C 6.1 (H) 4.0 - 5.6 %    Estimated Avg Glucose 128 68 - 131 mg/dL   Comprehensive Metabolic Panel   Result Value Ref Range    Sodium 138 136 - 145 mmol/L    Potassium 5.0 3.5 - 5.1 mmol/L    Chloride 105 95 - 110 mmol/L    CO2 22 (L) 23 - 29 mmol/L    Glucose 110 70 - 110 mg/dL    BUN 12 8 - 23 mg/dL    Creatinine 1.1 0.5 - 1.4 mg/dL    Calcium 9.3 8.7 - 10.5 mg/dL    Total Protein 7.0 6.0 - 8.4 g/dL    Albumin 3.7 3.5 - 5.2 g/dL    Total Bilirubin 0.8 0.1 - 1.0 mg/dL    Alkaline Phosphatase 75 55 - 135 U/L    AST 22 10 - 40 U/L    ALT 12 10 - 44 U/L    Anion Gap 11 8 - 16 mmol/L    eGFR if African American >60.0 >60 mL/min/1.73 m^2    eGFR if non African American >60.0 >60 mL/min/1.73 m^2   Lipid Panel   Result Value Ref Range    Cholesterol 164 120 - 199 mg/dL    Triglycerides 135 30 - 150 mg/dL    HDL 49 40 - 75 mg/dL    LDL Cholesterol 88.0 63.0 - 159.0 mg/dL    HDL/Cholesterol Ratio 29.9 20.0 - 50.0 %    Total Cholesterol/HDL Ratio 3.3 2.0 - 5.0    Non-HDL Cholesterol 115 mg/dL       "

## 2022-03-22 NOTE — ASSESSMENT & PLAN NOTE
Two lesions on the upper right neck posteriorly behind the year better non pigmented raised scaly consistent with actinic keratosis.  Discussed with patient.  He wanted treatment with cryotherapy Procedure note:  Both lesions were treated with cryotherapy.  Post treatment instructions given.  No complications.  Should he develop any concerns or issues he will contact us.  Otherwise will re-evaluate when he returns in three months.

## 2022-03-22 NOTE — ASSESSMENT & PLAN NOTE
History of atrial fibrillation.  Followed by Cardiology.  Rhythm appears to be regular.  No tachycardia.

## 2022-03-30 ENCOUNTER — OFFICE VISIT (OUTPATIENT)
Dept: CARDIOLOGY | Facility: CLINIC | Age: 87
End: 2022-03-30
Payer: MEDICARE

## 2022-03-30 ENCOUNTER — CLINICAL SUPPORT (OUTPATIENT)
Dept: CARDIOLOGY | Facility: HOSPITAL | Age: 87
End: 2022-03-30
Attending: INTERNAL MEDICINE
Payer: MEDICARE

## 2022-03-30 VITALS
HEIGHT: 67 IN | DIASTOLIC BLOOD PRESSURE: 84 MMHG | SYSTOLIC BLOOD PRESSURE: 129 MMHG | HEART RATE: 70 BPM | WEIGHT: 212.31 LBS | BODY MASS INDEX: 33.32 KG/M2

## 2022-03-30 DIAGNOSIS — I77.819 ECTASIS AORTA: Chronic | ICD-10-CM

## 2022-03-30 DIAGNOSIS — E11.43 TYPE II DIABETES MELLITUS WITH PERIPHERAL AUTONOMIC NEUROPATHY: Chronic | ICD-10-CM

## 2022-03-30 DIAGNOSIS — I45.5 SINUS PAUSE: ICD-10-CM

## 2022-03-30 DIAGNOSIS — Z95.0 PACEMAKER: Chronic | ICD-10-CM

## 2022-03-30 DIAGNOSIS — R06.09 DOE (DYSPNEA ON EXERTION): Primary | ICD-10-CM

## 2022-03-30 DIAGNOSIS — I65.23 BILATERAL CAROTID ARTERY STENOSIS: Chronic | ICD-10-CM

## 2022-03-30 DIAGNOSIS — Z95.0 PACEMAKER: ICD-10-CM

## 2022-03-30 DIAGNOSIS — R06.02 SHORTNESS OF BREATH: ICD-10-CM

## 2022-03-30 DIAGNOSIS — I10 ESSENTIAL HYPERTENSION: Chronic | ICD-10-CM

## 2022-03-30 DIAGNOSIS — E78.5 DYSLIPIDEMIA: Chronic | ICD-10-CM

## 2022-03-30 DIAGNOSIS — N18.31 STAGE 3A CHRONIC KIDNEY DISEASE: Chronic | ICD-10-CM

## 2022-03-30 DIAGNOSIS — Z98.61 HISTORY OF PTCA: Chronic | ICD-10-CM

## 2022-03-30 DIAGNOSIS — I48.91 ATRIAL FIBRILLATION, UNSPECIFIED TYPE: ICD-10-CM

## 2022-03-30 DIAGNOSIS — I70.0 ATHEROSCLEROSIS OF AORTA: Chronic | ICD-10-CM

## 2022-03-30 DIAGNOSIS — I25.10 CORONARY ARTERY DISEASE INVOLVING NATIVE CORONARY ARTERY OF NATIVE HEART WITHOUT ANGINA PECTORIS: Chronic | ICD-10-CM

## 2022-03-30 PROCEDURE — 1101F PR PT FALLS ASSESS DOC 0-1 FALLS W/OUT INJ PAST YR: ICD-10-PCS | Mod: CPTII,S$GLB,, | Performed by: PHYSICIAN ASSISTANT

## 2022-03-30 PROCEDURE — 1126F PR PAIN SEVERITY QUANTIFIED, NO PAIN PRESENT: ICD-10-PCS | Mod: CPTII,S$GLB,, | Performed by: PHYSICIAN ASSISTANT

## 2022-03-30 PROCEDURE — 1159F PR MEDICATION LIST DOCUMENTED IN MEDICAL RECORD: ICD-10-PCS | Mod: CPTII,S$GLB,, | Performed by: PHYSICIAN ASSISTANT

## 2022-03-30 PROCEDURE — 3288F FALL RISK ASSESSMENT DOCD: CPT | Mod: CPTII,S$GLB,, | Performed by: PHYSICIAN ASSISTANT

## 2022-03-30 PROCEDURE — 1160F RVW MEDS BY RX/DR IN RCRD: CPT | Mod: CPTII,S$GLB,, | Performed by: PHYSICIAN ASSISTANT

## 2022-03-30 PROCEDURE — 1160F PR REVIEW ALL MEDS BY PRESCRIBER/CLIN PHARMACIST DOCUMENTED: ICD-10-PCS | Mod: CPTII,S$GLB,, | Performed by: PHYSICIAN ASSISTANT

## 2022-03-30 PROCEDURE — 3072F PR LOW RISK FOR RETINOPATHY: ICD-10-PCS | Mod: CPTII,S$GLB,, | Performed by: PHYSICIAN ASSISTANT

## 2022-03-30 PROCEDURE — 3072F LOW RISK FOR RETINOPATHY: CPT | Mod: CPTII,S$GLB,, | Performed by: PHYSICIAN ASSISTANT

## 2022-03-30 PROCEDURE — 99214 PR OFFICE/OUTPT VISIT, EST, LEVL IV, 30-39 MIN: ICD-10-PCS | Mod: S$GLB,,, | Performed by: PHYSICIAN ASSISTANT

## 2022-03-30 PROCEDURE — 1126F AMNT PAIN NOTED NONE PRSNT: CPT | Mod: CPTII,S$GLB,, | Performed by: PHYSICIAN ASSISTANT

## 2022-03-30 PROCEDURE — 99999 PR PBB SHADOW E&M-EST. PATIENT-LVL IV: CPT | Mod: PBBFAC,,, | Performed by: PHYSICIAN ASSISTANT

## 2022-03-30 PROCEDURE — 3288F PR FALLS RISK ASSESSMENT DOCUMENTED: ICD-10-PCS | Mod: CPTII,S$GLB,, | Performed by: PHYSICIAN ASSISTANT

## 2022-03-30 PROCEDURE — 1101F PT FALLS ASSESS-DOCD LE1/YR: CPT | Mod: CPTII,S$GLB,, | Performed by: PHYSICIAN ASSISTANT

## 2022-03-30 PROCEDURE — 93280 PM DEVICE PROGR EVAL DUAL: CPT | Mod: 26,,, | Performed by: INTERNAL MEDICINE

## 2022-03-30 PROCEDURE — 99499 RISK ADDL DX/OHS AUDIT: ICD-10-PCS | Mod: S$GLB,,, | Performed by: PHYSICIAN ASSISTANT

## 2022-03-30 PROCEDURE — 99214 OFFICE O/P EST MOD 30 MIN: CPT | Mod: S$GLB,,, | Performed by: PHYSICIAN ASSISTANT

## 2022-03-30 PROCEDURE — 99999 PR PBB SHADOW E&M-EST. PATIENT-LVL IV: ICD-10-PCS | Mod: PBBFAC,,, | Performed by: PHYSICIAN ASSISTANT

## 2022-03-30 PROCEDURE — 1159F MED LIST DOCD IN RCRD: CPT | Mod: CPTII,S$GLB,, | Performed by: PHYSICIAN ASSISTANT

## 2022-03-30 PROCEDURE — 93280 CARDIAC DEVICE CHECK - IN CLINIC & HOSPITAL: ICD-10-PCS | Mod: 26,,, | Performed by: INTERNAL MEDICINE

## 2022-03-30 PROCEDURE — 99499 UNLISTED E&M SERVICE: CPT | Mod: S$GLB,,, | Performed by: PHYSICIAN ASSISTANT

## 2022-03-30 NOTE — PROGRESS NOTES
Subjective:    Patient ID:  Lázaro Wang is a 87 y.o. male who presents for follow-up of CAD.       HPI  Mr. Wang is a very pleasant gentleman who follows with Dr. Dominguez. He has a hx of CAD s/p PCI,  presents today for routine follow up. He c/o significant SMALLS and generalized fatigue/malaise. Symptoms improve with rest, but he takes longer than usual to recover. No CP which was his previous angina. No PND, orthopnea, or LE edema.     Interrogation today shows runs of AT, PACs, and PVCs.     His last nuclear stress test was in January 2020 and was negative for ischemia. He had an echo at that time that revealed:  · Normal left ventricular systolic function. The estimated ejection fraction is 65%.  · Grade I (mild) left ventricular diastolic dysfunction consistent with impaired relaxation.  · Normal right ventricular systolic function.  · Mild mitral regurgitation.  · Mild tricuspid regurgitation.  · Normal central venous pressure (3 mmHg).  · The estimated PA systolic pressure is 35 mmHg.  · Mild aortic regurgitation.  · No wall motion abnormalities.       Review of Systems   Constitutional: Positive for malaise/fatigue. Negative for chills, diaphoresis, fever, weight gain and weight loss.   HENT: Negative for sore throat.    Eyes: Negative for blurred vision, vision loss in left eye, vision loss in right eye and visual disturbance.   Cardiovascular: Positive for dyspnea on exertion. Negative for chest pain, claudication, leg swelling, near-syncope, orthopnea, palpitations, paroxysmal nocturnal dyspnea and syncope.   Respiratory: Negative for cough, hemoptysis, shortness of breath, sputum production and wheezing.    Endocrine: Negative for cold intolerance and heat intolerance.   Hematologic/Lymphatic: Negative for adenopathy. Does not bruise/bleed easily.   Skin: Negative for rash.   Musculoskeletal: Negative for falls, muscle weakness and myalgias.   Gastrointestinal: Negative for abdominal pain, change in bowel  "habit, constipation, diarrhea, melena and nausea.   Genitourinary: Negative for bladder incontinence.   Neurological: Negative for dizziness, focal weakness, headaches, light-headedness, numbness and weakness.   Psychiatric/Behavioral: Negative for altered mental status.         Vitals:    03/30/22 1420   BP: 129/84   Pulse: 70   Weight: 96.3 kg (212 lb 4.9 oz)   Height: 5' 7" (1.702 m)   Body mass index is 33.25 kg/m².    Objective:    Physical Exam  Constitutional:       Appearance: He is well-developed.   HENT:      Head: Normocephalic and atraumatic.   Eyes:      Extraocular Movements: Extraocular movements intact.      Pupils: Pupils are equal, round, and reactive to light.   Neck:      Thyroid: No thyromegaly.      Vascular: No JVD.      Trachea: No tracheal deviation.   Cardiovascular:      Rate and Rhythm: Normal rate and regular rhythm.      Chest Wall: PMI is not displaced.      Pulses: Normal pulses and intact distal pulses.      Heart sounds: S1 normal and S2 normal. No murmur heard.    No friction rub. No gallop.   Pulmonary:      Effort: Pulmonary effort is normal. No respiratory distress.      Breath sounds: Normal breath sounds. No wheezing or rales.   Chest:      Chest wall: No tenderness.   Abdominal:      General: Bowel sounds are normal. There is no distension.      Palpations: Abdomen is soft. There is no mass.      Tenderness: There is no abdominal tenderness.   Musculoskeletal:         General: No tenderness. Normal range of motion.      Cervical back: Neck supple.   Skin:     General: Skin is warm and dry.      Findings: No rash.   Neurological:      General: No focal deficit present.      Mental Status: He is alert and oriented to person, place, and time.   Psychiatric:         Mood and Affect: Mood normal.         Behavior: Behavior normal.           Assessment:       Problem List Items Addressed This Visit        Cardiology Problems    Atherosclerosis of aorta (Chronic)    CAD (coronary " artery disease) (Chronic)    Carotid disease, bilateral (Chronic)    Ectasis aorta (Chronic)    Essential hypertension (Chronic)    Atrial fibrillation       Other    Dyslipidemia (Chronic)    History of PTCA (Chronic)    Pacemaker (Chronic)    Stage 3a chronic kidney disease (Chronic)    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 (Chronic)    SMALLS (dyspnea on exertion) - Primary    Relevant Orders    Echo    Nuclear Stress - Cardiology Interpreted      Other Visit Diagnoses     Shortness of breath         Relevant Orders    Nuclear Stress - Cardiology Interpreted             Plan:       Nuclear stress test to evaluate for ischemia.   Echo to evaluate for structural heart disease.  If above unremarkable, will increase Toprol XL.    Continue current cardiac medications.   Risk factor modification including diet and exercise.   F/U with Dr. Stevens in 6 months.

## 2022-04-11 DIAGNOSIS — M51.9 LUMBAR DISC DISEASE: ICD-10-CM

## 2022-04-11 RX ORDER — HYDROCODONE BITARTRATE AND ACETAMINOPHEN 5; 325 MG/1; MG/1
1 TABLET ORAL EVERY 6 HOURS PRN
Qty: 120 TABLET | Refills: 0 | Status: SHIPPED | OUTPATIENT
Start: 2022-04-11 | End: 2022-05-11 | Stop reason: SDUPTHER

## 2022-04-11 NOTE — TELEPHONE ENCOUNTER
----- Message from Marbin Almaguer sent at 4/11/2022  2:16 PM CDT -----  Type:  RX Refill Request    Who Called: Patient   Refill or New Rx:  Refill  RX Name and Strength:  HYDROcodone-acetaminophen (NORCO) 5-325 mg per tablet      How is the patient currently taking it? (ex. 1XDay): 1 X6 hours  Is this a 30 day or 90 day RX: 120 tablets    Preferred Pharmacy with phone number:   [facility][id number] [address] & [address] [address]  Phone: [phone] Fax: [phone]        Local or Mail Order:  Local  Ordering Provider:  Sergo Jimenez Call Back Number: [phone]  Additional Information:

## 2022-05-11 ENCOUNTER — TELEPHONE (OUTPATIENT)
Dept: CARDIOLOGY | Facility: CLINIC | Age: 87
End: 2022-05-11
Payer: MEDICARE

## 2022-05-11 DIAGNOSIS — M51.9 LUMBAR DISC DISEASE: ICD-10-CM

## 2022-05-11 RX ORDER — HYDROCODONE BITARTRATE AND ACETAMINOPHEN 5; 325 MG/1; MG/1
1 TABLET ORAL EVERY 6 HOURS PRN
Qty: 120 TABLET | Refills: 0 | Status: SHIPPED | OUTPATIENT
Start: 2022-05-11 | End: 2022-06-09 | Stop reason: SDUPTHER

## 2022-05-11 NOTE — TELEPHONE ENCOUNTER
----- Message from Peterson Burt sent at 5/11/2022 11:17 AM CDT -----  Contact: Pt  Type: RX Refill Request  Who Called:Pt  Refill or New RX:Refill  RX Name and Strength:HYDROcodone-acetaminophen (NORCO) 5-325 mg per tablet  How is the patient currently taking it: As Directed  Is this a 30 or 90 day : as Directed  Preferred Pharmacy/Phone Number:  [facility] #[id number] [address] & [address] [address]  Phone: [phone] Fax: [phone]      Best Call Back Number:[phone]    Additional Information :Pt was requesting to speak with nurse when available pt states they had some issues getting there nuclear stress test please call Thank you  Please Advise- Thank you

## 2022-05-11 NOTE — TELEPHONE ENCOUNTER
----- Message from Leonila Wang RN sent at 5/11/2022 11:35 AM CDT -----  Regarding: Nuclear Stress Test  Good Morning,    Please contact patient to reschedule nuclear stress test. Thanks!

## 2022-05-13 RX ORDER — QUETIAPINE FUMARATE 100 MG/1
TABLET, FILM COATED ORAL
Qty: 180 TABLET | Refills: 1 | Status: SHIPPED | OUTPATIENT
Start: 2022-05-13 | End: 2022-10-25

## 2022-05-30 RX ORDER — SERTRALINE HYDROCHLORIDE 50 MG/1
TABLET, FILM COATED ORAL
Qty: 90 TABLET | Refills: 3 | Status: SHIPPED | OUTPATIENT
Start: 2022-05-30 | End: 2023-02-27

## 2022-05-30 NOTE — TELEPHONE ENCOUNTER
No new care gaps identified.  [facility] Embedded Care Gaps. Reference number: [id number]. 5/30/2022   3:13:45 PM CDT

## 2022-05-30 NOTE — TELEPHONE ENCOUNTER
Refill Authorization Note   Lázaro Wang  is requesting a refill authorization.  Brief Assessment and Rationale for Refill:  Approve     Medication Therapy Plan:       Medication Reconciliation Completed: No   Comments:     No Care Gaps recommended.     Note composed:3:14 PM 05/30/2022

## 2022-06-09 DIAGNOSIS — M51.9 LUMBAR DISC DISEASE: ICD-10-CM

## 2022-06-09 RX ORDER — HYDROCODONE BITARTRATE AND ACETAMINOPHEN 5; 325 MG/1; MG/1
1 TABLET ORAL EVERY 6 HOURS PRN
Qty: 120 TABLET | Refills: 0 | Status: SHIPPED | OUTPATIENT
Start: 2022-06-09 | End: 2022-07-12 | Stop reason: SDUPTHER

## 2022-06-09 NOTE — TELEPHONE ENCOUNTER
----- Message from Sita Rosales sent at 6/9/2022  3:21 PM CDT -----  Regarding: Refill Request  Please refill the medication(s) listed below :         Medication # 1 : HYDROcodone-acetaminophen (NORCO) 5-325 mg per tablet           Please call the patient when the prescription is sent to pharmacy :[phone]         Can the clinic reply in [facility] : No          Preferred Pharmacy : [facility] [address] [place] [id number]

## 2022-06-11 ENCOUNTER — CLINICAL SUPPORT (OUTPATIENT)
Dept: CARDIOLOGY | Facility: HOSPITAL | Age: 87
End: 2022-06-11
Payer: MEDICARE

## 2022-06-11 DIAGNOSIS — Z95.0 PRESENCE OF CARDIAC PACEMAKER: ICD-10-CM

## 2022-06-11 PROCEDURE — 93296 REM INTERROG EVL PM/IDS: CPT | Mod: PO | Performed by: INTERNAL MEDICINE

## 2022-06-11 PROCEDURE — 93294 CARDIAC DEVICE CHECK - REMOTE: ICD-10-PCS | Mod: ,,, | Performed by: INTERNAL MEDICINE

## 2022-06-11 PROCEDURE — 93294 REM INTERROG EVL PM/LDLS PM: CPT | Mod: ,,, | Performed by: INTERNAL MEDICINE

## 2022-06-13 ENCOUNTER — PATIENT MESSAGE (OUTPATIENT)
Dept: CARDIOLOGY | Facility: HOSPITAL | Age: 87
End: 2022-06-13
Payer: MEDICARE

## 2022-06-14 ENCOUNTER — HOSPITAL ENCOUNTER (OUTPATIENT)
Dept: RADIOLOGY | Facility: HOSPITAL | Age: 87
Discharge: HOME | End: 2022-06-14
Attending: PHYSICIAN ASSISTANT
Payer: MEDICARE

## 2022-06-14 ENCOUNTER — CLINICAL SUPPORT (OUTPATIENT)
Dept: CARDIOLOGY | Facility: HOSPITAL | Age: 87
End: 2022-06-14
Attending: PHYSICIAN ASSISTANT
Payer: MEDICARE

## 2022-06-14 ENCOUNTER — CLINICAL SUPPORT (OUTPATIENT)
Dept: CARDIOLOGY | Facility: HOSPITAL | Age: 87
End: 2022-06-14
Attending: INTERNAL MEDICINE
Payer: MEDICARE

## 2022-06-14 VITALS — HEIGHT: 67 IN | BODY MASS INDEX: 33.27 KG/M2 | WEIGHT: 212 LBS

## 2022-06-14 VITALS — BODY MASS INDEX: 33.27 KG/M2 | HEIGHT: 67 IN | WEIGHT: 212 LBS

## 2022-06-14 DIAGNOSIS — R06.09 DOE (DYSPNEA ON EXERTION): ICD-10-CM

## 2022-06-14 DIAGNOSIS — I45.5 SINUS PAUSE: ICD-10-CM

## 2022-06-14 DIAGNOSIS — Z95.0 PACEMAKER: Primary | ICD-10-CM

## 2022-06-14 DIAGNOSIS — Z95.0 PACEMAKER: ICD-10-CM

## 2022-06-14 DIAGNOSIS — R06.02 SHORTNESS OF BREATH: ICD-10-CM

## 2022-06-14 LAB
CV PHARM DOSE: 0.4 MG
CV STRESS BASE HR: 74 BPM
DIASTOLIC BLOOD PRESSURE: 91 MMHG
NUC STRESS EJECTION FRACTION: 61 %
OHS CV CPX 1 MINUTE RECOVERY HEART RATE: 101 BPM
OHS CV CPX 85 PERCENT MAX PREDICTED HEART RATE MALE: 113
OHS CV CPX MAX PREDICTED HEART RATE: 133
OHS CV CPX PATIENT IS FEMALE: 0
OHS CV CPX PATIENT IS MALE: 1
OHS CV CPX PEAK DIASTOLIC BLOOD PRESSURE: 80 MMHG
OHS CV CPX PEAK HEAR RATE: 107 BPM
OHS CV CPX PEAK RATE PRESSURE PRODUCT: NORMAL
OHS CV CPX PEAK SYSTOLIC BLOOD PRESSURE: 180 MMHG
OHS CV CPX PERCENT MAX PREDICTED HEART RATE ACHIEVED: 80
OHS CV CPX RATE PRESSURE PRODUCT PRESENTING: NORMAL
OHS CV PHARM TIME: 1432 MIN
SYSTOLIC BLOOD PRESSURE: 151 MMHG

## 2022-06-14 PROCEDURE — 93306 ECHO (CUPID ONLY): ICD-10-PCS | Mod: 26,,, | Performed by: INTERNAL MEDICINE

## 2022-06-14 PROCEDURE — 93306 TTE W/DOPPLER COMPLETE: CPT | Mod: 26,,, | Performed by: INTERNAL MEDICINE

## 2022-06-14 PROCEDURE — 78452 STRESS TEST WITH MYOCARDIAL PERFUSION (CUPID ONLY): ICD-10-PCS | Mod: 26,,, | Performed by: INTERNAL MEDICINE

## 2022-06-14 PROCEDURE — 78452 HT MUSCLE IMAGE SPECT MULT: CPT | Mod: 26,,, | Performed by: INTERNAL MEDICINE

## 2022-06-14 PROCEDURE — 93306 TTE W/DOPPLER COMPLETE: CPT | Mod: PO

## 2022-06-14 PROCEDURE — 93016 STRESS TEST WITH MYOCARDIAL PERFUSION (CUPID ONLY): ICD-10-PCS | Mod: ,,, | Performed by: INTERNAL MEDICINE

## 2022-06-14 PROCEDURE — 93016 CV STRESS TEST SUPVJ ONLY: CPT | Mod: ,,, | Performed by: INTERNAL MEDICINE

## 2022-06-14 PROCEDURE — 93018 CV STRESS TEST I&R ONLY: CPT | Mod: ,,, | Performed by: INTERNAL MEDICINE

## 2022-06-14 PROCEDURE — 93018 PR CARDIAC STRESS TST,INTERP/REPT ONLY: ICD-10-PCS | Mod: ,,, | Performed by: INTERNAL MEDICINE

## 2022-06-14 PROCEDURE — 93017 CV STRESS TEST TRACING ONLY: CPT | Mod: PO

## 2022-06-14 PROCEDURE — 93280 PM DEVICE PROGR EVAL DUAL: CPT | Mod: 26,,, | Performed by: INTERNAL MEDICINE

## 2022-06-14 PROCEDURE — A9502 TC99M TETROFOSMIN: HCPCS | Mod: PO

## 2022-06-14 PROCEDURE — 93280 CARDIAC DEVICE CHECK - IN CLINIC & HOSPITAL: ICD-10-PCS | Mod: 26,,, | Performed by: INTERNAL MEDICINE

## 2022-06-14 PROCEDURE — 63600175 PHARM REV CODE 636 W HCPCS: Mod: PO | Performed by: PHYSICIAN ASSISTANT

## 2022-06-14 RX ORDER — REGADENOSON 0.08 MG/ML
0.4 INJECTION, SOLUTION INTRAVENOUS ONCE
Status: COMPLETED | OUTPATIENT
Start: 2022-06-14 | End: 2022-06-14

## 2022-06-14 RX ADMIN — REGADENOSON 0.4 MG: 0.08 INJECTION, SOLUTION INTRAVENOUS at 02:06

## 2022-06-15 LAB
ASCENDING AORTA: 3.85 CM
AV INDEX (PROSTH): 0.48
AV MEAN GRADIENT: 11 MMHG
AV PEAK GRADIENT: 21 MMHG
AV VALVE AREA: 2.07 CM2
AV VELOCITY RATIO: 0.47
BSA FOR ECHO PROCEDURE: 2.13 M2
CV ECHO LV RWT: 0.57 CM
DOP CALC AO PEAK VEL: 2.28 M/S
DOP CALC AO VTI: 48.18 CM
DOP CALC LVOT AREA: 4.3 CM2
DOP CALC LVOT DIAMETER: 2.33 CM
DOP CALC LVOT PEAK VEL: 1.08 M/S
DOP CALC LVOT STROKE VOLUME: 99.51 CM3
DOP CALCLVOT PEAK VEL VTI: 23.35 CM
E WAVE DECELERATION TIME: 151.38 MSEC
E/A RATIO: 0.7
E/E' RATIO: 10 M/S
ECHO LV POSTERIOR WALL: 1.26 CM (ref 0.6–1.1)
EJECTION FRACTION: 60 %
FRACTIONAL SHORTENING: 35 % (ref 28–44)
INTERVENTRICULAR SEPTUM: 1.29 CM (ref 0.6–1.1)
LA MAJOR: 5.13 CM
LA MINOR: 5.26 CM
LA WIDTH: 3.22 CM
LEFT ATRIUM SIZE: 3.4 CM
LEFT ATRIUM VOLUME INDEX: 23.4 ML/M2
LEFT ATRIUM VOLUME: 48.34 CM3
LEFT INTERNAL DIMENSION IN SYSTOLE: 2.86 CM (ref 2.1–4)
LEFT VENTRICLE DIASTOLIC VOLUME INDEX: 42.83 ML/M2
LEFT VENTRICLE DIASTOLIC VOLUME: 88.66 ML
LEFT VENTRICLE MASS INDEX: 102 G/M2
LEFT VENTRICLE SYSTOLIC VOLUME INDEX: 15.1 ML/M2
LEFT VENTRICLE SYSTOLIC VOLUME: 31.21 ML
LEFT VENTRICULAR INTERNAL DIMENSION IN DIASTOLE: 4.42 CM (ref 3.5–6)
LEFT VENTRICULAR MASS: 210.48 G
LV LATERAL E/E' RATIO: 8.75 M/S
LV SEPTAL E/E' RATIO: 11.67 M/S
MV PEAK A VEL: 1 M/S
MV PEAK E VEL: 0.7 M/S
MV STENOSIS PRESSURE HALF TIME: 43.9 MS
MV VALVE AREA P 1/2 METHOD: 5.01 CM2
PISA TR MAX VEL: 2.42 M/S
PULM VEIN S/D RATIO: 1.16
PV PEAK D VEL: 0.5 M/S
PV PEAK S VEL: 0.58 M/S
RA MAJOR: 4.97 CM
RA PRESSURE: 3 MMHG
RA WIDTH: 3.57 CM
RIGHT VENTRICULAR END-DIASTOLIC DIMENSION: 4.24 CM
RV TISSUE DOPPLER FREE WALL SYSTOLIC VELOCITY 1 (APICAL 4 CHAMBER VIEW): 14.96 CM/S
SINUS: 3.58 CM
STJ: 3.32 CM
TDI LATERAL: 0.08 M/S
TDI SEPTAL: 0.06 M/S
TDI: 0.07 M/S
TR MAX PG: 23 MMHG
TRICUSPID ANNULAR PLANE SYSTOLIC EXCURSION: 2.04 CM
TV REST PULMONARY ARTERY PRESSURE: 26 MMHG

## 2022-06-21 ENCOUNTER — OFFICE VISIT (OUTPATIENT)
Dept: PRIMARY CARE CLINIC | Facility: CLINIC | Age: 87
End: 2022-06-21
Payer: MEDICARE

## 2022-06-21 VITALS
HEART RATE: 74 BPM | BODY MASS INDEX: 32.49 KG/M2 | HEIGHT: 67 IN | SYSTOLIC BLOOD PRESSURE: 128 MMHG | OXYGEN SATURATION: 97 % | RESPIRATION RATE: 18 BRPM | DIASTOLIC BLOOD PRESSURE: 64 MMHG | WEIGHT: 207 LBS

## 2022-06-21 DIAGNOSIS — J34.89 RHINORRHEA: ICD-10-CM

## 2022-06-21 DIAGNOSIS — I10 ESSENTIAL HYPERTENSION: ICD-10-CM

## 2022-06-21 DIAGNOSIS — F51.04 CHRONIC INSOMNIA: Chronic | ICD-10-CM

## 2022-06-21 DIAGNOSIS — M51.9 LUMBAR DISC DISEASE: Chronic | ICD-10-CM

## 2022-06-21 DIAGNOSIS — F11.20 CHRONIC NARCOTIC DEPENDENCE: Chronic | ICD-10-CM

## 2022-06-21 DIAGNOSIS — F41.9 RECURRENT MILD MAJOR DEPRESSIVE DISORDER WITH ANXIETY: Chronic | ICD-10-CM

## 2022-06-21 DIAGNOSIS — F33.0 RECURRENT MILD MAJOR DEPRESSIVE DISORDER WITH ANXIETY: Chronic | ICD-10-CM

## 2022-06-21 DIAGNOSIS — E11.43 TYPE II DIABETES MELLITUS WITH PERIPHERAL AUTONOMIC NEUROPATHY: Primary | Chronic | ICD-10-CM

## 2022-06-21 PROCEDURE — 3288F PR FALLS RISK ASSESSMENT DOCUMENTED: ICD-10-PCS | Mod: CPTII,S$GLB,, | Performed by: FAMILY MEDICINE

## 2022-06-21 PROCEDURE — 3288F FALL RISK ASSESSMENT DOCD: CPT | Mod: CPTII,S$GLB,, | Performed by: FAMILY MEDICINE

## 2022-06-21 PROCEDURE — 3072F PR LOW RISK FOR RETINOPATHY: ICD-10-PCS | Mod: CPTII,S$GLB,, | Performed by: FAMILY MEDICINE

## 2022-06-21 PROCEDURE — 1159F PR MEDICATION LIST DOCUMENTED IN MEDICAL RECORD: ICD-10-PCS | Mod: CPTII,S$GLB,, | Performed by: FAMILY MEDICINE

## 2022-06-21 PROCEDURE — 99215 PR OFFICE/OUTPT VISIT, EST, LEVL V, 40-54 MIN: ICD-10-PCS | Mod: S$GLB,,, | Performed by: FAMILY MEDICINE

## 2022-06-21 PROCEDURE — 99999 PR PBB SHADOW E&M-EST. PATIENT-LVL V: CPT | Mod: PBBFAC,,, | Performed by: FAMILY MEDICINE

## 2022-06-21 PROCEDURE — 1126F AMNT PAIN NOTED NONE PRSNT: CPT | Mod: CPTII,S$GLB,, | Performed by: FAMILY MEDICINE

## 2022-06-21 PROCEDURE — 99999 PR PBB SHADOW E&M-EST. PATIENT-LVL V: ICD-10-PCS | Mod: PBBFAC,,, | Performed by: FAMILY MEDICINE

## 2022-06-21 PROCEDURE — 99215 OFFICE O/P EST HI 40 MIN: CPT | Mod: S$GLB,,, | Performed by: FAMILY MEDICINE

## 2022-06-21 PROCEDURE — 1160F RVW MEDS BY RX/DR IN RCRD: CPT | Mod: CPTII,S$GLB,, | Performed by: FAMILY MEDICINE

## 2022-06-21 PROCEDURE — 99499 RISK ADDL DX/OHS AUDIT: ICD-10-PCS | Mod: S$GLB,,, | Performed by: FAMILY MEDICINE

## 2022-06-21 PROCEDURE — 1101F PR PT FALLS ASSESS DOC 0-1 FALLS W/OUT INJ PAST YR: ICD-10-PCS | Mod: CPTII,S$GLB,, | Performed by: FAMILY MEDICINE

## 2022-06-21 PROCEDURE — 1101F PT FALLS ASSESS-DOCD LE1/YR: CPT | Mod: CPTII,S$GLB,, | Performed by: FAMILY MEDICINE

## 2022-06-21 PROCEDURE — 1160F PR REVIEW ALL MEDS BY PRESCRIBER/CLIN PHARMACIST DOCUMENTED: ICD-10-PCS | Mod: CPTII,S$GLB,, | Performed by: FAMILY MEDICINE

## 2022-06-21 PROCEDURE — 99499 UNLISTED E&M SERVICE: CPT | Mod: S$GLB,,, | Performed by: FAMILY MEDICINE

## 2022-06-21 PROCEDURE — 3072F LOW RISK FOR RETINOPATHY: CPT | Mod: CPTII,S$GLB,, | Performed by: FAMILY MEDICINE

## 2022-06-21 PROCEDURE — 1126F PR PAIN SEVERITY QUANTIFIED, NO PAIN PRESENT: ICD-10-PCS | Mod: CPTII,S$GLB,, | Performed by: FAMILY MEDICINE

## 2022-06-21 PROCEDURE — 1159F MED LIST DOCD IN RCRD: CPT | Mod: CPTII,S$GLB,, | Performed by: FAMILY MEDICINE

## 2022-06-21 NOTE — ASSESSMENT & PLAN NOTE
Patient has tried and failed multiple medications including nasal steroids, nasal antihistamine, ipratropium nasal spray, oral antihistamines, and even montelukast.  Patient may have a primary component of nonallergic rhinitis.  Although up to 15% of people taking tamsulosin can have rhinorrhea so I recommend discontinuing the tamsulosin and seeing if this helps.  If not I think the next step would be to consult with an allergist.  So he will let me know if not improving.

## 2022-06-21 NOTE — ASSESSMENT & PLAN NOTE
Chronic insomnia for many years.  A long time ago he was started on Seroquel which worked really well although over the years the dosage has been gradually increased.  Once again he does report having increased difficulty in sleeping and has been taking a shot of bourbon every night.  He reports this has helped significantly.  Although I advised him this is not recommended and will likely lead to more complications in the future.  I recommend he gradually cut back.  He also had reported intermittent hand tremor recently which I feel could be related to alcohol intake although it does not improve as he cuts back consider consulting with Neurology.  I would recommend consultation with counselor or possibly undergoing cognitive behavioral therapy to help with his chronic insomnia if not improving.  He was open to this idea although we do not have that available yet in the new clinic.

## 2022-06-21 NOTE — ASSESSMENT & PLAN NOTE
Depression seems to be well controlled, having some difficulty with insomnia, see below for discussion on this

## 2022-06-21 NOTE — ASSESSMENT & PLAN NOTE
Chronic condition remains satisfactory.  Current BP medications include  Metoprolol 25 mg, now taking two tablets daily per Cardiology  (Diabetic, not on an Ace or ARB although has not had evidence of micro albuminemia)

## 2022-06-21 NOTE — PROGRESS NOTES
THIS DOCUMENT WAS MADE IN PART WITH VOICE RECOGNITION SOFTWARE.  OCCASIONALLY THIS SOFTWARE WILL MISINTERPRET WORDS OR PHRASES.      Primary Care Provider Appointment   [facility] ([facility])  [address] [facility] [place] [address]   Ph: [phone]  Fax: [phone]      Patient ID: Lázaro Wang is a 87 y.o. male.    ASSESSMENT/PLAN by Problem List:  Problem List Items Addressed This Visit     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 - Primary (Chronic)     Diabetes has been stable and well controlled.  Will check labs next time.  Continues under diet control.  No hypoglycemia reported.           Relevant Orders    Ambulatory referral/consult to Optometry    Hemoglobin A1C    Comprehensive Metabolic Panel    Essential hypertension (Chronic)     Chronic condition remains satisfactory.  Current BP medications include  Metoprolol 25 mg, now taking two tablets daily per Cardiology  (Diabetic, not on an Ace or ARB although has not had evidence of micro albuminemia)           Lumbar disc disease (Chronic)     Chronic pain, remains stable on hydrocodone and follows regularly.           Chronic narcotic dependence (Chronic)     Remains stable on hydrocodone, he follows every three months and has been compliant for the most part.           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  A long time ago he was started on Seroquel which worked really well although over the years the dosage has been gradually increased.  Once again he does report having increased difficulty in sleeping and has been taking a shot of bourbon every night.  He reports this has helped significantly.  Although I advised him this is not recommended and will likely lead to more  complications in the future.  I recommend he gradually cut back.  He also had reported intermittent hand tremor recently which I feel could be related to alcohol intake although it does not improve as he cuts back consider consulting with Neurology.  I would recommend consultation with counselor or possibly undergoing cognitive behavioral therapy to help with his chronic insomnia if not improving.  He was open to this idea although we do not have that available yet in the new clinic.           Rhinorrhea     Patient has tried and failed multiple medications including nasal steroids, nasal antihistamine, ipratropium nasal spray, oral antihistamines, and even montelukast.  Patient may have a primary component of nonallergic rhinitis.  Although up to 15% of people taking tamsulosin can have rhinorrhea so I recommend discontinuing the tamsulosin and seeing if this helps.  If not I think the next step would be to consult with an allergist.  So he will let me know if not improving.                 Follow Up:  Three months    Forty-four minutes of total time spent on the encounter, which includes face to face time and non-face to face time preparing to see the patient (eg, review of tests), Obtaining and/or reviewing separately obtained history, Documenting clinical information in the electronic or other health record, Independently interpreting results (not separately reported) and communicating results to the patient/family/caregiver, or Care coordination (not separately reported).    Health Maintenance       Date Due Completion Date    Sign Pain Contract Never done ---    Shingles Vaccine (2 of 3) 06/30/2009 5/5/2009    TETANUS VACCINE 05/05/2019 5/5/2009    COVID-19 Vaccine (4 - Booster for Pfizer series) 12/28/2021 9/28/2021    Eye Exam 04/15/2022 4/15/2021    Foot Exam 05/21/2022 5/21/2021    Override on 11/6/2020: Done    Hemoglobin A1c 09/22/2022 3/22/2022    Diabetes Urine Screening 12/20/2022 12/20/2021    Lipid  Panel 12/20/2022 12/20/2021    Aspirin/Antiplatelet Therapy 06/21/2023 6/21/2022            Subjective:     Chief Complaint   Patient presents with    Anxiety     3 month f/u visit     Hypertension     3 month f/u visit      I have reviewed the information entered by the ancillary staff regarding the chief complaint as well as the related history.    HPI    Patient is a/an 87 y.o.  male   RISK of ADMIT/ED: 5     hand tremor noticed in last few months  Only change is increase in metoprolol  Only caffeine intake is one cup coffee in am  This was discussed under my comments regarding chronic insomnia and recent alcohol intake.  See above.    Reports chronic rhinorrhea, states none of the nasal sprays have not helped.  He has been on steroid sprays, Astelin, and even Atrovent.  States zyrtec, singulair no help  He has consulted with ENT in the past.    See above for all other topics addressed today and further details    For complete problem list, past medical history, surgical history, social history, etc., see appropriate section in the electronic medical record    Review of Systems   HENT: Positive for rhinorrhea.    Respiratory: Negative.    Cardiovascular: Negative.    Musculoskeletal: Positive for back pain.   Psychiatric/Behavioral: Positive for sleep disturbance. Negative for dysphoric mood.       Objective     Physical Exam  Constitutional:       General: He is not in acute distress.     Appearance: He is well-developed. He is not diaphoretic.   HENT:      Head: Normocephalic and atraumatic.      Mouth/Throat:      Pharynx: No oropharyngeal exudate.   Eyes:      General: No scleral icterus.     Conjunctiva/sclera: Conjunctivae normal.   Cardiovascular:      Rate and Rhythm: Normal rate and regular rhythm.      Pulses:           Dorsalis pedis pulses are 2+ on the right side and 2+ on the left side.        Posterior tibial pulses are 1+ on the right side and 1+ on the left side.      Heart sounds: Murmur (2/6  "systolic ejection murmur) heard.      Comments: Heart sounds are distant, trace ankle and peripheral edema  Pulmonary:      Effort: Pulmonary effort is normal. No respiratory distress.      Breath sounds: No wheezing or rales.   Feet:      Right foot:      Protective Sensation: 6 sites tested. 6 sites sensed.      Skin integrity: Dry skin present. No ulcer or skin breakdown.      Toenail Condition: Right toenails are abnormally thick.      Left foot:      Protective Sensation: 6 sites tested. 6 sites sensed.      Skin integrity: Dry skin present. No ulcer or skin breakdown.      Toenail Condition: Left toenails are abnormally thick.      Comments: Some scaling and mild redness between the toes on the left foot, likely tinea pedis.  Recommend over-the-counter topical cream and proper foot care and protection.  If not improving he will let me know.  Neurological:      Mental Status: He is alert and oriented to person, place, and time.      Coordination: Coordination normal.   Psychiatric:         Behavior: Behavior normal.        Vitals:    06/21/22 1453   BP: 128/64   BP Location: Left arm   Patient Position: Sitting   BP Method: Medium (Manual)   Pulse: 74   Resp: 18   SpO2: 97%   Weight: 93.9 kg (207 lb 0.2 oz)   Height: 5' 7" (1.702 m)       RECENT LABS:    Lab Results   Component Value Date    WBC 7.21 03/22/2022    HGB 15.3 03/22/2022    HCT 46.6 03/22/2022     03/22/2022    CHOL 164 12/20/2021    TRIG 135 12/20/2021    HDL 49 12/20/2021    ALT 13 03/22/2022    AST 23 03/22/2022     03/22/2022    K 4.9 03/22/2022     03/22/2022    CREATININE 1.1 03/22/2022    BUN 14 03/22/2022    CO2 27 03/22/2022    TSH 1.173 03/22/2022    PSA 1.4 09/15/2015    GLUF 112 (H) 01/28/2008    HGBA1C 6.1 (H) 03/22/2022       Results for orders placed or performed in visit on 06/14/22   Echo   Result Value Ref Range    Ascending aorta 3.85 cm    STJ 3.32 cm    AV mean gradient 11 mmHg    Ao peak weston 2.28 m/s    Ao " VTI 48.18 cm    IVS 1.29 (A) 0.6 - 1.1 cm    LA size 3.40 cm    Left Atrium Major Axis 5.13 cm    Left Atrium Minor Axis 5.26 cm    LVIDd 4.42 3.5 - 6.0 cm    LVIDs 2.86 2.1 - 4.0 cm    LVOT diameter 2.33 cm    LVOT peak VTI 23.35 cm    Posterior Wall 1.26 (A) 0.6 - 1.1 cm    MV Peak A Pardeep 1.00 m/s    E wave deceleration time 151.38 msec    MV Peak E Pardeep 0.70 m/s    PV Peak D Pardeep 0.50 m/s    PV Peak S Pardeep 0.58 m/s    RA Major Axis 4.97 cm    RA Width 3.57 cm    RVDD 4.24 cm    Sinus 3.58 cm    TAPSE 2.04 cm    TR Max Pardeep 2.42 m/s    TDI LATERAL 0.08 m/s    TDI SEPTAL 0.06 m/s    LA WIDTH 3.22 cm    MV stenosis pressure 1/2 time 43.90 ms    LV Diastolic Volume 88.66 mL    LV Systolic Volume 31.21 mL    LVOT peak pardeep 1.08 m/s    RV S' 14.96 cm/s    LV LATERAL E/E' RATIO 8.75 m/s    LV SEPTAL E/E' RATIO 11.67 m/s    FS 35 %    LA volume 48.34 cm3    LV mass 210.48 g    Left Ventricle Relative Wall Thickness 0.57 cm    AV valve area 2.07 cm2    AV Velocity Ratio 0.47     AV index (prosthetic) 0.48     MV valve area p 1/2 method 5.01 cm2    E/A ratio 0.70     Mean e' 0.07 m/s    Pulm vein S/D ratio 1.16     LVOT area 4.3 cm2    LVOT stroke volume 99.51 cm3    AV peak gradient 21 mmHg    E/E' ratio 10.00 m/s    LV Systolic Volume Index 15.1 mL/m2    LV Diastolic Volume Index 42.83 mL/m2    LA Volume Index 23.4 mL/m2    LV Mass Index 102 g/m2    Triscuspid Valve Regurgitation Peak Gradient 23 mmHg    BSA 2.13 m2    Right Atrial Pressure (from IVC) 3 mmHg    EF 60 %    TV rest pulmonary artery pressure 26 mmHg

## 2022-06-21 NOTE — ASSESSMENT & PLAN NOTE
Diabetes has been stable and well controlled.  Will check labs next time.  Continues under diet control.  No hypoglycemia reported.

## 2022-06-21 NOTE — ASSESSMENT & PLAN NOTE
Remains stable on hydrocodone, he follows every three months and has been compliant for the most part.

## 2022-06-30 RX ORDER — METOPROLOL SUCCINATE 25 MG/1
25 TABLET, EXTENDED RELEASE ORAL 2 TIMES DAILY
Qty: 180 TABLET | Refills: 1 | Status: ON HOLD | OUTPATIENT
Start: 2022-06-30 | End: 2023-10-22

## 2022-07-12 ENCOUNTER — TELEPHONE (OUTPATIENT)
Dept: PRIMARY CARE CLINIC | Facility: CLINIC | Age: 87
End: 2022-07-12
Payer: MEDICARE

## 2022-07-12 DIAGNOSIS — M51.9 LUMBAR DISC DISEASE: ICD-10-CM

## 2022-07-12 RX ORDER — HYDROCODONE BITARTRATE AND ACETAMINOPHEN 5; 325 MG/1; MG/1
1 TABLET ORAL EVERY 6 HOURS PRN
Qty: 120 TABLET | Refills: 0 | Status: SHIPPED | OUTPATIENT
Start: 2022-07-12 | End: 2022-08-10 | Stop reason: SDUPTHER

## 2022-07-12 NOTE — TELEPHONE ENCOUNTER
----- Message from Annmarie Schmidt sent at 7/12/2022 11:09 AM CDT -----  Regarding: refill  Can the clinic reply in [facility]: NO      Please refill the medication(s) listed below. Please call the patient when the prescription(s) is ready for  at this phone number   [phone], DAVID A [[id number]]      Medication #1 HYDROcodone-acetaminophen (NORCO) 5-325 mg per tablet        Preferred Pharmacy: [facility] - [address]

## 2022-07-12 NOTE — TELEPHONE ENCOUNTER
Attempted to contact patient several times to notify medication was refilled. Pt did  and call was disconnected.

## 2022-07-12 NOTE — TELEPHONE ENCOUNTER
----- Message from Annmarie Schmidt sent at 7/12/2022 11:09 AM CDT -----  Regarding: refill  Can the clinic reply in [facility]: NO      Please refill the medication(s) listed below. Please call the patient when the prescription(s) is ready for  at this phone number   [phone], DAVID A [[id number]]      Medication #1 HYDROcodone-acetaminophen (NORCO) 5-325 mg per tablet        Preferred Pharmacy: [facility] - [address]

## 2022-08-03 ENCOUNTER — OFFICE VISIT (OUTPATIENT)
Dept: OPTOMETRY | Facility: CLINIC | Age: 87
End: 2022-08-03
Payer: MEDICARE

## 2022-08-03 DIAGNOSIS — G43.109 OCULAR MIGRAINE: ICD-10-CM

## 2022-08-03 DIAGNOSIS — Z13.5 GLAUCOMA SCREENING: ICD-10-CM

## 2022-08-03 DIAGNOSIS — E11.9 DIABETES MELLITUS TYPE 2 WITHOUT RETINOPATHY: Primary | ICD-10-CM

## 2022-08-03 DIAGNOSIS — E11.36 CATARACT ASSOCIATED WITH TYPE 2 DIABETES MELLITUS: ICD-10-CM

## 2022-08-03 DIAGNOSIS — E11.43 TYPE II DIABETES MELLITUS WITH PERIPHERAL AUTONOMIC NEUROPATHY: Chronic | ICD-10-CM

## 2022-08-03 DIAGNOSIS — H18.003 CORNEA VERTICILLATA OF BOTH EYES: ICD-10-CM

## 2022-08-03 DIAGNOSIS — H43.813 POSTERIOR VITREOUS DETACHMENT, BILATERAL: ICD-10-CM

## 2022-08-03 PROCEDURE — 99999 PR PBB SHADOW E&M-EST. PATIENT-LVL IV: ICD-10-PCS | Mod: PBBFAC,,, | Performed by: OPTOMETRIST

## 2022-08-03 PROCEDURE — 92014 PR EYE EXAM, EST PATIENT,COMPREHESV: ICD-10-PCS | Mod: S$GLB,,, | Performed by: OPTOMETRIST

## 2022-08-03 PROCEDURE — 1126F PR PAIN SEVERITY QUANTIFIED, NO PAIN PRESENT: ICD-10-PCS | Mod: CPTII,S$GLB,, | Performed by: OPTOMETRIST

## 2022-08-03 PROCEDURE — 92014 COMPRE OPH EXAM EST PT 1/>: CPT | Mod: S$GLB,,, | Performed by: OPTOMETRIST

## 2022-08-03 PROCEDURE — 3288F FALL RISK ASSESSMENT DOCD: CPT | Mod: CPTII,S$GLB,, | Performed by: OPTOMETRIST

## 2022-08-03 PROCEDURE — 1101F PR PT FALLS ASSESS DOC 0-1 FALLS W/OUT INJ PAST YR: ICD-10-PCS | Mod: CPTII,S$GLB,, | Performed by: OPTOMETRIST

## 2022-08-03 PROCEDURE — 3288F PR FALLS RISK ASSESSMENT DOCUMENTED: ICD-10-PCS | Mod: CPTII,S$GLB,, | Performed by: OPTOMETRIST

## 2022-08-03 PROCEDURE — 2023F DILAT RTA XM W/O RTNOPTHY: CPT | Mod: CPTII,S$GLB,, | Performed by: OPTOMETRIST

## 2022-08-03 PROCEDURE — 1159F MED LIST DOCD IN RCRD: CPT | Mod: CPTII,S$GLB,, | Performed by: OPTOMETRIST

## 2022-08-03 PROCEDURE — 1101F PT FALLS ASSESS-DOCD LE1/YR: CPT | Mod: CPTII,S$GLB,, | Performed by: OPTOMETRIST

## 2022-08-03 PROCEDURE — 99999 PR PBB SHADOW E&M-EST. PATIENT-LVL IV: CPT | Mod: PBBFAC,,, | Performed by: OPTOMETRIST

## 2022-08-03 PROCEDURE — 1126F AMNT PAIN NOTED NONE PRSNT: CPT | Mod: CPTII,S$GLB,, | Performed by: OPTOMETRIST

## 2022-08-03 PROCEDURE — 1159F PR MEDICATION LIST DOCUMENTED IN MEDICAL RECORD: ICD-10-PCS | Mod: CPTII,S$GLB,, | Performed by: OPTOMETRIST

## 2022-08-03 PROCEDURE — 2023F PR DILATED RETINAL EXAM W/O EVID OF RETINOPATHY: ICD-10-PCS | Mod: CPTII,S$GLB,, | Performed by: OPTOMETRIST

## 2022-08-03 NOTE — PATIENT INSTRUCTIONS
Cataract Surgery FAQs  Frequently Asked Questions    My doctor told me I have a cataract     What do I do next?   Relax. Cataracts are a normal part of aging, and cataract surgery is among the safest and most common procedures performed today.  Most patients who have had cataract surgery report significant improvement in their quality of life because of their improved vision, with a speedy recovery and minimal discomfort or inconvenience.    Your optometrist will recommend a cataract surgeon who will examine your eyes, evaluate the need for surgery, and discuss the details with you and your family.     What exactly is a cataract?   A cataract is simply a darkening or clouding of the eyes internal lens, which blurs your vision.  It is not a film which grows over the eye, but rather a degenerative process which causes the eyes normally clear lens to become cloudy or hazy.     Because this degenerative process occurs slowly over many years, we generally wait until the cataract begins to significantly impact your vision, before recommend surgery.                     How is cataract surgery performed?  Cataract surgery involves removal of the dark or cloudy lens from the eye, and implantation of a new, clear lens implant or IOL.  Cataract surgery is a lens replacement surgery.          Modern cataract surgery is performed as a same-day surgery and typically takes about 15 minutes to complete.  It is performed while you are awake, but you will be medicated so that you are relaxed and comfortable. Of course, the eye is anesthetized so that you dont feel any discomfort, and many people only vaguely remember the actual procedure, recalling only lights and the sensation of moisture on the eye.     Although is takes about a week to fully heal, most people are able to see better and resume their normal activities the very next day!  You will need to use eye drops for about one month after your surgery.     Will I  need glasses after cataract surgery?   The purpose of cataract surgery is to improve the overall quality of your vision, but it does not necessarily eliminate the need for glasses. Although some people dont need to wear glasses after standard cataract surgery, most people will still need to wear glasses for certain tasks.  If you are interested in minimizing or even eliminating the need for glasses, you should ask your surgeon about Refractive Cataract Surgery.    What is Refractive Cataract Surgery?   Refractive Cataract Surgery refers to the use of additional techniques performed either at the time of your cataract surgery or soon afterwards, designed to minimize and often eliminate your need for glasses.  Technologies such as LASIK, Astigmatism Correcting Incisions, Multifocal, Accommodating, or Toric lens implants can all be used, depending on the particular needs of each patient. Only your surgeon can determine if you are a candidate and which techniques are appropriate for your goals and your eye.                         LASIK                Multifocal IOL         Will my insurance cover these additional procedures?   Although your insurance will fully cover your cataract surgery, any other additional procedures -designed solely to eliminate the need for glasses- are considered elective (not medically necessary), and therefore not covered by your insurance.  Rest assured that your vision will be better after cataract surgery, in either case.  You simply need to decide whether minimizing your dependence on glasses is worth the additional investment in your eyes.  (Costs typically range between $1,000 to $2,000 per eye, depending on your needs).    View a 1hr video discussing cataract surgery with live patient questions and answers on the [employer] Web Site (Keywords: [facility] Cataract):    [email or web address]     Ophthalmic (Ocular) Migraine  Ophthalmic migraine is  "quite common. Patients usually experience visual symptoms of seeing bright zigzag type lines in their central or peripheral (side) vision. These bright lines may have associated flashing light sensations and sometimes can interfere with vision. There are many variations of these vision changes including spots, sparkles, or heat waves.  Painless symptoms usually resolve spontaneously between 15 and 60 minutes. Often, rest in a darkened room can be helpful during a migraine attack. Medical treatment is typically not necessary.    Sometimes, there can be a headache after the visual symptoms resolve. This is called migraine headache with visual prodrome, or Classic Migraine.  Some patients have migraine headaches without any visual symptoms, or Common Migraine.      Further, some patients have visual symptoms without the headache. This is called Ophthalmic Migraine. The cause is due to temporary spasms in the blood vessels behind the eye called "vasospasm" Typically, this resolves without treatment and many people never have another episode. Some people may continue to have them, months or years apart. An examination of the eye is important to rule out any other causes for these symptoms.    In the rare instance that these symptoms continue to recur on a regular basis and interfere with one's quality of life, then treatment may be recommended that both decrease the frequency and severity of the attacks. Treatment is usually started in consultation with an internist, family physician, or neurologist.     Please contact our office if you have any sudden changes in your symptoms, or any other questions concerning your vision.         "

## 2022-08-03 NOTE — PROGRESS NOTES
"HPI     Routine dm-dle-4/15/21    Pt complains of blurred vision at distance and near. Needing updated   glasses rx. No flashes, some floaters. BLS controlled. Vision is blurred   when going outside. Complains of va distortion x 2 mins-states it is  NOT   floaters- states hes seeing "little spots"    Hemoglobin A1C       Date                     Value               Ref Range             Status                03/22/2022               6.1 (H)             4.0 - 5.6 %           Final              Comment:    ADA Screening Guidelines:  5.7-6.4%  Consistent with   prediabetes  >or=6.5%  Consistent with diabetes    High levels of fetal   hemoglobin interfere with the HbA1C  assay. Heterozygous hemoglobin   variants (HbS, HgC, etc)do  not significantly interfere with this assay.     However, presence of multiple variants may affect accuracy.         12/20/2021               6.1 (H)             4.0 - 5.6 %           Final              Comment:    ADA Screening Guidelines:  5.7-6.4%  Consistent with   prediabetes  >or=6.5%  Consistent with diabetes    High levels of fetal   hemoglobin interfere with the HbA1C  assay. Heterozygous hemoglobin   variants (HbS, HgC, etc)do  not significantly interfere with this assay.     However, presence of multiple variants may affect accuracy.         05/25/2021               6.2 (H)             4.0 - 5.6 %           Final              Comment:    ADA Screening Guidelines:  5.7-6.4%  Consistent with   prediabetes  >or=6.5%  Consistent with diabetes    High levels of fetal   hemoglobin interfere with the HbA1C  assay. Heterozygous hemoglobin   variants (HbS, HgC, etc)do  not significantly interfere with this assay.     However, presence of multiple variants may affect accuracy.    ----------      Last edited by Zarina Talbert on 8/3/2022  9:41 AM. (History)        ROS     Positive for: Eyes    Negative for: Constitutional, Gastrointestinal, Neurological, Skin,   Genitourinary, " Musculoskeletal, HENT, Endocrine, Cardiovascular,   Respiratory, Psychiatric, Allergic/Imm, Heme/Lymph    Last edited by DEAN Muir, OD on 8/3/2022 10:02 AM. (History)        Assessment /Plan     For exam results, see Encounter Report.    Diabetes mellitus type 2 without retinopathy    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  -     Ambulatory referral/consult to Optometry    Cataract associated with type 2 diabetes mellitus  -     Ambulatory referral/consult to Ophthalmology; Future; Expected date: 08/10/2022    Glaucoma screening    Posterior vitreous detachment, bilateral    Cornea verticillata of both eyes    Ocular migraine      1,2. No [place]/ retinopathy, no csme, gave Diabetic Retinopathy info, control glucose and BP  Advise annual DFE  3. Dense NS cataract OD>OS   Pt will finally consider CE   Scheduled for consult   4. Not suspect  5. RD precautions given and reviewed. Patient knows to call/ message if any further changes in symptoms occur.  6. Stable from previous   7. New s/s of ocular migraine--monitor BP and hydration   Gave info    RTC cataract consult

## 2022-08-10 DIAGNOSIS — M51.9 LUMBAR DISC DISEASE: ICD-10-CM

## 2022-08-10 RX ORDER — HYDROCODONE BITARTRATE AND ACETAMINOPHEN 5; 325 MG/1; MG/1
1 TABLET ORAL EVERY 6 HOURS PRN
Qty: 120 TABLET | Refills: 0 | Status: SHIPPED | OUTPATIENT
Start: 2022-08-10 | End: 2022-09-08 | Stop reason: SDUPTHER

## 2022-09-06 ENCOUNTER — OFFICE VISIT (OUTPATIENT)
Dept: OPHTHALMOLOGY | Facility: CLINIC | Age: 87
End: 2022-09-06
Payer: MEDICARE

## 2022-09-06 DIAGNOSIS — E11.36 CATARACT ASSOCIATED WITH TYPE 2 DIABETES MELLITUS: ICD-10-CM

## 2022-09-06 DIAGNOSIS — E11.9 DIABETES MELLITUS TYPE 2 WITHOUT RETINOPATHY: ICD-10-CM

## 2022-09-06 DIAGNOSIS — E11.36 DIABETIC CATARACT OF RIGHT EYE: Primary | ICD-10-CM

## 2022-09-06 DIAGNOSIS — E11.8 TYPE 2 DIABETES MELLITUS WITH COMPLICATION: ICD-10-CM

## 2022-09-06 DIAGNOSIS — H43.813 POSTERIOR VITREOUS DETACHMENT OF BOTH EYES: ICD-10-CM

## 2022-09-06 PROCEDURE — 2023F PR DILATED RETINAL EXAM W/O EVID OF RETINOPATHY: ICD-10-PCS | Mod: CPTII,S$GLB,, | Performed by: OPHTHALMOLOGY

## 2022-09-06 PROCEDURE — 99999 PR PBB SHADOW E&M-EST. PATIENT-LVL III: ICD-10-PCS | Mod: PBBFAC,,, | Performed by: OPHTHALMOLOGY

## 2022-09-06 PROCEDURE — 1100F PTFALLS ASSESS-DOCD GE2>/YR: CPT | Mod: CPTII,S$GLB,, | Performed by: OPHTHALMOLOGY

## 2022-09-06 PROCEDURE — 99204 PR OFFICE/OUTPT VISIT, NEW, LEVL IV, 45-59 MIN: ICD-10-PCS | Mod: S$GLB,,, | Performed by: OPHTHALMOLOGY

## 2022-09-06 PROCEDURE — 3288F PR FALLS RISK ASSESSMENT DOCUMENTED: ICD-10-PCS | Mod: CPTII,S$GLB,, | Performed by: OPHTHALMOLOGY

## 2022-09-06 PROCEDURE — 1100F PR PT FALLS ASSESS DOC 2+ FALLS/FALL W/INJURY/YR: ICD-10-PCS | Mod: CPTII,S$GLB,, | Performed by: OPHTHALMOLOGY

## 2022-09-06 PROCEDURE — 92015 PR REFRACTION: ICD-10-PCS | Mod: S$GLB,,, | Performed by: OPHTHALMOLOGY

## 2022-09-06 PROCEDURE — 99999 PR PBB SHADOW E&M-EST. PATIENT-LVL III: CPT | Mod: PBBFAC,,, | Performed by: OPHTHALMOLOGY

## 2022-09-06 PROCEDURE — 1159F PR MEDICATION LIST DOCUMENTED IN MEDICAL RECORD: ICD-10-PCS | Mod: CPTII,S$GLB,, | Performed by: OPHTHALMOLOGY

## 2022-09-06 PROCEDURE — 1126F AMNT PAIN NOTED NONE PRSNT: CPT | Mod: CPTII,S$GLB,, | Performed by: OPHTHALMOLOGY

## 2022-09-06 PROCEDURE — 1159F MED LIST DOCD IN RCRD: CPT | Mod: CPTII,S$GLB,, | Performed by: OPHTHALMOLOGY

## 2022-09-06 PROCEDURE — 1160F RVW MEDS BY RX/DR IN RCRD: CPT | Mod: CPTII,S$GLB,, | Performed by: OPHTHALMOLOGY

## 2022-09-06 PROCEDURE — 92015 DETERMINE REFRACTIVE STATE: CPT | Mod: S$GLB,,, | Performed by: OPHTHALMOLOGY

## 2022-09-06 PROCEDURE — 1126F PR PAIN SEVERITY QUANTIFIED, NO PAIN PRESENT: ICD-10-PCS | Mod: CPTII,S$GLB,, | Performed by: OPHTHALMOLOGY

## 2022-09-06 PROCEDURE — 1160F PR REVIEW ALL MEDS BY PRESCRIBER/CLIN PHARMACIST DOCUMENTED: ICD-10-PCS | Mod: CPTII,S$GLB,, | Performed by: OPHTHALMOLOGY

## 2022-09-06 PROCEDURE — 2023F DILAT RTA XM W/O RTNOPTHY: CPT | Mod: CPTII,S$GLB,, | Performed by: OPHTHALMOLOGY

## 2022-09-06 PROCEDURE — 3288F FALL RISK ASSESSMENT DOCD: CPT | Mod: CPTII,S$GLB,, | Performed by: OPHTHALMOLOGY

## 2022-09-06 PROCEDURE — 99204 OFFICE O/P NEW MOD 45 MIN: CPT | Mod: S$GLB,,, | Performed by: OPHTHALMOLOGY

## 2022-09-06 RX ORDER — ZOSTER VACCINE RECOMBINANT, ADJUVANTED 50 MCG/0.5
KIT INTRAMUSCULAR
COMMUNITY
Start: 2022-07-13 | End: 2022-09-27

## 2022-09-06 NOTE — PROGRESS NOTES
HPI     Cataract     Additional comments: Cataract eval           Comments    DLS: 8/3/22    Pt states vision seems darker over past year. Does not drive.           Last edited by Cheryle Quintana on 9/6/2022  8:37 AM.        ROS    Negative for: Constitutional, Gastrointestinal, Neurological, Skin,   Genitourinary, Musculoskeletal, HENT, Endocrine, Cardiovascular, Eyes,   Respiratory, Psychiatric, Allergic/Imm, Heme/Lymph  Last edited by Shen Sandoval Jr., MD on 9/6/2022  9:09 AM.        Assessment /Plan     For exam results, see Encounter Report.    Diabetic cataract of right eye    Cataract associated with type 2 diabetes mellitus  -     Ambulatory referral/consult to Ophthalmology    Type 2 diabetes mellitus with complication    Posterior vitreous detachment of both eyes    Diabetes mellitus type 2 without retinopathy    Panoptix +FLOMAX; will need pre-op atropine  -schedule cataract surgery, right  -RBA discussed with patient  -Risks of worse vision, loss of vision, infection, bleeding, re-surgery,and may need to have surgery done by a different physician was explained to the patient  -patient was also counseled on premium lens options, laser cataract, and astigmatic correction  -patient was also counseled that they may still need glasses after surgery to help improve their vision, especially the need for reading glasses for reading small print texts after surgery  -patient understood the risks involved with cataract surgery and wanted to move forward with surgery  Follow up in about 1 month (around 10/6/2022) for Measurements, H&P, and consents.

## 2022-09-08 DIAGNOSIS — M51.9 LUMBAR DISC DISEASE: ICD-10-CM

## 2022-09-08 RX ORDER — HYDROCODONE BITARTRATE AND ACETAMINOPHEN 5; 325 MG/1; MG/1
1 TABLET ORAL EVERY 6 HOURS PRN
Qty: 120 TABLET | Refills: 0 | Status: SHIPPED | OUTPATIENT
Start: 2022-09-08 | End: 2022-10-11 | Stop reason: SDUPTHER

## 2022-09-08 NOTE — TELEPHONE ENCOUNTER
----- Message from Tiffanie Estrada sent at 9/8/2022  4:11 PM CDT -----  Contact: [phone]  Type:  RX Refill Request    Who Called:  Pt   Refill or New Rx:  refill  RX Name and Strength:  HYDROcodone-acetaminophen (NORCO) 5-325 mg per tablet  How is the patient currently taking it? (ex. 1XDay):  every 6hrs   Is this a 30 day or 90 day RX:  30  Preferred Pharm[facility] with phone number[facility]    [facility] [address] [address]  Phone: [phone] Fax: [phone]      Local or Mail Order:  Local   Ordering Provider:  Sergo   Best Call Back Number:  [phone]    Pls call pt when rx is sent in

## 2022-09-09 ENCOUNTER — CLINICAL SUPPORT (OUTPATIENT)
Dept: CARDIOLOGY | Facility: HOSPITAL | Age: 87
End: 2022-09-09
Payer: MEDICARE

## 2022-09-09 DIAGNOSIS — Z95.0 PRESENCE OF CARDIAC PACEMAKER: ICD-10-CM

## 2022-09-09 PROCEDURE — 93296 REM INTERROG EVL PM/IDS: CPT | Mod: PO | Performed by: INTERNAL MEDICINE

## 2022-09-27 ENCOUNTER — OFFICE VISIT (OUTPATIENT)
Dept: PRIMARY CARE CLINIC | Facility: CLINIC | Age: 87
End: 2022-09-27
Payer: MEDICARE

## 2022-09-27 VITALS
HEART RATE: 66 BPM | RESPIRATION RATE: 18 BRPM | SYSTOLIC BLOOD PRESSURE: 128 MMHG | WEIGHT: 205.13 LBS | DIASTOLIC BLOOD PRESSURE: 64 MMHG | BODY MASS INDEX: 32.2 KG/M2 | OXYGEN SATURATION: 97 % | HEIGHT: 67 IN

## 2022-09-27 DIAGNOSIS — E11.43 TYPE II DIABETES MELLITUS WITH PERIPHERAL AUTONOMIC NEUROPATHY: Chronic | ICD-10-CM

## 2022-09-27 DIAGNOSIS — I10 ESSENTIAL HYPERTENSION: Chronic | ICD-10-CM

## 2022-09-27 DIAGNOSIS — M51.9 LUMBAR DISC DISEASE: Chronic | ICD-10-CM

## 2022-09-27 DIAGNOSIS — F11.20 CHRONIC NARCOTIC DEPENDENCE: Chronic | ICD-10-CM

## 2022-09-27 DIAGNOSIS — F51.04 CHRONIC INSOMNIA: Chronic | ICD-10-CM

## 2022-09-27 DIAGNOSIS — F41.9 RECURRENT MILD MAJOR DEPRESSIVE DISORDER WITH ANXIETY: Chronic | ICD-10-CM

## 2022-09-27 DIAGNOSIS — E11.8 TYPE 2 DIABETES MELLITUS WITH COMPLICATION: Primary | Chronic | ICD-10-CM

## 2022-09-27 DIAGNOSIS — N18.31 STAGE 3A CHRONIC KIDNEY DISEASE: Chronic | ICD-10-CM

## 2022-09-27 DIAGNOSIS — E78.5 DYSLIPIDEMIA: Chronic | ICD-10-CM

## 2022-09-27 DIAGNOSIS — F33.0 RECURRENT MILD MAJOR DEPRESSIVE DISORDER WITH ANXIETY: Chronic | ICD-10-CM

## 2022-09-27 PROCEDURE — G0008 ADMIN INFLUENZA VIRUS VAC: HCPCS | Mod: S$GLB,,, | Performed by: FAMILY MEDICINE

## 2022-09-27 PROCEDURE — 1159F PR MEDICATION LIST DOCUMENTED IN MEDICAL RECORD: ICD-10-PCS | Mod: CPTII,S$GLB,, | Performed by: FAMILY MEDICINE

## 2022-09-27 PROCEDURE — 1126F AMNT PAIN NOTED NONE PRSNT: CPT | Mod: CPTII,S$GLB,, | Performed by: FAMILY MEDICINE

## 2022-09-27 PROCEDURE — 90694 VACC AIIV4 NO PRSRV 0.5ML IM: CPT | Mod: S$GLB,,, | Performed by: FAMILY MEDICINE

## 2022-09-27 PROCEDURE — G0008 FLU VACCINE - QUADRIVALENT - ADJUVANTED: ICD-10-PCS | Mod: S$GLB,,, | Performed by: FAMILY MEDICINE

## 2022-09-27 PROCEDURE — 90694 FLU VACCINE - QUADRIVALENT - ADJUVANTED: ICD-10-PCS | Mod: S$GLB,,, | Performed by: FAMILY MEDICINE

## 2022-09-27 PROCEDURE — 3288F PR FALLS RISK ASSESSMENT DOCUMENTED: ICD-10-PCS | Mod: CPTII,S$GLB,, | Performed by: FAMILY MEDICINE

## 2022-09-27 PROCEDURE — 99999 PR PBB SHADOW E&M-EST. PATIENT-LVL IV: ICD-10-PCS | Mod: PBBFAC,,, | Performed by: FAMILY MEDICINE

## 2022-09-27 PROCEDURE — 99214 PR OFFICE/OUTPT VISIT, EST, LEVL IV, 30-39 MIN: ICD-10-PCS | Mod: S$GLB,,, | Performed by: FAMILY MEDICINE

## 2022-09-27 PROCEDURE — 3288F FALL RISK ASSESSMENT DOCD: CPT | Mod: CPTII,S$GLB,, | Performed by: FAMILY MEDICINE

## 2022-09-27 PROCEDURE — 3072F PR LOW RISK FOR RETINOPATHY: ICD-10-PCS | Mod: CPTII,S$GLB,, | Performed by: FAMILY MEDICINE

## 2022-09-27 PROCEDURE — 1100F PR PT FALLS ASSESS DOC 2+ FALLS/FALL W/INJURY/YR: ICD-10-PCS | Mod: CPTII,S$GLB,, | Performed by: FAMILY MEDICINE

## 2022-09-27 PROCEDURE — 1160F RVW MEDS BY RX/DR IN RCRD: CPT | Mod: CPTII,S$GLB,, | Performed by: FAMILY MEDICINE

## 2022-09-27 PROCEDURE — 1100F PTFALLS ASSESS-DOCD GE2>/YR: CPT | Mod: CPTII,S$GLB,, | Performed by: FAMILY MEDICINE

## 2022-09-27 PROCEDURE — 1126F PR PAIN SEVERITY QUANTIFIED, NO PAIN PRESENT: ICD-10-PCS | Mod: CPTII,S$GLB,, | Performed by: FAMILY MEDICINE

## 2022-09-27 PROCEDURE — 99499 UNLISTED E&M SERVICE: CPT | Mod: S$GLB,,, | Performed by: FAMILY MEDICINE

## 2022-09-27 PROCEDURE — 99999 PR PBB SHADOW E&M-EST. PATIENT-LVL IV: CPT | Mod: PBBFAC,,, | Performed by: FAMILY MEDICINE

## 2022-09-27 PROCEDURE — 1159F MED LIST DOCD IN RCRD: CPT | Mod: CPTII,S$GLB,, | Performed by: FAMILY MEDICINE

## 2022-09-27 PROCEDURE — 99214 OFFICE O/P EST MOD 30 MIN: CPT | Mod: S$GLB,,, | Performed by: FAMILY MEDICINE

## 2022-09-27 PROCEDURE — 1160F PR REVIEW ALL MEDS BY PRESCRIBER/CLIN PHARMACIST DOCUMENTED: ICD-10-PCS | Mod: CPTII,S$GLB,, | Performed by: FAMILY MEDICINE

## 2022-09-27 PROCEDURE — 3072F LOW RISK FOR RETINOPATHY: CPT | Mod: CPTII,S$GLB,, | Performed by: FAMILY MEDICINE

## 2022-09-27 RX ORDER — ONDANSETRON 4 MG/1
4 TABLET, ORALLY DISINTEGRATING ORAL ONCE
Qty: 6 TABLET | Refills: 0 | Status: SHIPPED | OUTPATIENT
Start: 2022-09-27 | End: 2022-09-27

## 2022-09-27 NOTE — ASSESSMENT & PLAN NOTE
His blood pressure remains stable and under satisfactory control    Current BP medications include  Metoprolol 25 mg, two tablets daily, prescribed by Cardiology.  (Diabetic, not on an Ace or ARB although has not had evidence of micro albuminemia, will continue to monitor)

## 2022-09-27 NOTE — ASSESSMENT & PLAN NOTE
Diabetes has been stable and satisfactory.  Patient remains on diet control.  There is room to improve diet, exercise and lose weight but overall A1c is satisfactory.  Continue to monitor and encourage healthy lifestyle.

## 2022-09-27 NOTE — PROGRESS NOTES
THIS DOCUMENT WAS MADE IN PART WITH VOICE RECOGNITION SOFTWARE.  OCCASIONALLY THIS SOFTWARE WILL MISINTERPRET WORDS OR PHRASES.      Primary Care Provider Appointment   [facility] ([facility])  [address] [place] [address]   Ph: [phone]  Fax: [phone]      Patient ID: Lázaro Wang is a 87 y.o. male.    ASSESSMENT/PLAN by Problem List:  Problem List Items Addressed This Visit       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 (Chronic)     Diabetes has been stable and satisfactory.  Patient remains on diet control.  There is room to improve diet, exercise and lose weight but overall A1c is satisfactory.  Continue to monitor and encourage healthy lifestyle.         Relevant Orders    Hemoglobin A1C    Microalbumin/Creatinine Ratio, Urine    Type 2 diabetes mellitus with complication - Primary (Chronic)    Relevant Orders    Hemoglobin A1C    Microalbumin/Creatinine Ratio, Urine    Essential hypertension (Chronic)     His blood pressure remains stable and under satisfactory control    Current BP medications include  Metoprolol 25 mg, two tablets daily, prescribed by Cardiology.  (Diabetic, not on an Ace or ARB although has not had evidence of micro albuminemia, will continue to monitor)         Relevant Orders    Comprehensive Metabolic Panel    Stage 3a chronic kidney disease (Chronic)     Stable, age-appropriate.  No NSAID usage.  No uremic symptoms.  We will continue to monitor.         Relevant Orders    Comprehensive Metabolic Panel    Dyslipidemia (Chronic)     Previously on atorvastatin but he discontinued secondary to side effects.  Lipids are not elevated although with history of diabetes and atherosclerosis, did discuss that he may still benefit from trying an alternative statin.         Relevant Orders    Lipid Panel    Lumbar disc disease (Chronic)     He continues to have chronic low back pain but feels that symptoms are under  reasonable control with current therapy.  He is not interested in alternatives or referrals at this time.  Continue current medications and follow in three months         Chronic narcotic dependence (Chronic)     Patient remains on hydrocodone for chronic low back pain.  He has been stable on his current regimen for many years and follows up reasonably well.  Continue and continue to follow every three months.         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  Continue.         Chronic insomnia (Chronic)     Patient has had chronic insomnia for many years.  He has done well on Seroquel.  Although we have had to titrate up over time.  We have discussed long-term concerns and warnings with Seroquel but this has greatly improved sleep and quality of life and he desires to continue.  He does not report any adverse effects.            Addendum:  Patient also informed me that he is planning cataract surgery within the next few months.  He has no contraindications for cataract surgery and may proceed as planned.    Follow Up:  Three months      Health Maintenance         Date Due Completion Date    Sign Pain Contract Never done ---    TETANUS VACCINE 05/05/2019 5/5/2009    COVID-19 Vaccine (4 - Booster for Pfizer series) 11/23/2021 9/28/2021    Influenza Vaccine (1) 09/01/2022 12/20/2021    Shingles Vaccine (3 of 3) 09/07/2022 7/13/2022    Hemoglobin A1c 09/22/2022 3/22/2022    Diabetes Urine Screening 12/20/2022 12/20/2021    Lipid Panel 12/20/2022 12/20/2021    Eye Exam 08/03/2023 8/3/2022    Aspirin/Antiplatelet Therapy 09/06/2023 9/6/2022            Subjective:     Chief Complaint   Patient presents with    Diabetes     3 month f/u visit      I have reviewed the information entered by the ancillary staff regarding the chief complaint as well as the related history.    HPI    Patient is a/an 87 y.o.  male   RISK of ADMIT/ED: 5    Fell ~ 3 weeks ago, in  "'store parking lot', trip on did hit head, no LOC, EMS took to [place].  States few scratches, all better now    See above for all other details addressed today    For complete problem list, past medical history, surgical history, social history, etc., see appropriate section in the electronic medical record    Review of Systems   Eyes:  Positive for visual disturbance.   Respiratory: Negative.     Cardiovascular: Negative.    Gastrointestinal: Negative.    Genitourinary: Negative.    Musculoskeletal:  Positive for arthralgias and back pain.   Neurological: Negative.    Psychiatric/Behavioral: Negative.       Objective     Physical Exam  Constitutional:       General: He is not in acute distress.     Appearance: He is well-developed. He is not diaphoretic.   HENT:      Head: Normocephalic and atraumatic.   Eyes:      General: No scleral icterus.     Conjunctiva/sclera: Conjunctivae normal.   Cardiovascular:      Rate and Rhythm: Normal rate and regular rhythm.      Heart sounds: Murmur (2/6 systolic ejection murmur) heard.   Pulmonary:      Effort: Pulmonary effort is normal. No respiratory distress.      Breath sounds: No wheezing or rales.   Neurological:      Mental Status: He is alert and oriented to person, place, and time.      Coordination: Coordination normal.   Psychiatric:         Behavior: Behavior normal.     Vitals:    09/27/22 1435   BP: 128/64   BP Location: Left arm   Patient Position: Sitting   BP Method: Medium (Manual)   Pulse: 66   Resp: 18   SpO2: 97%   Weight: 93.1 kg (205 lb 2.2 oz)   Height: 5' 7" (1.702 m)       RECENT LABS:    Lab Results   Component Value Date    WBC 7.21 03/22/2022    HGB 15.3 03/22/2022    HCT 46.6 03/22/2022     03/22/2022    CHOL 164 12/20/2021    TRIG 135 12/20/2021    HDL 49 12/20/2021    ALT 13 03/22/2022    AST 23 03/22/2022     03/22/2022    K 4.9 03/22/2022     03/22/2022    CREATININE 1.1 03/22/2022    BUN 14 03/22/2022    CO2 27 03/22/2022 "    TSH 1.173 03/22/2022    PSA 1.4 09/15/2015    GLUF 112 (H) 01/28/2008    HGBA1C 6.1 (H) 03/22/2022       Results for orders placed or performed in visit on 06/14/22   Echo   Result Value Ref Range    Ascending aorta 3.85 cm    STJ 3.32 cm    AV mean gradient 11 mmHg    Ao peak pardeep 2.28 m/s    Ao VTI 48.18 cm    IVS 1.29 (A) 0.6 - 1.1 cm    LA size 3.40 cm    Left Atrium Major Axis 5.13 cm    Left Atrium Minor Axis 5.26 cm    LVIDd 4.42 3.5 - 6.0 cm    LVIDs 2.86 2.1 - 4.0 cm    LVOT diameter 2.33 cm    LVOT peak VTI 23.35 cm    Posterior Wall 1.26 (A) 0.6 - 1.1 cm    MV Peak A Pardeep 1.00 m/s    E wave deceleration time 151.38 msec    MV Peak E Pardeep 0.70 m/s    PV Peak D Pardeep 0.50 m/s    PV Peak S Pardeep 0.58 m/s    RA Major Axis 4.97 cm    RA Width 3.57 cm    RVDD 4.24 cm    Sinus 3.58 cm    TAPSE 2.04 cm    TR Max Pardeep 2.42 m/s    TDI LATERAL 0.08 m/s    TDI SEPTAL 0.06 m/s    LA WIDTH 3.22 cm    MV stenosis pressure 1/2 time 43.90 ms    LV Diastolic Volume 88.66 mL    LV Systolic Volume 31.21 mL    LVOT peak pardeep 1.08 m/s    RV S' 14.96 cm/s    LV LATERAL E/E' RATIO 8.75 m/s    LV SEPTAL E/E' RATIO 11.67 m/s    FS 35 %    LA volume 48.34 cm3    LV mass 210.48 g    Left Ventricle Relative Wall Thickness 0.57 cm    AV valve area 2.07 cm2    AV Velocity Ratio 0.47     AV index (prosthetic) 0.48     MV valve area p 1/2 method 5.01 cm2    E/A ratio 0.70     Mean e' 0.07 m/s    Pulm vein S/D ratio 1.16     LVOT area 4.3 cm2    LVOT stroke volume 99.51 cm3    AV peak gradient 21 mmHg    E/E' ratio 10.00 m/s    LV Systolic Volume Index 15.1 mL/m2    LV Diastolic Volume Index 42.83 mL/m2    LA Volume Index 23.4 mL/m2    LV Mass Index 102 g/m2    Triscuspid Valve Regurgitation Peak Gradient 23 mmHg    BSA 2.13 m2    Right Atrial Pressure (from IVC) 3 mmHg    EF 60 %    TV rest pulmonary artery pressure 26 mmHg

## 2022-09-27 NOTE — ASSESSMENT & PLAN NOTE
He continues to have chronic low back pain but feels that symptoms are under reasonable control with current therapy.  He is not interested in alternatives or referrals at this time.  Continue current medications and follow in three months

## 2022-09-27 NOTE — ASSESSMENT & PLAN NOTE
Patient has had chronic insomnia for many years.  He has done well on Seroquel.  Although we have had to titrate up over time.  We have discussed long-term concerns and warnings with Seroquel but this has greatly improved sleep and quality of life and he desires to continue.  He does not report any adverse effects.

## 2022-09-27 NOTE — ASSESSMENT & PLAN NOTE
Patient remains on hydrocodone for chronic low back pain.  He has been stable on his current regimen for many years and follows up reasonably well.  Continue and continue to follow every three months.

## 2022-09-27 NOTE — ASSESSMENT & PLAN NOTE
Previously on atorvastatin but he discontinued secondary to side effects.  Lipids are not elevated although with history of diabetes and atherosclerosis, did discuss that he may still benefit from trying an alternative statin.

## 2022-09-28 ENCOUNTER — LAB VISIT (OUTPATIENT)
Dept: LAB | Facility: HOSPITAL | Age: 87
End: 2022-09-28
Attending: FAMILY MEDICINE
Payer: MEDICARE

## 2022-09-28 DIAGNOSIS — I10 ESSENTIAL HYPERTENSION: Chronic | ICD-10-CM

## 2022-09-28 DIAGNOSIS — N18.31 STAGE 3A CHRONIC KIDNEY DISEASE: Chronic | ICD-10-CM

## 2022-09-28 DIAGNOSIS — E78.5 DYSLIPIDEMIA: Chronic | ICD-10-CM

## 2022-09-28 DIAGNOSIS — E11.8 TYPE 2 DIABETES MELLITUS WITH COMPLICATION: Chronic | ICD-10-CM

## 2022-09-28 DIAGNOSIS — E11.43 TYPE II DIABETES MELLITUS WITH PERIPHERAL AUTONOMIC NEUROPATHY: Chronic | ICD-10-CM

## 2022-09-28 LAB
ESTIMATED AVG GLUCOSE: 120 MG/DL (ref 68–131)
HBA1C MFR BLD: 5.8 % (ref 4–5.6)

## 2022-09-28 PROCEDURE — 80061 LIPID PANEL: CPT | Performed by: FAMILY MEDICINE

## 2022-09-28 PROCEDURE — 83036 HEMOGLOBIN GLYCOSYLATED A1C: CPT | Performed by: FAMILY MEDICINE

## 2022-09-28 PROCEDURE — 80053 COMPREHEN METABOLIC PANEL: CPT | Performed by: FAMILY MEDICINE

## 2022-09-28 PROCEDURE — 36415 COLL VENOUS BLD VENIPUNCTURE: CPT | Mod: PN | Performed by: FAMILY MEDICINE

## 2022-09-29 LAB
ALBUMIN SERPL BCP-MCNC: 3.9 G/DL (ref 3.5–5.2)
ALP SERPL-CCNC: 151 U/L (ref 55–135)
ALT SERPL W/O P-5'-P-CCNC: 11 U/L (ref 10–44)
ANION GAP SERPL CALC-SCNC: 8 MMOL/L (ref 8–16)
AST SERPL-CCNC: 16 U/L (ref 10–40)
BILIRUB SERPL-MCNC: 0.9 MG/DL (ref 0.1–1)
BUN SERPL-MCNC: 14 MG/DL (ref 8–23)
CALCIUM SERPL-MCNC: 9.6 MG/DL (ref 8.7–10.5)
CHLORIDE SERPL-SCNC: 105 MMOL/L (ref 95–110)
CHOLEST SERPL-MCNC: 165 MG/DL (ref 120–199)
CHOLEST/HDLC SERPL: 3.2 {RATIO} (ref 2–5)
CO2 SERPL-SCNC: 25 MMOL/L (ref 23–29)
CREAT SERPL-MCNC: 0.9 MG/DL (ref 0.5–1.4)
EST. GFR  (NO RACE VARIABLE): >60 ML/MIN/1.73 M^2
GLUCOSE SERPL-MCNC: 126 MG/DL (ref 70–110)
HDLC SERPL-MCNC: 52 MG/DL (ref 40–75)
HDLC SERPL: 31.5 % (ref 20–50)
LDLC SERPL CALC-MCNC: 93.8 MG/DL (ref 63–159)
NONHDLC SERPL-MCNC: 113 MG/DL
POTASSIUM SERPL-SCNC: 4.2 MMOL/L (ref 3.5–5.1)
PROT SERPL-MCNC: 7.2 G/DL (ref 6–8.4)
SODIUM SERPL-SCNC: 138 MMOL/L (ref 136–145)
TRIGL SERPL-MCNC: 96 MG/DL (ref 30–150)

## 2022-09-30 ENCOUNTER — TELEPHONE (OUTPATIENT)
Dept: CARDIOLOGY | Facility: HOSPITAL | Age: 87
End: 2022-09-30
Payer: MEDICARE

## 2022-09-30 NOTE — TELEPHONE ENCOUNTER
Pt has appt w/Dr. Dominguez 10/21/22    Information for HM transfer to [facility]    [employer] PPM    Model #: [id number] [place] MRI  SN: [id number]

## 2022-10-03 ENCOUNTER — TELEPHONE (OUTPATIENT)
Dept: CARDIOLOGY | Facility: HOSPITAL | Age: 87
End: 2022-10-03
Payer: MEDICARE

## 2022-10-11 DIAGNOSIS — M51.9 LUMBAR DISC DISEASE: ICD-10-CM

## 2022-10-11 RX ORDER — HYDROCODONE BITARTRATE AND ACETAMINOPHEN 5; 325 MG/1; MG/1
1 TABLET ORAL EVERY 6 HOURS PRN
Qty: 120 TABLET | Refills: 0 | Status: SHIPPED | OUTPATIENT
Start: 2022-10-11 | End: 2022-11-10 | Stop reason: SDUPTHER

## 2022-10-11 NOTE — TELEPHONE ENCOUNTER
----- Message from Johnathon Carballo sent at 10/11/2022 11:55 AM CDT -----  Type:  RX Refill Request    Who Called: Pt     Refill or New Rx: Refill    RX Name and Strength:HYDROcodone-acetaminophen (NORCO) 5-325 mg per tablet    How is the patient currently taking it? Sig - Route: Take 1 tablet by mouth every 6 (six) hours as needed for Pain. >7 day supply of opioid is medically necessary for chronic pain. - Oral  Sent to pharmacy as: HYDROcodone-acetaminophen (NORCO) 5-325 mg per tablet        Is this a 30 day or 90 day RX:    Preferred Pharmacy with phone number:[facility] - [address]    Local or Mail Order: Local     Ordering Provider:Sergo    Would the patient rather a call back or a response via [facility]?  Call back     Best Call Back Number:[phone]  (mobile)     Additional Information:

## 2022-10-14 ENCOUNTER — OFFICE VISIT (OUTPATIENT)
Dept: OPHTHALMOLOGY | Facility: CLINIC | Age: 87
End: 2022-10-14
Payer: MEDICARE

## 2022-10-14 VITALS — SYSTOLIC BLOOD PRESSURE: 146 MMHG | DIASTOLIC BLOOD PRESSURE: 86 MMHG | HEART RATE: 83 BPM

## 2022-10-14 DIAGNOSIS — E11.36 DIABETIC CATARACT OF RIGHT EYE: Primary | ICD-10-CM

## 2022-10-14 PROCEDURE — 1101F PR PT FALLS ASSESS DOC 0-1 FALLS W/OUT INJ PAST YR: ICD-10-PCS | Mod: CPTII,S$GLB,, | Performed by: OPHTHALMOLOGY

## 2022-10-14 PROCEDURE — 99213 OFFICE O/P EST LOW 20 MIN: CPT | Mod: S$GLB,,, | Performed by: OPHTHALMOLOGY

## 2022-10-14 PROCEDURE — 99999 PR PBB SHADOW E&M-EST. PATIENT-LVL IV: ICD-10-PCS | Mod: PBBFAC,,, | Performed by: OPHTHALMOLOGY

## 2022-10-14 PROCEDURE — 3288F FALL RISK ASSESSMENT DOCD: CPT | Mod: CPTII,S$GLB,, | Performed by: OPHTHALMOLOGY

## 2022-10-14 PROCEDURE — 1159F PR MEDICATION LIST DOCUMENTED IN MEDICAL RECORD: ICD-10-PCS | Mod: CPTII,S$GLB,, | Performed by: OPHTHALMOLOGY

## 2022-10-14 PROCEDURE — 1101F PT FALLS ASSESS-DOCD LE1/YR: CPT | Mod: CPTII,S$GLB,, | Performed by: OPHTHALMOLOGY

## 2022-10-14 PROCEDURE — 99213 PR OFFICE/OUTPT VISIT, EST, LEVL III, 20-29 MIN: ICD-10-PCS | Mod: S$GLB,,, | Performed by: OPHTHALMOLOGY

## 2022-10-14 PROCEDURE — 1126F AMNT PAIN NOTED NONE PRSNT: CPT | Mod: CPTII,S$GLB,, | Performed by: OPHTHALMOLOGY

## 2022-10-14 PROCEDURE — 1126F PR PAIN SEVERITY QUANTIFIED, NO PAIN PRESENT: ICD-10-PCS | Mod: CPTII,S$GLB,, | Performed by: OPHTHALMOLOGY

## 2022-10-14 PROCEDURE — 3288F PR FALLS RISK ASSESSMENT DOCUMENTED: ICD-10-PCS | Mod: CPTII,S$GLB,, | Performed by: OPHTHALMOLOGY

## 2022-10-14 PROCEDURE — 1159F MED LIST DOCD IN RCRD: CPT | Mod: CPTII,S$GLB,, | Performed by: OPHTHALMOLOGY

## 2022-10-14 PROCEDURE — 99999 PR PBB SHADOW E&M-EST. PATIENT-LVL IV: CPT | Mod: PBBFAC,,, | Performed by: OPHTHALMOLOGY

## 2022-10-14 RX ORDER — PROPARACAINE HYDROCHLORIDE 5 MG/ML
1 SOLUTION/ DROPS OPHTHALMIC
Status: CANCELLED | OUTPATIENT
Start: 2022-10-14 | End: 2022-10-14

## 2022-10-14 RX ORDER — TROPICAMIDE 10 MG/ML
1 SOLUTION/ DROPS OPHTHALMIC
Status: CANCELLED | OUTPATIENT
Start: 2022-10-14 | End: 2022-10-14

## 2022-10-14 RX ORDER — PHENYLEPHRINE HYDROCHLORIDE 100 MG/ML
1 SOLUTION/ DROPS OPHTHALMIC
Status: CANCELLED | OUTPATIENT
Start: 2022-10-14 | End: 2022-10-14

## 2022-10-14 RX ORDER — DICLOFENAC SODIUM 1 MG/ML
1 SOLUTION/ DROPS OPHTHALMIC 4 TIMES DAILY
Qty: 5 ML | Refills: 3 | Status: SHIPPED | OUTPATIENT
Start: 2022-10-14 | End: 2022-11-13

## 2022-10-14 RX ORDER — PREDNISOLONE ACETATE 10 MG/ML
1 SUSPENSION/ DROPS OPHTHALMIC 4 TIMES DAILY
Qty: 5 ML | Refills: 3 | Status: SHIPPED | OUTPATIENT
Start: 2022-10-14 | End: 2022-10-31 | Stop reason: SDUPTHER

## 2022-10-14 RX ORDER — ATROPINE SULFATE 10 MG/ML
1 SOLUTION/ DROPS OPHTHALMIC 2 TIMES DAILY
Qty: 2 ML | Refills: 0 | Status: SHIPPED | OUTPATIENT
Start: 2022-10-14 | End: 2022-11-07

## 2022-10-14 RX ORDER — MOXIFLOXACIN 5 MG/ML
1 SOLUTION/ DROPS OPHTHALMIC 4 TIMES DAILY
Qty: 3 ML | Refills: 1 | Status: SHIPPED | OUTPATIENT
Start: 2022-10-14 | End: 2022-11-02

## 2022-10-14 NOTE — H&P
CC: H&P for cataract surgery  HPI: Patient has been diagnosed with cataracts, which are interfering with daily activities due to blurred vision and glare which cannot adequately be corrected with glasses.   PMH:  Past Medical History:   Diagnosis Date    Anticoagulant long-term use     325 asa    Anxiety     Arthritis     Carpal tunnel syndrome on right 09/2018    Cataract     OU    Chronic back pain     Coronary artery disease     stents x3    Diabetes mellitus     LBSL 70 today---stable    Elevated cholesterol     General anesthetics causing adverse effect in therapeutic use     confusion for 7-8 days following min tka    GERD (gastroesophageal reflux disease)     Heart disease     Hypertension     Myocardial infarction     Prostate disorder     Sinus pause 12/10/2018    Trouble in sleeping        FH:  Family History   Problem Relation Age of Onset    Glaucoma Mother     Cataracts Mother     Cancer Father         esophageal, did not die of    COPD Father         emphysema (2 ppd)    Macular degeneration Neg Hx     Retinal detachment Neg Hx        SH:  Social History     Socioeconomic History    Marital status:    Occupational History    Occupation: retired    Tobacco Use    Smoking status: Never    Smokeless tobacco: Never   Substance and Sexual Activity    Alcohol use: No     Comment: quit 2015    Drug use: No       ROS:  HEENT: Blurred vision  CV: No chest pain  Pulm: No SOB  Please see my last exam note for additional ROS details    PE:  There were no vitals filed for this visit.  HEENT: Cataract  CV: N S1 and S2 no murmurs  Pulm: CTAB wheezes, rales, or ronchi  Abd:  soft nabs NTND no masses  Extremeties: No edema    A/P  1. Cataract - Indications, risks and alternatives to the procedure were explained to the patient. The patient was given the opportunity to ask questions and consented to the procedure in writing.  Proceed with cataract surgery as scheduled.  2. Other health issues -  patient has been cleared by their PCP. See last note for details.

## 2022-10-14 NOTE — PROGRESS NOTES
HPI     Pre-op Exam     Additional comments: Phaco w/ IOL OD 10/25/2022           Comments    Pt states:  here for cataract preop. No eye pain.  VA OU is blurred. Trouble focusing and cannot read the TV screen anymore.    Likes to read and able to use denise after changing font.  + difficulty   with newspaper.            Last edited by Winnie Rojas on 10/14/2022  3:34 PM.        ROS    Negative for: Constitutional, Gastrointestinal, Neurological, Skin,   Genitourinary, Musculoskeletal, HENT, Endocrine, Cardiovascular, Eyes,   Respiratory, Psychiatric, Allergic/Imm, Heme/Lymph  Last edited by Shen Sandoval Jr., MD on 10/14/2022  3:20 PM.        Assessment /Plan     For exam results, see Encounter Report.    Diabetic cataract of right eye  -     phenylephrine HCL 10% ophthalmic solution 1 drop  -     proparacaine 0.5 % ophthalmic solution 1 drop  -     tropicamide 1% ophthalmic solution 1 drop  -     Place in Outpatient; Standing  -     moxifloxacin (VIGAMOX) 0.5 % ophthalmic solution; Place 1 drop into the right eye 4 (four) times daily. for 7 days  Dispense: 3 mL; Refill: 1  -     Case Request Operating Room: PHACOEMULSIFICATION, CATARACT  -     prednisoLONE acetate (PRED FORTE) 1 % DrpS; Place 1 drop into the right eye 4 (four) times daily.  Dispense: 5 mL; Refill: 3  -     IOL Master - OD - Right Eye  -     diclofenac (VOLTAREN) 0.1 % ophthalmic solution; Place 1 drop into the right eye 4 (four) times daily. Start drops 3 days before surgery  Dispense: 5 mL; Refill: 3  -     atropine 1% (ISOPTO ATROPINE) 1 % Drop; Place 1 drop into the right eye 2 (two) times daily. for 3 days  Dispense: 2 mL; Refill: 0    Omidria, m. Ring with d.blades  Will go with standard IOL  Patient understands he will need to wear readers for near vision  -schedule cataract surgery, right  -RBA discussed with patient  -Risks of worse vision, loss of vision, infection, bleeding, re-surgery,and may need to have surgery done by a  different physician was explained to the patient  -patient was also counseled on premium lens options, laser cataract, and astigmatic correction  -patient was also counseled that they may still need glasses after surgery to help improve their vision, especially the need for reading glasses for reading small print texts after surgery  -patient understood the risks involved with cataract surgery and wanted to move forward with surgery

## 2022-10-20 ENCOUNTER — TELEPHONE (OUTPATIENT)
Dept: OPHTHALMOLOGY | Facility: CLINIC | Age: 87
End: 2022-10-20
Payer: MEDICARE

## 2022-10-20 NOTE — TELEPHONE ENCOUNTER
Called pt concerning her surgery drops and instruction sheet. I informed him that we gave him his instruction sheet at his Pre-op appt and he should have 4 drops, atropine(red top), Diclofenac(gray top), Moxifloxacin(tan top), and PF(pink or white top). He said her granddaughter was handling this for him and he will check with her and call back if he needs any more help.

## 2022-10-24 ENCOUNTER — ANESTHESIA EVENT (OUTPATIENT)
Dept: SURGERY | Facility: HOSPITAL | Age: 87
End: 2022-10-24
Payer: MEDICARE

## 2022-10-25 ENCOUNTER — HOSPITAL ENCOUNTER (OUTPATIENT)
Facility: HOSPITAL | Age: 87
Discharge: HOME | End: 2022-10-25
Attending: OPHTHALMOLOGY | Admitting: OPHTHALMOLOGY
Payer: MEDICARE

## 2022-10-25 ENCOUNTER — ANESTHESIA (OUTPATIENT)
Dept: SURGERY | Facility: HOSPITAL | Age: 87
End: 2022-10-25
Payer: MEDICARE

## 2022-10-25 VITALS
RESPIRATION RATE: 16 BRPM | HEART RATE: 60 BPM | BODY MASS INDEX: 32.18 KG/M2 | HEIGHT: 67 IN | DIASTOLIC BLOOD PRESSURE: 85 MMHG | WEIGHT: 205 LBS | SYSTOLIC BLOOD PRESSURE: 158 MMHG | OXYGEN SATURATION: 98 % | TEMPERATURE: 98 F

## 2022-10-25 DIAGNOSIS — E11.36 DIABETIC CATARACT OF RIGHT EYE: Primary | ICD-10-CM

## 2022-10-25 LAB — GLUCOSE SERPL-MCNC: 102 MG/DL (ref 70–110)

## 2022-10-25 PROCEDURE — 63600175 PHARM REV CODE 636 W HCPCS: Mod: PO | Performed by: NURSE ANESTHETIST, CERTIFIED REGISTERED

## 2022-10-25 PROCEDURE — 82962 GLUCOSE BLOOD TEST: CPT | Mod: PO | Performed by: OPHTHALMOLOGY

## 2022-10-25 PROCEDURE — 25000003 PHARM REV CODE 250: Mod: PO

## 2022-10-25 PROCEDURE — D9220A PRA ANESTHESIA: Mod: CRNA,,, | Performed by: NURSE ANESTHETIST, CERTIFIED REGISTERED

## 2022-10-25 PROCEDURE — D9220A PRA ANESTHESIA: ICD-10-PCS | Mod: ANES,,, | Performed by: ANESTHESIOLOGY

## 2022-10-25 PROCEDURE — 36000706: Mod: PO | Performed by: OPHTHALMOLOGY

## 2022-10-25 PROCEDURE — 63600175 PHARM REV CODE 636 W HCPCS: Mod: PO | Performed by: ANESTHESIOLOGY

## 2022-10-25 PROCEDURE — 36000707: Mod: PO | Performed by: OPHTHALMOLOGY

## 2022-10-25 PROCEDURE — 37000008 HC ANESTHESIA 1ST 15 MINUTES: Mod: PO | Performed by: OPHTHALMOLOGY

## 2022-10-25 PROCEDURE — 63600175 PHARM REV CODE 636 W HCPCS: Mod: PO | Performed by: OPHTHALMOLOGY

## 2022-10-25 PROCEDURE — 71000015 HC POSTOP RECOV 1ST HR: Mod: PO | Performed by: OPHTHALMOLOGY

## 2022-10-25 PROCEDURE — 37000009 HC ANESTHESIA EA ADD 15 MINS: Mod: PO | Performed by: OPHTHALMOLOGY

## 2022-10-25 PROCEDURE — 66982 XCAPSL CTRC RMVL CPLX WO ECP: CPT | Mod: 79,RT,, | Performed by: OPHTHALMOLOGY

## 2022-10-25 PROCEDURE — V2632 POST CHMBR INTRAOCULAR LENS: HCPCS | Mod: PO | Performed by: OPHTHALMOLOGY

## 2022-10-25 PROCEDURE — D9220A PRA ANESTHESIA: Mod: ANES,,, | Performed by: ANESTHESIOLOGY

## 2022-10-25 PROCEDURE — D9220A PRA ANESTHESIA: ICD-10-PCS | Mod: CRNA,,, | Performed by: NURSE ANESTHETIST, CERTIFIED REGISTERED

## 2022-10-25 PROCEDURE — 66982 PR REMOVAL, CATARACT, W/INSRT INTRAOC LENS, W/O ENDO CYCLO, CMPLX: ICD-10-PCS | Mod: 79,RT,, | Performed by: OPHTHALMOLOGY

## 2022-10-25 PROCEDURE — 25000003 PHARM REV CODE 250: Mod: PO | Performed by: NURSE ANESTHETIST, CERTIFIED REGISTERED

## 2022-10-25 PROCEDURE — 25000003 PHARM REV CODE 250: Mod: PO | Performed by: OPHTHALMOLOGY

## 2022-10-25 DEVICE — LENS EYHANCE +21.0D: Type: IMPLANTABLE DEVICE | Site: EYE | Status: FUNCTIONAL

## 2022-10-25 RX ORDER — TROPICAMIDE 10 MG/ML
1 SOLUTION/ DROPS OPHTHALMIC
Status: COMPLETED | OUTPATIENT
Start: 2022-10-25 | End: 2022-10-25

## 2022-10-25 RX ORDER — FENTANYL CITRATE 50 UG/ML
25 INJECTION, SOLUTION INTRAMUSCULAR; INTRAVENOUS EVERY 5 MIN PRN
Status: DISCONTINUED | OUTPATIENT
Start: 2022-10-25 | End: 2022-10-25 | Stop reason: HOSPADM

## 2022-10-25 RX ORDER — LIDOCAINE HYDROCHLORIDE 10 MG/ML
INJECTION INFILTRATION; PERINEURAL
Status: DISCONTINUED | OUTPATIENT
Start: 2022-10-25 | End: 2022-10-25 | Stop reason: HOSPADM

## 2022-10-25 RX ORDER — HYDROMORPHONE HYDROCHLORIDE 2 MG/ML
0.2 INJECTION, SOLUTION INTRAMUSCULAR; INTRAVENOUS; SUBCUTANEOUS EVERY 5 MIN PRN
Status: DISCONTINUED | OUTPATIENT
Start: 2022-10-25 | End: 2022-10-25 | Stop reason: HOSPADM

## 2022-10-25 RX ORDER — PROPARACAINE HYDROCHLORIDE 5 MG/ML
1 SOLUTION/ DROPS OPHTHALMIC
Status: COMPLETED | OUTPATIENT
Start: 2022-10-25 | End: 2022-10-25

## 2022-10-25 RX ORDER — ONDANSETRON 2 MG/ML
INJECTION INTRAMUSCULAR; INTRAVENOUS
Status: DISCONTINUED | OUTPATIENT
Start: 2022-10-25 | End: 2022-10-25

## 2022-10-25 RX ORDER — SODIUM CHLORIDE, SODIUM LACTATE, POTASSIUM CHLORIDE, CALCIUM CHLORIDE 600; 310; 30; 20 MG/100ML; MG/100ML; MG/100ML; MG/100ML
INJECTION, SOLUTION INTRAVENOUS CONTINUOUS
Status: DISCONTINUED | OUTPATIENT
Start: 2022-10-25 | End: 2022-10-25 | Stop reason: HOSPADM

## 2022-10-25 RX ORDER — OXYCODONE HYDROCHLORIDE 5 MG/1
5 TABLET ORAL
Status: DISCONTINUED | OUTPATIENT
Start: 2022-10-25 | End: 2022-10-25 | Stop reason: HOSPADM

## 2022-10-25 RX ORDER — OFLOXACIN 3 MG/ML
SOLUTION/ DROPS OPHTHALMIC
Status: DISCONTINUED | OUTPATIENT
Start: 2022-10-25 | End: 2022-10-25 | Stop reason: HOSPADM

## 2022-10-25 RX ORDER — LIDOCAINE HYDROCHLORIDE 10 MG/ML
1 INJECTION, SOLUTION EPIDURAL; INFILTRATION; INTRACAUDAL; PERINEURAL ONCE
Status: DISCONTINUED | OUTPATIENT
Start: 2022-10-25 | End: 2022-10-25 | Stop reason: HOSPADM

## 2022-10-25 RX ORDER — LIDOCAINE HYDROCHLORIDE 40 MG/ML
INJECTION, SOLUTION RETROBULBAR
Status: DISCONTINUED | OUTPATIENT
Start: 2022-10-25 | End: 2022-10-25

## 2022-10-25 RX ORDER — MIDAZOLAM HYDROCHLORIDE 1 MG/ML
INJECTION INTRAMUSCULAR; INTRAVENOUS
Status: DISCONTINUED | OUTPATIENT
Start: 2022-10-25 | End: 2022-10-25

## 2022-10-25 RX ORDER — PHENYLEPHRINE HYDROCHLORIDE 100 MG/ML
1 SOLUTION/ DROPS OPHTHALMIC
Status: COMPLETED | OUTPATIENT
Start: 2022-10-25 | End: 2022-10-25

## 2022-10-25 RX ADMIN — PHENYLEPHRINE HYDROCHLORIDE 1 DROP: 100 SOLUTION/ DROPS OPHTHALMIC at 09:10

## 2022-10-25 RX ADMIN — PROPARACAINE HYDROCHLORIDE 1 DROP: 5 SOLUTION/ DROPS OPHTHALMIC at 09:10

## 2022-10-25 RX ADMIN — TROPICAMIDE 1 DROP: 10 SOLUTION/ DROPS OPHTHALMIC at 09:10

## 2022-10-25 RX ADMIN — MIDAZOLAM HYDROCHLORIDE 1 MG: 1 INJECTION, SOLUTION INTRAMUSCULAR; INTRAVENOUS at 10:10

## 2022-10-25 RX ADMIN — ONDANSETRON 4 MG: 2 INJECTION, SOLUTION INTRAMUSCULAR; INTRAVENOUS at 11:10

## 2022-10-25 RX ADMIN — LIDOCAINE HYDROCHLORIDE 5 DROP: 40 INJECTION, SOLUTION RETROBULBAR; TOPICAL at 11:10

## 2022-10-25 RX ADMIN — SODIUM CHLORIDE, SODIUM LACTATE, POTASSIUM CHLORIDE, AND CALCIUM CHLORIDE: .6; .31; .03; .02 INJECTION, SOLUTION INTRAVENOUS at 10:10

## 2022-10-25 RX ADMIN — LIDOCAINE HYDROCHLORIDE 5 DROP: 40 INJECTION, SOLUTION RETROBULBAR; TOPICAL at 10:10

## 2022-10-25 NOTE — BRIEF OP NOTE
Operative Note     SUMMARY     Surgery Date: 10/25/2022     Surgeon(s) and Role:     * Shen Sandoval Jr., MD - Primary    Pre-op Diagnosis:  Diabetic cataract of right eye [E11.36]    Post-op Diagnosis:  Diabetic cataract of right eye [E11.36]    Procedure(s) (LRB):  PHACOEMULSIFICATION, CATARACT (Right)    Anesthesia: Monitor Anesthesia Care    Findings/Key Components:  Same as post op diagnosis    Estimated Blood Loss: * No values recorded between 10/25/2022 11:08 AM and 10/25/2022 11:32 AM *         Specimens (From admission, onward)      None              Discharge Note      SUMMARY     Admit Date: 10/25/2022    Attending Physician: Shen Sandoval Jr., MD     Discharge Physician: Shen Sandoval Jr., MD    Discharge Date: 10/25/2022     Final Diagnosis: Diabetic cataract of right eye [E11.36]    Hospital Course: The patient was admitted for outpatient surgery and tolerated the procedure well. The patient was discharged home in stable condition the same day.    Diet: Advance as tolerated    Follow-Up: Follow up with Dr. Sandoval, next day, as previously scheduled  Activity as tolerated.      Activity: Per Handout Instructions    Disposition: Home or self care    Patient Instructions:   Current Discharge Medication List        CONTINUE these medications which have NOT CHANGED    Details   aspirin 325 MG tablet Take 325 mg by mouth once daily.      HYDROcodone-acetaminophen (NORCO) 5-325 mg per tablet Take 1 tablet by mouth every 6 (six) hours as needed for Pain. >7 day supply of opioid is medically necessary for chronic pain.  Qty: 120 tablet, Refills: 0    Comments: GREATER THAN 7 DAY SUPPLY IS MEDICALLY NECESSARY  Associated Diagnoses: Lumbar disc disease      ipratropium (ATROVENT) 42 mcg (0.06 %) nasal spray 2 sprays by Nasal route 2 (two) times daily as needed for Rhinitis.  Qty: 15 mL, Refills: 6      metoprolol succinate (TOPROL-XL) 25 MG 24 hr tablet Take 1 tablet (25 mg total) by mouth 2 (two) times  daily.  Qty: 180 tablet, Refills: 1    Comments: .      multivitamin capsule Take 1 capsule by mouth once daily.      omeprazole (PRILOSEC) 20 MG capsule TAKE ONE CAPSULE BY MOUTH EVERY DAY  Qty: 90 capsule, Refills: 3      QUEtiapine (SEROQUEL) 100 MG Tab TAKE 1 TO 2 TABLETS BY MOUTH IN THE EVENING FOR SLEEP  Qty: 180 tablet, Refills: 0      sertraline (ZOLOFT) 50 MG tablet TAKE 1 TABLET(50 MG) BY MOUTH EVERY DAY  Qty: 90 tablet, Refills: 3      tamsulosin (FLOMAX) 0.4 mg Cap TAKE 1 CAPSULE(0.4 MG) BY MOUTH EVERY NIGHT  Qty: 90 capsule, Refills: 2    Comments: Please inactivate all prior scripts with same name and strength including any scripts on hold.      VIT C/VIT E AC/LUT/COPPER/ZINC (PRESERVISION LUTEIN ORAL) Take by mouth once daily.       atropine 1% (ISOPTO ATROPINE) 1 % Drop Place 1 drop into the right eye 2 (two) times daily. for 3 days  Qty: 2 mL, Refills: 0    Associated Diagnoses: Diabetic cataract of right eye      blood sugar diagnostic Strp One touch ultra test strips . Test two times a day DX E11.43  Qty: 200 each, Refills: 3      blood-glucose meter Misc 1 Units by Misc.(Non-Drug; Combo Route) route once daily. Test BS QD  Qty: 1 each, Refills: 0    Comments: Patient had one touch ultra strips and has true metrix air meter (from [facility] pharmacy) Needs one touch ultra meter to use strips his has.  Associated Diagnoses: Type 2 diabetes mellitus with complication      diabetic supplies, miscellan. Kit One true metrix testing device to be used two times a day DX E11.49  Qty: 1 kit, Refills: 1      diclofenac (VOLTAREN) 0.1 % ophthalmic solution Place 1 drop into the right eye 4 (four) times daily. Start drops 3 days before surgery  Qty: 5 mL, Refills: 3    Associated Diagnoses: Diabetic cataract of right eye      lancets (ONETOUCH DELICA LANCETS) 33 gauge Misc 1 lancet by Misc.(Non-Drug; Combo Route) route 2 (two) times daily. Trueplus 33guage lancet use two times a day. DX E11.40  Qty: 200 each,  Refills: 4      moxifloxacin (VIGAMOX) 0.5 % ophthalmic solution Place 1 drop into the right eye 4 (four) times daily. for 7 days  Qty: 3 mL, Refills: 1    Associated Diagnoses: Diabetic cataract of right eye      nitroGLYCERIN (NITROQUICK) 0.4 MG SL tablet Place 1 tablet (0.4 mg total) under the tongue every 5 (five) minutes as needed. PRN  Qty: 25 tablet, Refills: 5      prednisoLONE acetate (PRED FORTE) 1 % DrpS Place 1 drop into the right eye 4 (four) times daily.  Qty: 5 mL, Refills: 3    Associated Diagnoses: Diabetic cataract of right eye             Discharge Procedure Orders (must include Diet, Follow-up, Activity)   Discharge Procedure Orders (must include Diet, Follow-up, Activity)   Diet general     Lifting restrictions     Leave dressing on - Keep it clean, dry, and intact until clinic visit     Call MD for:  persistent nausea and vomiting     Call MD for:  severe uncontrolled pain     Activity as tolerated

## 2022-10-25 NOTE — OP NOTE
Date of surgery: 10/25/2022    Operative Report    Preoperative Diagnosis:      Nuclear sclerosis, right eye with small pupil    Postoperative Diagnosis:      Nuclear sclerosis, right eye with small pupil    Procedure: Phacoemulsification with posterior chamber intraocular lens, right eye, with Malyugen ring    Surgeon: Shen Sandoval Jr., M.D.    Anesthesia: MAC with topical    Procedure in detail:  Informed consent was obtained. Indications, risks and alternatives to the procedure were explained to the patient. The patient was given the opportunity to ask questions and consented to the procedure in writing. In the preoperative holding area, the patients identity and operative site were confirmed. Topical anesthetic was administered, consisting of alternating 0.5% Proparacaine and 4% preservative-free Lidocaine Q 5 minutes times 3. The patient was taken to the operative suite. A time-out was performed. The right eye was prepped with 5% Betadine and draped in sterile fashion. A lid speculum was placed. A paracentesis was made at the 4 oclock position. 1% preservative-free lidocaine was placed in the anterior chamber, followed by Viscoat. A bi-planar clear corneal incision was made at the 2 oclock position. A Malyugen ring was placed to expand the pupil. The cystotome was then used to begin the continuous curvilinear capsulorrhexis, which was completed with the Utrata forceps. BSS on a blunt-tipped cannula was used to hydrodissect and hydrodelineate the lens nucleus until it was noted to rotate freely. Phacoemulsification was used in a divide-and-conquer technique to remove the lens nucleus, followed by irrigation and aspiration of the lens cortex. The capsular bag was inflated with Provisc. The lens, which was an J&J model DIBOO, power 21.0, was placed in the capsular bag and rotated into position. The Malyugen ring was removed The remaining viscoelastic material was removed by I&A. The wound and paracentesis  were hydrated. The lens was centered. The anterior chamber was deep. The eye pressure was good. The wounds were checked and watertight. The lid speculum and drape were removed. A drop of Vigamox was placed in the eye. A clear shield was taped in place over the eye.  Estimated blood loss: none    Specimens: none    Complications: none    Disposition: stable to PACU

## 2022-10-25 NOTE — TRANSFER OF CARE
"Anesthesia Transfer of Care Note    Patient: Lázaro Wang    Procedure(s) Performed: Procedure(s) (LRB):  PHACOEMULSIFICATION, CATARACT (Right)    Patient location: PACU    Anesthesia Type: MAC    Transport from OR: Transported from OR on room air with adequate spontaneous ventilation    Post pain: adequate analgesia    Post assessment: no apparent anesthetic complications and tolerated procedure well    Post vital signs: stable    Level of consciousness: awake, alert and oriented    Nausea/Vomiting: no nausea/vomiting    Complications: none    Transfer of care protocol was followed      Last vitals:   Visit Vitals  BP (!) 159/85   Pulse 62   Temp 36.8 °C (98.2 °F)   Resp 18   Ht 5' 7" (1.702 m)   Wt 93 kg (205 lb)   SpO2 96%   BMI 32.11 kg/m²     "

## 2022-10-25 NOTE — ANESTHESIA PREPROCEDURE EVALUATION
10/25/2022  Lázaro Wang is a 88 y.o., male.      Pre-op Assessment    I have reviewed the NPO Status.   I have reviewed the Medications.     Review of Systems  Anesthesia Hx:  No problems with previous Anesthesia    Cardiovascular:   Pacemaker Hypertension Past MI CAD  CABG/stent   Denies Angina.  Denies Orthopnea.  Denies PND. SMALLS    Pulmonary:   Shortness of breath    Renal/:   Chronic Renal Disease, CKD    Hepatic/GI:   GERD    Musculoskeletal:   Arthritis     Neurological:   Neuromuscular Disease,    Endocrine:   Diabetes, type 2    Psych:   Psychiatric History          Physical Exam  General: Well nourished    Airway:  Mallampati: II   Mouth Opening: Normal  TM Distance: Normal  Tongue: Normal  Neck ROM: Normal ROM    Dental:  Intact    Chest/Lungs:  Clear to auscultation, Normal Respiratory Rate        Anesthesia Plan  Type of Anesthesia, risks & benefits discussed:    Anesthesia Type: MAC  Intra-op Monitoring Plan: Standard ASA Monitors  Post Op Pain Control Plan: IV/PO Opioids PRN  Induction:  IV  Informed Consent: Informed consent signed with the Patient and all parties understand the risks and agree with anesthesia plan.  All questions answered. Patient consented to blood products? No  ASA Score: 3    Ready For Surgery From Anesthesia Perspective.     .

## 2022-10-26 ENCOUNTER — OFFICE VISIT (OUTPATIENT)
Dept: OPHTHALMOLOGY | Facility: CLINIC | Age: 87
End: 2022-10-26
Payer: MEDICARE

## 2022-10-26 DIAGNOSIS — Z96.1 STATUS POST CATARACT EXTRACTION AND INSERTION OF INTRAOCULAR LENS OF RIGHT EYE: Primary | ICD-10-CM

## 2022-10-26 DIAGNOSIS — Z98.41 STATUS POST CATARACT EXTRACTION AND INSERTION OF INTRAOCULAR LENS OF RIGHT EYE: Primary | ICD-10-CM

## 2022-10-26 PROCEDURE — 1126F AMNT PAIN NOTED NONE PRSNT: CPT | Mod: CPTII,S$GLB,, | Performed by: OPHTHALMOLOGY

## 2022-10-26 PROCEDURE — 99024 PR POST-OP FOLLOW-UP VISIT: ICD-10-PCS | Mod: S$GLB,,, | Performed by: OPHTHALMOLOGY

## 2022-10-26 PROCEDURE — 99024 POSTOP FOLLOW-UP VISIT: CPT | Mod: S$GLB,,, | Performed by: OPHTHALMOLOGY

## 2022-10-26 PROCEDURE — 3288F PR FALLS RISK ASSESSMENT DOCUMENTED: ICD-10-PCS | Mod: CPTII,S$GLB,, | Performed by: OPHTHALMOLOGY

## 2022-10-26 PROCEDURE — 3288F FALL RISK ASSESSMENT DOCD: CPT | Mod: CPTII,S$GLB,, | Performed by: OPHTHALMOLOGY

## 2022-10-26 PROCEDURE — 1101F PT FALLS ASSESS-DOCD LE1/YR: CPT | Mod: CPTII,S$GLB,, | Performed by: OPHTHALMOLOGY

## 2022-10-26 PROCEDURE — 99999 PR PBB SHADOW E&M-EST. PATIENT-LVL III: ICD-10-PCS | Mod: PBBFAC,,, | Performed by: OPHTHALMOLOGY

## 2022-10-26 PROCEDURE — 99999 PR PBB SHADOW E&M-EST. PATIENT-LVL III: CPT | Mod: PBBFAC,,, | Performed by: OPHTHALMOLOGY

## 2022-10-26 PROCEDURE — 1159F PR MEDICATION LIST DOCUMENTED IN MEDICAL RECORD: ICD-10-PCS | Mod: CPTII,S$GLB,, | Performed by: OPHTHALMOLOGY

## 2022-10-26 PROCEDURE — 1160F PR REVIEW ALL MEDS BY PRESCRIBER/CLIN PHARMACIST DOCUMENTED: ICD-10-PCS | Mod: CPTII,S$GLB,, | Performed by: OPHTHALMOLOGY

## 2022-10-26 PROCEDURE — 1101F PR PT FALLS ASSESS DOC 0-1 FALLS W/OUT INJ PAST YR: ICD-10-PCS | Mod: CPTII,S$GLB,, | Performed by: OPHTHALMOLOGY

## 2022-10-26 PROCEDURE — 1160F RVW MEDS BY RX/DR IN RCRD: CPT | Mod: CPTII,S$GLB,, | Performed by: OPHTHALMOLOGY

## 2022-10-26 PROCEDURE — 1126F PR PAIN SEVERITY QUANTIFIED, NO PAIN PRESENT: ICD-10-PCS | Mod: CPTII,S$GLB,, | Performed by: OPHTHALMOLOGY

## 2022-10-26 PROCEDURE — 1159F MED LIST DOCD IN RCRD: CPT | Mod: CPTII,S$GLB,, | Performed by: OPHTHALMOLOGY

## 2022-10-26 NOTE — PROGRESS NOTES
HPI     Post-op Evaluation     Additional comments: 1 day s/p phaco iol OD 10/25/22           Comments    Pt states some irritation last night but doing better today. Seeing colors   clearer today.    Gtts: PF, Diclo, Moxi QID OD - Installed 1 drop each into OD at 9:25am              Last edited by Cheryle Quintana on 10/26/2022  9:21 AM.        ROS    Negative for: Constitutional, Gastrointestinal, Neurological, Skin,   Genitourinary, Musculoskeletal, HENT, Endocrine, Cardiovascular, Eyes,   Respiratory, Psychiatric, Allergic/Imm, Heme/Lymph  Last edited by Shen Sandoval Jr., MD on 10/26/2022  1:53 PM.        Assessment /Plan     For exam results, see Encounter Report.    Status post cataract extraction and insertion of intraocular lens of right eye    Hypermature cataract OD  3+ D. Folds with corneal edema  Elevated IOP post op day 1 IOP 32  right EYE, Proparacaine used in eye, 30 gauge needle used to release fluid from anterior chamber   Post Burp IOP 10 right EYE  Pre and post burp antibiotic drop given to patient   Patient has significant CORNEAL EDEMA  Prednisolone acetate (pink or white top drop) 1 drop 8x daily left eye; shake well before using drop  Vigamox 1 drop 4x daily left eye (tan top)  Diclofenac 1 drop 4x daily left eye (gray top)  No bending no lifting  Keep head elevated when laying down  Keep dry   F/u 1 week

## 2022-10-26 NOTE — ANESTHESIA POSTPROCEDURE EVALUATION
Anesthesia Post Evaluation    Patient: Lázaro Wang    Procedure(s) Performed: Procedure(s) (LRB):  PHACOEMULSIFICATION, CATARACT (Right)    Final Anesthesia Type: MAC      Patient location during evaluation: PACU  Patient participation: Yes- Able to Participate  Level of consciousness: awake and alert and oriented  Post-procedure vital signs: reviewed and stable  Pain management: adequate  Airway patency: patent    PONV status at discharge: No PONV  Anesthetic complications: no      Cardiovascular status: blood pressure returned to baseline  Respiratory status: unassisted, spontaneous ventilation and room air  Hydration status: euvolemic  Follow-up not needed.          Vitals Value Taken Time   /85 10/25/22 1145   Temp 36.8 °C (98.2 °F) 10/25/22 1135   Pulse 60 10/25/22 1145   Resp 16 10/25/22 1145   SpO2 98 % 10/25/22 1145         No case tracking events are documented in the log.      Pain/Juana Score: Juana Score: 10 (10/25/2022 11:50 AM)

## 2022-10-31 ENCOUNTER — TELEPHONE (OUTPATIENT)
Dept: OPTOMETRY | Facility: CLINIC | Age: 87
End: 2022-10-31
Payer: MEDICARE

## 2022-10-31 DIAGNOSIS — E11.36 DIABETIC CATARACT OF RIGHT EYE: ICD-10-CM

## 2022-10-31 RX ORDER — PREDNISOLONE ACETATE 10 MG/ML
1 SUSPENSION/ DROPS OPHTHALMIC
Qty: 15 ML | Refills: 1 | Status: SHIPPED | OUTPATIENT
Start: 2022-10-31 | End: 2022-11-14

## 2022-10-31 NOTE — PROGRESS NOTES
Assessment /Plan     For exam results, see Encounter Report.    Diabetic cataract of right eye  -     prednisoLONE acetate (PRED FORTE) 1 % DrpS; Place 1 drop into the right eye every 2 (two) hours. for 14 days  Dispense: 15 mL; Refill: 1      Dr Sandoval out, changed drop of pred from 4x/day to q2h while awake so pt could get more drops. Chart note from roel said 8x/day

## 2022-10-31 NOTE — TELEPHONE ENCOUNTER
Spoke with daughter, Dr. Sandoval has him on the Pred 8 times day.  Pt has ran out of the drops.  Pharmacy here will not fill them as it's too soon.  Can we send a corrected rx for 8 times day or call the pharmacy to increase the refills/daily usage so the pt can get his bottle refilled.  He is out of drops today.        ----- Message from Carlos Salinas sent at 10/31/2022  3:55 PM CDT -----  Contact: Mariann at [phone]  Type: Needs Medical Advice  Who Called:  pt's daughter    Best Call Back Number: [phone]  Additional Information: pt;s daughter is calling the office requesting a call back in regards to his drops. Please call back and advise.

## 2022-11-10 DIAGNOSIS — M51.9 LUMBAR DISC DISEASE: ICD-10-CM

## 2022-11-10 RX ORDER — HYDROCODONE BITARTRATE AND ACETAMINOPHEN 5; 325 MG/1; MG/1
1 TABLET ORAL EVERY 6 HOURS PRN
Qty: 120 TABLET | Refills: 0 | Status: SHIPPED | OUTPATIENT
Start: 2022-11-10 | End: 2022-12-09 | Stop reason: SDUPTHER

## 2022-11-10 NOTE — TELEPHONE ENCOUNTER
----- Message from Charles May sent at 11/10/2022  1:19 PM CST -----  Contact: pt at [phone]  Type:  RX Refill Request    Who Called:  pt  Refill or New Rx:  refill  RX Name and Strength:  HYDROcodone-acetaminophen (NORCO) 5-325 mg per tablet  How is the patient currently taking it? (ex. 1XDay):  every 6 hours as needed for pain  Is this a 30 day or 90 day RX:  120 tablets  Preferred Pharmacy with phone number:    [address] [address]  Phone: [phone] Fax: [phone]  Local or Mail Order:  Local  Ordering Provider:  Sergo Jimenez Call Back Number:  [phone]  Additional Information:  pt is calling the office to let them know he's out of his   HYDROcodone-acetaminophen (NORCO) 5-325 mg per table and needs it filled today. Please call back and advise.  PER THE PT PLEASE NOTIFY HIM WHEN IT'S SENT TO THE PHARMACY

## 2022-11-11 ENCOUNTER — PES CALL (OUTPATIENT)
Dept: ADMINISTRATIVE | Facility: CLINIC | Age: 87
End: 2022-11-11
Payer: MEDICARE

## 2022-11-25 ENCOUNTER — PES CALL (OUTPATIENT)
Dept: ADMINISTRATIVE | Facility: CLINIC | Age: 87
End: 2022-11-25
Payer: MEDICARE

## 2022-11-28 ENCOUNTER — PES CALL (OUTPATIENT)
Dept: ADMINISTRATIVE | Facility: CLINIC | Age: 87
End: 2022-11-28
Payer: MEDICARE

## 2022-12-09 DIAGNOSIS — M51.9 LUMBAR DISC DISEASE: ICD-10-CM

## 2022-12-09 RX ORDER — HYDROCODONE BITARTRATE AND ACETAMINOPHEN 5; 325 MG/1; MG/1
1 TABLET ORAL EVERY 6 HOURS PRN
Qty: 120 TABLET | Refills: 0 | Status: SHIPPED | OUTPATIENT
Start: 2022-12-09 | End: 2023-01-10 | Stop reason: SDUPTHER

## 2022-12-09 NOTE — TELEPHONE ENCOUNTER
----- Message from Trinidad Bryan sent at 12/9/2022 11:28 AM CST -----  Contact: Patient  Type:  RX Refill Request    Who Called: Patient    Refill or New Rx: refill    RX Name and Strength: HYDROcodone-acetaminophen (NORCO) 5-325 mg per tablet    How is the patient currently taking it? (ex. 1XDay)    Is this a 30 day or 90 day RX:    [facility] with phone number[facility] [address] [address]  Phone: [phone] Fax: [phone]      Would the patient rather a call back or a response via [facility]?    Best Call Back Number: [phone]    Additional Information:

## 2022-12-23 ENCOUNTER — PES CALL (OUTPATIENT)
Dept: ADMINISTRATIVE | Facility: CLINIC | Age: 87
End: 2022-12-23
Payer: MEDICARE

## 2022-12-27 ENCOUNTER — OFFICE VISIT (OUTPATIENT)
Dept: PRIMARY CARE CLINIC | Facility: CLINIC | Age: 87
End: 2022-12-27
Payer: MEDICARE

## 2022-12-27 VITALS
HEART RATE: 70 BPM | OXYGEN SATURATION: 97 % | BODY MASS INDEX: 32.67 KG/M2 | SYSTOLIC BLOOD PRESSURE: 116 MMHG | DIASTOLIC BLOOD PRESSURE: 76 MMHG | RESPIRATION RATE: 18 BRPM | HEIGHT: 67 IN | WEIGHT: 208.13 LBS

## 2022-12-27 DIAGNOSIS — I25.10 CORONARY ARTERY DISEASE INVOLVING NATIVE CORONARY ARTERY OF NATIVE HEART WITHOUT ANGINA PECTORIS: Primary | Chronic | ICD-10-CM

## 2022-12-27 DIAGNOSIS — F51.04 CHRONIC INSOMNIA: Chronic | ICD-10-CM

## 2022-12-27 DIAGNOSIS — E11.43 TYPE II DIABETES MELLITUS WITH PERIPHERAL AUTONOMIC NEUROPATHY: Chronic | ICD-10-CM

## 2022-12-27 DIAGNOSIS — F11.20 CHRONIC NARCOTIC DEPENDENCE: Chronic | ICD-10-CM

## 2022-12-27 DIAGNOSIS — M51.9 LUMBAR DISC DISEASE: Chronic | ICD-10-CM

## 2022-12-27 DIAGNOSIS — I10 ESSENTIAL HYPERTENSION: Chronic | ICD-10-CM

## 2022-12-27 DIAGNOSIS — F41.9 RECURRENT MILD MAJOR DEPRESSIVE DISORDER WITH ANXIETY: Chronic | ICD-10-CM

## 2022-12-27 DIAGNOSIS — F33.0 RECURRENT MILD MAJOR DEPRESSIVE DISORDER WITH ANXIETY: Chronic | ICD-10-CM

## 2022-12-27 PROCEDURE — 99999 PR PBB SHADOW E&M-EST. PATIENT-LVL V: ICD-10-PCS | Mod: PBBFAC,HCNC,, | Performed by: FAMILY MEDICINE

## 2022-12-27 PROCEDURE — 1126F PR PAIN SEVERITY QUANTIFIED, NO PAIN PRESENT: ICD-10-PCS | Mod: HCNC,CPTII,S$GLB, | Performed by: FAMILY MEDICINE

## 2022-12-27 PROCEDURE — 99214 PR OFFICE/OUTPT VISIT, EST, LEVL IV, 30-39 MIN: ICD-10-PCS | Mod: HCNC,S$GLB,, | Performed by: FAMILY MEDICINE

## 2022-12-27 PROCEDURE — 1100F PTFALLS ASSESS-DOCD GE2>/YR: CPT | Mod: HCNC,CPTII,S$GLB, | Performed by: FAMILY MEDICINE

## 2022-12-27 PROCEDURE — 99214 OFFICE O/P EST MOD 30 MIN: CPT | Mod: HCNC,S$GLB,, | Performed by: FAMILY MEDICINE

## 2022-12-27 PROCEDURE — 3288F PR FALLS RISK ASSESSMENT DOCUMENTED: ICD-10-PCS | Mod: HCNC,CPTII,S$GLB, | Performed by: FAMILY MEDICINE

## 2022-12-27 PROCEDURE — 3288F FALL RISK ASSESSMENT DOCD: CPT | Mod: HCNC,CPTII,S$GLB, | Performed by: FAMILY MEDICINE

## 2022-12-27 PROCEDURE — 99499 RISK ADDL DX/OHS AUDIT: ICD-10-PCS | Mod: S$GLB,,, | Performed by: FAMILY MEDICINE

## 2022-12-27 PROCEDURE — 3072F LOW RISK FOR RETINOPATHY: CPT | Mod: HCNC,CPTII,S$GLB, | Performed by: FAMILY MEDICINE

## 2022-12-27 PROCEDURE — 1159F PR MEDICATION LIST DOCUMENTED IN MEDICAL RECORD: ICD-10-PCS | Mod: HCNC,CPTII,S$GLB, | Performed by: FAMILY MEDICINE

## 2022-12-27 PROCEDURE — 99999 PR PBB SHADOW E&M-EST. PATIENT-LVL V: CPT | Mod: PBBFAC,HCNC,, | Performed by: FAMILY MEDICINE

## 2022-12-27 PROCEDURE — 99499 UNLISTED E&M SERVICE: CPT | Mod: S$GLB,,, | Performed by: FAMILY MEDICINE

## 2022-12-27 PROCEDURE — 1126F AMNT PAIN NOTED NONE PRSNT: CPT | Mod: HCNC,CPTII,S$GLB, | Performed by: FAMILY MEDICINE

## 2022-12-27 PROCEDURE — 1160F PR REVIEW ALL MEDS BY PRESCRIBER/CLIN PHARMACIST DOCUMENTED: ICD-10-PCS | Mod: HCNC,CPTII,S$GLB, | Performed by: FAMILY MEDICINE

## 2022-12-27 PROCEDURE — 1159F MED LIST DOCD IN RCRD: CPT | Mod: HCNC,CPTII,S$GLB, | Performed by: FAMILY MEDICINE

## 2022-12-27 PROCEDURE — 1100F PR PT FALLS ASSESS DOC 2+ FALLS/FALL W/INJURY/YR: ICD-10-PCS | Mod: HCNC,CPTII,S$GLB, | Performed by: FAMILY MEDICINE

## 2022-12-27 PROCEDURE — 1160F RVW MEDS BY RX/DR IN RCRD: CPT | Mod: HCNC,CPTII,S$GLB, | Performed by: FAMILY MEDICINE

## 2022-12-27 PROCEDURE — 3072F PR LOW RISK FOR RETINOPATHY: ICD-10-PCS | Mod: HCNC,CPTII,S$GLB, | Performed by: FAMILY MEDICINE

## 2022-12-27 NOTE — PROGRESS NOTES
THIS DOCUMENT WAS MADE IN PART WITH VOICE RECOGNITION SOFTWARE.  OCCASIONALLY THIS SOFTWARE WILL MISINTERPRET WORDS OR PHRASES.      Primary Care Provider Appointment   [facility])  [address] [place] [address]   Ph: [phone]  Fax: [phone]      Patient ID: Lázaro Wang is a 88 y.o. male.    ASSESSMENT/PLAN by Problem List:  Problem List Items Addressed This Visit       CAD (coronary artery disease) - Primary (Chronic)     Stable.  No angina.  Mild stable dyspnea with exertion.  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  Continue current management         Relevant Orders    Hemoglobin A1C    Comprehensive Metabolic Panel    Lipid Panel    Essential hypertension (Chronic)     Stable satisfactory continue current medications         Lumbar disc disease (Chronic)     Patient has chronic low back pain secondary to degenerative disc disease and associated changes.  He has been stable on hydrocodone for many years.  He continues to follow with me every three months for management.  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  No SI and no current depression symptoms.         Chronic insomnia (Chronic)     Patient has had chronic insomnia for many years.  He has done well on Seroquel.  Although we have had to titrate up over time.  We have discussed long-term concerns and warnings with Seroquel but this has greatly improved sleep and quality of life and he desires to continue.   will continue with monitoring            Follow Up:  Three months      Health Maintenance         Date Due Completion Date    Sign Pain Contract Never done ---    Thinks done at  pharm, will  check with them TETANUS VACCINE 05/05/2019 5/5/2009   recommended COVID-19 Vaccine (4 - Booster for Pfizer series) 11/23/2021 9/28/2021   Needs 2nd Shingles Vaccine (3 of 3) 09/07/2022 7/13/2022    Hemoglobin A1c 03/28/2023 9/28/2022    Eye Exam 09/06/2023 9/6/2022    Diabetes Urine Screening 09/28/2023 9/28/2022    Lipid Panel 09/28/2023 9/28/2022    Aspirin/Antiplatelet Therapy 10/26/2023 10/26/2022          Subjective:     Chief Complaint   Patient presents with    Diabetes     3 month f/u visit      I have reviewed the information entered by the ancillary staff regarding the chief complaint as well as the related history.    HPI    Patient is a/an 88 y.o.  male   RISK of ADMIT/ED: 17    No falls since prior to last visit    Ran out of flomax, but not really helping so will hold off on restarting    For complete problem list, past medical history, surgical history, social history, etc., see appropriate section in the electronic medical record    Review of Systems   HENT: Negative.     Respiratory: Negative.     Cardiovascular: Negative.    Gastrointestinal: Negative.    Genitourinary: Negative.  Negative for dysuria and hematuria.   Musculoskeletal:  Positive for arthralgias and back pain.   Neurological: Negative.    Psychiatric/Behavioral: Negative.       Objective     Physical Exam  Constitutional:       General: He is not in acute distress.     Appearance: He is well-developed. He is not diaphoretic.   HENT:      Head: Normocephalic and atraumatic.   Eyes:      General: No scleral icterus.     Conjunctiva/sclera: Conjunctivae normal.   Cardiovascular:      Rate and Rhythm: Normal rate and regular rhythm.      Heart sounds: Murmur (2/6 systolic ejection murmur) heard.   Pulmonary:      Effort: Pulmonary effort is normal. No respiratory distress.      Breath sounds: No wheezing or rales.   Skin:     Comments: There is cracking and peeling of the skin surrounding most of his  "fingernails.  He does indicate some of this is related to anxiety picking.  Recommend that he use Aquaphor topically help reduce some of the picking and help healing.  He will watch for any signs of redness or infection.   Neurological:      Mental Status: He is alert and oriented to person, place, and time.      Coordination: Coordination normal.   Psychiatric:         Behavior: Behavior normal.     Vitals:    12/27/22 1502   BP: 116/76   BP Location: Left arm   Patient Position: Sitting   BP Method: Medium (Manual)   Pulse: 70   Resp: 18   SpO2: 97%   Weight: 94.4 kg (208 lb 1.8 oz)   Height: 5' 7" (1.702 m)       RECENT LABS:    Lab Results   Component Value Date    WBC 7.21 03/22/2022    HGB 15.3 03/22/2022    HCT 46.6 03/22/2022     03/22/2022    CHOL 165 09/28/2022    TRIG 96 09/28/2022    HDL 52 09/28/2022    ALT 11 09/28/2022    AST 16 09/28/2022     09/28/2022    K 4.2 09/28/2022     09/28/2022    CREATININE 0.9 09/28/2022    BUN 14 09/28/2022    CO2 25 09/28/2022    TSH 1.173 03/22/2022    PSA 1.4 09/15/2015    GLUF 112 (H) 01/28/2008    HGBA1C 5.8 (H) 09/28/2022       Results for orders placed or performed during the hospital encounter of 10/25/22   POCT Glucose, Hand-Held Device   Result Value Ref Range    POC Glucose 102 70 - 110 MG/DL       "

## 2022-12-27 NOTE — ASSESSMENT & PLAN NOTE
Patient has chronic low back pain secondary to degenerative disc disease and associated changes.  He has been stable on hydrocodone for many years.  He continues to follow with me every three months for management.  He is comfortable with his current level of pain control

## 2022-12-27 NOTE — ASSESSMENT & PLAN NOTE
Patient has had chronic insomnia for many years.  He has done well on Seroquel.  Although we have had to titrate up over time.  We have discussed long-term concerns and warnings with Seroquel but this has greatly improved sleep and quality of life and he desires to continue.   will continue with monitoring

## 2022-12-27 NOTE — ASSESSMENT & PLAN NOTE
Stable.  No angina.  Mild stable dyspnea with exertion.  Continue current medications and follow with Cardiology

## 2023-01-10 DIAGNOSIS — M51.9 LUMBAR DISC DISEASE: ICD-10-CM

## 2023-01-10 RX ORDER — HYDROCODONE BITARTRATE AND ACETAMINOPHEN 5; 325 MG/1; MG/1
1 TABLET ORAL EVERY 6 HOURS PRN
Qty: 120 TABLET | Refills: 0 | Status: SHIPPED | OUTPATIENT
Start: 2023-01-10 | End: 2023-02-13 | Stop reason: SDUPTHER

## 2023-01-10 NOTE — TELEPHONE ENCOUNTER
----- Message from Valerie Wood sent at 1/10/2023 12:54 PM CST -----  Who Called:Patient     Refill or New Rx.: Refill     HYDROcodone-acetaminophen (NORCO) 5-325 mg per tablet 120 tablet 0 12/9/2022  No  Sig - Route: Take 1 tablet by mouth every 6 (six) hours as needed for Pain. >7 day supply of opioid is medically necessary for chronic pain. - Oral  Sent to pharmacy as: HYDROcodone-acetaminophen (NORCO) 5-325 mg per tablet  Class: Normal          Preferred Pharmacy with phone number:   [address] [address]  Phone: [phone] Fax: [phone]           Best Call Back Number:[phone]       Additional Information:

## 2023-02-13 DIAGNOSIS — M51.9 LUMBAR DISC DISEASE: ICD-10-CM

## 2023-02-13 NOTE — TELEPHONE ENCOUNTER
----- Message from Anijericho Cosmeandrew sent at 2/13/2023 10:59 AM CST -----  Regarding: refill  Contact: Patient  Type:  RX Refill Request    Who Called:  Patient  Refill or New Rx:  refill  RX Name and Strength:  HYDROcodone-acetaminophen (NORCO) 5-325 mg per tablet  How is the patient currently taking it? (ex. 1XDay):   Is this a 30 day or 90 day RX:    Pref[facility] Pharm[facility] with phone number:       [facility] [address] [address]  Phone: [phone] Fax: [phone]       Local or Mail Order:    Ordering Provider:    Best Call Back Number:  [phone]    Additional Information:  Please call patient once it has been sent. Thanks!

## 2023-02-14 RX ORDER — HYDROCODONE BITARTRATE AND ACETAMINOPHEN 5; 325 MG/1; MG/1
1 TABLET ORAL EVERY 6 HOURS PRN
Qty: 120 TABLET | Refills: 0 | Status: SHIPPED | OUTPATIENT
Start: 2023-02-14 | End: 2023-03-09 | Stop reason: SDUPTHER

## 2023-03-09 DIAGNOSIS — M51.9 LUMBAR DISC DISEASE: ICD-10-CM

## 2023-03-09 RX ORDER — HYDROCODONE BITARTRATE AND ACETAMINOPHEN 5; 325 MG/1; MG/1
1 TABLET ORAL EVERY 6 HOURS PRN
Qty: 120 TABLET | Refills: 0 | Status: SHIPPED | OUTPATIENT
Start: 2023-03-09 | End: 2023-04-11 | Stop reason: SDUPTHER

## 2023-03-09 NOTE — TELEPHONE ENCOUNTER
----- Message from Alice Dumont sent at 3/9/2023 12:07 PM CST -----  Contact: Patient  Type:  RX Refill Request    Who Called: Patient    Refill or New Rx:refill    RX Name and Strength:HYDROcodone-acetaminophen (NORCO) 5-325 mg per tablet    How is the patient currently taking it? (ex. 1XDay):As needed, max 4 x day     Is this a 30 day or 90 day RX:30    Preferred Pharmacy with phone number:  [facility] [address]  Phone: [phone] Fax: [phone]      Local or Mail Order:Local    Ordering Provider:Sergo    Would the patient rather a call back or a response via [facility]? Call    Best Call Back Number:[phone] (home)     Additional Information: Please refill

## 2023-04-05 ENCOUNTER — OFFICE VISIT (OUTPATIENT)
Dept: PRIMARY CARE CLINIC | Facility: CLINIC | Age: 88
End: 2023-04-05
Payer: MEDICARE

## 2023-04-05 VITALS
HEIGHT: 67 IN | RESPIRATION RATE: 18 BRPM | HEART RATE: 89 BPM | BODY MASS INDEX: 31.66 KG/M2 | OXYGEN SATURATION: 96 % | WEIGHT: 201.75 LBS | SYSTOLIC BLOOD PRESSURE: 136 MMHG | DIASTOLIC BLOOD PRESSURE: 72 MMHG

## 2023-04-05 DIAGNOSIS — I48.91 ATRIAL FIBRILLATION, UNSPECIFIED TYPE: ICD-10-CM

## 2023-04-05 DIAGNOSIS — N40.0 BENIGN PROSTATIC HYPERPLASIA, UNSPECIFIED WHETHER LOWER URINARY TRACT SYMPTOMS PRESENT: ICD-10-CM

## 2023-04-05 DIAGNOSIS — I65.23 BILATERAL CAROTID ARTERY STENOSIS: Chronic | ICD-10-CM

## 2023-04-05 DIAGNOSIS — E11.43 TYPE II DIABETES MELLITUS WITH PERIPHERAL AUTONOMIC NEUROPATHY: ICD-10-CM

## 2023-04-05 DIAGNOSIS — F33.0 RECURRENT MILD MAJOR DEPRESSIVE DISORDER WITH ANXIETY: ICD-10-CM

## 2023-04-05 DIAGNOSIS — F41.9 RECURRENT MILD MAJOR DEPRESSIVE DISORDER WITH ANXIETY: ICD-10-CM

## 2023-04-05 DIAGNOSIS — F51.04 CHRONIC INSOMNIA: ICD-10-CM

## 2023-04-05 DIAGNOSIS — I70.0 ATHEROSCLEROSIS OF AORTA: ICD-10-CM

## 2023-04-05 DIAGNOSIS — Z91.81 AT RISK FOR FALLING: ICD-10-CM

## 2023-04-05 DIAGNOSIS — N18.31 STAGE 3A CHRONIC KIDNEY DISEASE: ICD-10-CM

## 2023-04-05 DIAGNOSIS — F11.20 CHRONIC NARCOTIC DEPENDENCE: ICD-10-CM

## 2023-04-05 DIAGNOSIS — R26.89 BALANCE PROBLEM: Primary | ICD-10-CM

## 2023-04-05 PROCEDURE — 1100F PTFALLS ASSESS-DOCD GE2>/YR: CPT | Mod: CPTII,S$GLB,, | Performed by: FAMILY MEDICINE

## 2023-04-05 PROCEDURE — 1160F RVW MEDS BY RX/DR IN RCRD: CPT | Mod: CPTII,S$GLB,, | Performed by: FAMILY MEDICINE

## 2023-04-05 PROCEDURE — 1126F AMNT PAIN NOTED NONE PRSNT: CPT | Mod: CPTII,S$GLB,, | Performed by: FAMILY MEDICINE

## 2023-04-05 PROCEDURE — 3288F PR FALLS RISK ASSESSMENT DOCUMENTED: ICD-10-PCS | Mod: CPTII,S$GLB,, | Performed by: FAMILY MEDICINE

## 2023-04-05 PROCEDURE — 1159F PR MEDICATION LIST DOCUMENTED IN MEDICAL RECORD: ICD-10-PCS | Mod: CPTII,S$GLB,, | Performed by: FAMILY MEDICINE

## 2023-04-05 PROCEDURE — 3288F FALL RISK ASSESSMENT DOCD: CPT | Mod: CPTII,S$GLB,, | Performed by: FAMILY MEDICINE

## 2023-04-05 PROCEDURE — 1126F PR PAIN SEVERITY QUANTIFIED, NO PAIN PRESENT: ICD-10-PCS | Mod: CPTII,S$GLB,, | Performed by: FAMILY MEDICINE

## 2023-04-05 PROCEDURE — 99999 PR PBB SHADOW E&M-EST. PATIENT-LVL V: ICD-10-PCS | Mod: PBBFAC,,, | Performed by: FAMILY MEDICINE

## 2023-04-05 PROCEDURE — 99999 PR PBB SHADOW E&M-EST. PATIENT-LVL V: CPT | Mod: PBBFAC,,, | Performed by: FAMILY MEDICINE

## 2023-04-05 PROCEDURE — 3072F PR LOW RISK FOR RETINOPATHY: ICD-10-PCS | Mod: CPTII,S$GLB,, | Performed by: FAMILY MEDICINE

## 2023-04-05 PROCEDURE — 99214 PR OFFICE/OUTPT VISIT, EST, LEVL IV, 30-39 MIN: ICD-10-PCS | Mod: S$GLB,,, | Performed by: FAMILY MEDICINE

## 2023-04-05 PROCEDURE — 1159F MED LIST DOCD IN RCRD: CPT | Mod: CPTII,S$GLB,, | Performed by: FAMILY MEDICINE

## 2023-04-05 PROCEDURE — 1160F PR REVIEW ALL MEDS BY PRESCRIBER/CLIN PHARMACIST DOCUMENTED: ICD-10-PCS | Mod: CPTII,S$GLB,, | Performed by: FAMILY MEDICINE

## 2023-04-05 PROCEDURE — 1100F PR PT FALLS ASSESS DOC 2+ FALLS/FALL W/INJURY/YR: ICD-10-PCS | Mod: CPTII,S$GLB,, | Performed by: FAMILY MEDICINE

## 2023-04-05 PROCEDURE — 99214 OFFICE O/P EST MOD 30 MIN: CPT | Mod: S$GLB,,, | Performed by: FAMILY MEDICINE

## 2023-04-05 PROCEDURE — 3072F LOW RISK FOR RETINOPATHY: CPT | Mod: CPTII,S$GLB,, | Performed by: FAMILY MEDICINE

## 2023-04-05 RX ORDER — TAMSULOSIN HYDROCHLORIDE 0.4 MG/1
0.4 CAPSULE ORAL NIGHTLY
Qty: 30 CAPSULE | Refills: 5 | Status: SHIPPED | OUTPATIENT
Start: 2023-04-05 | End: 2023-10-03

## 2023-04-05 NOTE — PROGRESS NOTES
THIS DOCUMENT WAS MADE IN PART WITH VOICE RECOGNITION SOFTWARE.  OCCASIONALLY THIS SOFTWARE WILL MISINTERPRET WORDS OR PHRASES.      Primary Care Provider Appointment   [facility] [place]       Patient ID: Lázaro Wang is a 88 y.o. male.    ASSESSMENT/PLAN by Problem List:    1. Balance problem  -     Ambulatory referral/consult to Physical/Occupational Therapy; Future; Expected date: 04/12/2023    2. At risk for falling  -     Ambulatory referral/consult to Physical/Occupational Therapy; Future; Expected date: 04/12/2023    3. Chronic insomnia  Overview:  Chronic insomnia for many years.  Many years ago he was started on Seroquel which worked really well although over the years the dosage has been gradually increased.   We previously discussed potential side effects from this medication long-term concerns.  With a recent fall we must also be concerned about increased risk.  Unfortunately he has been supplementing 1-2 drinks every night (shots of bourbon).  I advised against this.  Discussed how alcohol only adversely affect sleep in the long run not dementia increases other risk of complication.  He also has a history of alcohol abuse and was hospitalized in his 30s but he feels it is no longer a problem but I encouraged him to reconsider.  He is agreeable to consider cognitive behavioral therapy.  Referral placed.  Maybe we can encourage him to discontinue alcohol and try to gradually reduce the Seroquel over time.  Although I have been suspicious about an underlying mood disorder, can not even exclude possibility of mild bipolar disorder as Seroquel seem to improve his quality of life, mood and overall well-being when this was started many years ago.    Orders:  -     Ambulatory referral/consult to [facility] Behavioral Health; Future; Expected date: 04/12/2023    4. 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   Denies painful symptoms.  Continue monitoring diabetes.      5. Chronic narcotic dependence  Overview:  Patient remains on hydrocodone for chronic low back pain.  He has been stable on his current regimen for many years and follows up reasonably well.  Continue and continue to follow every three months.      6. 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  Overview:  Stable continue sertraline.      7. Atrial fibrillation, unspecified type  Assessment & Plan:  History of atrial fibrillation.  Appears to be in a regular rhythm today.  Remains on metoprolol as well as aspirin.  He will continue to follow with Cardiology.      8. Atherosclerosis of aorta  Overview:  Detected on previous imaging, currently stable.  He remains on aspirin per Cardiology.  Previously on a statin but discontinued secondary to side effects.  Continue to treat risk factors including hypertension and diabetes.      9. Stage 3a chronic kidney disease    10. Bilateral carotid artery stenosis  Overview:  Mild carotid disease noted bilaterally.  No severe stenosis.  No symptoms to suggest concern.  Continue to treat risk factors including hypertension, diabetes.  He will continue to follow with Cardiology.      11. Benign prostatic hyperplasia, unspecified whether lower urinary tract symptoms present  Overview:  He did report increase in difficulty urinating, slowing of the stream since discontinuing tamsulosin and he would like to resume.  A new prescription was sent in.  Caution lightheadedness, drops in blood pressure, etc. and he should take in the evening time.      Other orders  -     tamsulosin (FLOMAX) 0.4 mg Cap; Take 1 capsule (0.4 mg total) by mouth every evening.  Dispense: 30 capsule; Refill: 5         Follow Up:  Three months    Subjective:     Chief Complaint   Patient presents with    Coronary Artery Disease     F/u visit     Diabetes     F/u visit     Medication Management     Would like to discuss his sleeping medicine  and re-starting his urinary medication again.      I have reviewed the information entered by the ancillary staff regarding the chief complaint as well as the related history.    HPI    Patient is a/an 88 y.o.  male     Follow-up multiple topics.  He reports one fall since last seen.  States he was outside walking for exercise when he tripped.  He did have to go to the emergency room.  No falls since then.  Although he does admit some balance problems and is agreeable for physical therapy.  We also discussed his other chronic medical problems as addressed above      For complete problem list, past medical history, surgical history, social history, etc., see appropriate section in the electronic medical record    Review of Systems   Constitutional:  Negative for fever.   HENT: Negative.     Respiratory: Negative.     Cardiovascular: Negative.    Gastrointestinal: Negative.    Genitourinary:  Positive for difficulty urinating.   Musculoskeletal:  Positive for arthralgias and back pain.   Psychiatric/Behavioral:  Positive for sleep disturbance. Negative for dysphoric mood.      Objective     Physical Exam  Constitutional:       General: He is not in acute distress.     Appearance: He is well-developed. He is not diaphoretic.   HENT:      Head: Normocephalic and atraumatic.   Eyes:      General: No scleral icterus.     Conjunctiva/sclera: Conjunctivae normal.   Cardiovascular:      Rate and Rhythm: Normal rate and regular rhythm.      Heart sounds: Murmur (2/6 systolic ejection murmur) heard.   Pulmonary:      Effort: Pulmonary effort is normal. No respiratory distress.      Breath sounds: No wheezing or rales.   Neurological:      Mental Status: He is alert and oriented to person, place, and time.      Coordination: Coordination normal.   Psychiatric:         Behavior: Behavior normal.     Vitals:    04/05/23 1432   BP: 136/72   BP Location: Left arm   Patient Position: Sitting   BP Method: Medium (Manual)   Pulse: 89  "  Resp: 18   SpO2: 96%   Weight: 91.5 kg (201 lb 11.5 oz)   Height: 5' 7" (1.702 m)       "

## 2023-04-05 NOTE — ASSESSMENT & PLAN NOTE
History of atrial fibrillation.  Appears to be in a regular rhythm today.  Remains on metoprolol as well as aspirin.  He will continue to follow with Cardiology.

## 2023-04-11 DIAGNOSIS — M51.9 LUMBAR DISC DISEASE: ICD-10-CM

## 2023-04-11 RX ORDER — HYDROCODONE BITARTRATE AND ACETAMINOPHEN 5; 325 MG/1; MG/1
1 TABLET ORAL EVERY 6 HOURS PRN
Qty: 120 TABLET | Refills: 0 | Status: SHIPPED | OUTPATIENT
Start: 2023-04-11 | End: 2023-05-11 | Stop reason: SDUPTHER

## 2023-04-11 NOTE — TELEPHONE ENCOUNTER
----- Message from Liane Richardson sent at 4/11/2023 11:16 AM CDT -----  Contact: Patient  Type:  RX Refill Request    Who Called: Patient  Refill or New Rx:Refill  RX Name and Strength:HYDROcodone-acetaminophen (NORCO) 5-325 mg per tablet  How is the patient currently taking it? (ex. 1XDay):4x per day  Is this a 30 day or 90 day RX:30    [facility] with phone number[facility]  [facility] [address] [address]  Phone: [phone] Fax: [phone]      Local or Mail Order:  Local  Ordering Provider:Dr Trotter  Would the patient rather a call back or a response via [facility]? Call back  Best Call Back Number:[phone]    Additional Information: States he needs a refill on this prescription - please advise - thank you

## 2023-04-13 ENCOUNTER — TELEPHONE (OUTPATIENT)
Dept: PRIMARY CARE CLINIC | Facility: CLINIC | Age: 88
End: 2023-04-13
Payer: MEDICARE

## 2023-04-13 NOTE — TELEPHONE ENCOUNTER
Clinician received referral from PCP. Clinician attempted to contact patient via telephone at [phone]. There was no answer and unable to leave a voice mail. Clinician will continue to follow up.

## 2023-04-21 ENCOUNTER — PATIENT MESSAGE (OUTPATIENT)
Dept: PRIMARY CARE CLINIC | Facility: CLINIC | Age: 88
End: 2023-04-21
Payer: MEDICARE

## 2023-04-27 ENCOUNTER — TELEPHONE (OUTPATIENT)
Dept: PRIMARY CARE CLINIC | Facility: CLINIC | Age: 88
End: 2023-04-27
Payer: MEDICARE

## 2023-04-27 NOTE — TELEPHONE ENCOUNTER
Clinician received referral for behavioral health. Reached out to patient, who agreed to services. He is scheduled for initial BHI on 5/2/2023 at 10:00.

## 2023-05-08 RX ORDER — QUETIAPINE FUMARATE 100 MG/1
TABLET, FILM COATED ORAL
Qty: 180 TABLET | Refills: 0 | Status: SHIPPED | OUTPATIENT
Start: 2023-05-08 | End: 2023-08-07

## 2023-05-08 NOTE — TELEPHONE ENCOUNTER
No care due was identified.  [facility] Embedded Care Due Messages. Reference number: [id number].   5/08/2023 11:37:22 AM CDT

## 2023-05-11 DIAGNOSIS — M51.9 LUMBAR DISC DISEASE: ICD-10-CM

## 2023-05-11 RX ORDER — HYDROCODONE BITARTRATE AND ACETAMINOPHEN 5; 325 MG/1; MG/1
1 TABLET ORAL EVERY 6 HOURS PRN
Qty: 120 TABLET | Refills: 0 | Status: SHIPPED | OUTPATIENT
Start: 2023-05-11 | End: 2023-06-12 | Stop reason: SDUPTHER

## 2023-05-11 NOTE — TELEPHONE ENCOUNTER
----- Message from Alberta Gardner sent at 5/11/2023  3:26 PM CDT -----  Regarding: MEDICATION REQUEST  Please refill the medication(s) listed below. Please call the patient when the prescription(s) is ready for  at this phone number     JEANNE FLETCHER [[id number]]  [phone]         Medication #1 HYDROcodone-acetaminophen (NORCO) 5-325 mg per tablet        Preferred Pharmacy:  [facility] - [address]

## 2023-05-12 ENCOUNTER — PES CALL (OUTPATIENT)
Dept: ADMINISTRATIVE | Facility: CLINIC | Age: 88
End: 2023-05-12
Payer: MEDICARE

## 2023-06-12 ENCOUNTER — TELEPHONE (OUTPATIENT)
Dept: PRIMARY CARE CLINIC | Facility: CLINIC | Age: 88
End: 2023-06-12
Payer: MEDICARE

## 2023-06-12 DIAGNOSIS — M51.9 LUMBAR DISC DISEASE: ICD-10-CM

## 2023-06-12 RX ORDER — HYDROCODONE BITARTRATE AND ACETAMINOPHEN 5; 325 MG/1; MG/1
1 TABLET ORAL EVERY 6 HOURS PRN
Qty: 120 TABLET | Refills: 0 | Status: SHIPPED | OUTPATIENT
Start: 2023-06-12 | End: 2023-07-17 | Stop reason: SDUPTHER

## 2023-06-12 NOTE — TELEPHONE ENCOUNTER
----- Message from Tiffanie Estrada sent at 6/12/2023  2:07 PM CDT -----  Contact: pt [phone]  Type:  RX Refill Request    Who Called:  Pt   Refill or New Rx:  refill   RX Name and Strength:  HYDROcodone-acetaminophen (NORCO) 5-325 mg per tablet  How is the patient currently taking it? (ex. 1XDay):  4xday   Is this a 30 day or 90 day RX:  30  [facility]    [facility] [address] [address]  Phone: [phone] Fax: [phone]      Local or Mail Order:  Local   Ordering Provider:  Sergo Jimenez Call Back Number:  [phone] (home) [phone] (work)      Additional Information:  Pls call back and advise

## 2023-06-16 NOTE — H&P (VIEW-ONLY)
CC: H&P for cataract surgery  HPI: Patient has been diagnosed with cataracts, which are interfering with daily activities due to blurred vision and glare which cannot adequately be corrected with glasses.   PMH:  Past Medical History:   Diagnosis Date    Anticoagulant long-term use     325 asa    Anxiety     Arthritis     Carpal tunnel syndrome on right 09/2018    Cataract     OU    Chronic back pain     Coronary artery disease     stents x3    Diabetes mellitus     LBSL 70 today---stable    Elevated cholesterol     General anesthetics causing adverse effect in therapeutic use     confusion for 7-8 days following min tka    GERD (gastroesophageal reflux disease)     Heart disease     Hypertension     Myocardial infarction     Prostate disorder     Sinus pause 12/10/2018    Trouble in sleeping        FH:  Family History   Problem Relation Age of Onset    Glaucoma Mother     Cataracts Mother     Cancer Father         esophageal, did not die of    COPD Father         emphysema (2 ppd)    Macular degeneration Neg Hx     Retinal detachment Neg Hx        SH:  Social History     Socioeconomic History    Marital status:    Occupational History    Occupation: retired    Tobacco Use    Smoking status: Never    Smokeless tobacco: Never   Substance and Sexual Activity    Alcohol use: No     Comment: quit 2015    Drug use: No       ROS:  HEENT: Blurred vision  CV: No chest pain  Pulm: No SOB  Please see my last exam note for additional ROS details    PE:  There were no vitals filed for this visit.  HEENT: Cataract  CV: N S1 and S2 no murmurs  Pulm: CTAB wheezes, rales, or ronchi  Abd:  soft nabs NTND no masses  Extremeties: No edema    A/P  1. Cataract - Indications, risks and alternatives to the procedure were explained to the patient. The patient was given the opportunity to ask questions and consented to the procedure in writing.  Proceed with cataract surgery as scheduled.  2. Other health issues -  patient has been cleared by their PCP. See last note for details.     (2) Male

## 2023-07-03 ENCOUNTER — TELEPHONE (OUTPATIENT)
Dept: PRIMARY CARE CLINIC | Facility: CLINIC | Age: 88
End: 2023-07-03
Payer: MEDICARE

## 2023-07-03 NOTE — TELEPHONE ENCOUNTER
----- Message from Shanta Campa sent at 7/3/2023  9:47 AM CDT -----  Regarding: Meds no longer work  Type:  Needs Medical Advice    Who Called: Pt    Symptoms (please be specific): Can not sleep     How long has patient had these symptoms:  past 2 or 3 months    Pharmacy name and phone #:    [facility] - [address] [address]  Phone: [phone] Fax: [phone]    Would the patient rather a call back or a response via [facility]? Callback    Best Call Back Number: [phone]    Additional Information: Sts is meds for sleep is no longer working like it should and would like so advise on what to do. Please advise ---------------Thank you.

## 2023-07-03 NOTE — TELEPHONE ENCOUNTER
Spoke with Kayleigh, discussed your message. Kayleigh will speak with her dad and let you know if there is anything else we can to do for pt.     No other questions.

## 2023-07-03 NOTE — TELEPHONE ENCOUNTER
I am not going to make any medication changes over the phone.  He is already on Seroquel and I will not increase this any further.    He could try supplementing with melatonin.  I recommend more physical activity during the day.  If he is sedentary which I am certain he is, it is almost impossible to have a normal sleep pattern.      The next step might be to consider consulting with a cognitive behavioral therapist or possibly neuropsych given the complexity of insomnia especially on his medications.    Absolutely zero caffeine after the morning time.  This includes coffee, tea, soft drinks, chocolate.

## 2023-07-03 NOTE — TELEPHONE ENCOUNTER
Pt reports that he has trouble getting to sleep and that he wakes up in the middle of the night about 3-4 nights per week.  TV goes off at 2100 and pt reads for a while before starting to fall asleep.  Reports that he does not consume anything including caffeine after dinner. Reports that he has 1 cup of coffee in the morning.     Preferred pharmacy: [facility]    Please advise.

## 2023-07-13 ENCOUNTER — OFFICE VISIT (OUTPATIENT)
Dept: PRIMARY CARE CLINIC | Facility: CLINIC | Age: 88
End: 2023-07-13
Payer: MEDICARE

## 2023-07-13 VITALS
BODY MASS INDEX: 31.46 KG/M2 | RESPIRATION RATE: 18 BRPM | SYSTOLIC BLOOD PRESSURE: 132 MMHG | HEART RATE: 69 BPM | DIASTOLIC BLOOD PRESSURE: 80 MMHG | WEIGHT: 200.81 LBS | TEMPERATURE: 99 F | OXYGEN SATURATION: 97 %

## 2023-07-13 DIAGNOSIS — R41.3 MEMORY LOSS: ICD-10-CM

## 2023-07-13 DIAGNOSIS — F11.20 CHRONIC NARCOTIC DEPENDENCE: ICD-10-CM

## 2023-07-13 DIAGNOSIS — F51.04 CHRONIC INSOMNIA: Chronic | ICD-10-CM

## 2023-07-13 DIAGNOSIS — E11.8 TYPE 2 DIABETES MELLITUS WITH COMPLICATION: Primary | Chronic | ICD-10-CM

## 2023-07-13 DIAGNOSIS — I10 ESSENTIAL HYPERTENSION: Chronic | ICD-10-CM

## 2023-07-13 DIAGNOSIS — Z71.89 ADVANCED CARE PLANNING/COUNSELING DISCUSSION: ICD-10-CM

## 2023-07-13 PROCEDURE — 1159F MED LIST DOCD IN RCRD: CPT | Mod: CPTII,S$GLB,, | Performed by: FAMILY MEDICINE

## 2023-07-13 PROCEDURE — 99999 PR PBB SHADOW E&M-EST. PATIENT-LVL V: ICD-10-PCS | Mod: PBBFAC,,, | Performed by: FAMILY MEDICINE

## 2023-07-13 PROCEDURE — 3288F FALL RISK ASSESSMENT DOCD: CPT | Mod: CPTII,S$GLB,, | Performed by: FAMILY MEDICINE

## 2023-07-13 PROCEDURE — 3072F LOW RISK FOR RETINOPATHY: CPT | Mod: CPTII,S$GLB,, | Performed by: FAMILY MEDICINE

## 2023-07-13 PROCEDURE — 1101F PR PT FALLS ASSESS DOC 0-1 FALLS W/OUT INJ PAST YR: ICD-10-PCS | Mod: CPTII,S$GLB,, | Performed by: FAMILY MEDICINE

## 2023-07-13 PROCEDURE — 99214 OFFICE O/P EST MOD 30 MIN: CPT | Mod: S$GLB,,, | Performed by: FAMILY MEDICINE

## 2023-07-13 PROCEDURE — 3288F PR FALLS RISK ASSESSMENT DOCUMENTED: ICD-10-PCS | Mod: CPTII,S$GLB,, | Performed by: FAMILY MEDICINE

## 2023-07-13 PROCEDURE — 1159F PR MEDICATION LIST DOCUMENTED IN MEDICAL RECORD: ICD-10-PCS | Mod: CPTII,S$GLB,, | Performed by: FAMILY MEDICINE

## 2023-07-13 PROCEDURE — 1158F ADVNC CARE PLAN TLK DOCD: CPT | Mod: CPTII,S$GLB,, | Performed by: FAMILY MEDICINE

## 2023-07-13 PROCEDURE — 1126F AMNT PAIN NOTED NONE PRSNT: CPT | Mod: CPTII,S$GLB,, | Performed by: FAMILY MEDICINE

## 2023-07-13 PROCEDURE — 99999 PR PBB SHADOW E&M-EST. PATIENT-LVL V: CPT | Mod: PBBFAC,,, | Performed by: FAMILY MEDICINE

## 2023-07-13 PROCEDURE — 1160F RVW MEDS BY RX/DR IN RCRD: CPT | Mod: CPTII,S$GLB,, | Performed by: FAMILY MEDICINE

## 2023-07-13 PROCEDURE — 1158F PR ADVANCE CARE PLANNING DISCUSS DOCUMENTED IN MEDICAL RECORD: ICD-10-PCS | Mod: CPTII,S$GLB,, | Performed by: FAMILY MEDICINE

## 2023-07-13 PROCEDURE — 3072F PR LOW RISK FOR RETINOPATHY: ICD-10-PCS | Mod: CPTII,S$GLB,, | Performed by: FAMILY MEDICINE

## 2023-07-13 PROCEDURE — 1160F PR REVIEW ALL MEDS BY PRESCRIBER/CLIN PHARMACIST DOCUMENTED: ICD-10-PCS | Mod: CPTII,S$GLB,, | Performed by: FAMILY MEDICINE

## 2023-07-13 PROCEDURE — 1101F PT FALLS ASSESS-DOCD LE1/YR: CPT | Mod: CPTII,S$GLB,, | Performed by: FAMILY MEDICINE

## 2023-07-13 PROCEDURE — 99214 PR OFFICE/OUTPT VISIT, EST, LEVL IV, 30-39 MIN: ICD-10-PCS | Mod: S$GLB,,, | Performed by: FAMILY MEDICINE

## 2023-07-13 PROCEDURE — 1126F PR PAIN SEVERITY QUANTIFIED, NO PAIN PRESENT: ICD-10-PCS | Mod: CPTII,S$GLB,, | Performed by: FAMILY MEDICINE

## 2023-07-13 NOTE — ASSESSMENT & PLAN NOTE
Recent worsening.  No obvious identifiable cause or change in medications.  Although he was drinking a few glasses of bourbon every night and then recently stopped which I suspect could be contributing to more difficulty falling asleep.  Of course I do not recommend that he drink alcohol at all.  I will switch the sertraline from evening to morning because sometimes this does affect quality of sleep.  Depending on response I may then increase the dosage to help with his anxiety.    He is showing increased signs of anxiety as well and I do recommend consultation with neuropsychiatry.  I have also noticed some recent changes in memory.  I will see him back in a month or two as a precaution.

## 2023-07-13 NOTE — PROGRESS NOTES
Primary Care Provider Appointment   [facility] [place]       Patient ID: Lázaro Wang is a 88 y.o. male.    ASSESSMENT/PLAN by Problem List:    1. Type 2 diabetes mellitus with complication  Overview:  This chronic condition has been stable and satisfactory.  He is due for labs, will reschedule as he missed the visit.  He has no symptoms to suggest hypo nor hyperglycemia  Currently diet controlled.    Lab Results   Component Value Date    HGBA1C 5.8 (H) 09/28/2022           2. Essential hypertension  Overview:  Chronic condition remains satisfactory.  Current BP medications include  Metoprolol 25 mg, now taking two tablets daily per Cardiology  (Diabetic, not on an Ace or ARB although has not had evidence of microalbuminemia)      3. Chronic narcotic dependence  Overview:  Patient remains on hydrocodone for chronic low back pain.  He has been stable on his current regimen for many years and follows up reasonably well.  Continue and continue to follow every three months.      4. Chronic insomnia  Overview:  Chronic insomnia for many years.  Many years ago he was started on Seroquel which worked really well although over the years the dosage has been gradually increased.   We previously discussed potential side effects from this medication long-term concerns.  With a recent fall we must also be concerned about increased risk.  Unfortunately he has been supplementing 1-2 drinks every night (shots of bourbon).  I advised against this.  Discussed how alcohol only adversely affect sleep in the long run not dementia increases other risk of complication.  He also has a history of alcohol abuse and was hospitalized in his 30s but he feels it is no longer a problem but I encouraged him to reconsider.  He is agreeable to consider cognitive behavioral therapy.  Referral placed.  Maybe we can encourage him to discontinue alcohol and try to gradually reduce the Seroquel over time.  Although I  have been suspicious about an underlying mood disorder, can not even exclude possibility of mild bipolar disorder as Seroquel seem to improve his quality of life, mood and overall well-being when this was started many years ago.    Assessment & Plan:  Recent worsening.  No obvious identifiable cause or change in medications.  Although he was drinking a few glasses of bourbon every night and then recently stopped which I suspect could be contributing to more difficulty falling asleep.  Of course I do not recommend that he drink alcohol at all.  I will switch the sertraline from evening to morning because sometimes this does affect quality of sleep.  Depending on response I may then increase the dosage to help with his anxiety.    He is showing increased signs of anxiety as well and I do recommend consultation with neuropsychiatry.  I have also noticed some recent changes in memory.  I will see him back in a month or two as a precaution.    Orders:  -     Ambulatory referral/consult to Adult Neuropsychology; Future; Expected date: 07/20/2023    5. Memory loss  -     Ambulatory referral/consult to Adult Neuropsychology; Future; Expected date: 07/20/2023    6. Advanced care planning/counseling discussion  Comments:  Reviewed and discussed.  He does have completed inappropriate documentation.  I have advised him to send us a copy and for him to review as well           Patient missed his lab appointment.  Patient did not keep appointment with [facility] as he previously agree to do.  Switch sertraline to am, then may increase the dosage      Follow Up:  Two months        Advance Care Planning     Date: 07/13/2023    Living Will  Power of   I initiated the process of voluntary advance care planning today and explained the importance of this process to the patient.  I introduced the concept of advance directives to the patient, as well. Then the patient received detailed information about the importance of designating a  Health Care Power of  (HCPOA). He was also instructed to communicate with this person about their wishes for future healthcare, should he become sick and lose decision-making capacity. The patient has previously appointed a HCPOA. After our discussion, the patient has decided to complete a HCPOA and has appointed his significant other, health care agent:  wife, then daughter      He will send us copies         Subjective:     Chief Complaint   Patient presents with    Follow-up    Insomnia     Pt states he can't sleep.     I have reviewed the information entered by the ancillary staff regarding the chief complaint as well as the related history.    HPI    Patient is a/an 88 y.o.  male     Not sleeping well  Taking seroquel around 10 pm, falling asleep ~ 2 am, sleeping to 8 am  Was taking 'a few shots of bourbon' which was helping but now states 'my system just can't take it'  Caffeine: 'one cup of coffee in am', no other caffeine  Daytime naps:  'I might rest my eyes a few minutes but I don't sleep'  Has been doing some exercise the last week  He does take sertraline around 10 pm  Was having panic attacks in evening    For complete problem list, past medical history, surgical history, social history, etc., see appropriate section in the electronic medical record    Review of Systems   Respiratory: Negative.     Cardiovascular: Negative.    Gastrointestinal: Negative.    Genitourinary: Negative.    Musculoskeletal:  Positive for arthralgias and back pain.   Psychiatric/Behavioral:  Positive for sleep disturbance. Negative for dysphoric mood. The patient is nervous/anxious.      Objective     Physical Exam  Constitutional:       General: He is not in acute distress.     Appearance: He is well-developed. He is not diaphoretic.   HENT:      Head: Normocephalic and atraumatic.   Eyes:      General: No scleral icterus.     Conjunctiva/sclera: Conjunctivae normal.   Cardiovascular:      Rate and Rhythm: Normal rate  and regular rhythm.      Heart sounds: Murmur (2/6 systolic ejection murmur) heard.   Pulmonary:      Effort: Pulmonary effort is normal. No respiratory distress.      Breath sounds: No wheezing or rales.   Skin:     Comments: Evidence of picking and scratching around his nails and cuticles.  Some scratching on his forearms.   Neurological:      Mental Status: He is alert and oriented to person, place, and time.      Coordination: Coordination normal.   Psychiatric:         Behavior: Behavior normal.      Comments: He appears calm, behavior is normal.  He appears oriented in thought content appears normal.     Vitals:    07/13/23 1358   BP: 132/80   BP Location: Right arm   Patient Position: Sitting   BP Method: Medium (Manual)   Pulse: 69   Resp: 18   Temp: 98.5 °F (36.9 °C)   TempSrc: Oral   SpO2: 97%   Weight: 91.1 kg (200 lb 13.4 oz)

## 2023-07-17 ENCOUNTER — TELEPHONE (OUTPATIENT)
Dept: PRIMARY CARE CLINIC | Facility: CLINIC | Age: 88
End: 2023-07-17
Payer: MEDICARE

## 2023-07-17 DIAGNOSIS — M51.9 LUMBAR DISC DISEASE: ICD-10-CM

## 2023-07-17 RX ORDER — HYDROCODONE BITARTRATE AND ACETAMINOPHEN 5; 325 MG/1; MG/1
1 TABLET ORAL EVERY 6 HOURS PRN
Qty: 120 TABLET | Refills: 0 | Status: SHIPPED | OUTPATIENT
Start: 2023-07-17 | End: 2023-08-14 | Stop reason: SDUPTHER

## 2023-07-22 NOTE — TELEPHONE ENCOUNTER
----- Message from Valdo Purdy sent at 8/10/2022 10:17 AM CDT -----  Contact: self  Type:  RX Refill Request    Who Called:  self  Refill or New Rx:  refill  RX Name and Strength:  HYDROcodone-acetaminophen (NORCO) 5-325 mg per tablet  How is the patient currently taking it? (ex. 1XDay):  as directed  Is this a 30 day or 90 day RX:  30  Preferred Pharmacy with phone number:    [facility] [address]  Phone: [phone] Fax: [phone]    Local or Mail Order:  local  Ordering Provider:  Dr. Trotter  Best Call Back Number:  [phone]  Additional Information:       

## 2023-07-24 ENCOUNTER — TELEPHONE (OUTPATIENT)
Dept: PRIMARY CARE CLINIC | Facility: CLINIC | Age: 88
End: 2023-07-24
Payer: MEDICARE

## 2023-07-24 NOTE — TELEPHONE ENCOUNTER
"Called pt about seeing if he was able to get scheduled for his Neuropysch appt and pt reports that he "doesn't feel like it at this time."  I asked if pt wants to take down the phone number to make the appt at a later time, and he responded, "not today."  "

## 2023-08-06 NOTE — TELEPHONE ENCOUNTER
No care due was identified.  [facility] Embedded Care Due Messages. Reference number: [id number].   8/06/2023 4:50:26 PM CDT

## 2023-08-07 RX ORDER — QUETIAPINE FUMARATE 100 MG/1
TABLET, FILM COATED ORAL
Qty: 180 TABLET | Refills: 0 | Status: SHIPPED | OUTPATIENT
Start: 2023-08-07 | End: 2023-09-13

## 2023-08-14 DIAGNOSIS — M51.9 LUMBAR DISC DISEASE: ICD-10-CM

## 2023-08-14 NOTE — TELEPHONE ENCOUNTER
----- Message from Liane Richardson sent at 8/14/2023  1:53 PM CDT -----  Contact: Patient  Type:  RX Refill Request    Who Called:   Patient  Refill or New Rx:  Refill    RX Name and Strength:  HYDROcodone-acetaminophen (NORCO) 5-325 mg per tablet    Preferred Pharmacy with phone number:      [address] [address]  Phone: [phone] Fax: [phone]    Local or Mail Order:  Local  Ordering Provider:  Dr. Trotter    Would the patient rather a call back or a response via [facility]?   Call back  Best Call Back Number:   [phone]    Additional Information: States he is completely out of this medication and would like refills called in to the pharmacy - please call to advise - thank you

## 2023-08-15 RX ORDER — HYDROCODONE BITARTRATE AND ACETAMINOPHEN 5; 325 MG/1; MG/1
1 TABLET ORAL EVERY 6 HOURS PRN
Qty: 120 TABLET | Refills: 0 | Status: SHIPPED | OUTPATIENT
Start: 2023-08-15 | End: 2023-09-13 | Stop reason: SDUPTHER

## 2023-09-13 ENCOUNTER — OFFICE VISIT (OUTPATIENT)
Dept: PRIMARY CARE CLINIC | Facility: CLINIC | Age: 88
End: 2023-09-13
Payer: MEDICARE

## 2023-09-13 VITALS
TEMPERATURE: 98 F | BODY MASS INDEX: 31.84 KG/M2 | WEIGHT: 203.25 LBS | DIASTOLIC BLOOD PRESSURE: 68 MMHG | RESPIRATION RATE: 18 BRPM | SYSTOLIC BLOOD PRESSURE: 128 MMHG | HEART RATE: 84 BPM | OXYGEN SATURATION: 94 %

## 2023-09-13 DIAGNOSIS — F41.9 RECURRENT MILD MAJOR DEPRESSIVE DISORDER WITH ANXIETY: Chronic | ICD-10-CM

## 2023-09-13 DIAGNOSIS — M51.9 LUMBAR DISC DISEASE: ICD-10-CM

## 2023-09-13 DIAGNOSIS — F33.0 RECURRENT MILD MAJOR DEPRESSIVE DISORDER WITH ANXIETY: Chronic | ICD-10-CM

## 2023-09-13 DIAGNOSIS — I10 ESSENTIAL HYPERTENSION: Chronic | ICD-10-CM

## 2023-09-13 DIAGNOSIS — F51.04 CHRONIC INSOMNIA: Chronic | ICD-10-CM

## 2023-09-13 DIAGNOSIS — E11.43 TYPE II DIABETES MELLITUS WITH PERIPHERAL AUTONOMIC NEUROPATHY: Primary | Chronic | ICD-10-CM

## 2023-09-13 DIAGNOSIS — F41.9 ANXIETY: Chronic | ICD-10-CM

## 2023-09-13 PROCEDURE — 99214 OFFICE O/P EST MOD 30 MIN: CPT | Mod: S$GLB,,, | Performed by: FAMILY MEDICINE

## 2023-09-13 PROCEDURE — 99214 PR OFFICE/OUTPT VISIT, EST, LEVL IV, 30-39 MIN: ICD-10-PCS | Mod: S$GLB,,, | Performed by: FAMILY MEDICINE

## 2023-09-13 PROCEDURE — 99999 PR PBB SHADOW E&M-EST. PATIENT-LVL V: ICD-10-PCS | Mod: PBBFAC,,, | Performed by: FAMILY MEDICINE

## 2023-09-13 PROCEDURE — 1159F PR MEDICATION LIST DOCUMENTED IN MEDICAL RECORD: ICD-10-PCS | Mod: CPTII,S$GLB,, | Performed by: FAMILY MEDICINE

## 2023-09-13 PROCEDURE — 1160F PR REVIEW ALL MEDS BY PRESCRIBER/CLIN PHARMACIST DOCUMENTED: ICD-10-PCS | Mod: CPTII,S$GLB,, | Performed by: FAMILY MEDICINE

## 2023-09-13 PROCEDURE — 1126F PR PAIN SEVERITY QUANTIFIED, NO PAIN PRESENT: ICD-10-PCS | Mod: CPTII,S$GLB,, | Performed by: FAMILY MEDICINE

## 2023-09-13 PROCEDURE — 1126F AMNT PAIN NOTED NONE PRSNT: CPT | Mod: CPTII,S$GLB,, | Performed by: FAMILY MEDICINE

## 2023-09-13 PROCEDURE — 1159F MED LIST DOCD IN RCRD: CPT | Mod: CPTII,S$GLB,, | Performed by: FAMILY MEDICINE

## 2023-09-13 PROCEDURE — 3288F PR FALLS RISK ASSESSMENT DOCUMENTED: ICD-10-PCS | Mod: CPTII,S$GLB,, | Performed by: FAMILY MEDICINE

## 2023-09-13 PROCEDURE — 1101F PT FALLS ASSESS-DOCD LE1/YR: CPT | Mod: CPTII,S$GLB,, | Performed by: FAMILY MEDICINE

## 2023-09-13 PROCEDURE — 3288F FALL RISK ASSESSMENT DOCD: CPT | Mod: CPTII,S$GLB,, | Performed by: FAMILY MEDICINE

## 2023-09-13 PROCEDURE — 3072F LOW RISK FOR RETINOPATHY: CPT | Mod: CPTII,S$GLB,, | Performed by: FAMILY MEDICINE

## 2023-09-13 PROCEDURE — 99999 PR PBB SHADOW E&M-EST. PATIENT-LVL V: CPT | Mod: PBBFAC,,, | Performed by: FAMILY MEDICINE

## 2023-09-13 PROCEDURE — 1160F RVW MEDS BY RX/DR IN RCRD: CPT | Mod: CPTII,S$GLB,, | Performed by: FAMILY MEDICINE

## 2023-09-13 PROCEDURE — 3072F PR LOW RISK FOR RETINOPATHY: ICD-10-PCS | Mod: CPTII,S$GLB,, | Performed by: FAMILY MEDICINE

## 2023-09-13 PROCEDURE — 1101F PR PT FALLS ASSESS DOC 0-1 FALLS W/OUT INJ PAST YR: ICD-10-PCS | Mod: CPTII,S$GLB,, | Performed by: FAMILY MEDICINE

## 2023-09-13 RX ORDER — QUETIAPINE FUMARATE 150 MG/1
150 TABLET, FILM COATED ORAL NIGHTLY
Start: 2023-09-13 | End: 2023-11-14

## 2023-09-13 RX ORDER — HYDROCODONE BITARTRATE AND ACETAMINOPHEN 5; 325 MG/1; MG/1
1 TABLET ORAL EVERY 6 HOURS PRN
Qty: 120 TABLET | Refills: 0 | Status: SHIPPED | OUTPATIENT
Start: 2023-09-13 | End: 2023-10-19 | Stop reason: SDUPTHER

## 2023-09-13 NOTE — ASSESSMENT & PLAN NOTE
Remains on sertraline.  Symptoms appear to be fairly well controlled with the exception of chronic insomnia. Denies feeling anxious or depressed but does have anxious behavior/pick See separate discussion under chronic insomnia.  Also with concerns of memory loss I had placed a referral to neuropsych.  He states he was never contacted to schedule.  Scheduling this is not within our abilities, will send a message back to that department to see if they can help him.

## 2023-09-13 NOTE — ASSESSMENT & PLAN NOTE
Diabetes has been well controlled but he missed his lab appointment again.  Will help him reschedule.

## 2023-09-13 NOTE — ASSESSMENT & PLAN NOTE
Patient continues to report problems and that is Seroquel is not working as well.  Regardless I would like to reduce the dosage of Seroquel.  I would encourage cognitive behavioral therapy and try to set him up with Lisa but he did not schedule.  Will encourage him to reconsider.

## 2023-09-13 NOTE — PROGRESS NOTES
THIS DOCUMENT WAS MADE IN PART WITH VOICE RECOGNITION SOFTWARE.  OCCASIONALLY THIS SOFTWARE WILL MISINTERPRET WORDS OR PHRASES.      [facility]ment   [facility]       Patient ID: Lázaro Wang is a 88 y.o. male.    ASSESSMENT/PLAN by Problem List:    1. 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  Denies painful symptoms.  Continue monitoring diabetes.    Assessment & Plan:  Diabetes has been well controlled but he missed his lab appointment again.  Will help him reschedule.    Orders:  -     Hemoglobin A1C; Future; Expected date: 09/13/2023  -     Comprehensive Metabolic Panel; Future; Expected date: 09/13/2023  -     Lipid Panel; Future; Expected date: 09/13/2023  -     Microalbumin/Creatinine Ratio, Urine; Future  -     Ambulatory referral/consult to Optometry; Future; Expected date: 09/20/2023    2. Essential hypertension  Overview:  Chronic condition remains satisfactory.  Current BP medications include  Metoprolol 25 mg, now taking two tablets daily per Cardiology  (Diabetic, not on an Ace or ARB although has not had evidence of microalbuminemia)      3. 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  Symptoms appear to be fairly well controlled with the exception of chronic insomnia. Denies feeling anxious or depressed but does have anxious behavior/pick See separate discussion under chronic insomnia.  Also with concerns of memory loss I had placed a referral to neuropsych.  He states he was never contacted to schedule.  Scheduling this is not within our abilities, will send a message back to that department to see if they can help him.    Orders:  -     Ambulatory referral/consult to Value Based Primary Care Behavioral Health; Future; Expected date: 09/20/2023    4. Chronic insomnia  Overview:  Chronic insomnia for many years.  Many years ago  he was started on Seroquel which worked really well although over the years the dosage has been gradually increased.   We previously discussed potential side effects from this medication long-term concerns.  With a recent fall we must also be concerned about increased risk.  Unfortunately he has been supplementing 1-2 drinks every night (shots of bourbon).  I advised against this.  Discussed how alcohol only adversely affect sleep in the long run not dementia increases other risk of complication.  He also has a history of alcohol abuse and was hospitalized in his 30s but he feels it is no longer a problem but I encouraged him to reconsider.  He is agreeable to consider cognitive behavioral therapy.  Referral placed.  Maybe we can encourage him to discontinue alcohol and try to gradually reduce the Seroquel over time.  Although I have been suspicious about an underlying mood disorder, can not even exclude possibility of mild bipolar disorder as Seroquel seem to improve his quality of life, mood and overall well-being when this was started many years ago.    Assessment & Plan:  Patient continues to report problems and that is Seroquel is not working as well.  Regardless I would like to reduce the dosage of Seroquel.  I would encourage cognitive behavioral therapy and try to set him up with Lisa but he did not schedule.  Will encourage him to reconsider.    Orders:  -     Ambulatory referral/consult to Value Based Primary Care Behavioral Health; Future; Expected date: 09/20/2023    5. Anxiety  -     Ambulatory referral/consult to Value Based Primary Care Behavioral Health; Future; Expected date: 09/20/2023    6. Lumbar disc disease  Overview:  Chronic low back pain for many years.  This has been reasonably well controlled with hydrocodone.  He has consulted with pain management.  Reports he never really had great success with injections although did not follow back for further advice or additional therapy  recommendations.  Pain does fluctuate.  I will continue current medications however if symptoms worsen or he needs additional assistance I would request that he follow back with pain management    Orders:  -     HYDROcodone-acetaminophen (NORCO) 5-325 mg per tablet; Take 1 tablet by mouth every 6 (six) hours as needed for Pain. >7 day supply of opioid is medically necessary for chronic pain.  Dispense: 120 tablet; Refill: 0    Other orders  -     QUEtiapine 150 mg Tab; Take 150 mg by mouth every evening.         Follow Up:  Three months      Subjective:     Chief Complaint   Patient presents with    Follow-up     I have reviewed the information entered by the ancillary staff regarding the chief complaint as well as the related history.    HPI    Patient is a/an 88 y.o.  male     Routine follow-up.  He voices no initial complaints.  Still reports he is difficulty sleeping.  Patient missed his lab appointment.  He states he was not contacted for his appointment with neuropsych.  He had declined to consult with our LCSW for possible cognitive behavioral therapy.  He denies any recent injuries or illnesses.    For complete problem list, past medical history, surgical history, social history, etc., see appropriate section in the electronic medical record    Review of Systems   HENT: Negative.     Respiratory: Negative.  Negative for shortness of breath and wheezing.    Cardiovascular: Negative.    Gastrointestinal: Negative.    Genitourinary: Negative.    Musculoskeletal:  Positive for arthralgias and back pain.   Psychiatric/Behavioral:  Positive for sleep disturbance. Negative for dysphoric mood. The patient is nervous/anxious.        Objective     Physical Exam  Constitutional:       General: He is not in acute distress.     Appearance: He is well-developed. He is not diaphoretic.   HENT:      Head: Normocephalic and atraumatic.   Eyes:      General: No scleral icterus.     Conjunctiva/sclera: Conjunctivae normal.    Cardiovascular:      Rate and Rhythm: Normal rate and regular rhythm.      Heart sounds: Murmur (2/6 systolic ejection murmur) heard.   Pulmonary:      Effort: Pulmonary effort is normal. No respiratory distress.      Breath sounds: No wheezing or rales.   Skin:     Comments: Continuing evidence of picking and scratching on his hands and forearms   Neurological:      Mental Status: He is alert and oriented to person, place, and time.      Coordination: Coordination normal.   Psychiatric:         Behavior: Behavior normal.       Vitals:    09/13/23 1405   BP: 128/68   BP Location: Right arm   Patient Position: Sitting   BP Method: Large (Manual)   Pulse: 84   Resp: 18   Temp: 98.3 °F (36.8 °C)   TempSrc: Oral   SpO2: (!) 94%   Weight: 92.2 kg (203 lb 4.2 oz)

## 2023-09-15 ENCOUNTER — LAB VISIT (OUTPATIENT)
Dept: LAB | Facility: HOSPITAL | Age: 88
End: 2023-09-15
Attending: FAMILY MEDICINE
Payer: MEDICARE

## 2023-09-15 DIAGNOSIS — E11.43 TYPE II DIABETES MELLITUS WITH PERIPHERAL AUTONOMIC NEUROPATHY: Chronic | ICD-10-CM

## 2023-09-15 PROCEDURE — 82043 UR ALBUMIN QUANTITATIVE: CPT | Performed by: FAMILY MEDICINE

## 2023-09-18 LAB
ALBUMIN/CREAT UR: 20.3 UG/MG (ref 0–30)
CREAT UR-MCNC: 256 MG/DL (ref 23–375)
MICROALBUMIN UR DL<=1MG/L-MCNC: 52 UG/ML

## 2023-09-27 ENCOUNTER — TELEPHONE (OUTPATIENT)
Dept: ADMINISTRATIVE | Facility: CLINIC | Age: 88
End: 2023-09-27
Payer: MEDICARE

## 2023-09-27 NOTE — TELEPHONE ENCOUNTER
Called pt, no answer; left message informing pt I was calling to remind pt of his in office EAWV on 9/29/23 and to return my call if he had any questions; left my name and number.

## 2023-09-29 ENCOUNTER — OFFICE VISIT (OUTPATIENT)
Dept: FAMILY MEDICINE | Facility: CLINIC | Age: 88
End: 2023-09-29
Payer: MEDICARE

## 2023-09-29 VITALS
DIASTOLIC BLOOD PRESSURE: 72 MMHG | HEIGHT: 67 IN | HEART RATE: 77 BPM | BODY MASS INDEX: 31.87 KG/M2 | OXYGEN SATURATION: 95 % | WEIGHT: 203.06 LBS | SYSTOLIC BLOOD PRESSURE: 130 MMHG

## 2023-09-29 DIAGNOSIS — I48.91 ATRIAL FIBRILLATION, UNSPECIFIED TYPE: ICD-10-CM

## 2023-09-29 DIAGNOSIS — I65.23 BILATERAL CAROTID ARTERY STENOSIS: Chronic | ICD-10-CM

## 2023-09-29 DIAGNOSIS — I10 ESSENTIAL HYPERTENSION: Chronic | ICD-10-CM

## 2023-09-29 DIAGNOSIS — F11.20 CHRONIC NARCOTIC DEPENDENCE: ICD-10-CM

## 2023-09-29 DIAGNOSIS — E11.43 TYPE II DIABETES MELLITUS WITH PERIPHERAL AUTONOMIC NEUROPATHY: Chronic | ICD-10-CM

## 2023-09-29 DIAGNOSIS — Z00.00 ENCOUNTER FOR PREVENTIVE HEALTH EXAMINATION: Primary | ICD-10-CM

## 2023-09-29 DIAGNOSIS — E11.8 TYPE 2 DIABETES MELLITUS WITH COMPLICATION: Chronic | ICD-10-CM

## 2023-09-29 DIAGNOSIS — I70.0 ATHEROSCLEROSIS OF AORTA: Chronic | ICD-10-CM

## 2023-09-29 DIAGNOSIS — Z95.0 PACEMAKER: Chronic | ICD-10-CM

## 2023-09-29 DIAGNOSIS — F33.0 RECURRENT MILD MAJOR DEPRESSIVE DISORDER WITH ANXIETY: Chronic | ICD-10-CM

## 2023-09-29 DIAGNOSIS — I77.819 ECTASIS AORTA: Chronic | ICD-10-CM

## 2023-09-29 DIAGNOSIS — I25.10 CORONARY ARTERY DISEASE INVOLVING NATIVE CORONARY ARTERY OF NATIVE HEART WITHOUT ANGINA PECTORIS: Chronic | ICD-10-CM

## 2023-09-29 DIAGNOSIS — E78.5 DYSLIPIDEMIA: Chronic | ICD-10-CM

## 2023-09-29 DIAGNOSIS — F41.9 RECURRENT MILD MAJOR DEPRESSIVE DISORDER WITH ANXIETY: Chronic | ICD-10-CM

## 2023-09-29 DIAGNOSIS — N18.31 STAGE 3A CHRONIC KIDNEY DISEASE: Chronic | ICD-10-CM

## 2023-09-29 PROCEDURE — 1101F PR PT FALLS ASSESS DOC 0-1 FALLS W/OUT INJ PAST YR: ICD-10-PCS | Mod: CPTII,S$GLB,, | Performed by: NURSE PRACTITIONER

## 2023-09-29 PROCEDURE — 90694 FLU VACCINE - QUADRIVALENT - ADJUVANTED: ICD-10-PCS | Mod: S$GLB,,, | Performed by: NURSE PRACTITIONER

## 2023-09-29 PROCEDURE — 1101F PT FALLS ASSESS-DOCD LE1/YR: CPT | Mod: CPTII,S$GLB,, | Performed by: NURSE PRACTITIONER

## 2023-09-29 PROCEDURE — 1160F PR REVIEW ALL MEDS BY PRESCRIBER/CLIN PHARMACIST DOCUMENTED: ICD-10-PCS | Mod: CPTII,S$GLB,, | Performed by: NURSE PRACTITIONER

## 2023-09-29 PROCEDURE — 3288F FALL RISK ASSESSMENT DOCD: CPT | Mod: CPTII,S$GLB,, | Performed by: NURSE PRACTITIONER

## 2023-09-29 PROCEDURE — G0439 PR MEDICARE ANNUAL WELLNESS SUBSEQUENT VISIT: ICD-10-PCS | Mod: S$GLB,,, | Performed by: NURSE PRACTITIONER

## 2023-09-29 PROCEDURE — 90694 VACC AIIV4 NO PRSRV 0.5ML IM: CPT | Mod: S$GLB,,, | Performed by: NURSE PRACTITIONER

## 2023-09-29 PROCEDURE — 1170F FXNL STATUS ASSESSED: CPT | Mod: CPTII,S$GLB,, | Performed by: NURSE PRACTITIONER

## 2023-09-29 PROCEDURE — G0008 FLU VACCINE - QUADRIVALENT - ADJUVANTED: ICD-10-PCS | Mod: S$GLB,,, | Performed by: NURSE PRACTITIONER

## 2023-09-29 PROCEDURE — 1159F PR MEDICATION LIST DOCUMENTED IN MEDICAL RECORD: ICD-10-PCS | Mod: CPTII,S$GLB,, | Performed by: NURSE PRACTITIONER

## 2023-09-29 PROCEDURE — 3072F LOW RISK FOR RETINOPATHY: CPT | Mod: CPTII,S$GLB,, | Performed by: NURSE PRACTITIONER

## 2023-09-29 PROCEDURE — G0439 PPPS, SUBSEQ VISIT: HCPCS | Mod: S$GLB,,, | Performed by: NURSE PRACTITIONER

## 2023-09-29 PROCEDURE — 1126F PR PAIN SEVERITY QUANTIFIED, NO PAIN PRESENT: ICD-10-PCS | Mod: CPTII,S$GLB,, | Performed by: NURSE PRACTITIONER

## 2023-09-29 PROCEDURE — 99999 PR PBB SHADOW E&M-EST. PATIENT-LVL V: ICD-10-PCS | Mod: PBBFAC,,, | Performed by: NURSE PRACTITIONER

## 2023-09-29 PROCEDURE — 99999 PR PBB SHADOW E&M-EST. PATIENT-LVL V: CPT | Mod: PBBFAC,,, | Performed by: NURSE PRACTITIONER

## 2023-09-29 PROCEDURE — 1159F MED LIST DOCD IN RCRD: CPT | Mod: CPTII,S$GLB,, | Performed by: NURSE PRACTITIONER

## 2023-09-29 PROCEDURE — 3288F PR FALLS RISK ASSESSMENT DOCUMENTED: ICD-10-PCS | Mod: CPTII,S$GLB,, | Performed by: NURSE PRACTITIONER

## 2023-09-29 PROCEDURE — 1126F AMNT PAIN NOTED NONE PRSNT: CPT | Mod: CPTII,S$GLB,, | Performed by: NURSE PRACTITIONER

## 2023-09-29 PROCEDURE — 1170F PR FUNCTIONAL STATUS ASSESSED: ICD-10-PCS | Mod: CPTII,S$GLB,, | Performed by: NURSE PRACTITIONER

## 2023-09-29 PROCEDURE — G0008 ADMIN INFLUENZA VIRUS VAC: HCPCS | Mod: S$GLB,,, | Performed by: NURSE PRACTITIONER

## 2023-09-29 PROCEDURE — 3072F PR LOW RISK FOR RETINOPATHY: ICD-10-PCS | Mod: CPTII,S$GLB,, | Performed by: NURSE PRACTITIONER

## 2023-09-29 PROCEDURE — 1160F RVW MEDS BY RX/DR IN RCRD: CPT | Mod: CPTII,S$GLB,, | Performed by: NURSE PRACTITIONER

## 2023-09-29 RX ORDER — ONDANSETRON 4 MG/1
4 TABLET, FILM COATED ORAL EVERY 8 HOURS PRN
Qty: 10 TABLET | Refills: 0 | Status: SHIPPED | OUTPATIENT
Start: 2023-09-29

## 2023-09-29 NOTE — PROGRESS NOTES
"  Lázaro Wang presented for a  Medicare AWV and comprehensive Health Risk Assessment today. The following components were reviewed and updated:    Medical history  Family History  Social history  Allergies and Current Medications  Health Risk Assessment  Health Maintenance  Care Team     ** See Completed Assessments for Annual Wellness Visit within the encounter summary.**     The following assessments were completed:  Living Situation  CAGE  Depression Screening  Timed Get Up and Go  Whisper Test  Cognitive Function Screening      Nutrition Screening  ADL Screening  PAQ Screening    Review for Opioid Screening: Pt does have a rx for Norco 5/325mg every 6 hours prn. Followed by pcp ()  Review for Substance Use Disorders: Patient does not use substance      Vitals:    09/29/23 1310   BP: 130/72   BP Location: Left arm   Patient Position: Sitting   BP Method: Medium (Manual)   Pulse: 77   SpO2: 95%   Weight: 92.1 kg (203 lb 0.7 oz)   Height: 5' 7" (1.702 m)     Body mass index is 31.8 kg/m².  Physical Exam  Vitals reviewed.   Constitutional:       General: He is not in acute distress.  Pulmonary:      Effort: Pulmonary effort is normal. No respiratory distress.   Neurological:      Mental Status: He is alert and oriented to person, place, and time.   Psychiatric:         Mood and Affect: Mood normal.         Thought Content: Thought content normal.         Judgment: Judgment normal.     Diagnoses and health risks identified today and associated recommendations/orders:    1. Encounter for preventive health examination  Reviewed and discussed health maintenance.      2. 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 Chronic narcotic dependence  Stable- continue current treatment and follow up routinely with PCP     4. 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 Type 2 diabetes mellitus with complication  Stable- continue current treatment and follow up routinely with PCP   A1c-6.0 (9/2023)    6. Stage 3a chronic kidney disease  Stable- continue current treatment and follow up routinely with PCP   Avoid NSAIDs and increase fluids    7. Atrial fibrillation, unspecified type  Stable- continue current treatment and follow up routinely with PCP and cardiology ()    8. Atherosclerosis of aorta  Stable- continue current treatment and follow up routinely with PCP and cardiology ()    9. Bilateral carotid artery stenosis  Stable- continue current treatment and follow up routinely with PCP and cardiology ()    10. Coronary artery disease involving native coronary artery of native heart without angina pectoris  Stable- continue current treatment and follow up routinely with PCP and cardiology ()    11. Dyslipidemia  Stable- continue current treatment and follow up routinely with PCP and cardiology ()    12. Ectasis aorta  Stable- continue current treatment and follow up routinely with PCP and cardiology ()    13. Essential hypertension  Stable- continue current treatment and follow up routinely with PCP and cardiology ()    14. Pacemaker  Stable- continue current treatment and follow up routinely with PCP and cardiology ()    Provided Lázaro with a 5-10 year written screening schedule and personal prevention plan. Recommendations were developed using the USPSTF age appropriate recommendations. Education, counseling, and referrals were provided as needed. After Visit Summary printed and given to patient which includes a list of additional screenings\tests needed.    I offered to discuss advanced care planning, including how to pick a person who would make decisions for you if you were unable to make them for yourself, called a health care power of , and what kind of decisions  you might make such as use of life sustaining treatments such as ventilators and tube feeding when faced with a life limiting illness recorded on a living will that they will need to know. (How you want to be cared for as you near the end of your natural life)   X  Patient has advanced directives written and agrees to provide copies to the institution.  Latoya Palomo, NP

## 2023-09-29 NOTE — PATIENT INSTRUCTIONS
Counseling and Referral of Other Preventative  (Italic type indicates deductible and co-insurance are waived)    Patient Name: Lázaro Wang  Today's Date: 9/29/2023    Health Maintenance       Date Due Completion Date    Sign Pain Contract Never done ---    TETANUS VACCINE 05/05/2019 5/5/2009    COVID-19 Vaccine (4 - Pfizer risk series) 11/23/2021 9/28/2021    Shingles Vaccine (3 of 3) 09/07/2022 7/13/2022    Influenza Vaccine (1) 09/01/2023 9/27/2022    Eye Exam 09/06/2023 9/6/2022    Hemoglobin A1c 03/15/2024 9/15/2023    Aspirin/Antiplatelet Therapy 09/13/2024 9/13/2023    Lipid Panel 09/15/2024 9/15/2023    Diabetes Urine Screening 09/18/2024 9/18/2023        No orders of the defined types were placed in this encounter.      The following information is provided to all patients.  This information is to help you find resources for any of the problems found today that may be affecting your health:                Living healthy guide: www.[facility].[place].gov      Understanding Diabetes: www.diabetes.org      Eating healthy: www.cdc.gov/healthyweight      CDC home safety checklist: www.cdc.gov/steadi/patient.html      Agency on Aging: www.goea.[place].gov      Alcoholics anonymous (AA): www.aa.org      Physical Activity: www.shadi.nih.gov/wq8xggm      Tobacco use: www.quitwithusla.org

## 2023-10-03 RX ORDER — TAMSULOSIN HYDROCHLORIDE 0.4 MG/1
0.4 CAPSULE ORAL NIGHTLY
Qty: 90 CAPSULE | Refills: 3 | Status: SHIPPED | OUTPATIENT
Start: 2023-10-03

## 2023-10-03 NOTE — TELEPHONE ENCOUNTER
No care due was identified.  [facility] Embedded Care Due Messages. Reference number: [id number].   10/03/2023 9:59:27 AM CDT

## 2023-10-19 ENCOUNTER — PATIENT MESSAGE (OUTPATIENT)
Dept: OPTOMETRY | Facility: CLINIC | Age: 88
End: 2023-10-19
Payer: MEDICARE

## 2023-10-19 DIAGNOSIS — M51.9 LUMBAR DISC DISEASE: ICD-10-CM

## 2023-10-19 RX ORDER — HYDROCODONE BITARTRATE AND ACETAMINOPHEN 5; 325 MG/1; MG/1
1 TABLET ORAL EVERY 6 HOURS PRN
Qty: 120 TABLET | Refills: 0 | Status: ON HOLD | OUTPATIENT
Start: 2023-10-19 | End: 2023-10-22 | Stop reason: HOSPADM

## 2023-10-19 NOTE — TELEPHONE ENCOUNTER
----- Message from Hortencia Wynn sent at 10/19/2023  3:51 PM CDT -----  Type:  RX Refill Request    Who Called:  pt  Refill or New Rx:  refill  RX Name and Strength:  HYDROcodone-acetaminophen (NORCO) 5-325 mg per tablet  How is the patient currently taking it? (ex. 1XDay):  as direct  Is this a 30 day or 90 day RX:  30  Preferred Pharmacy with phone number:    [facility] [address] [address]  Phone: [phone] Fax: [phone]      Local or Mail Order:  local   Ordering Provider:  sylvia   Best Call Back Number:  [phone] (home) [phone] (work)    Additional Information:  please advise

## 2023-10-20 PROBLEM — I63.522 ACUTE ISCHEMIC LEFT ACA STROKE: Status: ACTIVE | Noted: 2023-10-20

## 2023-10-20 PROBLEM — I63.512 ACUTE ISCHEMIC LEFT MCA STROKE: Status: ACTIVE | Noted: 2023-10-20

## 2023-10-23 ENCOUNTER — TELEPHONE (OUTPATIENT)
Dept: PRIMARY CARE CLINIC | Facility: CLINIC | Age: 88
End: 2023-10-23
Payer: MEDICARE

## 2023-10-23 DIAGNOSIS — I63.522 ACUTE ISCHEMIC LEFT ACA STROKE: Primary | ICD-10-CM

## 2023-10-23 DIAGNOSIS — E11.43 TYPE II DIABETES MELLITUS WITH PERIPHERAL AUTONOMIC NEUROPATHY: Chronic | ICD-10-CM

## 2023-10-23 NOTE — TELEPHONE ENCOUNTER
Spoke with Joel at [facility] and she requested HH services for pt.  Nursing, PT and OT.  Referral pended.     Spoke with pt, updated him that [facility] requested HH services for pt and he is requesting a HH aide as well.  HFU appt scheduled on 10/25.

## 2023-10-23 NOTE — TELEPHONE ENCOUNTER
----- Message from Etelvina Irene sent at 10/23/2023  1:30 PM CDT -----  Regarding: Needs return call  Type: Needs Medical Advice  Who Called:  Nurse  mark Jimenez Call Back Number: [phone] ext [phone]  Additional Information: He just got out the hosp he is requesting home health physical and occupational therapy please call to advise.

## 2023-10-24 PROBLEM — I77.9 VERTEBRAL ARTERY DISEASE: Status: ACTIVE | Noted: 2023-10-24

## 2023-10-24 PROCEDURE — G0180 PR HOME HEALTH MD CERTIFICATION: ICD-10-PCS | Mod: ,,, | Performed by: FAMILY MEDICINE

## 2023-10-24 PROCEDURE — G0180 MD CERTIFICATION HHA PATIENT: HCPCS | Mod: ,,, | Performed by: FAMILY MEDICINE

## 2023-10-25 ENCOUNTER — TELEPHONE (OUTPATIENT)
Dept: NEUROLOGY | Facility: CLINIC | Age: 88
End: 2023-10-25
Payer: MEDICARE

## 2023-10-25 ENCOUNTER — TELEPHONE (OUTPATIENT)
Dept: OPTOMETRY | Facility: CLINIC | Age: 88
End: 2023-10-25
Payer: MEDICARE

## 2023-10-25 RX ORDER — OMEPRAZOLE 20 MG/1
CAPSULE, DELAYED RELEASE ORAL
Qty: 90 CAPSULE | Refills: 3 | Status: SHIPPED | OUTPATIENT
Start: 2023-10-25

## 2023-10-25 NOTE — TELEPHONE ENCOUNTER
----- Message from Marsha Francisco sent at 10/25/2023 11:03 AM CDT -----  Regarding: appt needed  Pt was scheduled for an appt today but it was cancelled due to him being in the hospital.  Pt showed up thinking he still had an appt.  Can someone please contact pt @ [phone] to reschedule.  Thank you

## 2023-10-25 NOTE — TELEPHONE ENCOUNTER
----- Message from Carol Cox sent at 10/25/2023  8:33 AM CDT -----  Contact: STMARY ELLEN  Type:  Sooner Appointment Request    Caller is requesting a sooner appointment.  Caller declined first available appointment listed below.  Caller will not accept being placed on the waitlist and is requesting a message be sent to doctor.  Name of Caller: Jillian Caicedo, JAMAL  When is the first available appointment? 02/2024  Symptoms: Pt need a 2 - 3 wk Hosp f/u   Would the patient rather a call back or a response via [facility]?  Call   Best Call Back Number:[phone]  Thank you...

## 2023-10-25 NOTE — TELEPHONE ENCOUNTER
Spoke to pt sweetie Victor. Appt scheduled with Vivi Osullivan NP for 10/31 for hosp f/u. Time and location discussed.

## 2023-10-26 ENCOUNTER — TELEPHONE (OUTPATIENT)
Dept: PRIMARY CARE CLINIC | Facility: CLINIC | Age: 88
End: 2023-10-26
Payer: MEDICARE

## 2023-10-26 NOTE — TELEPHONE ENCOUNTER
----- Message from Manuel Neumann sent at 10/26/2023  3:06 PM CDT -----  Contact: [phone]  Patient needs a Hosp follow up appt with their PCP only.       When is the next available appointment:  11/6/23    Symptoms:  stroke    Discharge date:10/22/23    Needs to be seen by: 7 days    Would you prefer an answer via [facility]?:  call      Comments:

## 2023-10-27 ENCOUNTER — OFFICE VISIT (OUTPATIENT)
Dept: PRIMARY CARE CLINIC | Facility: CLINIC | Age: 88
End: 2023-10-27
Payer: MEDICARE

## 2023-10-27 VITALS
OXYGEN SATURATION: 97 % | HEART RATE: 70 BPM | TEMPERATURE: 98 F | BODY MASS INDEX: 28.33 KG/M2 | RESPIRATION RATE: 18 BRPM | SYSTOLIC BLOOD PRESSURE: 110 MMHG | WEIGHT: 197.88 LBS | DIASTOLIC BLOOD PRESSURE: 72 MMHG | HEIGHT: 70 IN

## 2023-10-27 DIAGNOSIS — I48.91 ATRIAL FIBRILLATION, UNSPECIFIED TYPE: ICD-10-CM

## 2023-10-27 DIAGNOSIS — I10 ESSENTIAL HYPERTENSION: ICD-10-CM

## 2023-10-27 DIAGNOSIS — E11.8 TYPE 2 DIABETES MELLITUS WITH COMPLICATION: Chronic | ICD-10-CM

## 2023-10-27 DIAGNOSIS — I63.522 ACUTE ISCHEMIC LEFT ACA STROKE: Primary | ICD-10-CM

## 2023-10-27 PROCEDURE — 99215 OFFICE O/P EST HI 40 MIN: CPT | Mod: S$GLB,,, | Performed by: FAMILY MEDICINE

## 2023-10-27 PROCEDURE — 3072F PR LOW RISK FOR RETINOPATHY: ICD-10-PCS | Mod: CPTII,S$GLB,, | Performed by: FAMILY MEDICINE

## 2023-10-27 PROCEDURE — 99999 PR PBB SHADOW E&M-EST. PATIENT-LVL V: ICD-10-PCS | Mod: PBBFAC,,, | Performed by: FAMILY MEDICINE

## 2023-10-27 PROCEDURE — 3288F PR FALLS RISK ASSESSMENT DOCUMENTED: ICD-10-PCS | Mod: CPTII,S$GLB,, | Performed by: FAMILY MEDICINE

## 2023-10-27 PROCEDURE — 99215 PR OFFICE/OUTPT VISIT, EST, LEVL V, 40-54 MIN: ICD-10-PCS | Mod: S$GLB,,, | Performed by: FAMILY MEDICINE

## 2023-10-27 PROCEDURE — 1111F PR DISCHARGE MEDS RECONCILED W/ CURRENT OUTPATIENT MED LIST: ICD-10-PCS | Mod: CPTII,S$GLB,, | Performed by: FAMILY MEDICINE

## 2023-10-27 PROCEDURE — 3288F FALL RISK ASSESSMENT DOCD: CPT | Mod: CPTII,S$GLB,, | Performed by: FAMILY MEDICINE

## 2023-10-27 PROCEDURE — 1159F PR MEDICATION LIST DOCUMENTED IN MEDICAL RECORD: ICD-10-PCS | Mod: CPTII,S$GLB,, | Performed by: FAMILY MEDICINE

## 2023-10-27 PROCEDURE — 1101F PT FALLS ASSESS-DOCD LE1/YR: CPT | Mod: CPTII,S$GLB,, | Performed by: FAMILY MEDICINE

## 2023-10-27 PROCEDURE — 1126F PR PAIN SEVERITY QUANTIFIED, NO PAIN PRESENT: ICD-10-PCS | Mod: CPTII,S$GLB,, | Performed by: FAMILY MEDICINE

## 2023-10-27 PROCEDURE — 1101F PR PT FALLS ASSESS DOC 0-1 FALLS W/OUT INJ PAST YR: ICD-10-PCS | Mod: CPTII,S$GLB,, | Performed by: FAMILY MEDICINE

## 2023-10-27 PROCEDURE — 1159F MED LIST DOCD IN RCRD: CPT | Mod: CPTII,S$GLB,, | Performed by: FAMILY MEDICINE

## 2023-10-27 PROCEDURE — 99999 PR PBB SHADOW E&M-EST. PATIENT-LVL V: CPT | Mod: PBBFAC,,, | Performed by: FAMILY MEDICINE

## 2023-10-27 PROCEDURE — 1111F DSCHRG MED/CURRENT MED MERGE: CPT | Mod: CPTII,S$GLB,, | Performed by: FAMILY MEDICINE

## 2023-10-27 PROCEDURE — 3072F LOW RISK FOR RETINOPATHY: CPT | Mod: CPTII,S$GLB,, | Performed by: FAMILY MEDICINE

## 2023-10-27 PROCEDURE — 1160F RVW MEDS BY RX/DR IN RCRD: CPT | Mod: CPTII,S$GLB,, | Performed by: FAMILY MEDICINE

## 2023-10-27 PROCEDURE — 1160F PR REVIEW ALL MEDS BY PRESCRIBER/CLIN PHARMACIST DOCUMENTED: ICD-10-PCS | Mod: CPTII,S$GLB,, | Performed by: FAMILY MEDICINE

## 2023-10-27 PROCEDURE — 1126F AMNT PAIN NOTED NONE PRSNT: CPT | Mod: CPTII,S$GLB,, | Performed by: FAMILY MEDICINE

## 2023-10-27 NOTE — ASSESSMENT & PLAN NOTE
Rate controlled.  Patient was initiated on Eliquis after suspected embolic CVA.  Will continue to follow with Cardiology

## 2023-10-27 NOTE — PROGRESS NOTES
Primary Care Provider Appointment   [facility]       Patient ID: Lázaro Wang is a 89 y.o. male.    ASSESSMENT/PLAN by Problem List:    1. Acute ischemic left NEVAEH stroke  Overview:  Oct 2023 - small infarct in L superior frontal lobe without hemorrhagic conversion  no TNK, no residual deficit       2. Atrial fibrillation, unspecified type  Assessment & Plan:  Rate controlled.  Patient was initiated on Eliquis after suspected embolic CVA.  Will continue to follow with Cardiology      3. Essential hypertension  Assessment & Plan:  Blood pressure is satisfactory potentially mildly low.  Discussed orthostatic precautions and monitoring.  Adequate water intake but maintain reasonable low-sodium intake      4. Type 2 diabetes mellitus with complication  Assessment & Plan:  His diabetes remains satisfactory.  Continue healthy nutrition and monitoring.           Follow Up:  4-6 weeks    Forty-four + minutes of total time spent on the encounter, time includes face to face time, and some or all of the following: review of chart, lab, imaging, consultant notes, ER, hospital, documentation, care coordination, etc.      Subjective:     Chief Complaint   Patient presents with    Follow-up     I have reviewed the information entered by the ancillary staff regarding the chief complaint as well as the related history.    HPI    Patient is a/an 89 y.o.  male     Hospital follow-up.  He presented with acute mental status changes.  Workup suggested acute embolic CVA likely from atrial fibrillation.  Patient was transitioned to Eliquis.  Cardiology and Neurology consultations obtained.  Outpatient follow-up with Neurology and Cardiology recommended.  All measurable deficits appear resolved.  Perhaps still some fatigue appears to be back near baseline cognitive function.    For complete problem list, past medical history, surgical history, social history, etc., see appropriate section in the  "electronic medical record    Review of Systems   Constitutional:  Positive for fatigue.   HENT: Negative.     Respiratory: Negative.     Cardiovascular: Negative.    Gastrointestinal: Negative.    Genitourinary: Negative.    Musculoskeletal:  Positive for arthralgias and back pain.   Psychiatric/Behavioral:  Positive for sleep disturbance. Negative for dysphoric mood. The patient is not nervous/anxious.        Objective     Physical Exam  Constitutional:       General: He is not in acute distress.     Appearance: He is well-developed. He is not diaphoretic.   HENT:      Head: Normocephalic and atraumatic.      Ears:      Comments: Tube imbedded in wax right side  Eyes:      General: No scleral icterus.     Conjunctiva/sclera: Conjunctivae normal.   Cardiovascular:      Rate and Rhythm: Normal rate and regular rhythm.      Heart sounds: Murmur (2/6 systolic ejection murmur) heard.   Pulmonary:      Effort: Pulmonary effort is normal. No respiratory distress.      Breath sounds: No wheezing or rales.   Skin:     Comments: Multiple excoriations on the forearm although perhaps slightly improved, Continuing evidence of picking and scratching on his hands and forearms   Neurological:      Mental Status: He is alert and oriented to person, place, and time.      Cranial Nerves: No cranial nerve deficit.      Coordination: Coordination normal.      Comments: Ambulatory   Psychiatric:         Mood and Affect: Mood normal.         Behavior: Behavior normal.         Thought Content: Thought content normal.       Vitals:    10/27/23 1106   BP: 110/72   BP Location: Left arm   Patient Position: Sitting   BP Method: Large (Manual)   Pulse: 70   Resp: 18   Temp: 97.8 °F (36.6 °C)   TempSrc: Oral   SpO2: 97%   Weight: 89.8 kg (197 lb 13.8 oz)   Height: 5' 10" (1.778 m)           THIS DOCUMENT WAS MADE IN PART WITH VOICE RECOGNITION SOFTWARE.  OCCASIONALLY THIS SOFTWARE WILL MISINTERPRET WORDS OR PHRASES.    "

## 2023-10-31 ENCOUNTER — OFFICE VISIT (OUTPATIENT)
Dept: NEUROLOGY | Facility: CLINIC | Age: 88
End: 2023-10-31
Payer: MEDICARE

## 2023-10-31 VITALS
HEIGHT: 70 IN | DIASTOLIC BLOOD PRESSURE: 60 MMHG | RESPIRATION RATE: 16 BRPM | WEIGHT: 199.5 LBS | SYSTOLIC BLOOD PRESSURE: 99 MMHG | BODY MASS INDEX: 28.56 KG/M2 | HEART RATE: 71 BPM

## 2023-10-31 DIAGNOSIS — I25.10 CORONARY ARTERY DISEASE INVOLVING NATIVE CORONARY ARTERY OF NATIVE HEART WITHOUT ANGINA PECTORIS: Chronic | ICD-10-CM

## 2023-10-31 DIAGNOSIS — E11.43 TYPE II DIABETES MELLITUS WITH PERIPHERAL AUTONOMIC NEUROPATHY: Chronic | ICD-10-CM

## 2023-10-31 DIAGNOSIS — I77.9 VERTEBRAL ARTERY DISEASE: Primary | ICD-10-CM

## 2023-10-31 DIAGNOSIS — I48.91 ATRIAL FIBRILLATION, UNSPECIFIED TYPE: ICD-10-CM

## 2023-10-31 DIAGNOSIS — I65.23 BILATERAL CAROTID ARTERY STENOSIS: Chronic | ICD-10-CM

## 2023-10-31 DIAGNOSIS — Z86.73 HISTORY OF ISCHEMIC LEFT ACA STROKE: ICD-10-CM

## 2023-10-31 PROCEDURE — 3288F PR FALLS RISK ASSESSMENT DOCUMENTED: ICD-10-PCS | Mod: CPTII,S$GLB,, | Performed by: NURSE PRACTITIONER

## 2023-10-31 PROCEDURE — 99215 PR OFFICE/OUTPT VISIT, EST, LEVL V, 40-54 MIN: ICD-10-PCS | Mod: S$GLB,,, | Performed by: NURSE PRACTITIONER

## 2023-10-31 PROCEDURE — 99999 PR PBB SHADOW E&M-EST. PATIENT-LVL IV: ICD-10-PCS | Mod: PBBFAC,,, | Performed by: NURSE PRACTITIONER

## 2023-10-31 PROCEDURE — 1101F PT FALLS ASSESS-DOCD LE1/YR: CPT | Mod: CPTII,S$GLB,, | Performed by: NURSE PRACTITIONER

## 2023-10-31 PROCEDURE — 1159F MED LIST DOCD IN RCRD: CPT | Mod: CPTII,S$GLB,, | Performed by: NURSE PRACTITIONER

## 2023-10-31 PROCEDURE — 1111F DSCHRG MED/CURRENT MED MERGE: CPT | Mod: CPTII,S$GLB,, | Performed by: NURSE PRACTITIONER

## 2023-10-31 PROCEDURE — 99215 OFFICE O/P EST HI 40 MIN: CPT | Mod: S$GLB,,, | Performed by: NURSE PRACTITIONER

## 2023-10-31 PROCEDURE — 1159F PR MEDICATION LIST DOCUMENTED IN MEDICAL RECORD: ICD-10-PCS | Mod: CPTII,S$GLB,, | Performed by: NURSE PRACTITIONER

## 2023-10-31 PROCEDURE — 1126F AMNT PAIN NOTED NONE PRSNT: CPT | Mod: CPTII,S$GLB,, | Performed by: NURSE PRACTITIONER

## 2023-10-31 PROCEDURE — 1101F PR PT FALLS ASSESS DOC 0-1 FALLS W/OUT INJ PAST YR: ICD-10-PCS | Mod: CPTII,S$GLB,, | Performed by: NURSE PRACTITIONER

## 2023-10-31 PROCEDURE — 3072F PR LOW RISK FOR RETINOPATHY: ICD-10-PCS | Mod: CPTII,S$GLB,, | Performed by: NURSE PRACTITIONER

## 2023-10-31 PROCEDURE — 3072F LOW RISK FOR RETINOPATHY: CPT | Mod: CPTII,S$GLB,, | Performed by: NURSE PRACTITIONER

## 2023-10-31 PROCEDURE — 1126F PR PAIN SEVERITY QUANTIFIED, NO PAIN PRESENT: ICD-10-PCS | Mod: CPTII,S$GLB,, | Performed by: NURSE PRACTITIONER

## 2023-10-31 PROCEDURE — 3288F FALL RISK ASSESSMENT DOCD: CPT | Mod: CPTII,S$GLB,, | Performed by: NURSE PRACTITIONER

## 2023-10-31 PROCEDURE — 99999 PR PBB SHADOW E&M-EST. PATIENT-LVL IV: CPT | Mod: PBBFAC,,, | Performed by: NURSE PRACTITIONER

## 2023-10-31 PROCEDURE — 1111F PR DISCHARGE MEDS RECONCILED W/ CURRENT OUTPATIENT MED LIST: ICD-10-PCS | Mod: CPTII,S$GLB,, | Performed by: NURSE PRACTITIONER

## 2023-10-31 NOTE — PATIENT INSTRUCTIONS
"Continue the Eliquis to prevent strokes due to blood clots that can form in the heart from atrial fibrillation.     Continue the low dose aspirin to prevent blood vessel blockages.     Your cholesterol was not very high, but they started a cholesterol medication to help stabilize the blood vessels post stroke. Your PCP will recheck your cholesterol in a few months and may be able to stop this then.     STROKE EDUCATION:    During a stroke, blood stops flowing to part of the brain. This can damage areas in the brain that control the rest of the body. Symptoms after a stroke depend on which part of the brain has been affected. A TIA is often called a "mini stroke" and can be a warning sign for future strokes.     Stroke Risk Factors:  - Previous stroke  - High blood pressure  - High cholesterol  - Cigarette/cigar smoking  - Diabetes  - Carotid or other artery disease  - Atrial fibrillation/flutter  - Obesity  - Blood clotting disorders  - Excessive alcohol use  - Street drug use  - Family history of stroke    Call 911 right away if you have any of the following symptoms of stroke:  Weakness, tingling, or loss of feeling on one side of your face or body  Sudden double vision or trouble seeing in one or both eyes  Sudden trouble talking or slurred speech  Trouble understanding others  Sudden, severe headache  Dizziness, loss of balance, or a sense of falling  Blackouts or seizures     F.A.S.T. is an easy way to remember the signs of stroke. When you see these signs, you know that you need to call 911 FAST!   F is for face drooping. One side of the face is drooping or numb. When the person smiles, the smile is uneven.   A is for arm weakness. One arm is weak or numb. When the person lifts both arms at the same time, one arm may drift downward.   S is for speech difficulty. You may notice slurred speech or trouble speaking. The person can't repeat a simple sentence correctly when asked.   T is for time to call 911. If " someone shows any of these symptoms, even if they go away, call 911 immediately. Make note of the time the symptoms first appeared.

## 2023-10-31 NOTE — PROGRESS NOTES
"NEUROLOGY  Outpatient Follow Up Visit     [facility]  [address] [facility] [address], [place] [address]  [phone] (office) / [phone] (fax)    Patient Name:  Lázaro Wang  :  10/8/1934  MR #:  [id number]  Acct #:  [id number]    Date of  Visit: 10/31/2023    Other Physicians:  Brodie Trotter MD (Primary Care Physician); Brodie Trotter, * (Referring)      CHIEF COMPLAINT: Stroke (Hospital follow up)      INTERVAL HISTORY:  Lázaro Wang is a 89 y.o. R-handed male seen in hospital follow up for CVA.     The patient is new to me today, but was evaluated by Dr. Weller during a recent admission to [facility].     Medical history is otherwise significant for CAD w/stents, DM2, HTN, Afib, CKD3, SSS s/p PPM    Here today with his wife and daughter.     Presented with wake up symptoms of behavioral changes. Wife found him standing in the doorframe with a blank stare. He recalls trying to get OOB and falling onto the floor. In the ED, noted to have dysfluency and R lower > upper extremity drift. Imaging showed acute L NEVAEH CVA.     He was on ASA at the time of this event. He had recently been diagnosed with Afib, but was not on a DOAC. Discharged on Eliquis 5 mg BID and ASA reduced to 81 mg. Also started on Lipitor 40 mg. Tolerating new medications without any SE or bleeding issues.     TTE with non diagnostic bubble study. Started amiodarone and will follow up with Dr. Dominguez in a few weeks for monitoring.     He does not have any residual dysfluency or extremity weakness from the stroke. He has not had any recurrent neurological symptoms since IA.     Prior history:  [facility] Neurology Consultation:  "The patient is an 89-year-old man with coronary artery disease, multiple stents, diabetes mellitus type 2, hypertension recently diagnosed with atrial fibrillation, not yet on anticoagulation who was brought in by his family for some unusual behavior today.  According to his wife, he went to bed in his " "usual state of health and this morning he was seeing getting up and standing in the hallway with a flat expression on his face, nonverbal.  He did not respond to his wife and just did there.  EMS was called and the patient was brought into the hospital for evaluation.  The patient does not really recall the events.  Workup was done consistent with left NEVAEH stroke.  He was evaluated by stroke telemedicine, not appropriate for thrombolytics.  He was noted to be in atrial fibrillation, rate controlled.  At this point, Hospital Medicine was called for further evaluation.     Tele stroke evaluation with stroke neurology completed yesterday; recommended MRI brain as the patient is potentially eligible for IV thrombolytics on protocol criteria.  At the time of that evaluation, NIH stroke scale of 4 (reduced fluency, lower greater than upper extremity drift).  No large vessel occlusion on CT angiogram.  MRI reveals very subtle area of restricted diffusion in the left superior frontal lobe.  There is extensive confluent chronic hyperintensity on FLAIR, difficult to confidently determine diffusion/FLAIR mismatch.     Currently feels well, no complaints.  Does not feel he has any residual neurological deficits.  He does not recall any discussion with his cardiologist regarding starting a stronger blood thinner."       Allergies:  Review of patient's allergies indicates:   Allergen Reactions    No known drug allergies        Current Medications:  Current Outpatient Medications   Medication Sig Dispense Refill    amiodarone (PACERONE) 200 MG Tab Take 1 tablet (200 mg total) by mouth once daily. 2 po BID for 1 week then 1 po BID for 2 weeks then 1 po QD 90 tablet 6    apixaban (ELIQUIS) 5 mg Tab Take 1 tablet (5 mg total) by mouth 2 (two) times daily. 180 tablet 4    aspirin (ECOTRIN) 81 MG EC tablet Take 1 tablet (81 mg total) by mouth once daily. 30 tablet 11    atorvastatin (LIPITOR) 40 MG tablet Take 1 tablet (40 mg total) by " mouth once daily. 90 tablet 0    blood sugar diagnostic Strp One touch ultra test strips . Test two times a day DX E11.43 200 each 3    diabetic supplies, miscellan. Kit One true metrix testing device to be used two times a day DX E11.49 1 kit 1    ipratropium (ATROVENT) 42 mcg (0.06 %) nasal spray 2 sprays by Nasal route 2 (two) times daily as needed for Rhinitis. 15 mL 6    lancets (ONETOUCH DELICA LANCETS) 33 gauge Misc 1 lancet by Misc.(Non-Drug; Combo Route) route 2 (two) times daily. Trueplus 33guage lancet use two times a day. DX E11.40 200 each 4    metoprolol succinate (TOPROL-XL) 25 MG 24 hr tablet Take 1 tablet (25 mg total) by mouth once daily. 30 tablet 0    montelukast (SINGULAIR) 10 mg tablet TAKE ONE TABLET BY MOUTH IN THE EVENING 90 tablet 3    multivitamin capsule Take 1 capsule by mouth once daily.      nitroGLYCERIN (NITROQUICK) 0.4 MG SL tablet Place 1 tablet (0.4 mg total) under the tongue every 5 (five) minutes as needed. PRN 25 tablet 5    omeprazole (PRILOSEC) 20 MG capsule TAKE ONE CAPSULE BY MOUTH EVERY DAY 90 capsule 3    ondansetron (ZOFRAN) 4 MG tablet Take 1 tablet (4 mg total) by mouth every 8 (eight) hours as needed for Nausea. 10 tablet 0    QUEtiapine 150 mg Tab Take 150 mg by mouth every evening.      sertraline (ZOLOFT) 50 MG tablet TAKE ONE TABLET BY MOUTH EVERY DAY 90 tablet 2    tamsulosin (FLOMAX) 0.4 mg Cap Take 1 capsule (0.4 mg total) by mouth every evening. 90 capsule 3    VIT C/VIT E AC/LUT/COPPER/ZINC (PRESERVISION LUTEIN ORAL) Take by mouth once daily.       blood-glucose meter Misc 1 Units by Misc.(Non-Drug; Combo Route) route once daily. Test BS QD 1 each 0     No current facility-administered medications for this visit.       Past Medical History:  Past Medical History:   Diagnosis Date    Anticoagulant long-term use     325 asa    Anxiety     Arthritis     Carpal tunnel syndrome on right 09/2018    Cataract     OS    Chronic back pain     Coronary artery disease      stents x3    Diabetes mellitus     LBSL 70 today---stable    Elevated cholesterol     General anesthetics causing adverse effect in therapeutic use     confusion for 7-8 days following min tka    GERD (gastroesophageal reflux disease)     Heart disease     Hypertension     Myocardial infarction     Prostate disorder     Sinus pause 12/10/2018    Trouble in sleeping        Past Surgical History:  Past Surgical History:   Procedure Laterality Date    BIOPSY Right 09/25/2020    Procedure: BIOPSY right vocal cord;  Surgeon: Yves Ernst MD;  Location: [facility] OR;  Service: ENT;  Laterality: Right;    CARPAL TUNNEL RELEASE Right 09/24/2018    Procedure: RELEASE, CARPAL TUNNEL;  Surgeon: ADELINE Jorgensen MD;  Location: [facility];  Service: General;  Laterality: Right;    CATARACT EXTRACTION W/  INTRAOCULAR LENS IMPLANT Right 10/25/2022    Dr Sandoval    CIRCUMCISION      COLONOSCOPY  11/14/2008  Ray    Diverticulosis.  Small Internal hemorrhoids.    COLONOSCOPY N/A 05/25/2017    Procedure: COLONOSCOPY;  Surgeon: Nito Larsen MD;  Location: [facility] ENDO;  Service: Endoscopy;  Laterality: N/A;    COLONOSCOPY W/ POLYPECTOMY  12/2002    Adenomatous polyp    COLONOSCOPY W/ POLYPECTOMY  10/12/2005  Ray    One 2-3 mm polyp in the rectum. ADENOMATOUS POLYP.  Diverticulosis.  Internal hemorrhoids.      CORONARY ANGIOPLASTY WITH STENT PLACEMENT      x 3    CORONARY ANGIOPLASTY WITH STENT PLACEMENT      x2    EPIDURAL STEROID INJECTION INTO LUMBAR SPINE N/A 09/20/2021    Procedure: Injection-steroid-epidural-lumbar L5/S1;  Surgeon: Michael Gotti MD;  Location: [facility] OR;  Service: Pain Management;  Laterality: N/A;    ESOPHAGOGASTRODUODENOSCOPY  10/12/2005  Ray    Reflux esophagitis.  Small Hiatal Hernia.  Esophageal spasm?  Antral erythema.  CLOtest negative.    INJECTION OF ANESTHETIC AGENT AROUND MEDIAL BRANCH NERVES INNERVATING LUMBAR FACET JOINT Bilateral 07/21/2021    Procedure: Block-nerve-medial  "branch-lumbar L4/5 L5/S1;  Surgeon: Michael Gotti MD;  Location: [facility] OR;  Service: Pain Management;  Laterality: Bilateral;    INSERTION OF PACEMAKER N/A 12/10/2018    Procedure: INSERTION, PACEMAKER-BOSTON SCIENTIFIC;  Surgeon: Cj Stevens MD;  [facility];  Service: Cardiology;  Laterality: N/A;    LARYNGOSCOPY Right 09/25/2020    Procedure: LARYNGOSCOPY with excision;  Surgeon: Yves Ernst MD;  Location: [facility] OR;  Service: ENT;  Laterality: Right;    PHACOEMULSIFICATION OF CATARACT Right 10/25/2022    Procedure: PHACOEMULSIFICATION, CATARACT;  Surgeon: Shen Sandoval Jr., MD;  Location: [facility] OR;  Service: Ophthalmology;  Laterality: Right;  right    TOTAL KNEE ARTHROPLASTY Bilateral     bilateral       Family History:  family history includes COPD in his father; Cancer in his father; Cataracts in his mother; Glaucoma in his mother.    Social History:   reports that he has never smoked. He has never used smokeless tobacco. He reports that he does not currently use alcohol. He reports that he does not use drugs.      PHYSICAL EXAM:  BP 99/60 (BP Location: Right arm, Patient Position: Sitting, BP Method: Medium (Automatic))   Pulse 71   Resp 16   Ht 5' 10" (1.778 m)   Wt 90.5 kg (199 lb 8.3 oz)   BMI 28.63 kg/m²     General: Well groomed. NAD.  Pulmonary: Normal effort and rate.   Musculoskeletal: No obvious joint deformities, moves all extremities well.  Extremities: No clubbing, cyanosis or edema.     Neurological exam:  Mental status: Awake and alert.  Oriented to person, place, time and situation. Recent and remote memory appear to be intact.  Fund of knowledge normal. Normal attention and concentration.   Speech/Language: Fluent and appropriate. No dysarthria or aphasia on conversation. Able to follow complex commands.   Cranial nerves (II-XII): Visual fields full. Extraocular movements intact, no ptosis, no nystagmus. Facial sensation and symmetry intact bilaterally. Hearing " grossly intact. Palate and tongue deferred. Shoulder shrug normal bilaterally.   Motor: 5 out of 5 strength throughout the upper and lower extremities bilaterally. Normal bulk and tone. No abnormal movements noted. No drift appreciated.   Sensation: Intact to light touch.  DTR: 1+ at the knees and biceps bilaterally.  Coordination: Finger-nose-finger testing intact bilaterally. No tremor.   Gait: Normal gait.    DIAGNOSTIC DATA:  I have personally reviewed provider notes, labs and imaging made available to me today.     Imaging:  CT head 10/20/2023:  FINDINGS:  There is vascular calcification.  There is decreased attenuation in the periventricular white matter consistent with chronic ischemia.  There is no hemorrhage or extra-axial fluid collections noted.  There is mild atrophy.  No masses is seen no acute infarcts are noted.     Impression:  Chronic changes, no acute disease is seen.     CTA stroke multi phase 10/20/2023:  Impression:     1. No evidence for stenosis, occlusion, aneurysm, or thrombus of the intracranial vasculature.  2. Diminutive appearance of the posterior cerebral arteries again without evidence for stenosis, occlusion, aneurysm, or thrombus.  3. Occluded left vertebral artery distally with retrograde reconstitution from basilar artery and patent right vertebral artery.     MRI brain 10/20/2023:    FINDINGS:  Similar prominence of ventricles and sulci compatible generalized cerebral volume loss.  No extra-axial blood or fluid collections.     Small focus of restricted diffusion in the left superior frontal lobe (series 6, image 21).  There is FLAIR signal hyperintensity in this area which is continuous with diffuse patchy and confluent FLAIR signal hyperintensity throughout the supratentorial white matter which may reflect chronic microvascular ischemic change, posttreatment changes, or sequela of demyelination.  No foci of restricted diffusion identified elsewhere.  No intracranial hemorrhage.   No intracranial mass effect or midline shift.     Bone marrow signal intensity is normal.     Impression:     Small focus of restricted diffusion in the left superior frontal lobe concerning for acute infarct.  FLAIR signal hyperintensity in this area which appears continuous with diffuse patchy and confluent hyperintense FLAIR signal throughout the supratentorial white matter.  No intracranial hemorrhage identified.     Cardiac:  TTE 10/21/23:   Left Ventricle: The left ventricle is normal in size. Moderately increased wall thickness. There is concentric remodeling. There is normal systolic function. Ejection fraction by visual approximation is 55%.    Left Atrium: Left atrium is moderately dilated.    Right Ventricle: Normal right ventricular cavity size. Systolic function is normal.    Right Atrium: Right atrium is dilated.    Aortic Valve: Moderately calcified cusps. Moderately restricted motion. There is mild stenosis. Aortic valve area by VTI is 1.87 cm². Aortic valve peak velocity is 2.29 m/s. Mean gradient is 12 mmHg. The dimensionless index is 0.49. There is mild to moderate aortic regurgitation.    Mitral Valve: There is mild regurgitation.    Tricuspid Valve: There is mild regurgitation.    Pulmonary Artery: There is mild pulmonary hypertension. The estimated pulmonary artery systolic pressure is 43 mmHg.    IVC/SVC: Elevated venous pressure at 15 mmHg.    Nondiagnostic bubble study due to poor visualization.  Possible interatrial shunt with right-to-left flow visualized on color Doppler.  Consider further evaluation with transesophageal echo.    Labs:  CBC:   Lab Results   Component Value Date    WBC 6.99 10/22/2023    HGB 14.3 10/22/2023    HCT 43.0 10/22/2023     10/22/2023    MCV 97 10/22/2023    RDW 13.1 10/22/2023     BMP:   Lab Results   Component Value Date     10/22/2023    K 3.8 10/22/2023     10/22/2023    CO2 29 10/22/2023    BUN 17 10/22/2023    CREATININE 0.81 10/22/2023      (H) 10/22/2023    CALCIUM 8.7 10/22/2023    MG 1.6 10/21/2023    PHOS 2.9 10/21/2023     LFTS;   Lab Results   Component Value Date    PROT 7.0 10/22/2023    ALBUMIN 3.9 10/22/2023    BILITOT 1.1 10/22/2023    AST 34 10/22/2023    ALKPHOS 71 10/22/2023    ALT 20 10/22/2023     COAGS:   Lab Results   Component Value Date    INR 1.3 10/21/2023     FLP:   Lab Results   Component Value Date    CHOL 145 10/20/2023    HDL 56 10/20/2023    LDLCALC 74.6 10/20/2023    TRIG 72 10/20/2023    CHOLHDL 38.6 10/20/2023     Lab Results   Component Value Date    HGBA1C 6.2 (H) 10/20/2023     Lab Results   Component Value Date    TSH 1.410 10/20/2023       ASSESSMENT & PLAN:  Lázaro Wang is a 89 y.o. R-handed male seen in hospital follow up for CVA.     Problem List Items Addressed This Visit          Neuro    History of ischemic left NEVAEH stroke    Overview     Oct 2023 - small infarct in L superior frontal lobe without hemorrhagic conversion  no TNK, no residual deficit          Current Assessment & Plan     Diagnostic testing from recent hospitalization reviewed with patient and family  - MRI brain shows small acute ischemic CVA in the L superior frontal lobe, as well as diffuse chronic microangiopathy   - Vascular imaging negative for LVO or significant atherosclerotic disease   - TTE with EF of 55%. Moderate LAE. Valvular disease, pHTN. Non diagnostic bubble study with possible shunt.   - EKG confirmed Afib   - A1C 6.2 , LDL 74     Reviewed vascular RF: HTN, CAD, DM2, Afib      Etiology of CVA most likely cardioembolic. Cannot r/u small vessel etiology given significant degree of microangiopathy noted on MRI brain. Continue Eliquis and ASA 81 mg given vascular RF. Explained rationale for both agents.   No indication for SAUMYA from neuro standpoint -- would not change long term management.   Statin initiated for immediate post CVA endothelial effect -- can likely dc within 6 months, monitor for precipitous LDL lowering    BP at goal today  CVA education / ED precautions discussed               Vertebral artery disease - Primary    Overview     10/23  Occluded left vertebral artery distally with retrograde reconstitution from basilar artery and patent right vertebral artery.         Current Assessment & Plan     Unrelated to recent CVA  Continue efforts to maximize vascular health as noted             Cardiac/Vascular    CAD (coronary artery disease) (Chronic)    Overview     Reviewed previous intervention.  Patient does not report any angina or unstable symptoms.  He will continue to follow with Cardiology.    07/06/2008 LM, 30% stenosis; mid LAD, 90% stenosis; mid LCX, 90%             stenosis; RCA, 60% stenosis; PDA, 90% stenosis;                              Previous PCI:                                                                S/P stent to PDA, 07/31/2008                                                 S/P stent to LCX, 07/06/2008                                                 S/P stent to LAD, 07/06/2008           Carotid disease, bilateral (Chronic)    Current Assessment & Plan     No sig disease noted on CTA head and neck          Atrial fibrillation       Endocrine    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 (Chronic)    Overview     Patient has mild neuropathy.  Denies painful symptoms.  Continue monitoring diabetes.            Follow up: PRN    I spent a total of 46 minutes on the day of the visit.    This includes face to face time with the patient, as well as non-face to face time preparing for and completing the visit (review of prior diagnostic testing and clinical notes, obtaining or reviewing history, documenting clinical information in the EMR, independently interpreting and communicating results to the patient/family and coordinating ongoing care).               I appreciate the opportunity to participate in the care of this patient. Please feel free to contact me with any questions or  concerns.       Vivi Osullivan, [facility]  [facility]  [address] [place] [address]

## 2023-10-31 NOTE — ASSESSMENT & PLAN NOTE
Diagnostic testing from recent hospitalization reviewed with patient and family  - MRI brain shows small acute ischemic CVA in the L superior frontal lobe, as well as diffuse chronic microangiopathy   - Vascular imaging negative for LVO or significant atherosclerotic disease   - TTE with EF of 55%. Moderate LAE. Valvular disease, pHTN. Non diagnostic bubble study with possible shunt.   - EKG confirmed Afib   - A1C 6.2 , LDL 74     Reviewed vascular RF: HTN, CAD, DM2, Afib      Etiology of CVA most likely cardioembolic. Cannot r/u small vessel etiology given significant degree of microangiopathy noted on MRI brain. Continue Eliquis and ASA 81 mg given vascular RF. Explained rationale for both agents.   No indication for SAUMYA from neuro standpoint -- would not change long term management.   Statin initiated for immediate post CVA endothelial effect -- can likely dc within 6 months, monitor for precipitous LDL lowering   BP at goal today  CVA education / ED precautions discussed

## 2023-11-01 NOTE — ASSESSMENT & PLAN NOTE
Blood pressure is satisfactory potentially mildly low.  Discussed orthostatic precautions and monitoring.  Adequate water intake but maintain reasonable low-sodium intake

## 2023-11-03 ENCOUNTER — PATIENT MESSAGE (OUTPATIENT)
Dept: PRIMARY CARE CLINIC | Facility: CLINIC | Age: 88
End: 2023-11-03
Payer: MEDICARE

## 2023-11-13 RX ORDER — SERTRALINE HYDROCHLORIDE 50 MG/1
50 TABLET, FILM COATED ORAL DAILY
Qty: 90 TABLET | Refills: 3 | Status: SHIPPED | OUTPATIENT
Start: 2023-11-13 | End: 2023-12-18

## 2023-11-13 NOTE — TELEPHONE ENCOUNTER
No care due was identified.  Health [facility] Embedded Care Due Messages. Reference number: [id number].   11/13/2023 4:52:15 PM CST

## 2023-11-13 NOTE — TELEPHONE ENCOUNTER
No care due was identified.  Health [facility] Embedded Care Due Messages. Reference number: [id number].   11/13/2023 2:18:18 PM CST

## 2023-11-13 NOTE — TELEPHONE ENCOUNTER
Refill Decision Note   Lázaro Wang  is requesting a refill authorization.  Brief Assessment and Rationale for Refill:  Approve     Medication Therapy Plan:         Comments:   Comments:     No Care Gaps recommended.     Note composed:2:21 PM 11/13/2023

## 2023-11-14 RX ORDER — QUETIAPINE FUMARATE 150 MG/1
TABLET, FILM COATED ORAL
Qty: 180 TABLET | Refills: 0 | Status: SHIPPED | OUTPATIENT
Start: 2023-11-14

## 2023-11-22 DIAGNOSIS — M51.9 LUMBAR DISC DISEASE: ICD-10-CM

## 2023-11-22 RX ORDER — HYDROCODONE BITARTRATE AND ACETAMINOPHEN 5; 325 MG/1; MG/1
1 TABLET ORAL EVERY 6 HOURS PRN
Qty: 120 TABLET | Refills: 0 | Status: SHIPPED | OUTPATIENT
Start: 2023-11-22

## 2023-11-22 NOTE — TELEPHONE ENCOUNTER
----- Message from Maryam Christopher sent at 11/22/2023  1:44 PM CST -----  Contact: patient  Type:  RX Refill Request    Who Called: patient     Refill or New Rx:refill     RX Name and Strength: Hydrocodone     How is the patient currently taking it? (ex. 1XDay):     Is this a 30 day or 90 day RX: 30    Preferred Pharmacy with phone number:    [address] [address]  Phone: [phone] Fax: [phone]    Local or Mail Order:local     Ordering Provider:    Would the patient rather a call back or a response via [facility]? Call     Best Call Back Number:[phone] (home) [phone] (work)     Additional Information:

## 2023-11-22 NOTE — TELEPHONE ENCOUNTER
----- Message from Maryam Christopher sent at 11/22/2023  1:44 PM CST -----  Contact: patient  Type:  RX Refill Request    Who Called: patient     Refill or New Rx:refill     RX Name and Strength: Hydrocodone     How is the patient currently taking it? (ex. 1XDay):     Is this a 30 day or 90 day RX: 30    Preferred Pharmacy with phone number:    [address] [address]  Phone: [phone] Fax: [phone]    Local or Mail Order:local     Ordering Provider:    Would the patient rather a call back or a response via [facility]? Call     Best Call Back Number:[phone] (home) [phone] (work)     Additional Information:

## 2023-11-22 NOTE — TELEPHONE ENCOUNTER
Contacted pt to confirm this rx request is needed. I dont see that Dr Johnson has provided this rx, since 2014 & 2015. I wanted to confirm the medication and the dosage pt is take and reconfirm the quantity before forwarding this refill. No answer was received left vm to instruct pt to call back.

## 2023-11-24 ENCOUNTER — EXTERNAL HOME HEALTH (OUTPATIENT)
Dept: HOME HEALTH SERVICES | Facility: HOSPITAL | Age: 88
End: 2023-11-24
Payer: MEDICARE

## 2023-12-05 ENCOUNTER — DOCUMENT SCAN (OUTPATIENT)
Dept: HOME HEALTH SERVICES | Facility: HOSPITAL | Age: 88
End: 2023-12-05
Payer: MEDICARE

## 2023-12-08 ENCOUNTER — TELEPHONE (OUTPATIENT)
Dept: OPTOMETRY | Facility: CLINIC | Age: 88
End: 2023-12-08
Payer: MEDICARE

## 2023-12-13 ENCOUNTER — TELEPHONE (OUTPATIENT)
Dept: PRIMARY CARE CLINIC | Facility: CLINIC | Age: 88
End: 2023-12-13
Payer: MEDICARE

## 2023-12-13 DIAGNOSIS — F33.0 RECURRENT MILD MAJOR DEPRESSIVE DISORDER WITH ANXIETY: Primary | ICD-10-CM

## 2023-12-13 DIAGNOSIS — F41.9 ANXIETY: ICD-10-CM

## 2023-12-13 DIAGNOSIS — F41.9 RECURRENT MILD MAJOR DEPRESSIVE DISORDER WITH ANXIETY: Primary | ICD-10-CM

## 2023-12-13 NOTE — TELEPHONE ENCOUNTER
Spoke with pt, he reports that he gets this uncontrollable sobbing and he is not able to stop. Pt reports that he looked at his account at [employer] and it was half of what it was the day before.  He spoke with his rep and the rep hasn't found any reason why this has happened.  However, he called [employer] back and all of his money is back in his account.  Questioned pt if he depends on this money to provide food and shelter for him and he reports that he does not.      Pt reports that he has a problem with drinking and that he states that he has about 2 oz of liquor a night.  Occasionally, pt reports that he consumes 3 oz per night.  Pt reports that he cannot go to sleep and that he was up all night the other night.  Reports that he take Quetiapine and that it is not helping.  Informed pt that we have a therapist there that could assist pt with his concerns and pt agreeable to speaking with Lisa Murphy LCSW.  Referral pended.

## 2023-12-14 ENCOUNTER — TELEPHONE (OUTPATIENT)
Dept: PRIMARY CARE CLINIC | Facility: CLINIC | Age: 88
End: 2023-12-14
Payer: MEDICARE

## 2023-12-14 NOTE — TELEPHONE ENCOUNTER
"Patient's wife called stating the patient needs to be seen today by Dr. Trotter. I asked the patient's wife what was going on with the patient and the wife states the patient was "out of it and slurring and can't hardly open his eyes." I urged the patient's wife to get the patient to the nearest ER ASAP for neurological evaluation. She verbalized understanding and hung up the call.   "

## 2023-12-14 NOTE — TELEPHONE ENCOUNTER
"Spoke with pt's wife, Tonia, to see if she had called 911.  She reports that they did not.  I informed her that I consulted with Dr. Cronin regarding the conversation that she had with Richard Cooley RN and that she was advised to call 911.  Ms. Randall handed the phone to pt, Mr. Sesay while informing him that Dr. Cronin advised that he go to the Emergency Room.  Pt reports to his wife that he is not having a stroke.  Pt gets on the phone and  was advised pt that this is Dr. Cronin's recommendation.  Pt states, "That's not gonna happen."  Speech is noted to be clear at this time.  Call disconnected.  Updated Dr. Cronin.   "

## 2023-12-18 ENCOUNTER — LAB VISIT (OUTPATIENT)
Dept: LAB | Facility: HOSPITAL | Age: 88
End: 2023-12-18
Attending: FAMILY MEDICINE
Payer: MEDICARE

## 2023-12-18 ENCOUNTER — OFFICE VISIT (OUTPATIENT)
Dept: PRIMARY CARE CLINIC | Facility: CLINIC | Age: 88
End: 2023-12-18
Payer: MEDICARE

## 2023-12-18 VITALS
DIASTOLIC BLOOD PRESSURE: 62 MMHG | HEIGHT: 70 IN | OXYGEN SATURATION: 97 % | BODY MASS INDEX: 29.54 KG/M2 | HEART RATE: 69 BPM | SYSTOLIC BLOOD PRESSURE: 108 MMHG | WEIGHT: 206.38 LBS | RESPIRATION RATE: 18 BRPM

## 2023-12-18 DIAGNOSIS — F51.04 CHRONIC INSOMNIA: ICD-10-CM

## 2023-12-18 DIAGNOSIS — R06.02 SOB (SHORTNESS OF BREATH): ICD-10-CM

## 2023-12-18 DIAGNOSIS — E11.8 TYPE 2 DIABETES MELLITUS WITH COMPLICATION: Primary | Chronic | ICD-10-CM

## 2023-12-18 DIAGNOSIS — F33.0 RECURRENT MILD MAJOR DEPRESSIVE DISORDER WITH ANXIETY: Chronic | ICD-10-CM

## 2023-12-18 DIAGNOSIS — F41.9 RECURRENT MILD MAJOR DEPRESSIVE DISORDER WITH ANXIETY: Chronic | ICD-10-CM

## 2023-12-18 DIAGNOSIS — F10.20 ALCOHOL USE DISORDER, MODERATE, DEPENDENCE: ICD-10-CM

## 2023-12-18 DIAGNOSIS — R06.09 DOE (DYSPNEA ON EXERTION): ICD-10-CM

## 2023-12-18 LAB
ANION GAP SERPL CALC-SCNC: 7 MMOL/L (ref 8–16)
BASOPHILS # BLD AUTO: 0.02 K/UL (ref 0–0.2)
BASOPHILS NFR BLD: 0.3 % (ref 0–1.9)
BNP SERPL-MCNC: 196 PG/ML (ref 0–99)
BUN SERPL-MCNC: 17 MG/DL (ref 8–23)
CALCIUM SERPL-MCNC: 8.8 MG/DL (ref 8.7–10.5)
CHLORIDE SERPL-SCNC: 105 MMOL/L (ref 95–110)
CO2 SERPL-SCNC: 25 MMOL/L (ref 23–29)
CREAT SERPL-MCNC: 1.1 MG/DL (ref 0.5–1.4)
DIFFERENTIAL METHOD: ABNORMAL
EOSINOPHIL # BLD AUTO: 0.1 K/UL (ref 0–0.5)
EOSINOPHIL NFR BLD: 1.3 % (ref 0–8)
ERYTHROCYTE [DISTWIDTH] IN BLOOD BY AUTOMATED COUNT: 14.7 % (ref 11.5–14.5)
EST. GFR  (NO RACE VARIABLE): >60 ML/MIN/1.73 M^2
ETHANOL SERPL-MCNC: 136 MG/DL
GLUCOSE SERPL-MCNC: 100 MG/DL (ref 70–110)
HCT VFR BLD AUTO: 30.9 % (ref 40–54)
HGB BLD-MCNC: 10 G/DL (ref 14–18)
IMM GRANULOCYTES # BLD AUTO: 0.04 K/UL (ref 0–0.04)
IMM GRANULOCYTES NFR BLD AUTO: 0.6 % (ref 0–0.5)
LYMPHOCYTES # BLD AUTO: 1.8 K/UL (ref 1–4.8)
LYMPHOCYTES NFR BLD: 25.6 % (ref 18–48)
MCH RBC QN AUTO: 28.4 PG (ref 27–31)
MCHC RBC AUTO-ENTMCNC: 32.4 G/DL (ref 32–36)
MCV RBC AUTO: 88 FL (ref 82–98)
MONOCYTES # BLD AUTO: 1 K/UL (ref 0.3–1)
MONOCYTES NFR BLD: 13.9 % (ref 4–15)
NEUTROPHILS # BLD AUTO: 4.2 K/UL (ref 1.8–7.7)
NEUTROPHILS NFR BLD: 58.3 % (ref 38–73)
NRBC BLD-RTO: 0 /100 WBC
PLATELET # BLD AUTO: 228 K/UL (ref 150–450)
PMV BLD AUTO: 10.4 FL (ref 9.2–12.9)
POTASSIUM SERPL-SCNC: 4.2 MMOL/L (ref 3.5–5.1)
RBC # BLD AUTO: 3.52 M/UL (ref 4.6–6.2)
SODIUM SERPL-SCNC: 137 MMOL/L (ref 136–145)
WBC # BLD AUTO: 7.12 K/UL (ref 3.9–12.7)

## 2023-12-18 PROCEDURE — 1126F AMNT PAIN NOTED NONE PRSNT: CPT | Mod: CPTII,S$GLB,, | Performed by: FAMILY MEDICINE

## 2023-12-18 PROCEDURE — 99215 PR OFFICE/OUTPT VISIT, EST, LEVL V, 40-54 MIN: ICD-10-PCS | Mod: S$GLB,,, | Performed by: FAMILY MEDICINE

## 2023-12-18 PROCEDURE — 3288F PR FALLS RISK ASSESSMENT DOCUMENTED: ICD-10-PCS | Mod: CPTII,S$GLB,, | Performed by: FAMILY MEDICINE

## 2023-12-18 PROCEDURE — 36415 COLL VENOUS BLD VENIPUNCTURE: CPT | Mod: PN | Performed by: FAMILY MEDICINE

## 2023-12-18 PROCEDURE — 99999 PR PBB SHADOW E&M-EST. PATIENT-LVL V: ICD-10-PCS | Mod: PBBFAC,,, | Performed by: FAMILY MEDICINE

## 2023-12-18 PROCEDURE — 1126F PR PAIN SEVERITY QUANTIFIED, NO PAIN PRESENT: ICD-10-PCS | Mod: CPTII,S$GLB,, | Performed by: FAMILY MEDICINE

## 2023-12-18 PROCEDURE — 3072F LOW RISK FOR RETINOPATHY: CPT | Mod: CPTII,S$GLB,, | Performed by: FAMILY MEDICINE

## 2023-12-18 PROCEDURE — 82077 ASSAY SPEC XCP UR&BREATH IA: CPT | Performed by: FAMILY MEDICINE

## 2023-12-18 PROCEDURE — 85025 COMPLETE CBC W/AUTO DIFF WBC: CPT | Performed by: FAMILY MEDICINE

## 2023-12-18 PROCEDURE — 1160F RVW MEDS BY RX/DR IN RCRD: CPT | Mod: CPTII,S$GLB,, | Performed by: FAMILY MEDICINE

## 2023-12-18 PROCEDURE — 83880 ASSAY OF NATRIURETIC PEPTIDE: CPT | Performed by: FAMILY MEDICINE

## 2023-12-18 PROCEDURE — 1160F PR REVIEW ALL MEDS BY PRESCRIBER/CLIN PHARMACIST DOCUMENTED: ICD-10-PCS | Mod: CPTII,S$GLB,, | Performed by: FAMILY MEDICINE

## 2023-12-18 PROCEDURE — 3288F FALL RISK ASSESSMENT DOCD: CPT | Mod: CPTII,S$GLB,, | Performed by: FAMILY MEDICINE

## 2023-12-18 PROCEDURE — 80048 BASIC METABOLIC PNL TOTAL CA: CPT | Performed by: FAMILY MEDICINE

## 2023-12-18 PROCEDURE — 99215 OFFICE O/P EST HI 40 MIN: CPT | Mod: S$GLB,,, | Performed by: FAMILY MEDICINE

## 2023-12-18 PROCEDURE — 1101F PR PT FALLS ASSESS DOC 0-1 FALLS W/OUT INJ PAST YR: ICD-10-PCS | Mod: CPTII,S$GLB,, | Performed by: FAMILY MEDICINE

## 2023-12-18 PROCEDURE — 3072F PR LOW RISK FOR RETINOPATHY: ICD-10-PCS | Mod: CPTII,S$GLB,, | Performed by: FAMILY MEDICINE

## 2023-12-18 PROCEDURE — 1101F PT FALLS ASSESS-DOCD LE1/YR: CPT | Mod: CPTII,S$GLB,, | Performed by: FAMILY MEDICINE

## 2023-12-18 PROCEDURE — 99999 PR PBB SHADOW E&M-EST. PATIENT-LVL V: CPT | Mod: PBBFAC,,, | Performed by: FAMILY MEDICINE

## 2023-12-18 PROCEDURE — 1159F PR MEDICATION LIST DOCUMENTED IN MEDICAL RECORD: ICD-10-PCS | Mod: CPTII,S$GLB,, | Performed by: FAMILY MEDICINE

## 2023-12-18 PROCEDURE — 1159F MED LIST DOCD IN RCRD: CPT | Mod: CPTII,S$GLB,, | Performed by: FAMILY MEDICINE

## 2023-12-18 RX ORDER — SERTRALINE HYDROCHLORIDE 100 MG/1
100 TABLET, FILM COATED ORAL DAILY
Qty: 90 TABLET | Refills: 1 | Status: SHIPPED | OUTPATIENT
Start: 2023-12-18

## 2023-12-18 NOTE — ASSESSMENT & PLAN NOTE
Patient admits to drinking about 3 oz of bourbon every evening.  His wife indicates that she feels at times he may be evasive and drinking more.  I did smell alcohol on his breath today, since her sending him for labs I will check a level just for information.  I recommended help including AA but he declines.  He is willing to consult with Psychiatry neuropsych more to help with his insomnia.  Regarding his history of drinking he states in his 40s I had a problem at that time he apparently underwent inpatient treatment and did alcoholics anonymous for awhile.  Although apparently he never stopped drinking after this just cut back.  There may have been brief periods where he did not drink during his adult life but it is sounding more now that this is been an ongoing problem that he has been somewhat evasive about.  He was advised that this is aggravating his longstanding insomnia and affecting other issues.  He has worsening cognition with age, unclear how much of this may be related to progressive mild cognitive impairment versus related to his alcohol intake.  The patient, his daughter-in-law, and his wife were advised that the patient himself has to be willing to receive help and participate and right now he does not seem to be very willing for any outside help.  He states he will cut back on his own but I am not very optimistic.  Will have him follow back in about four weeks.  Encouraged him to follow-up with psychiatry as well as with our  here for ongoing counseling regarding depression.

## 2023-12-18 NOTE — ASSESSMENT & PLAN NOTE
He does have chronic shortness of breath and dyspnea with exertion.  Family notes that they have noticed this more often.  He did not really acknowledge this when where the other.  On exam his lungs were clear.  Will check a BNP as a precaution and ultimately will need to follow back with Cardiology.  If worsening or unstable symptoms then to the emergency room.

## 2023-12-18 NOTE — PROGRESS NOTES
Primary Care Provider Appointment   [facility] [place]       Patient ID: Lázaro Wang is a 89 y.o. male.    ASSESSMENT/PLAN by Problem List:    1. Type 2 diabetes mellitus with complication  Assessment & Plan:  Diabetes has been satisfactory.  Patient missed his appointment with optometry.  Will try to help get him scheduled.  No reported hypoglycemia.  Remains diet controlled.  No high-risk medications for hypoglycemia.    Orders:  -     Ambulatory referral/consult to Home Health; Future; Expected date: 12/19/2023    2. Chronic insomnia  Overview:  Chronic insomnia for many years.  Many years ago he was started on Seroquel which worked really well although over the years the dosage has been gradually increased.   We previously discussed potential side effects from this medication long-term concerns.  With a recent fall we must also be concerned about increased risk.  Unfortunately he has been supplementing 1-2 drinks every night (shots of bourbon).  I advised against this.  Discussed how alcohol only adversely affect sleep in the long run not dementia increases other risk of complication.  He also has a history of alcohol abuse and was hospitalized in his 30s but he feels it is no longer a problem but I encouraged him to reconsider.  He is agreeable to consider cognitive behavioral therapy.  Referral placed.  Maybe we can encourage him to discontinue alcohol and try to gradually reduce the Seroquel over time.  Although I have been suspicious about an underlying mood disorder, can not even exclude possibility of mild bipolar disorder as Seroquel seem to improve his quality of life, mood and overall well-being when this was started many years ago.    Orders:  -     Ambulatory referral/consult to Adult Neuropsychology; Future; Expected date: 12/25/2023  -     Ambulatory referral/consult to Psychiatry; Future; Expected date: 12/25/2023  -     Ambulatory referral/consult to Home Health;  Future; Expected date: 12/19/2023    3. Alcohol use disorder, moderate, dependence  Assessment & Plan:  Patient admits to drinking about 3 oz of bourbon every evening.  His wife indicates that she feels at times he may be evasive and drinking more.  I did smell alcohol on his breath today, since her sending him for labs I will check a level just for information.  I recommended help including AA but he declines.  He is willing to consult with Psychiatry neuropsych more to help with his insomnia.  Regarding his history of drinking he states in his 40s I had a problem at that time he apparently underwent inpatient treatment and did alcoholics anonymous for awhile.  Although apparently he never stopped drinking after this just cut back.  There may have been brief periods where he did not drink during his adult life but it is sounding more now that this is been an ongoing problem that he has been somewhat evasive about.  He was advised that this is aggravating his longstanding insomnia and affecting other issues.  He has worsening cognition with age, unclear how much of this may be related to progressive mild cognitive impairment versus related to his alcohol intake.  The patient, his daughter-in-law, and his wife were advised that the patient himself has to be willing to receive help and participate and right now he does not seem to be very willing for any outside help.  He states he will cut back on his own but I am not very optimistic.  Will have him follow back in about four weeks.  Encouraged him to follow-up with psychiatry as well as with our  here for ongoing counseling regarding depression.    Orders:  -     Ambulatory referral/consult to Adult Neuropsychology; Future; Expected date: 12/25/2023  -     Ambulatory referral/consult to Psychiatry; Future; Expected date: 12/25/2023  -     ALCOHOL,MEDICAL (ETHANOL); Future; Expected date: 12/18/2023    4. SOB (shortness of breath)  -     B-TYPE  NATRIURETIC PEPTIDE; Future; Expected date: 12/18/2023  -     Basic Metabolic Panel; Future; Expected date: 12/18/2023  -     CBC Auto Differential; Future; Expected date: 12/18/2023  -     Ambulatory referral/consult to Home Health; Future; Expected date: 12/19/2023    5. SMALLS (dyspnea on exertion)  Assessment & Plan:  He does have chronic shortness of breath and dyspnea with exertion.  Family notes that they have noticed this more often.  He did not really acknowledge this when where the other.  On exam his lungs were clear.  Will check a BNP as a precaution and ultimately will need to follow back with Cardiology.  If worsening or unstable symptoms then to the emergency room.      6. 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  Encouraged follow-up with Lisa  for cognitive behavioral therapy and counseling.  Referrals also placed to neuropsych.      Other orders  -     sertraline (ZOLOFT) 100 MG tablet; Take 1 tablet (100 mg total) by mouth once daily.  Dispense: 90 tablet; Refill: 1       Further discussion: Will check blood alcohol level as I suspect he has already been drinking today.  Evaluation of his behavior, insomnia, memory loss, verses depression, etc. really can not be evaluated while he drinking alcohol.  He has a problem.  He is not willing to admit at this time.  We can offer advice and recommendations however it was made clear to his family that really none of us can help him unless he is willing to get help and make some changes.    Follow Up:  One month    43+ minutes of total time spent on the encounter, time includes face to face time, and some or all of the following: review of chart, lab, imaging, consultant notes, ER, hospital, documentation, care coordination, etc.      Subjective:     Chief Complaint   Patient presents with    Follow-up    Shortness of Breath    not feeling well     Pt states he's not feeling  well, breathing harder than normal and everything seems to be in a blur/fuzz    Insomnia     Pt states he has trouble sleeping.      I have reviewed the information entered by the ancillary staff regarding the chief complaint as well as the related history.    HPI    Patient is a/an 89 y.o.  male     He is here with his wife and his stepdaughter.  His only complaint is that he has not sleeping well.  He states he has not anxious or worried just has trouble falling asleep.  Insomnia has been a chronic concern.      His family's concerns are quite different.  They state he is drinking alcohol.  Feel he is somewhat evasive about it at times.  There have been episodes of emotional outbursts, confused behavior, etc..  The patient states he has about 3 oz of bourbon every evening.  States he never drinks during the day.    For complete problem list, past medical history, surgical history, social history, etc., see appropriate section in the electronic medical record    Review of Systems   HENT: Negative.     Respiratory: Negative.     Cardiovascular: Negative.    Gastrointestinal: Negative.    Genitourinary: Negative.    Musculoskeletal:  Positive for arthralgias and neck stiffness.   Psychiatric/Behavioral:  Positive for sleep disturbance. Negative for dysphoric mood and suicidal ideas. The patient is not nervous/anxious.        Objective     Physical Exam  Constitutional:       General: He is not in acute distress.     Appearance: He is well-developed. He is not diaphoretic.      Comments: Faint smell of alcohol   HENT:      Head: Normocephalic and atraumatic.   Eyes:      General: No scleral icterus.     Conjunctiva/sclera: Conjunctivae normal.   Cardiovascular:      Rate and Rhythm: Normal rate and regular rhythm.      Heart sounds: Murmur (2/6 systolic ejection murmur) heard.   Pulmonary:      Effort: Pulmonary effort is normal. No respiratory distress.      Breath sounds: No wheezing or rales.   Skin:     Comments:  "Continuing evidence of picking and scratching on his hands and forearms   Neurological:      General: No focal deficit present.      Mental Status: He is alert.      Coordination: Coordination normal.   Psychiatric:         Behavior: Behavior normal.       Vitals:    12/18/23 1411   BP: 108/62   BP Location: Left arm   Patient Position: Sitting   BP Method: Large (Manual)   Pulse: 69   Resp: 18   SpO2: 97%   Weight: 93.6 kg (206 lb 5.6 oz)   Height: 5' 10" (1.778 m)           THIS DOCUMENT WAS MADE IN PART WITH VOICE RECOGNITION SOFTWARE.  OCCASIONALLY THIS SOFTWARE WILL MISINTERPRET WORDS OR PHRASES.    "

## 2023-12-18 NOTE — ASSESSMENT & PLAN NOTE
Increase sertraline to 100 mg daily.  Encouraged follow-up with Lisa  for cognitive behavioral therapy and counseling.  Referrals also placed to neuropsych.

## 2023-12-18 NOTE — ASSESSMENT & PLAN NOTE
Diabetes has been satisfactory.  Patient missed his appointment with optometry.  Will try to help get him scheduled.  No reported hypoglycemia.  Remains diet controlled.  No high-risk medications for hypoglycemia.

## 2023-12-19 ENCOUNTER — PATIENT MESSAGE (OUTPATIENT)
Dept: PSYCHIATRY | Facility: CLINIC | Age: 88
End: 2023-12-19
Payer: MEDICARE

## 2023-12-19 ENCOUNTER — TELEPHONE (OUTPATIENT)
Dept: PRIMARY CARE CLINIC | Facility: CLINIC | Age: 88
End: 2023-12-19
Payer: MEDICARE

## 2023-12-19 NOTE — TELEPHONE ENCOUNTER
Clinician contacted pt secondary to referral from PCP. This is his daughter's number. She instructed clinician to call pt at [phone] after 10:00 am. Clinician will follow up.

## 2023-12-20 ENCOUNTER — TELEPHONE (OUTPATIENT)
Dept: PSYCHIATRY | Facility: CLINIC | Age: 88
End: 2023-12-20
Payer: MEDICARE

## 2023-12-20 ENCOUNTER — TELEPHONE (OUTPATIENT)
Dept: PRIMARY CARE CLINIC | Facility: CLINIC | Age: 88
End: 2023-12-20
Payer: MEDICARE

## 2023-12-20 PROCEDURE — G0180 MD CERTIFICATION HHA PATIENT: HCPCS | Mod: ,,, | Performed by: FAMILY MEDICINE

## 2023-12-20 NOTE — TELEPHONE ENCOUNTER
Clinician contacted pt regarding referral for behavioral health services. Pt verbalized understanding of service provided, and agreed to make initial appointment.He is scheduled on 12/26/2023 at 1:30 for initial BHI. He was encouraged to contact clinician if need to reschedule.

## 2023-12-20 NOTE — TELEPHONE ENCOUNTER
Spoke with patient in regards to medication management referral. Patient states he is not in need of services due to being managed by PCP and home health.

## 2023-12-26 ENCOUNTER — OFFICE VISIT (OUTPATIENT)
Dept: PRIMARY CARE CLINIC | Facility: CLINIC | Age: 88
End: 2023-12-26
Payer: MEDICARE

## 2023-12-26 ENCOUNTER — CLINICAL SUPPORT (OUTPATIENT)
Dept: PRIMARY CARE CLINIC | Facility: CLINIC | Age: 88
End: 2023-12-26
Payer: MEDICARE

## 2023-12-26 VITALS
DIASTOLIC BLOOD PRESSURE: 68 MMHG | RESPIRATION RATE: 18 BRPM | HEIGHT: 70 IN | OXYGEN SATURATION: 96 % | WEIGHT: 206.38 LBS | HEART RATE: 72 BPM | BODY MASS INDEX: 29.54 KG/M2 | TEMPERATURE: 98 F | SYSTOLIC BLOOD PRESSURE: 114 MMHG

## 2023-12-26 DIAGNOSIS — F10.20 ALCOHOL USE DISORDER, MODERATE, DEPENDENCE: Primary | ICD-10-CM

## 2023-12-26 DIAGNOSIS — F32.A DEPRESSIVE DISORDER: Primary | ICD-10-CM

## 2023-12-26 DIAGNOSIS — F10.930 ALCOHOL WITHDRAWAL SYNDROME WITHOUT COMPLICATION: ICD-10-CM

## 2023-12-26 DIAGNOSIS — R11.0 NAUSEA: ICD-10-CM

## 2023-12-26 PROCEDURE — 99214 PR OFFICE/OUTPT VISIT, EST, LEVL IV, 30-39 MIN: ICD-10-PCS | Mod: S$GLB,,, | Performed by: FAMILY MEDICINE

## 2023-12-26 PROCEDURE — 1160F PR REVIEW ALL MEDS BY PRESCRIBER/CLIN PHARMACIST DOCUMENTED: ICD-10-PCS | Mod: CPTII,S$GLB,, | Performed by: FAMILY MEDICINE

## 2023-12-26 PROCEDURE — 1126F PR PAIN SEVERITY QUANTIFIED, NO PAIN PRESENT: ICD-10-PCS | Mod: CPTII,S$GLB,, | Performed by: FAMILY MEDICINE

## 2023-12-26 PROCEDURE — 1126F AMNT PAIN NOTED NONE PRSNT: CPT | Mod: CPTII,S$GLB,, | Performed by: FAMILY MEDICINE

## 2023-12-26 PROCEDURE — 99214 OFFICE O/P EST MOD 30 MIN: CPT | Mod: S$GLB,,, | Performed by: FAMILY MEDICINE

## 2023-12-26 PROCEDURE — 1160F RVW MEDS BY RX/DR IN RCRD: CPT | Mod: CPTII,S$GLB,, | Performed by: FAMILY MEDICINE

## 2023-12-26 PROCEDURE — 3072F PR LOW RISK FOR RETINOPATHY: ICD-10-PCS | Mod: CPTII,S$GLB,, | Performed by: FAMILY MEDICINE

## 2023-12-26 PROCEDURE — 99999 PR PBB SHADOW E&M-EST. PATIENT-LVL V: CPT | Mod: PBBFAC,,, | Performed by: FAMILY MEDICINE

## 2023-12-26 PROCEDURE — 1159F MED LIST DOCD IN RCRD: CPT | Mod: CPTII,S$GLB,, | Performed by: FAMILY MEDICINE

## 2023-12-26 PROCEDURE — 3288F PR FALLS RISK ASSESSMENT DOCUMENTED: ICD-10-PCS | Mod: CPTII,S$GLB,, | Performed by: FAMILY MEDICINE

## 2023-12-26 PROCEDURE — 3288F FALL RISK ASSESSMENT DOCD: CPT | Mod: CPTII,S$GLB,, | Performed by: FAMILY MEDICINE

## 2023-12-26 PROCEDURE — 1101F PT FALLS ASSESS-DOCD LE1/YR: CPT | Mod: CPTII,S$GLB,, | Performed by: FAMILY MEDICINE

## 2023-12-26 PROCEDURE — 99999 PR PBB SHADOW E&M-EST. PATIENT-LVL V: ICD-10-PCS | Mod: PBBFAC,,, | Performed by: FAMILY MEDICINE

## 2023-12-26 PROCEDURE — 3072F LOW RISK FOR RETINOPATHY: CPT | Mod: CPTII,S$GLB,, | Performed by: FAMILY MEDICINE

## 2023-12-26 PROCEDURE — 1101F PR PT FALLS ASSESS DOC 0-1 FALLS W/OUT INJ PAST YR: ICD-10-PCS | Mod: CPTII,S$GLB,, | Performed by: FAMILY MEDICINE

## 2023-12-26 PROCEDURE — 1159F PR MEDICATION LIST DOCUMENTED IN MEDICAL RECORD: ICD-10-PCS | Mod: CPTII,S$GLB,, | Performed by: FAMILY MEDICINE

## 2023-12-26 NOTE — PROGRESS NOTES
Primary Care Provider Appointment   [facility]       Patient ID: Lázaro Wang is a 89 y.o. male.    ASSESSMENT/PLAN by Problem List:    1. Alcohol use disorder, moderate, dependence    2. Alcohol withdrawal syndrome without complication    3. Nausea       Emergency room follow-up from earlier this morning.  Patient with alcohol use disorder as I discussed in my last note last week.  Apparently he in his wife got into an argument about four days ago and she removed all alcohol from the house causing him to abruptly withdrawal.  Early this morning he went to the emergency room and was described as having tremors, nauseated and irritable.  He was given Valium, thiamine, discharged on Librium.  Family has been hesitant to administer the Librium concerned about his opioids.  Discussed that there is some concern here however we have to get him through this withdrawal so they will keep an eye on him and assured me that if anything worsens he will bring him back to the emergency room.  Note at the time of my examination he had taken Librium about an hour prior and appeared calm no visible tremors, he appeared oriented to person place and time.  So will cautiously proceed with outpatient treatment, he will complete the Librium as prescribed.  He will take daily thiamine 100 mg daily as well as folic acid supplement and B complex.  I encouraged him to get additional help such as alcoholics anonymous but he declined.  He states he will follow with therapy once he feels better in about a week.  We also have a referral placed to neuropsych.  Note blood pressure was elevated today above baseline.  We will see if this improves over the next week or two I think it is related to his withdrawal.  His baseline blood pressure has been satisfactory as indicated by the average documented.    Follow Up:  Two weeks    Subjective:     Chief Complaint   Patient presents with    Follow-up    Nausea      I have reviewed the information entered by the ancillary staff regarding the chief complaint as well as the related history.    Follow-up  Associated symptoms include headaches and nausea. Pertinent negatives include no vomiting.   Nausea  Associated symptoms include headaches and nausea. Pertinent negatives include no vomiting.       Patient is a/an 89 y.o.  male     ER follow-up.  Presented with alcohol withdrawal, mild, treated as an outpatient.  Appears stable on exam today.  See above for instructions for today's visit    Note he was here with his wife and his daughter-in-law.    For complete problem list, past medical history, surgical history, social history, etc., see appropriate section in the electronic medical record    Review of Systems   Eyes:  Positive for photophobia. Negative for visual disturbance.   Respiratory: Negative.     Cardiovascular: Negative.    Gastrointestinal:  Positive for nausea. Negative for diarrhea and vomiting.   Endocrine: Negative.    Musculoskeletal:  Negative for back pain and neck stiffness.   Neurological:  Positive for headaches.   Psychiatric/Behavioral:  Positive for sleep disturbance. Negative for suicidal ideas. The patient is nervous/anxious.        Objective     Physical Exam  Constitutional:       General: He is not in acute distress.     Appearance: He is well-developed. He is not diaphoretic.      Comments: Faint smell of alcohol   HENT:      Head: Normocephalic and atraumatic.   Eyes:      General: No scleral icterus.     Conjunctiva/sclera: Conjunctivae normal.   Cardiovascular:      Rate and Rhythm: Normal rate and regular rhythm.      Heart sounds: Murmur (2/6 systolic ejection murmur) heard.   Pulmonary:      Effort: Pulmonary effort is normal. No respiratory distress.      Breath sounds: No wheezing or rales.   Neurological:      General: No focal deficit present.      Mental Status: He is alert.      Coordination: Coordination normal.      Comments: He  "is awake alert and appears oriented to person place and time.  I do not see any tremors.  He was brought back in a wheelchair.  He is wearing sunglasses complaining of mild nausea and headaches.   Psychiatric:         Behavior: Behavior normal.       Vitals:    12/26/23 1441 12/26/23 1553   BP: (!) 164/82 114/68   BP Location: Right arm    Patient Position: Sitting    BP Method: Large (Manual)    Pulse: 72    Resp: 18    Temp: 97.8 °F (36.6 °C)    TempSrc: Oral    SpO2: 96%    Weight: 93.6 kg (206 lb 5.6 oz)    Height: 5' 10" (1.778 m)            THIS DOCUMENT WAS MADE IN PART WITH VOICE RECOGNITION SOFTWARE.  OCCASIONALLY THIS SOFTWARE WILL MISINTERPRET WORDS OR PHRASES.    "

## 2024-01-03 ENCOUNTER — DOCUMENTATION ONLY (OUTPATIENT)
Dept: PRIMARY CARE CLINIC | Facility: CLINIC | Age: 89
End: 2024-01-03

## 2024-01-03 ENCOUNTER — TELEPHONE (OUTPATIENT)
Dept: PRIMARY CARE CLINIC | Facility: CLINIC | Age: 89
End: 2024-01-03
Payer: MEDICARE

## 2024-01-03 NOTE — PROGRESS NOTES
I received an urgent request to complete a death certificate for cremation. I was unaware of his passing, so I called his home and spoke with his wife and stepdaughter (not Kayleigh, don't remember the other daughters name). His wife really could not provide much information as she has some memory problems, but she reported that he had gotten up after taking a nap, went to the bathroom, came back to bed when he collapsed. 911 was called, they attempted to resuscitate, but apparently were unsuccessful.The corner or someone from the corners office did come to investigate. Found no evidence of foul play or any reason to suggest suicide attempt and deferred body to the  home to . I spoke with the daughter who confirmed the events described by his wife. She also reported that he had not been feeling well for a couple of days just somewhat tired, but no other specific symptoms.She does report that there have been some medication management issues but more forgetting to take medicines rather than taking anything more than prescribed. She did confirm her own concerns regarding many years of alcohol use but otherwise really no additional information. I completed death certificate with a primary diagnosis of undetermined natural causes with contributing factors, being coronary disease, cerebral vascular disease, and .Alcohol use disorder.

## 2024-01-03 NOTE — TELEPHONE ENCOUNTER
----- Message from Betty Sosa sent at 1/3/2024  1:24 PM CST -----  Type: Needs Medical Advice  Who Called:  Lisa/Stephanie &Son  home    Best Call Back Number: [phone]  Additional Information: States she would like to speak with office regarding pt is  as of yesterday and need for leers cementation .Please call back

## 2024-01-03 NOTE — TELEPHONE ENCOUNTER
Spoke with Lisa at [facility] and she is requesting PCP to sign off on death certificate. Lisa reports that the patient passed away at home and was pronounced at home by the ; however, he was not taken to the 's office.

## 2024-01-16 ENCOUNTER — DOCUMENT SCAN (OUTPATIENT)
Dept: HOME HEALTH SERVICES | Facility: HOSPITAL | Age: 89
End: 2024-01-16
Payer: MEDICARE

## 2024-01-23 ENCOUNTER — DOCUMENT SCAN (OUTPATIENT)
Dept: HOME HEALTH SERVICES | Facility: HOSPITAL | Age: 89
End: 2024-01-23
Payer: MEDICARE

## 2024-01-24 ENCOUNTER — EXTERNAL HOME HEALTH (OUTPATIENT)
Dept: HOME HEALTH SERVICES | Facility: HOSPITAL | Age: 89
End: 2024-01-24
Payer: MEDICARE

## 2024-02-08 NOTE — TELEPHONE ENCOUNTER
----- Message from Brooke Dominguez sent at 9/21/2018 11:01 AM CDT -----  Contact: self  Type:  RX Refill Request    Who Called:  self  Refill or New Rx:  refill  RX Name and Strength:  Hydrocodone  How is the patient currently taking it? (ex. 1XDay):  4xday  Is this a 30 day or 90 day RX:  30  Preferred Pharmacy with phone number:  [facility]  Local or Mail Order:  local  Ordering Provider:  Dr Trotter  Best Call Back Number:  [phone]  Additional Information:  Please call patient when sent. Thanks!  [facility] [address] [place] [address] AT SEC OF [facility] & [address] [address]  Phone: [phone] Fax: [phone]         Urine test is normal

## 2024-02-29 RX ORDER — MONTELUKAST SODIUM 10 MG/1
TABLET ORAL
Qty: 90 TABLET | Refills: 3 | OUTPATIENT
Start: 2024-02-29

## 2024-03-05 NOTE — TELEPHONE ENCOUNTER
----- Message from Deepak Wasserman sent at 1/23/2017  9:32 AM CST -----  Contact: Patient  Patient needs a refill on Vikatin called into   [facility] [address] [place] [address] AT [facility] & [address] [address]  Phone: [phone] Fax: [phone]  Please call patient at [phone] to confirm that prescription has been called in. Thanks!    [Alert] : alert [Normal Voice/Communication] : normal voice/communication [Healthy Appearing] : healthy appearing [No Acute Distress] : no acute distress [Sclera] : the sclera and conjunctiva were normal [Hearing Threshold Finger Rub Not [place]] : hearing was normal [Normal Lips/Gums] : the lips and gums were normal [Oropharynx] : the oropharynx was normal [Normal Appearance] : the appearance of the neck was normal [No Acc Muscle Use] : no accessory muscle use [No Respiratory Distress] : no respiratory distress [No Neck Mass] : no neck mass was observed [Respiration, Rhythm And Depth] : normal respiratory rhythm and effort [Auscultation Breath Sounds / Voice Sounds] : lungs were clear to auscultation bilaterally [Heart Rate And Rhythm] : heart rate was normal and rhythm regular [Normal S1, S2] : normal S1 and S2 [Abdomen Tenderness] : non-tender [Bowel Sounds] : normal bowel sounds [Murmurs] : no murmurs [No Masses] : no abdominal mass palpated [Abdomen Soft] : soft [] : no hepatosplenomegaly [Oriented To Time, Place, And Person] : oriented to person, place, and time

## 2024-11-10 NOTE — TELEPHONE ENCOUNTER
----- Message from Hortencia Arellano sent at 8/21/2018  1:25 PM CDT -----  Contact: Patient  Lázaro, patient [phone] calling because he needs a refill on HYDROcodone-acetaminophen (NORCO) 5-325 mg per tablet. Please advise. Thanks.    [facility] [address] [place] [id number]  Phone: [phone] Fax: [phone]     gen weakness/weakness

## 2025-01-16 NOTE — TELEPHONE ENCOUNTER
Medication pended for refill. LOV: 3/22/22   NIAS placed 20g 1 3/4 PIV in the RFA using u/s guidance.

## (undated) DEVICE — TRAY NERVE BLOCK

## (undated) DEVICE — TOWEL OR NONABSORB ADH 17X26

## (undated) DEVICE — DRAPE HALF SURGICAL 40X58IN

## (undated) DEVICE — PACK EYE CUSTOM COVINGTON.

## (undated) DEVICE — GOWN POLY REINF BRTH SLV 3XL

## (undated) DEVICE — DRESSING SURGICAL 1/2X1/2

## (undated) DEVICE — GLOVE BIOGEL SZ 8 1/2

## (undated) DEVICE — Device

## (undated) DEVICE — SEE MEDLINE ITEM 152622

## (undated) DEVICE — SOL SOD CHLORIDE 0.9% 10ML

## (undated) DEVICE — SHIELD COLLAGEN 12HR CORNEAL

## (undated) DEVICE — APPLICATOR CHLORAPREP CLR 10.5

## (undated) DEVICE — DRESSING TELFA STRL 4X3 LF

## (undated) DEVICE — SEE MEDLINE ITEM 146313

## (undated) DEVICE — SOL BETADINE 5%

## (undated) DEVICE — COVER OVERHEAD SURG LT BLUE

## (undated) DEVICE — KIT ANTIFOG

## (undated) DEVICE — SEE MEDLINE ITEM 146292

## (undated) DEVICE — GLOVE 7.5 PROTEXIS PI MICRO

## (undated) DEVICE — NDL SPINAL 25GX3.5 SPINOCAN

## (undated) DEVICE — SPONGE WEC CEL SPEARS

## (undated) DEVICE — SYR DISP LL 5CC

## (undated) DEVICE — DRAPE SURGICAL STERI INCISE

## (undated) DEVICE — GLOVE SURGICAL LATEX SZ 7

## (undated) DEVICE — SOL 0.9% NACL IRRI.IN STERIL

## (undated) DEVICE — SOL IRR BSS OPHTH 500ML STRL